# Patient Record
Sex: FEMALE | Race: WHITE | NOT HISPANIC OR LATINO
[De-identification: names, ages, dates, MRNs, and addresses within clinical notes are randomized per-mention and may not be internally consistent; named-entity substitution may affect disease eponyms.]

---

## 2017-04-05 ENCOUNTER — APPOINTMENT (OUTPATIENT)
Dept: CARDIOLOGY | Facility: CLINIC | Age: 82
End: 2017-04-05

## 2017-04-05 VITALS — DIASTOLIC BLOOD PRESSURE: 70 MMHG | HEART RATE: 68 BPM | SYSTOLIC BLOOD PRESSURE: 110 MMHG

## 2017-04-05 VITALS — WEIGHT: 147 LBS | BODY MASS INDEX: 23.07 KG/M2 | HEIGHT: 67 IN

## 2017-05-03 ENCOUNTER — APPOINTMENT (OUTPATIENT)
Dept: CARDIOLOGY | Facility: CLINIC | Age: 82
End: 2017-05-03

## 2017-06-15 ENCOUNTER — OUTPATIENT (OUTPATIENT)
Dept: OUTPATIENT SERVICES | Facility: HOSPITAL | Age: 82
LOS: 1 days | Discharge: HOME | End: 2017-06-15

## 2017-06-28 DIAGNOSIS — E03.9 HYPOTHYROIDISM, UNSPECIFIED: ICD-10-CM

## 2017-06-29 ENCOUNTER — EMERGENCY (EMERGENCY)
Facility: HOSPITAL | Age: 82
LOS: 0 days | Discharge: HOME | End: 2017-06-29
Admitting: INTERNAL MEDICINE

## 2017-06-29 DIAGNOSIS — K21.9 GASTRO-ESOPHAGEAL REFLUX DISEASE WITHOUT ESOPHAGITIS: ICD-10-CM
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
## 2017-06-29 DIAGNOSIS — I25.10 ATHEROSCLEROTIC HEART DISEASE OF NATIVE CORONARY ARTERY WITHOUT ANGINA PECTORIS: ICD-10-CM

## 2017-06-29 DIAGNOSIS — I48.91 UNSPECIFIED ATRIAL FIBRILLATION: ICD-10-CM

## 2017-06-29 DIAGNOSIS — Z79.899 OTHER LONG TERM (CURRENT) DRUG THERAPY: ICD-10-CM

## 2017-06-29 DIAGNOSIS — Z88.2 ALLERGY STATUS TO SULFONAMIDES: ICD-10-CM

## 2017-06-29 DIAGNOSIS — S60.512A ABRASION OF LEFT HAND, INITIAL ENCOUNTER: ICD-10-CM

## 2017-07-10 ENCOUNTER — RX RENEWAL (OUTPATIENT)
Age: 82
End: 2017-07-10

## 2017-07-11 ENCOUNTER — EMERGENCY (EMERGENCY)
Facility: HOSPITAL | Age: 82
LOS: 0 days | Discharge: HOME | End: 2017-07-11
Admitting: INTERNAL MEDICINE

## 2017-07-11 DIAGNOSIS — Z79.899 OTHER LONG TERM (CURRENT) DRUG THERAPY: ICD-10-CM

## 2017-07-11 DIAGNOSIS — S80.12XA CONTUSION OF LEFT LOWER LEG, INITIAL ENCOUNTER: ICD-10-CM
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
## 2017-07-11 DIAGNOSIS — Z88.2 ALLERGY STATUS TO SULFONAMIDES: ICD-10-CM
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
## 2017-07-11 DIAGNOSIS — K21.9 GASTRO-ESOPHAGEAL REFLUX DISEASE WITHOUT ESOPHAGITIS: ICD-10-CM
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
## 2017-07-13 ENCOUNTER — OUTPATIENT (OUTPATIENT)
Dept: OUTPATIENT SERVICES | Facility: HOSPITAL | Age: 82
LOS: 1 days | Discharge: HOME | End: 2017-07-13

## 2017-07-13 DIAGNOSIS — E03.9 HYPOTHYROIDISM, UNSPECIFIED: ICD-10-CM

## 2017-07-13 DIAGNOSIS — I48.92 UNSPECIFIED ATRIAL FLUTTER: ICD-10-CM

## 2017-07-13 DIAGNOSIS — M43.06 SPONDYLOLYSIS, LUMBAR REGION: ICD-10-CM

## 2017-07-13 DIAGNOSIS — J45.909 UNSPECIFIED ASTHMA, UNCOMPLICATED: ICD-10-CM

## 2017-07-13 DIAGNOSIS — N28.1 CYST OF KIDNEY, ACQUIRED: ICD-10-CM

## 2017-07-14 ENCOUNTER — APPOINTMENT (OUTPATIENT)
Dept: CARDIOLOGY | Facility: CLINIC | Age: 82
End: 2017-07-14

## 2017-07-14 VITALS — HEART RATE: 76 BPM | SYSTOLIC BLOOD PRESSURE: 136 MMHG | DIASTOLIC BLOOD PRESSURE: 80 MMHG

## 2017-07-14 VITALS — WEIGHT: 148 LBS | BODY MASS INDEX: 23.23 KG/M2 | HEIGHT: 67 IN

## 2017-07-17 ENCOUNTER — OUTPATIENT (OUTPATIENT)
Dept: OUTPATIENT SERVICES | Facility: HOSPITAL | Age: 82
LOS: 1 days | Discharge: HOME | End: 2017-07-17

## 2017-07-17 DIAGNOSIS — T14.8 OTHER INJURY OF UNSPECIFIED BODY REGION: ICD-10-CM

## 2017-07-18 ENCOUNTER — RX RENEWAL (OUTPATIENT)
Age: 82
End: 2017-07-18

## 2017-07-31 ENCOUNTER — RX RENEWAL (OUTPATIENT)
Age: 82
End: 2017-07-31

## 2017-08-24 ENCOUNTER — APPOINTMENT (OUTPATIENT)
Dept: CARDIOLOGY | Facility: CLINIC | Age: 82
End: 2017-08-24

## 2017-08-24 VITALS — DIASTOLIC BLOOD PRESSURE: 80 MMHG | SYSTOLIC BLOOD PRESSURE: 130 MMHG | HEART RATE: 72 BPM

## 2017-08-24 VITALS — BODY MASS INDEX: 22.6 KG/M2 | HEIGHT: 67 IN | WEIGHT: 144 LBS

## 2017-08-26 ENCOUNTER — MEDICATION RENEWAL (OUTPATIENT)
Age: 82
End: 2017-08-26

## 2017-10-17 ENCOUNTER — EMERGENCY (EMERGENCY)
Facility: HOSPITAL | Age: 82
LOS: 0 days | Discharge: HOME | End: 2017-10-17
Admitting: INTERNAL MEDICINE

## 2017-10-17 DIAGNOSIS — Z79.899 OTHER LONG TERM (CURRENT) DRUG THERAPY: ICD-10-CM

## 2017-10-17 DIAGNOSIS — I25.10 ATHEROSCLEROTIC HEART DISEASE OF NATIVE CORONARY ARTERY WITHOUT ANGINA PECTORIS: ICD-10-CM

## 2017-10-17 DIAGNOSIS — Z88.2 ALLERGY STATUS TO SULFONAMIDES: ICD-10-CM

## 2017-10-17 DIAGNOSIS — M25.552 PAIN IN LEFT HIP: ICD-10-CM

## 2017-10-17 DIAGNOSIS — E03.9 HYPOTHYROIDISM, UNSPECIFIED: ICD-10-CM

## 2017-10-17 DIAGNOSIS — Z88.1 ALLERGY STATUS TO OTHER ANTIBIOTIC AGENTS STATUS: ICD-10-CM

## 2017-10-17 DIAGNOSIS — K21.9 GASTRO-ESOPHAGEAL REFLUX DISEASE WITHOUT ESOPHAGITIS: ICD-10-CM

## 2017-10-17 DIAGNOSIS — I48.91 UNSPECIFIED ATRIAL FIBRILLATION: ICD-10-CM

## 2017-10-24 ENCOUNTER — OUTPATIENT (OUTPATIENT)
Dept: OUTPATIENT SERVICES | Facility: HOSPITAL | Age: 82
LOS: 1 days | Discharge: HOME | End: 2017-10-24

## 2017-10-24 DIAGNOSIS — M25.50 PAIN IN UNSPECIFIED JOINT: ICD-10-CM

## 2017-11-10 ENCOUNTER — OUTPATIENT (OUTPATIENT)
Dept: OUTPATIENT SERVICES | Facility: HOSPITAL | Age: 82
LOS: 1 days | Discharge: HOME | End: 2017-11-10

## 2017-11-10 DIAGNOSIS — Z00.00 ENCOUNTER FOR GENERAL ADULT MEDICAL EXAMINATION WITHOUT ABNORMAL FINDINGS: ICD-10-CM

## 2017-11-10 DIAGNOSIS — R00.2 PALPITATIONS: ICD-10-CM

## 2017-11-10 DIAGNOSIS — I34.0 NONRHEUMATIC MITRAL (VALVE) INSUFFICIENCY: ICD-10-CM

## 2017-11-10 DIAGNOSIS — I31.9 DISEASE OF PERICARDIUM, UNSPECIFIED: ICD-10-CM

## 2017-11-10 DIAGNOSIS — K57.32 DIVERTICULITIS OF LARGE INTESTINE WITHOUT PERFORATION OR ABSCESS WITHOUT BLEEDING: ICD-10-CM

## 2017-11-10 DIAGNOSIS — J45.909 UNSPECIFIED ASTHMA, UNCOMPLICATED: ICD-10-CM

## 2017-11-10 DIAGNOSIS — I48.91 UNSPECIFIED ATRIAL FIBRILLATION: ICD-10-CM

## 2017-11-10 DIAGNOSIS — K21.9 GASTRO-ESOPHAGEAL REFLUX DISEASE WITHOUT ESOPHAGITIS: ICD-10-CM

## 2017-11-10 DIAGNOSIS — M65.80 OTHER SYNOVITIS AND TENOSYNOVITIS, UNSPECIFIED SITE: ICD-10-CM

## 2017-11-10 DIAGNOSIS — N28.1 CYST OF KIDNEY, ACQUIRED: ICD-10-CM

## 2017-11-16 ENCOUNTER — EMERGENCY (EMERGENCY)
Facility: HOSPITAL | Age: 82
LOS: 0 days | Discharge: HOME | End: 2017-11-16
Admitting: INTERNAL MEDICINE

## 2017-11-16 DIAGNOSIS — S81.811A LACERATION WITHOUT FOREIGN BODY, RIGHT LOWER LEG, INITIAL ENCOUNTER: ICD-10-CM

## 2017-11-16 DIAGNOSIS — I25.10 ATHEROSCLEROTIC HEART DISEASE OF NATIVE CORONARY ARTERY WITHOUT ANGINA PECTORIS: ICD-10-CM
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
## 2017-11-16 DIAGNOSIS — Z88.2 ALLERGY STATUS TO SULFONAMIDES: ICD-10-CM
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
## 2017-11-16 DIAGNOSIS — I48.91 UNSPECIFIED ATRIAL FIBRILLATION: ICD-10-CM

## 2017-11-21 DIAGNOSIS — Z79.899 OTHER LONG TERM (CURRENT) DRUG THERAPY: ICD-10-CM

## 2017-11-22 ENCOUNTER — EMERGENCY (EMERGENCY)
Facility: HOSPITAL | Age: 82
LOS: 0 days | Discharge: HOME | End: 2017-11-22

## 2017-11-22 DIAGNOSIS — K21.9 GASTRO-ESOPHAGEAL REFLUX DISEASE WITHOUT ESOPHAGITIS: ICD-10-CM

## 2017-11-22 DIAGNOSIS — Z79.899 OTHER LONG TERM (CURRENT) DRUG THERAPY: ICD-10-CM
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
## 2017-11-22 DIAGNOSIS — I25.10 ATHEROSCLEROTIC HEART DISEASE OF NATIVE CORONARY ARTERY WITHOUT ANGINA PECTORIS: ICD-10-CM

## 2017-11-22 DIAGNOSIS — Z88.2 ALLERGY STATUS TO SULFONAMIDES: ICD-10-CM

## 2017-11-22 DIAGNOSIS — Z79.82 LONG TERM (CURRENT) USE OF ASPIRIN: ICD-10-CM
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
## 2017-11-22 DIAGNOSIS — S80.822A BLISTER (NONTHERMAL), LEFT LOWER LEG, INITIAL ENCOUNTER: ICD-10-CM

## 2017-11-27 ENCOUNTER — OUTPATIENT (OUTPATIENT)
Dept: OUTPATIENT SERVICES | Facility: HOSPITAL | Age: 82
LOS: 1 days | Discharge: HOME | End: 2017-11-27

## 2017-11-27 DIAGNOSIS — R94.6 ABNORMAL RESULTS OF THYROID FUNCTION STUDIES: ICD-10-CM

## 2017-11-30 ENCOUNTER — APPOINTMENT (OUTPATIENT)
Dept: CARDIOLOGY | Facility: CLINIC | Age: 82
End: 2017-11-30

## 2017-11-30 VITALS — SYSTOLIC BLOOD PRESSURE: 110 MMHG | HEART RATE: 84 BPM | DIASTOLIC BLOOD PRESSURE: 70 MMHG

## 2017-11-30 VITALS — BODY MASS INDEX: 22.76 KG/M2 | HEIGHT: 67 IN | WEIGHT: 145 LBS

## 2017-11-30 DIAGNOSIS — M85.80 OTHER SPECIFIED DISORDERS OF BONE DENSITY AND STRUCTURE, UNSPECIFIED SITE: ICD-10-CM

## 2017-11-30 DIAGNOSIS — T14.8XXA OTHER INJURY OF UNSPECIFIED BODY REGION, INITIAL ENCOUNTER: ICD-10-CM

## 2017-12-10 ENCOUNTER — OUTPATIENT (OUTPATIENT)
Dept: OUTPATIENT SERVICES | Facility: HOSPITAL | Age: 82
LOS: 1 days | Discharge: HOME | End: 2017-12-10

## 2017-12-10 DIAGNOSIS — R05 COUGH: ICD-10-CM

## 2017-12-14 ENCOUNTER — APPOINTMENT (OUTPATIENT)
Age: 82
End: 2017-12-14

## 2017-12-14 ENCOUNTER — OUTPATIENT (OUTPATIENT)
Dept: OUTPATIENT SERVICES | Facility: HOSPITAL | Age: 82
LOS: 1 days | Discharge: HOME | End: 2017-12-14
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
## 2017-12-14 DIAGNOSIS — S81.801A UNSPECIFIED OPEN WOUND, RIGHT LOWER LEG, INITIAL ENCOUNTER: ICD-10-CM

## 2018-01-11 ENCOUNTER — APPOINTMENT (OUTPATIENT)
Age: 83
End: 2018-01-11

## 2018-01-11 ENCOUNTER — OUTPATIENT (OUTPATIENT)
Dept: OUTPATIENT SERVICES | Facility: HOSPITAL | Age: 83
LOS: 1 days | Discharge: HOME | End: 2018-01-11

## 2018-01-11 DIAGNOSIS — S81.801A UNSPECIFIED OPEN WOUND, RIGHT LOWER LEG, INITIAL ENCOUNTER: ICD-10-CM
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
## 2018-01-18 PROBLEM — S81.801A OPEN WOUND OF RIGHT LOWER LEG, INITIAL ENCOUNTER: Status: ACTIVE | Noted: 2017-12-29

## 2018-01-19 ENCOUNTER — OUTPATIENT (OUTPATIENT)
Dept: OUTPATIENT SERVICES | Facility: HOSPITAL | Age: 83
LOS: 1 days | Discharge: HOME | End: 2018-01-19

## 2018-01-19 DIAGNOSIS — M54.5 LOW BACK PAIN: ICD-10-CM

## 2018-02-01 ENCOUNTER — OUTPATIENT (OUTPATIENT)
Dept: OUTPATIENT SERVICES | Facility: HOSPITAL | Age: 83
LOS: 1 days | Discharge: HOME | End: 2018-02-01

## 2018-02-01 ENCOUNTER — APPOINTMENT (OUTPATIENT)
Age: 83
End: 2018-02-01
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
## 2018-02-12 DIAGNOSIS — S81.802A UNSPECIFIED OPEN WOUND, LEFT LOWER LEG, INITIAL ENCOUNTER: ICD-10-CM
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
## 2018-02-26 ENCOUNTER — LABORATORY RESULT (OUTPATIENT)
Age: 83
End: 2018-02-26

## 2018-02-26 ENCOUNTER — OUTPATIENT (OUTPATIENT)
Dept: OUTPATIENT SERVICES | Facility: HOSPITAL | Age: 83
LOS: 1 days | Discharge: HOME | End: 2018-02-26

## 2018-02-26 DIAGNOSIS — K21.9 GASTRO-ESOPHAGEAL REFLUX DISEASE WITHOUT ESOPHAGITIS: ICD-10-CM

## 2018-02-26 DIAGNOSIS — N28.1 CYST OF KIDNEY, ACQUIRED: ICD-10-CM

## 2018-02-26 DIAGNOSIS — I34.0 NONRHEUMATIC MITRAL (VALVE) INSUFFICIENCY: ICD-10-CM

## 2018-02-26 DIAGNOSIS — K57.32 DIVERTICULITIS OF LARGE INTESTINE WITHOUT PERFORATION OR ABSCESS WITHOUT BLEEDING: ICD-10-CM

## 2018-02-26 DIAGNOSIS — I48.91 UNSPECIFIED ATRIAL FIBRILLATION: ICD-10-CM

## 2018-02-26 DIAGNOSIS — I31.9 DISEASE OF PERICARDIUM, UNSPECIFIED: ICD-10-CM

## 2018-02-26 DIAGNOSIS — E03.9 HYPOTHYROIDISM, UNSPECIFIED: ICD-10-CM

## 2018-02-26 DIAGNOSIS — M85.80 OTHER SPECIFIED DISORDERS OF BONE DENSITY AND STRUCTURE, UNSPECIFIED SITE: ICD-10-CM

## 2018-02-26 DIAGNOSIS — T14.8XXA OTHER INJURY OF UNSPECIFIED BODY REGION, INITIAL ENCOUNTER: ICD-10-CM

## 2018-02-26 DIAGNOSIS — M43.06 SPONDYLOLYSIS, LUMBAR REGION: ICD-10-CM

## 2018-02-26 DIAGNOSIS — J45.909 UNSPECIFIED ASTHMA, UNCOMPLICATED: ICD-10-CM

## 2018-02-26 DIAGNOSIS — R00.2 PALPITATIONS: ICD-10-CM

## 2018-03-01 ENCOUNTER — OUTPATIENT (OUTPATIENT)
Dept: OUTPATIENT SERVICES | Facility: HOSPITAL | Age: 83
LOS: 1 days | Discharge: HOME | End: 2018-03-01

## 2018-03-01 ENCOUNTER — RX RENEWAL (OUTPATIENT)
Age: 83
End: 2018-03-01

## 2018-03-01 ENCOUNTER — APPOINTMENT (OUTPATIENT)
Age: 83
End: 2018-03-01

## 2018-03-08 DIAGNOSIS — S61.205A UNSPECIFIED OPEN WOUND OF LEFT RING FINGER WITHOUT DAMAGE TO NAIL, INITIAL ENCOUNTER: ICD-10-CM
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
## 2018-03-09 ENCOUNTER — OUTPATIENT (OUTPATIENT)
Dept: OUTPATIENT SERVICES | Facility: HOSPITAL | Age: 83
LOS: 1 days | Discharge: HOME | End: 2018-03-09

## 2018-03-09 ENCOUNTER — APPOINTMENT (OUTPATIENT)
Dept: CARDIOLOGY | Facility: CLINIC | Age: 83
End: 2018-03-09

## 2018-03-09 VITALS — WEIGHT: 145 LBS | BODY MASS INDEX: 22.76 KG/M2 | HEIGHT: 67 IN

## 2018-03-09 VITALS — DIASTOLIC BLOOD PRESSURE: 76 MMHG | SYSTOLIC BLOOD PRESSURE: 124 MMHG | HEART RATE: 98 BPM

## 2018-03-09 DIAGNOSIS — F03.90 UNSPECIFIED DEMENTIA WITHOUT BEHAVIORAL DISTURBANCE: ICD-10-CM

## 2018-03-09 DIAGNOSIS — E03.9 HYPOTHYROIDISM, UNSPECIFIED: ICD-10-CM

## 2018-03-11 LAB
FOLATE SERPL-MCNC: >20 NG/ML
T4 FREE SERPL-MCNC: 1.6 NG/DL
TSH SERPL-ACNC: 2.9 UIU/ML
VIT B12 SERPL-MCNC: 389 PG/ML

## 2018-03-15 ENCOUNTER — OUTPATIENT (OUTPATIENT)
Dept: OUTPATIENT SERVICES | Facility: HOSPITAL | Age: 83
LOS: 1 days | Discharge: HOME | End: 2018-03-15

## 2018-03-15 ENCOUNTER — APPOINTMENT (OUTPATIENT)
Age: 83
End: 2018-03-15

## 2018-03-20 ENCOUNTER — APPOINTMENT (OUTPATIENT)
Dept: PLASTIC SURGERY | Facility: CLINIC | Age: 83
End: 2018-03-20
Payer: MEDICARE

## 2018-03-20 PROCEDURE — 99203 OFFICE O/P NEW LOW 30 MIN: CPT

## 2018-03-23 ENCOUNTER — OUTPATIENT (OUTPATIENT)
Dept: OUTPATIENT SERVICES | Facility: HOSPITAL | Age: 83
LOS: 1 days | Discharge: HOME | End: 2018-03-23

## 2018-03-23 DIAGNOSIS — S61.209A UNSPECIFIED OPEN WOUND OF UNSPECIFIED FINGER WITHOUT DAMAGE TO NAIL, INITIAL ENCOUNTER: ICD-10-CM

## 2018-03-26 ENCOUNTER — EMERGENCY (EMERGENCY)
Facility: HOSPITAL | Age: 83
LOS: 0 days | Discharge: HOME | End: 2018-03-26
Attending: EMERGENCY MEDICINE

## 2018-03-26 VITALS
RESPIRATION RATE: 18 BRPM | HEART RATE: 87 BPM | OXYGEN SATURATION: 98 % | DIASTOLIC BLOOD PRESSURE: 66 MMHG | TEMPERATURE: 97 F | SYSTOLIC BLOOD PRESSURE: 133 MMHG
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
## 2018-03-26 DIAGNOSIS — R05 COUGH: ICD-10-CM

## 2018-03-26 DIAGNOSIS — S61.205A UNSPECIFIED OPEN WOUND OF LEFT RING FINGER WITHOUT DAMAGE TO NAIL, INITIAL ENCOUNTER: ICD-10-CM

## 2018-03-26 DIAGNOSIS — J06.9 ACUTE UPPER RESPIRATORY INFECTION, UNSPECIFIED: ICD-10-CM

## 2018-03-26 DIAGNOSIS — L08.9 LOCAL INFECTION OF THE SKIN AND SUBCUTANEOUS TISSUE, UNSPECIFIED: ICD-10-CM

## 2018-03-26 DIAGNOSIS — Z79.899 OTHER LONG TERM (CURRENT) DRUG THERAPY: ICD-10-CM
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
## 2018-03-26 DIAGNOSIS — Z88.2 ALLERGY STATUS TO SULFONAMIDES: ICD-10-CM

## 2018-03-26 RX ORDER — DEXAMETHASONE 0.5 MG/5ML
12 ELIXIR ORAL ONCE
Qty: 0 | Refills: 0 | Status: COMPLETED | OUTPATIENT
Start: 2018-03-26 | End: 2018-03-26

## 2018-03-26 RX ADMIN — Medication 12 MILLIGRAM(S): at 19:49

## 2018-03-26 NOTE — ED PROVIDER NOTE - NS ED ROS FT
Constitutional: see HPI  Cardiac: No chest pain, SOB or edema.  Respiratory: see HPI  GI: No nausea, vomiting, diarrhea or abdominal pain.  : No dysuria, frequency, urgency or hematuria  MS: no pain to back or extremities, no loss of ROM, no weakness  Neuro: No headache or weakness. No LOC.  Skin: No skin rash.  Except as documented in the HPI, all other systems are negative.

## 2018-03-26 NOTE — ED PROVIDER NOTE - OBJECTIVE STATEMENT
83 yo F with no significant history, here for assessment of cough, congestion x 3 days. Cough with white sputum, no associated fever, chills, chest pain, dizziness, weakness. No vomiting or diarrhea. Best friend whom she lives with (is a nun) has same sx, resolving.

## 2018-03-26 NOTE — ED PROVIDER NOTE - PHYSICAL EXAMINATION
VITAL SIGNS: I have reviewed nursing notes and confirm.  CONSTITUTIONAL: Well-developed; well-nourished; in no acute distress.  SKIN: Skin exam is warm and dry, no acute rash.  HEAD: Normocephalic; atraumatic.  EYES: PERRL, EOM intact; conjunctiva and sclera clear.  ENT: clear rhinorrhea  NECK: Supple; non tender.  CARD: Regular rate and rhythm.  RESP: No wheezes, rales or rhonchi.  ABD: Normal bowel sounds; soft; non-distended; non-tender  EXT: Normal ROM. No clubbing, cyanosis or edema.  NEURO: Alert, oriented. Grossly unremarkable. No focal deficits.  PSYCH: Cooperative, appropriate.

## 2018-03-26 NOTE — ED PROVIDER NOTE - MEDICAL DECISION MAKING DETAILS
Patient here for assessment of cough, congestion -- has normal VS, clear lungs, edematous turbinates and clear rhinorrhea. Will get CXR, give dex and reassess.

## 2018-04-05 ENCOUNTER — OUTPATIENT (OUTPATIENT)
Dept: OUTPATIENT SERVICES | Facility: HOSPITAL | Age: 83
LOS: 1 days | Discharge: HOME | End: 2018-04-05

## 2018-04-05 ENCOUNTER — APPOINTMENT (OUTPATIENT)
Age: 83
End: 2018-04-05

## 2018-04-10 ENCOUNTER — OUTPATIENT (OUTPATIENT)
Dept: OUTPATIENT SERVICES | Facility: HOSPITAL | Age: 83
LOS: 1 days | Discharge: HOME | End: 2018-04-10

## 2018-04-10 DIAGNOSIS — M79.671 PAIN IN RIGHT FOOT: ICD-10-CM

## 2018-04-10 DIAGNOSIS — E03.9 HYPOTHYROIDISM, UNSPECIFIED: ICD-10-CM
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
## 2018-04-11 DIAGNOSIS — S81.801A UNSPECIFIED OPEN WOUND, RIGHT LOWER LEG, INITIAL ENCOUNTER: ICD-10-CM

## 2018-04-11 DIAGNOSIS — S81.802A UNSPECIFIED OPEN WOUND, LEFT LOWER LEG, INITIAL ENCOUNTER: ICD-10-CM
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
## 2018-04-17 ENCOUNTER — MEDICATION RENEWAL (OUTPATIENT)
Age: 83
End: 2018-04-17

## 2018-04-18 DIAGNOSIS — Z02.9 ENCOUNTER FOR ADMINISTRATIVE EXAMINATIONS, UNSPECIFIED: ICD-10-CM

## 2018-04-18 DIAGNOSIS — E03.9 HYPOTHYROIDISM, UNSPECIFIED: ICD-10-CM

## 2018-04-19 ENCOUNTER — APPOINTMENT (OUTPATIENT)
Dept: WOUND CARE | Facility: CLINIC | Age: 83
End: 2018-04-19

## 2018-04-19 ENCOUNTER — OUTPATIENT (OUTPATIENT)
Dept: OUTPATIENT SERVICES | Facility: HOSPITAL | Age: 83
LOS: 1 days | Discharge: HOME | End: 2018-04-19

## 2018-04-24 ENCOUNTER — APPOINTMENT (OUTPATIENT)
Dept: PLASTIC SURGERY | Facility: CLINIC | Age: 83
End: 2018-04-24
Payer: MEDICARE

## 2018-04-24 DIAGNOSIS — S61.209A UNSPECIFIED OPEN WOUND OF UNSPECIFIED FINGER W/OUT DAMAGE TO NAIL, INITIAL ENCOUNTER: ICD-10-CM

## 2018-04-24 PROCEDURE — 99212 OFFICE O/P EST SF 10 MIN: CPT
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
## 2018-04-30 DIAGNOSIS — S81.801A UNSPECIFIED OPEN WOUND, RIGHT LOWER LEG, INITIAL ENCOUNTER: ICD-10-CM
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
## 2018-05-01 ENCOUNTER — OUTPATIENT (OUTPATIENT)
Dept: OUTPATIENT SERVICES | Facility: HOSPITAL | Age: 83
LOS: 1 days | Discharge: HOME | End: 2018-05-01

## 2018-05-01 DIAGNOSIS — E03.9 HYPOTHYROIDISM, UNSPECIFIED: ICD-10-CM

## 2018-05-01 DIAGNOSIS — D64.9 ANEMIA, UNSPECIFIED: ICD-10-CM

## 2018-05-03 ENCOUNTER — OUTPATIENT (OUTPATIENT)
Dept: OUTPATIENT SERVICES | Facility: HOSPITAL | Age: 83
LOS: 1 days | Discharge: HOME | End: 2018-05-03

## 2018-05-03 DIAGNOSIS — M19.90 UNSPECIFIED OSTEOARTHRITIS, UNSPECIFIED SITE: ICD-10-CM

## 2018-05-03 DIAGNOSIS — M85.80 OTHER SPECIFIED DISORDERS OF BONE DENSITY AND STRUCTURE, UNSPECIFIED SITE: ICD-10-CM

## 2018-05-22 ENCOUNTER — APPOINTMENT (OUTPATIENT)
Dept: WOUND CARE | Facility: CLINIC | Age: 83
End: 2018-05-22

## 2018-05-22 ENCOUNTER — OUTPATIENT (OUTPATIENT)
Dept: OUTPATIENT SERVICES | Facility: HOSPITAL | Age: 83
LOS: 1 days | Discharge: HOME | End: 2018-05-22

## 2018-05-29 ENCOUNTER — OUTPATIENT (OUTPATIENT)
Dept: OUTPATIENT SERVICES | Facility: HOSPITAL | Age: 83
LOS: 1 days | Discharge: HOME | End: 2018-05-29

## 2018-05-29 DIAGNOSIS — I48.91 UNSPECIFIED ATRIAL FIBRILLATION: ICD-10-CM

## 2018-05-29 DIAGNOSIS — L98.499 NON-PRESSURE CHRONIC ULCER OF SKIN OF OTHER SITES WITH UNSPECIFIED SEVERITY: ICD-10-CM

## 2018-05-29 DIAGNOSIS — S81.801A UNSPECIFIED OPEN WOUND, RIGHT LOWER LEG, INITIAL ENCOUNTER: ICD-10-CM

## 2018-05-29 DIAGNOSIS — K57.32 DIVERTICULITIS OF LARGE INTESTINE WITHOUT PERFORATION OR ABSCESS WITHOUT BLEEDING: ICD-10-CM

## 2018-05-29 DIAGNOSIS — K21.9 GASTRO-ESOPHAGEAL REFLUX DISEASE WITHOUT ESOPHAGITIS: ICD-10-CM

## 2018-05-29 DIAGNOSIS — E03.9 HYPOTHYROIDISM, UNSPECIFIED: ICD-10-CM

## 2018-05-29 DIAGNOSIS — I31.9 DISEASE OF PERICARDIUM, UNSPECIFIED: ICD-10-CM

## 2018-05-29 DIAGNOSIS — I48.92 UNSPECIFIED ATRIAL FLUTTER: ICD-10-CM

## 2018-05-29 DIAGNOSIS — S81.802D UNSPECIFIED OPEN WOUND, LEFT LOWER LEG, SUBSEQUENT ENCOUNTER: ICD-10-CM

## 2018-05-29 DIAGNOSIS — R00.2 PALPITATIONS: ICD-10-CM

## 2018-05-29 DIAGNOSIS — I34.0 NONRHEUMATIC MITRAL (VALVE) INSUFFICIENCY: ICD-10-CM

## 2018-05-29 DIAGNOSIS — L08.9 LOCAL INFECTION OF THE SKIN AND SUBCUTANEOUS TISSUE, UNSPECIFIED: ICD-10-CM
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
## 2018-05-31 DIAGNOSIS — S81.801A UNSPECIFIED OPEN WOUND, RIGHT LOWER LEG, INITIAL ENCOUNTER: ICD-10-CM
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
## 2018-06-02 LAB
ALBUMIN SERPL ELPH-MCNC: 4.1 G/DL
ALP BLD-CCNC: 71 U/L
ALT SERPL-CCNC: 17 U/L
ANION GAP SERPL CALC-SCNC: 17 MMOL/L
AST SERPL-CCNC: 21 U/L
BASOPHILS # BLD AUTO: 0.04 K/UL
BASOPHILS NFR BLD AUTO: 0.6 %
BILIRUB SERPL-MCNC: 0.4 MG/DL
BUN SERPL-MCNC: 25 MG/DL
CALCIUM SERPL-MCNC: 9.5 MG/DL
CHLORIDE SERPL-SCNC: 106 MMOL/L
CHOLEST SERPL-MCNC: 186 MG/DL
CHOLEST/HDLC SERPL: 2 RATIO
CO2 SERPL-SCNC: 22 MMOL/L
CREAT SERPL-MCNC: 1.1 MG/DL
EOSINOPHIL # BLD AUTO: 0.07 K/UL
EOSINOPHIL NFR BLD AUTO: 1.1 %
GLUCOSE SERPL-MCNC: 113 MG/DL
HCT VFR BLD CALC: 37 %
HDLC SERPL-MCNC: 93 MG/DL
HGB BLD-MCNC: 11.6 G/DL
IMM GRANULOCYTES NFR BLD AUTO: 0.3 %
LDLC SERPL CALC-MCNC: 84 MG/DL
LYMPHOCYTES # BLD AUTO: 1.54 K/UL
LYMPHOCYTES NFR BLD AUTO: 23.5 %
MAN DIFF?: NORMAL
MCHC RBC-ENTMCNC: 29.4 PG
MCHC RBC-ENTMCNC: 31.4 G/DL
MCV RBC AUTO: 93.7 FL
MONOCYTES # BLD AUTO: 0.43 K/UL
MONOCYTES NFR BLD AUTO: 6.6 %
NEUTROPHILS # BLD AUTO: 4.45 K/UL
NEUTROPHILS NFR BLD AUTO: 67.9 %
PLATELET # BLD AUTO: 240 K/UL
POTASSIUM SERPL-SCNC: 4.3 MMOL/L
PROT SERPL-MCNC: 6.5 G/DL
RBC # BLD: 3.95 M/UL
RBC # FLD: 13.5 %
SODIUM SERPL-SCNC: 145 MMOL/L
T4 FREE SERPL-MCNC: 1.6 NG/DL
TRIGL SERPL-MCNC: 66 MG/DL
TSH SERPL-ACNC: 1.05 UIU/ML
WBC # FLD AUTO: 6.55 K/UL

## 2018-06-14 ENCOUNTER — OUTPATIENT (OUTPATIENT)
Dept: OUTPATIENT SERVICES | Facility: HOSPITAL | Age: 83
LOS: 1 days | Discharge: HOME | End: 2018-06-14

## 2018-06-14 ENCOUNTER — APPOINTMENT (OUTPATIENT)
Dept: BURN CARE | Facility: CLINIC | Age: 83
End: 2018-06-14

## 2018-06-14 DIAGNOSIS — S51.801A UNSPECIFIED OPEN WOUND OF RIGHT FOREARM, INITIAL ENCOUNTER: ICD-10-CM

## 2018-06-14 DIAGNOSIS — S41.101A UNSPECIFIED OPEN WOUND OF RIGHT UPPER ARM, INITIAL ENCOUNTER: ICD-10-CM

## 2018-06-15 ENCOUNTER — APPOINTMENT (OUTPATIENT)
Dept: CARDIOLOGY | Facility: CLINIC | Age: 83
End: 2018-06-15

## 2018-06-15 VITALS
WEIGHT: 143 LBS | SYSTOLIC BLOOD PRESSURE: 130 MMHG | HEIGHT: 67 IN | BODY MASS INDEX: 22.44 KG/M2 | HEART RATE: 87 BPM | DIASTOLIC BLOOD PRESSURE: 80 MMHG

## 2018-06-28 ENCOUNTER — OUTPATIENT (OUTPATIENT)
Dept: OUTPATIENT SERVICES | Facility: HOSPITAL | Age: 83
LOS: 1 days | Discharge: HOME | End: 2018-06-28

## 2018-06-28 ENCOUNTER — APPOINTMENT (OUTPATIENT)
Dept: BURN CARE | Facility: CLINIC | Age: 83
End: 2018-06-28

## 2018-06-28 DIAGNOSIS — S81.802A UNSPECIFIED OPEN WOUND, LEFT LOWER LEG, INITIAL ENCOUNTER: ICD-10-CM
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
## 2018-06-28 DIAGNOSIS — S81.801A UNSPECIFIED OPEN WOUND, RIGHT LOWER LEG, INITIAL ENCOUNTER: ICD-10-CM

## 2018-06-28 DIAGNOSIS — S51.801A UNSPECIFIED OPEN WOUND OF RIGHT FOREARM, INITIAL ENCOUNTER: ICD-10-CM

## 2018-07-05 ENCOUNTER — APPOINTMENT (OUTPATIENT)
Dept: CARDIOLOGY | Facility: CLINIC | Age: 83
End: 2018-07-05

## 2018-07-19 ENCOUNTER — OUTPATIENT (OUTPATIENT)
Dept: OUTPATIENT SERVICES | Facility: HOSPITAL | Age: 83
LOS: 1 days | Discharge: HOME | End: 2018-07-19

## 2018-07-19 ENCOUNTER — APPOINTMENT (OUTPATIENT)
Dept: CARDIOLOGY | Facility: CLINIC | Age: 83
End: 2018-07-19

## 2018-07-19 ENCOUNTER — APPOINTMENT (OUTPATIENT)
Dept: WOUND CARE | Facility: CLINIC | Age: 83
End: 2018-07-19
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
## 2018-07-30 DIAGNOSIS — S41.101D UNSPECIFIED OPEN WOUND OF RIGHT UPPER ARM, SUBSEQUENT ENCOUNTER: ICD-10-CM

## 2018-07-30 DIAGNOSIS — S81.801D UNSPECIFIED OPEN WOUND, RIGHT LOWER LEG, SUBSEQUENT ENCOUNTER: ICD-10-CM

## 2018-08-16 ENCOUNTER — MEDICATION RENEWAL (OUTPATIENT)
Age: 83
End: 2018-08-16

## 2018-08-30 ENCOUNTER — OUTPATIENT (OUTPATIENT)
Dept: OUTPATIENT SERVICES | Facility: HOSPITAL | Age: 83
LOS: 1 days | Discharge: HOME | End: 2018-08-30

## 2018-08-30 ENCOUNTER — APPOINTMENT (OUTPATIENT)
Dept: WOUND CARE | Facility: CLINIC | Age: 83
End: 2018-08-30
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
## 2018-08-30 DIAGNOSIS — S41.101A UNSPECIFIED OPEN WOUND OF RIGHT UPPER ARM, INITIAL ENCOUNTER: ICD-10-CM
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
## 2018-08-30 DIAGNOSIS — S81.802A UNSPECIFIED OPEN WOUND, LEFT LOWER LEG, INITIAL ENCOUNTER: ICD-10-CM

## 2018-08-30 DIAGNOSIS — S81.801A UNSPECIFIED OPEN WOUND, RIGHT LOWER LEG, INITIAL ENCOUNTER: ICD-10-CM

## 2018-09-04 ENCOUNTER — OUTPATIENT (OUTPATIENT)
Dept: OUTPATIENT SERVICES | Facility: HOSPITAL | Age: 83
LOS: 1 days | Discharge: HOME | End: 2018-09-04

## 2018-09-04 DIAGNOSIS — F03.90 UNSPECIFIED DEMENTIA WITHOUT BEHAVIORAL DISTURBANCE: ICD-10-CM

## 2018-09-04 DIAGNOSIS — S41.101A UNSPECIFIED OPEN WOUND OF RIGHT UPPER ARM, INITIAL ENCOUNTER: ICD-10-CM

## 2018-09-04 DIAGNOSIS — K57.32 DIVERTICULITIS OF LARGE INTESTINE WITHOUT PERFORATION OR ABSCESS WITHOUT BLEEDING: ICD-10-CM

## 2018-09-04 DIAGNOSIS — L98.499 NON-PRESSURE CHRONIC ULCER OF SKIN OF OTHER SITES WITH UNSPECIFIED SEVERITY: ICD-10-CM

## 2018-09-04 DIAGNOSIS — M19.90 UNSPECIFIED OSTEOARTHRITIS, UNSPECIFIED SITE: ICD-10-CM

## 2018-09-04 DIAGNOSIS — I34.0 NONRHEUMATIC MITRAL (VALVE) INSUFFICIENCY: ICD-10-CM

## 2018-09-04 DIAGNOSIS — J45.909 UNSPECIFIED ASTHMA, UNCOMPLICATED: ICD-10-CM

## 2018-09-04 DIAGNOSIS — S51.801A UNSPECIFIED OPEN WOUND OF RIGHT FOREARM, INITIAL ENCOUNTER: ICD-10-CM

## 2018-09-04 DIAGNOSIS — L08.9 LOCAL INFECTION OF THE SKIN AND SUBCUTANEOUS TISSUE, UNSPECIFIED: ICD-10-CM

## 2018-09-04 DIAGNOSIS — E03.9 HYPOTHYROIDISM, UNSPECIFIED: ICD-10-CM

## 2018-09-04 DIAGNOSIS — K21.9 GASTRO-ESOPHAGEAL REFLUX DISEASE WITHOUT ESOPHAGITIS: ICD-10-CM

## 2018-09-07 ENCOUNTER — APPOINTMENT (OUTPATIENT)
Dept: CARDIOLOGY | Facility: CLINIC | Age: 83
End: 2018-09-07

## 2018-09-10 LAB
ALBUMIN SERPL ELPH-MCNC: 4 G/DL
ALP BLD-CCNC: 73 U/L
ALT SERPL-CCNC: 12 U/L
ANION GAP SERPL CALC-SCNC: 16 MMOL/L
AST SERPL-CCNC: 19 U/L
BASOPHILS # BLD AUTO: 0.06 K/UL
BASOPHILS NFR BLD AUTO: 1.1 %
BILIRUB SERPL-MCNC: 0.4 MG/DL
BUN SERPL-MCNC: 25 MG/DL
CALCIUM SERPL-MCNC: 9.6 MG/DL
CHLORIDE SERPL-SCNC: 100 MMOL/L
CHOLEST SERPL-MCNC: 183 MG/DL
CHOLEST/HDLC SERPL: 2.2 RATIO
CO2 SERPL-SCNC: 23 MMOL/L
CREAT SERPL-MCNC: 1.2 MG/DL
EOSINOPHIL # BLD AUTO: 0.15 K/UL
EOSINOPHIL NFR BLD AUTO: 2.7 %
GLUCOSE SERPL-MCNC: 89 MG/DL
HCT VFR BLD CALC: 37.9 %
HDLC SERPL-MCNC: 83 MG/DL
HGB BLD-MCNC: 12.7 G/DL
LDLC SERPL CALC-MCNC: 95 MG/DL
LYMPHOCYTES # BLD AUTO: 1.78 K/UL
LYMPHOCYTES NFR BLD AUTO: 32.6 %
MAN DIFF?: NORMAL
MCHC RBC-ENTMCNC: 28.5 PG
MCHC RBC-ENTMCNC: 31.9 G/DL
MCV RBC AUTO: 89.2 FL
MONOCYTES # BLD AUTO: 0.35 K/UL
MONOCYTES NFR BLD AUTO: 6.4 %
NEUTROPHILS # BLD AUTO: 3.11 K/UL
NEUTROPHILS NFR BLD AUTO: 57 %
PLATELET # BLD AUTO: 216 K/UL
POTASSIUM SERPL-SCNC: 4.6 MMOL/L
PROT SERPL-MCNC: 6.8 G/DL
RBC # BLD: 4.25 M/UL
RBC # FLD: 13.4 %
SODIUM SERPL-SCNC: 139 MMOL/L
T4 FREE SERPL-MCNC: 0.9 NG/DL
TRIGL SERPL-MCNC: 93 MG/DL
TSH SERPL-ACNC: 6.21 UIU/ML
WBC # FLD AUTO: 5.46 K/UL

## 2018-09-11 ENCOUNTER — APPOINTMENT (OUTPATIENT)
Dept: CARDIOLOGY | Facility: CLINIC | Age: 83
End: 2018-09-11

## 2018-09-11 VITALS — BODY MASS INDEX: 22.29 KG/M2 | HEART RATE: 96 BPM | WEIGHT: 142 LBS | HEIGHT: 67 IN

## 2018-09-11 VITALS — SYSTOLIC BLOOD PRESSURE: 130 MMHG | DIASTOLIC BLOOD PRESSURE: 80 MMHG | HEART RATE: 96 BPM

## 2018-09-14 ENCOUNTER — APPOINTMENT (OUTPATIENT)
Dept: CARDIOLOGY | Facility: CLINIC | Age: 83
End: 2018-09-14

## 2018-09-18 ENCOUNTER — APPOINTMENT (OUTPATIENT)
Dept: BURN CARE | Facility: CLINIC | Age: 83
End: 2018-09-18

## 2018-09-18 ENCOUNTER — OUTPATIENT (OUTPATIENT)
Dept: OUTPATIENT SERVICES | Facility: HOSPITAL | Age: 83
LOS: 1 days | Discharge: HOME | End: 2018-09-18
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
## 2018-09-18 DIAGNOSIS — S81.802A UNSPECIFIED OPEN WOUND, LEFT LOWER LEG, INITIAL ENCOUNTER: ICD-10-CM
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
## 2018-09-20 ENCOUNTER — OUTPATIENT (OUTPATIENT)
Dept: OUTPATIENT SERVICES | Facility: HOSPITAL | Age: 83
LOS: 1 days | Discharge: HOME | End: 2018-09-20

## 2018-09-20 ENCOUNTER — APPOINTMENT (OUTPATIENT)
Dept: WOUND CARE | Facility: CLINIC | Age: 83
End: 2018-09-20
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
## 2018-09-20 DIAGNOSIS — S81.802A UNSPECIFIED OPEN WOUND, LEFT LOWER LEG, INITIAL ENCOUNTER: ICD-10-CM
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
## 2018-09-23 ENCOUNTER — FORM ENCOUNTER (OUTPATIENT)
Age: 83
End: 2018-09-23

## 2018-09-24 ENCOUNTER — OUTPATIENT (OUTPATIENT)
Dept: OUTPATIENT SERVICES | Facility: HOSPITAL | Age: 83
LOS: 1 days | Discharge: HOME | End: 2018-09-24

## 2018-09-24 DIAGNOSIS — I48.92 UNSPECIFIED ATRIAL FLUTTER: ICD-10-CM

## 2018-09-24 DIAGNOSIS — I48.91 UNSPECIFIED ATRIAL FIBRILLATION: ICD-10-CM

## 2018-10-11 ENCOUNTER — APPOINTMENT (OUTPATIENT)
Dept: WOUND CARE | Facility: CLINIC | Age: 83
End: 2018-10-11

## 2018-10-11 ENCOUNTER — OUTPATIENT (OUTPATIENT)
Dept: OUTPATIENT SERVICES | Facility: HOSPITAL | Age: 83
LOS: 1 days | Discharge: HOME | End: 2018-10-11
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
## 2018-10-22 DIAGNOSIS — S81.801A UNSPECIFIED OPEN WOUND, RIGHT LOWER LEG, INITIAL ENCOUNTER: ICD-10-CM

## 2018-10-29 ENCOUNTER — EMERGENCY (EMERGENCY)
Facility: HOSPITAL | Age: 83
LOS: 1 days | Discharge: HOME | End: 2018-10-29
Attending: EMERGENCY MEDICINE | Admitting: EMERGENCY MEDICINE

## 2018-10-29 VITALS — DIASTOLIC BLOOD PRESSURE: 66 MMHG | SYSTOLIC BLOOD PRESSURE: 150 MMHG

## 2018-10-29 VITALS
DIASTOLIC BLOOD PRESSURE: 70 MMHG | RESPIRATION RATE: 20 BRPM | OXYGEN SATURATION: 100 % | SYSTOLIC BLOOD PRESSURE: 161 MMHG | TEMPERATURE: 97 F | HEART RATE: 72 BPM

## 2018-10-29 DIAGNOSIS — Z88.2 ALLERGY STATUS TO SULFONAMIDES: ICD-10-CM

## 2018-10-29 DIAGNOSIS — Z79.01 LONG TERM (CURRENT) USE OF ANTICOAGULANTS: ICD-10-CM
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
## 2018-10-29 DIAGNOSIS — M54.9 DORSALGIA, UNSPECIFIED: ICD-10-CM

## 2018-10-29 DIAGNOSIS — S22.31XA FRACTURE OF ONE RIB, RIGHT SIDE, INITIAL ENCOUNTER FOR CLOSED FRACTURE: ICD-10-CM

## 2018-10-29 DIAGNOSIS — M25.551 PAIN IN RIGHT HIP: ICD-10-CM
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
## 2018-10-29 DIAGNOSIS — R10.9 UNSPECIFIED ABDOMINAL PAIN: ICD-10-CM
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
## 2018-10-29 RX ORDER — OXYCODONE AND ACETAMINOPHEN 5; 325 MG/1; MG/1
1 TABLET ORAL EVERY 4 HOURS
Qty: 0 | Refills: 0 | Status: DISCONTINUED | OUTPATIENT
Start: 2018-10-29 | End: 2018-10-29

## 2018-10-29 RX ADMIN — OXYCODONE AND ACETAMINOPHEN 1 TABLET(S): 5; 325 TABLET ORAL at 12:45

## 2018-10-29 NOTE — ED PROVIDER NOTE - OBJECTIVE STATEMENT
84 y/o female h/o dysrhythmia on eliquis p/w fall. fell on thursday, no presyncope/syncope, unsteady gait, dizziness, loc, vomiting, head trauma. Pt got up on her own. Consistently feeling pain in right flank, hip, ribs. Denies cp, sob, lightheadedness, dizziness, syncope, presyncope, vomiting. Pt is able to ambulate however is feeling pain.

## 2018-10-29 NOTE — ED PROVIDER NOTE - PHYSICAL EXAMINATION
Constitutional: Well developed, well nourished. NAD. Good general hygiene  Head: Atraumatic.  Eyes: PERRLA. EOMI without discomfort.   ENT: No nasal discharge. Mucous membranes moist.  Neck: Supple. Painless ROM.  Cardiovascular: Regular rhythm. Regular rate. Normal S1 and S2. No murmurs. 2+ pulses in all extremities.   Pulmonary: Normal respiratory rate and effort. Lungs clear to auscultation bilaterally. No wheezing, rales, or rhonchi. Bilateral, equal lung expansion.   Abdominal: Soft. Nondistended. Nontender. No rebound or guarding.   MS: Right flank, rib tenderness.   Skin: Mild ecchymosis right flank.   Neuro: AAOx3. No focal neurological deficits.

## 2018-10-29 NOTE — ED PROVIDER NOTE - NS ED ROS FT
Constitutional: No fever, chills, night sweats  Eyes:  No visual changes, eye pain or discharge.  ENMT:  No hearing changes, pain, no sore throat or runny nose, no difficulty swallowing  Cardiac:  No chest pain, SOB or edema. No chest pain with exertion.  Respiratory:  No cough or respiratory distress. No hemoptysis. No history of asthma or RAD.  GI:  No nausea, vomiting, diarrhea or abdominal pain.  :  No dysuria, frequency or burning.  MS:  Rt flank pain, hip pain, rib pain. Pt is able to ambulate.   Neuro:  No headache or weakness.  No LOC.  Skin:  No skin rash.   Endocrine: No history of thyroid disease or diabetes.

## 2018-10-29 NOTE — ED PROVIDER NOTE - ATTENDING CONTRIBUTION TO CARE
83 y F PMH dysrhythmia and pauses s/p loop recorder monitored by Dr. sauer, on Eliquis prophylaxis for stroke per patient, pw pain to right ribs, and flank, and posterior right hip worsening x 5 days since a mechanical fall from a chair as she kelly from the chair. No head injury or LOC. No SOB, cough, dizziness, abd pain, or other complaint.  Exam: NAD, NCAT, HEENT: mmm, EOMI, PERRLA, Neck: supple, nontender, nl ROM, Heart: RRR, no murmur, Lungs: BCTA, no signs of increased WOB, Abd: NTND, no guarding or rebound, no hernia palpated, no CVAT. MSK: Right flank and lower rib tenderness greatest at posterior axillary line ribs 8-11. left posterior iliac crest area tenderness. otherwise chest, back, and ext nontender, nl rom, no deformity, normal gait. Neuro: A&Ox3, normal strength, nl sensation throughout, normal speech.   A/P: eval for fractures, fluid collection suggesting blood. Patient is otherwise well appearing. Taking acetaminophen with some relief, also took naproxen with good relief.

## 2018-10-29 NOTE — ED PROVIDER NOTE - PROGRESS NOTE DETAILS
CT shows right 12th rib nondisplaced fx. Pain control for 3 days, pt encouraged for incentive spirometry, f/u w/ PMD. Pt given strict return precautions including syncope, presyncope, worsening pain, vomiting, chest pain, SOB.

## 2018-10-29 NOTE — ED ADULT NURSE NOTE - OBJECTIVE STATEMENT
Pt presents to ED c/o Back pain s/p twist injury a few days ago, pain more on right side. Pt ambulatory with steady gait no s/s distress observed.

## 2018-11-01 ENCOUNTER — OUTPATIENT (OUTPATIENT)
Dept: OUTPATIENT SERVICES | Facility: HOSPITAL | Age: 83
LOS: 1 days | Discharge: HOME | End: 2018-11-01

## 2018-11-01 ENCOUNTER — APPOINTMENT (OUTPATIENT)
Dept: WOUND CARE | Facility: CLINIC | Age: 83
End: 2018-11-01

## 2018-11-01 DIAGNOSIS — Z02.9 ENCOUNTER FOR ADMINISTRATIVE EXAMINATIONS, UNSPECIFIED: ICD-10-CM

## 2018-11-01 DIAGNOSIS — R94.6 ABNORMAL RESULTS OF THYROID FUNCTION STUDIES: ICD-10-CM
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
## 2018-11-01 DIAGNOSIS — S81.801D UNSPECIFIED OPEN WOUND, RIGHT LOWER LEG, SUBSEQUENT ENCOUNTER: ICD-10-CM

## 2018-11-16 ENCOUNTER — OUTPATIENT (OUTPATIENT)
Dept: OUTPATIENT SERVICES | Facility: HOSPITAL | Age: 83
LOS: 1 days | Discharge: HOME | End: 2018-11-16

## 2018-11-16 DIAGNOSIS — S79.919A UNSPECIFIED INJURY OF UNSPECIFIED HIP, INITIAL ENCOUNTER: ICD-10-CM

## 2018-11-20 ENCOUNTER — LABORATORY RESULT (OUTPATIENT)
Age: 83
End: 2018-11-20

## 2018-11-20 ENCOUNTER — OUTPATIENT (OUTPATIENT)
Dept: OUTPATIENT SERVICES | Facility: HOSPITAL | Age: 83
LOS: 1 days | Discharge: HOME | End: 2018-11-20

## 2018-11-20 DIAGNOSIS — M85.80 OTHER SPECIFIED DISORDERS OF BONE DENSITY AND STRUCTURE, UNSPECIFIED SITE: ICD-10-CM

## 2018-11-20 DIAGNOSIS — T14.8XXA OTHER INJURY OF UNSPECIFIED BODY REGION, INITIAL ENCOUNTER: ICD-10-CM

## 2018-11-20 DIAGNOSIS — I34.0 NONRHEUMATIC MITRAL (VALVE) INSUFFICIENCY: ICD-10-CM

## 2018-11-20 DIAGNOSIS — I48.91 UNSPECIFIED ATRIAL FIBRILLATION: ICD-10-CM

## 2018-11-20 DIAGNOSIS — I31.9 DISEASE OF PERICARDIUM, UNSPECIFIED: ICD-10-CM

## 2018-11-20 DIAGNOSIS — S81.801D UNSPECIFIED OPEN WOUND, RIGHT LOWER LEG, SUBSEQUENT ENCOUNTER: ICD-10-CM

## 2018-11-20 DIAGNOSIS — F03.90 UNSPECIFIED DEMENTIA WITHOUT BEHAVIORAL DISTURBANCE: ICD-10-CM

## 2018-11-20 DIAGNOSIS — E03.9 HYPOTHYROIDISM, UNSPECIFIED: ICD-10-CM

## 2018-11-27 ENCOUNTER — EMERGENCY (EMERGENCY)
Facility: HOSPITAL | Age: 83
LOS: 0 days | Discharge: HOME | End: 2018-11-27
Admitting: PHYSICIAN ASSISTANT

## 2018-11-27 VITALS
OXYGEN SATURATION: 100 % | SYSTOLIC BLOOD PRESSURE: 142 MMHG | RESPIRATION RATE: 18 BRPM | TEMPERATURE: 98 F | HEART RATE: 100 BPM | DIASTOLIC BLOOD PRESSURE: 95 MMHG
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
## 2018-11-27 DIAGNOSIS — E03.9 HYPOTHYROIDISM, UNSPECIFIED: ICD-10-CM

## 2018-11-27 DIAGNOSIS — Z87.442 PERSONAL HISTORY OF URINARY CALCULI: ICD-10-CM

## 2018-11-27 DIAGNOSIS — S22.41XA MULTIPLE FRACTURES OF RIBS, RIGHT SIDE, INITIAL ENCOUNTER FOR CLOSED FRACTURE: ICD-10-CM

## 2018-11-27 DIAGNOSIS — Z79.891 LONG TERM (CURRENT) USE OF OPIATE ANALGESIC: ICD-10-CM
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
## 2018-11-27 DIAGNOSIS — M54.9 DORSALGIA, UNSPECIFIED: ICD-10-CM
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
## 2018-11-27 DIAGNOSIS — Z79.899 OTHER LONG TERM (CURRENT) DRUG THERAPY: ICD-10-CM

## 2018-11-27 DIAGNOSIS — Z88.2 ALLERGY STATUS TO SULFONAMIDES: ICD-10-CM
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
## 2018-11-27 RX ORDER — OXYCODONE AND ACETAMINOPHEN 5; 325 MG/1; MG/1
1 TABLET ORAL ONCE
Qty: 0 | Refills: 0 | Status: DISCONTINUED | OUTPATIENT
Start: 2018-11-27 | End: 2018-11-27

## 2018-11-27 RX ADMIN — OXYCODONE AND ACETAMINOPHEN 1 TABLET(S): 5; 325 TABLET ORAL at 10:54

## 2018-11-27 NOTE — ED PROVIDER NOTE - NSFOLLOWUPINSTRUCTIONS_ED_ALL_ED_FT
Rib Fracture(s)    A rib fracture is a break or crack in one of the bones of the ribs. The ribs are a group of long, curved bones that wrap around your chest and attach to your spine. A broken or cracked rib is often painful, but most do not cause other problems and heal on their own over time. Symptoms include pain when you breath or cough or pain when pressing over the area. Breathing exercises will help keep your lungs inflated and prevent lung collapse or infection.    SEEK IMMEDIATE MEDICAL CARE IF YOU HAVE ANY OF THE FOLLOWING SYMPTOMS: fever, shortness of breath, coughing up blood, abdominal pain, nausea or vomiting, pain not controlled with medications.

## 2018-11-27 NOTE — ED PROVIDER NOTE - CARE PROVIDER_API CALL
Leonel Marte), Cardiovascular Disease; Internal Medicine  [address] [place] [address]  Phone: [phone]  Fax: [phone]

## 2018-11-27 NOTE — ED PROVIDER NOTE - PROGRESS NOTE DETAILS
Pt was seen here on 10/29 s/p fall and had CT chest and abd that revealed a 12th rib fx on right side. Now today noted to have an additional 11th rib fx.

## 2018-11-27 NOTE — ED PROVIDER NOTE - OBJECTIVE STATEMENT
3 days of atraumatic right upper back pain worse on ROM and inspiration. Denies sob, fever, urinary sx. Pt on Eloquis for Afib.

## 2018-11-27 NOTE — ED ADULT TRIAGE NOTE - CHIEF COMPLAINT QUOTE
back pain x 1 day, as per patient it starts in the right upper back and radiates down, pt states "I had this on my elft side and it was muscular"

## 2018-11-27 NOTE — ED PROVIDER NOTE - PHYSICAL EXAMINATION
CONST: Well appearing in NAD  EYES: PERRL, EOMI, Sclera and conjunctiva clear.   ENT: No nasal discharge. TM's clear B/L without drainage. Oropharynx normal appearing, no erythema or exudates. Uvula midline.  NECK: Non-tender, no meningeal signs  CARD: Normal S1 S2; Normal rate and rhythm  RESP: Equal BS B/L, No wheezes, rhonchi or rales. No distress  GI: Soft, non-tender, non-distended. No CVAT or puls masses  MS: Normal ROM in all extremities. No midline spinal tenderness. Point tenderness to right posterior ribs just below scapula. No crepitous.  SKIN: Warm, dry, no acute rashes. Good turgor  NEURO: A&Ox3, No focal deficits. Strength 5/5 with no sensory deficits. Steady gait

## 2018-11-27 NOTE — ED ADULT NURSE NOTE - OBJECTIVE STATEMENT
patient had mri 2 weeks ago for pain in right upper pain and states it was due to muscle, states no trauma,  Pt states urinating is often and "little".  States urine is darker denies dysuria. recent uti 2weeks ago. same sympton but on different side

## 2018-12-05 ENCOUNTER — OUTPATIENT (OUTPATIENT)
Dept: OUTPATIENT SERVICES | Facility: HOSPITAL | Age: 83
LOS: 1 days | Discharge: HOME | End: 2018-12-05

## 2018-12-05 ENCOUNTER — APPOINTMENT (OUTPATIENT)
Dept: CARDIOLOGY | Facility: CLINIC | Age: 83
End: 2018-12-05

## 2018-12-05 VITALS
BODY MASS INDEX: 21.66 KG/M2 | DIASTOLIC BLOOD PRESSURE: 72 MMHG | HEIGHT: 67 IN | HEART RATE: 96 BPM | SYSTOLIC BLOOD PRESSURE: 130 MMHG | WEIGHT: 138 LBS

## 2018-12-05 DIAGNOSIS — I77.6 ARTERITIS, UNSPECIFIED: ICD-10-CM

## 2018-12-05 DIAGNOSIS — M32.10 SYSTEMIC LUPUS ERYTHEMATOSUS, ORGAN OR SYSTEM INVOLVEMENT UNSPECIFIED: ICD-10-CM

## 2018-12-05 PROBLEM — E03.9 HYPOTHYROIDISM, UNSPECIFIED: Chronic | Status: ACTIVE | Noted: 2018-11-27

## 2018-12-05 PROBLEM — N39.0 URINARY TRACT INFECTION, SITE NOT SPECIFIED: Chronic | Status: ACTIVE | Noted: 2018-11-27

## 2018-12-05 NOTE — ASSESSMENT
[FreeTextEntry1] : The patient has not had SOB . SHe had pauses on ILM. This improved after stopping Aricept. . The patient has had rib fractures and is seeing rheumatology  . She has continued back issues as well. She is getting a bone density done. . Free t4 is ok TSH is mildly increased .

## 2018-12-05 NOTE — REVIEW OF SYSTEMS
[Feeling Fatigued] : feeling fatigued [Joint Pain] : joint pain [Joint Swelling] : joint swelling [Negative] : Heme/Lymph

## 2018-12-05 NOTE — PHYSICAL EXAM
[General Appearance - Well Developed] : well developed [Normal Appearance] : normal appearance [Well Groomed] : well groomed [General Appearance - Well Nourished] : well nourished [No Deformities] : no deformities [General Appearance - In No Acute Distress] : no acute distress [Normal Conjunctiva] : the conjunctiva exhibited no abnormalities [Eyelids - No Xanthelasma] : the eyelids demonstrated no xanthelasmas [Normal Oral Mucosa] : normal oral mucosa [No Oral Pallor] : no oral pallor [No Oral Cyanosis] : no oral cyanosis [Respiration, Rhythm And Depth] : normal respiratory rhythm and effort [Exaggerated Use Of Accessory Muscles For Inspiration] : no accessory muscle use [Auscultation Breath Sounds / Voice Sounds] : lungs were clear to auscultation bilaterally [Abdomen Soft] : soft [Abdomen Tenderness] : non-tender [Abdomen Mass (___ Cm)] : no abdominal mass palpated [Abnormal Walk] : normal gait [Nail Clubbing] : no clubbing of the fingernails [Cyanosis, Localized] : no localized cyanosis [Petechial Hemorrhages (___cm)] : no petechial hemorrhages [Skin Color & Pigmentation] : normal skin color and pigmentation [] : no rash [No Venous Stasis] : no venous stasis [No Skin Ulcers] : no skin ulcer [No Xanthoma] : no  xanthoma was observed [Oriented To Time, Place, And Person] : oriented to person, place, and time [Affect] : the affect was normal [Mood] : the mood was normal [No Anxiety] : not feeling anxious [Normal Rate] : normal [Rhythm Regular] : regular [No Murmur] : no murmurs heard [No Pitting Edema] : no pitting edema present [FreeTextEntry1] : multiple areas of ecchymosis. arems and legs.  [Rt] : no varicose veins of the right leg [Lt] : no varicose veins of the left leg

## 2018-12-05 NOTE — HISTORY OF PRESENT ILLNESS
[FreeTextEntry1] : The patient was having pauses on her ILM . Aricept was stopped and this was improved. Synthroid decreased by Dr. Thompson. . . No SOB . No palpitations.

## 2018-12-05 NOTE — REASON FOR VISIT
[Follow-Up - Clinic] : a clinic follow-up of [Atrial Fibrillation] : atrial fibrillation [Hypertension] : hypertension [Mitral Regurgitation] : mitral regurgitation [Palpitations] : palpitations

## 2018-12-06 ENCOUNTER — OUTPATIENT (OUTPATIENT)
Dept: OUTPATIENT SERVICES | Facility: HOSPITAL | Age: 83
LOS: 1 days | Discharge: HOME | End: 2018-12-06

## 2018-12-06 ENCOUNTER — APPOINTMENT (OUTPATIENT)
Dept: WOUND CARE | Facility: CLINIC | Age: 83
End: 2018-12-06
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
## 2018-12-06 DIAGNOSIS — M54.9 DORSALGIA, UNSPECIFIED: ICD-10-CM

## 2018-12-08 ENCOUNTER — MEDICATION RENEWAL (OUTPATIENT)
Age: 83
End: 2018-12-08

## 2018-12-10 DIAGNOSIS — Z78.0 ASYMPTOMATIC MENOPAUSAL STATE: ICD-10-CM

## 2018-12-10 DIAGNOSIS — M89.9 DISORDER OF BONE, UNSPECIFIED: ICD-10-CM

## 2018-12-10 DIAGNOSIS — Z13.820 ENCOUNTER FOR SCREENING FOR OSTEOPOROSIS: ICD-10-CM

## 2018-12-18 DIAGNOSIS — Z02.9 ENCOUNTER FOR ADMINISTRATIVE EXAMINATIONS, UNSPECIFIED: ICD-10-CM

## 2018-12-18 DIAGNOSIS — S41.151A OPEN BITE OF RIGHT UPPER ARM, INITIAL ENCOUNTER: ICD-10-CM

## 2018-12-18 DIAGNOSIS — Z13.820 ENCOUNTER FOR SCREENING FOR OSTEOPOROSIS: ICD-10-CM

## 2018-12-18 DIAGNOSIS — Z78.0 ASYMPTOMATIC MENOPAUSAL STATE: ICD-10-CM
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
## 2018-12-18 DIAGNOSIS — M54.9 DORSALGIA, UNSPECIFIED: ICD-10-CM
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
## 2018-12-18 DIAGNOSIS — M89.9 DISORDER OF BONE, UNSPECIFIED: ICD-10-CM

## 2019-01-10 ENCOUNTER — OUTPATIENT (OUTPATIENT)
Dept: OUTPATIENT SERVICES | Facility: HOSPITAL | Age: 84
LOS: 1 days | Discharge: HOME | End: 2019-01-10

## 2019-01-10 ENCOUNTER — APPOINTMENT (OUTPATIENT)
Dept: WOUND CARE | Facility: CLINIC | Age: 84
End: 2019-01-10

## 2019-01-10 NOTE — PHYSICAL EXAM
[Healing] : healing [Size%: ______] : Size: [unfilled]% [Infected?] : Infected: No [3] : 3 out of 10 [Normal] : normal [Small] : small  [] : no [de-identified] : and Xeroform  [de-identified] : left leg wound healing--> local wound care

## 2019-01-10 NOTE — HISTORY OF PRESENT ILLNESS
[Did you have an operation on your burn/wound injury?] : Did you have an operation on your burn/wound injury? No [Did this injury occur on the job?] : Did this injury occur on the job? No [de-identified] : home  [de-identified] : traumatic wound left leg [de-identified] : wound heling

## 2019-01-15 ENCOUNTER — APPOINTMENT (OUTPATIENT)
Dept: BURN CARE | Facility: CLINIC | Age: 84
End: 2019-01-15

## 2019-01-15 ENCOUNTER — OUTPATIENT (OUTPATIENT)
Dept: OUTPATIENT SERVICES | Facility: HOSPITAL | Age: 84
LOS: 1 days | Discharge: HOME | End: 2019-01-15

## 2019-01-15 DIAGNOSIS — S81.801A UNSPECIFIED OPEN WOUND, RIGHT LOWER LEG, INITIAL ENCOUNTER: ICD-10-CM

## 2019-01-16 PROBLEM — S81.801A OPEN WOUND OF LOWER LEG, RIGHT, INITIAL ENCOUNTER: Status: ACTIVE | Noted: 2019-01-16

## 2019-01-16 NOTE — ASSESSMENT
[FreeTextEntry1] : FT wound right leg - due to trauma\par \par Procedure: sharp excisional debridement of devitalized skin to subcutaneous tissue and old clot performed ; slight bleeding noted; stopped with minimal pressure\par \par Rec: wash with mild soap and water, apply Bacitracin ointment and Band aid daily [Wound Care] : wound care

## 2019-01-16 NOTE — HISTORY OF PRESENT ILLNESS
[de-identified] : 1 week ago  [de-identified] : home  [de-identified] : clawed by dog on right leg  [de-identified] : Pt c/o pain - treating site with Xeroform dressing.

## 2019-01-16 NOTE — PHYSICAL EXAM
[New] : new [Normal] : normal [Small] : small  [Infected?] : Infected: No [] : no [de-identified] : right leg - open FT  wound with skin avulsion, exposed subcutaneous fat ; devitalized skin and old clot ; pink otherwise \par no cellulitis , purulence

## 2019-01-16 NOTE — REASON FOR VISIT
[Other: _____] : [unfilled] [Initial] : initial visit [Were you seen in the Emergency Room?] : not seen in the emergency room [Were you admitted to the burn center at [facility]?] : not admitted to the burn center at [facility] [Friend] : friend

## 2019-01-20 ENCOUNTER — CLINICAL ADVICE (OUTPATIENT)
Age: 84
End: 2019-01-20

## 2019-01-21 ENCOUNTER — EMERGENCY (EMERGENCY)
Facility: HOSPITAL | Age: 84
LOS: 0 days | Discharge: HOME | End: 2019-01-21
Attending: EMERGENCY MEDICINE | Admitting: INTERNAL MEDICINE

## 2019-01-21 VITALS
RESPIRATION RATE: 20 BRPM | SYSTOLIC BLOOD PRESSURE: 146 MMHG | OXYGEN SATURATION: 98 % | HEART RATE: 91 BPM | TEMPERATURE: 97 F | DIASTOLIC BLOOD PRESSURE: 67 MMHG

## 2019-01-21 VITALS
DIASTOLIC BLOOD PRESSURE: 57 MMHG | HEART RATE: 92 BPM | RESPIRATION RATE: 18 BRPM | SYSTOLIC BLOOD PRESSURE: 123 MMHG | TEMPERATURE: 97 F

## 2019-01-21 DIAGNOSIS — Z02.9 ENCOUNTER FOR ADMINISTRATIVE EXAMINATIONS, UNSPECIFIED: ICD-10-CM

## 2019-01-21 RX ORDER — TETANUS TOXOID, REDUCED DIPHTHERIA TOXOID AND ACELLULAR PERTUSSIS VACCINE, ADSORBED 5; 2.5; 8; 8; 2.5 [IU]/.5ML; [IU]/.5ML; UG/.5ML; UG/.5ML; UG/.5ML
0.5 SUSPENSION INTRAMUSCULAR ONCE
Qty: 0 | Refills: 0 | Status: COMPLETED | OUTPATIENT
Start: 2019-01-21 | End: 2019-01-21

## 2019-01-21 RX ADMIN — Medication 1 TABLET(S): at 16:42

## 2019-01-21 RX ADMIN — TETANUS TOXOID, REDUCED DIPHTHERIA TOXOID AND ACELLULAR PERTUSSIS VACCINE, ADSORBED 0.5 MILLILITER(S): 5; 2.5; 8; 8; 2.5 SUSPENSION INTRAMUSCULAR at 16:42

## 2019-01-21 RX ADMIN — Medication 1 TABLET(S): at 16:45

## 2019-01-21 NOTE — ED PROVIDER NOTE - PHYSICAL EXAMINATION
Constitutional: Well developed, well nourished. NAD.  Head: Normocephalic, atraumatic.  Eyes: PERRL.  ENT: Mucous membranes moist.  Cardiovascular: Normal S1, S2. Regular rate and rhythm. No murmurs, rubs, or gallops.  Pulmonary: Normal respiratory rate and effort. Lungs clear to auscultation bilaterally. No wheezing, rales, or rhonchi.  Neuro: AAOx3. No focal neurological deficits.  Skin: + 2x3 cm wound to RLE with mild surrounding erythema. No streaking. No drainage.

## 2019-01-21 NOTE — ED PROVIDER NOTE - NSFOLLOWUPCLINICS_GEN_ALL_ED_FT
[facility]  Burn  [address], [address]  [place] [address]  Phone: [phone]  Fax:   Follow Up Time: 1-3 Days

## 2019-01-21 NOTE — ED PROVIDER NOTE - OBJECTIVE STATEMENT
Pt is an 82 y/o female with hx of afib on eliquis, dementia, presents to ED for wound check. Pt was scratched by her dog 1 week ago to RLE. Pt has abrasion to area with some redness. Since then, pt has been applying abx ointment and nonstick dressing. Pt was seen by Dr. Capellan last week who recommended continuing but wound is not getting better so came in for eval. no drainage, fever, chills, streaking.

## 2019-01-21 NOTE — ED PROVIDER NOTE - PROGRESS NOTE DETAILS
Will give abx and f/up in burn clinic. Strict return precautions given. Will give abx here and 1 pill for tomorrow morning since pt can't get to pharmacy until the afternoon. Pt will f/up in burn clinic. Strict return precautions given.

## 2019-01-21 NOTE — ED PROVIDER NOTE - NSFOLLOWUPINSTRUCTIONS_ED_ALL_ED_FT
Wound Care    Taking care of your wound properly can help to prevent pain and infection. It can also help your wound to heal more quickly.     HOW TO CARE FOR YOUR WOUND   Take or apply over-the-counter and prescription medicines only as told by your health care provider.  If you were prescribed antibiotic medicine, take or apply it as told by your health care provider. Do not stop using the antibiotic even if your condition improves.  Clean the wound each day or as told by your health care provider.  Wash the wound with mild soap and water.  Rinse the wound with water to remove all soap.  Pat the wound dry with a clean towel. Do not rub it.  There are many different ways to close and cover a wound. For example, a wound can be covered with stitches (sutures), skin glue, or adhesive strips. Follow instructions from your health care provider about:  How to take care of your wound.  When and how you should change your bandage (dressing).  When you should remove your dressing.  Removing whatever was used to close your wound.  Check your wound every day for signs of infection. Watch for:  Redness, swelling, or pain.  Fluid, blood, or pus.  Keep the dressing dry until your health care provider says it can be removed. Do not take baths, swim, use a hot tub, or do anything that would put your wound underwater until your health care provider approves.  Raise (elevate) the injured area above the level of your heart while you are sitting or lying down.  Do not scratch or pick at the wound.  Keep all follow-up visits as told by your health care provider. This is important.    SEEK MEDICAL CARE IF:  You received a tetanus shot and you have swelling, severe pain, redness, or bleeding at the injection site.  You have a fever.  Your pain is not controlled with medicine.  You have increased redness, swelling, or pain at the site of your wound.  You have fluid, blood, or pus coming from your wound.  You notice a bad smell coming from your wound or your dressing.    SEEK IMMEDIATE MEDICAL CARE IF:  You have a red streak going away from your wound.

## 2019-01-21 NOTE — ED PROVIDER NOTE - ATTENDING CONTRIBUTION TO CARE
dog scratch 1 week ago had debridment done as outpatient friend states wound is improving , no fevers, but still has redness. on exam 1x1cm with slight redness, no pus. plan is to start augment, update dtap and fu with wound clinic.

## 2019-01-22 ENCOUNTER — OUTPATIENT (OUTPATIENT)
Dept: OUTPATIENT SERVICES | Facility: HOSPITAL | Age: 84
LOS: 1 days | Discharge: HOME | End: 2019-01-22

## 2019-01-22 ENCOUNTER — APPOINTMENT (OUTPATIENT)
Dept: BURN CARE | Facility: CLINIC | Age: 84
End: 2019-01-22

## 2019-01-22 DIAGNOSIS — S81.802A UNSPECIFIED OPEN WOUND, LEFT LOWER LEG, INITIAL ENCOUNTER: ICD-10-CM
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
## 2019-01-22 NOTE — REASON FOR VISIT
[Initial] : initial visit [Were you seen in the Emergency Room?] : not seen in the emergency room [Were you admitted to the burn center at [facility]?] : not admitted to the burn center at [facility] [Friend] : friend [Other: _____] : [unfilled]

## 2019-01-22 NOTE — HISTORY OF PRESENT ILLNESS
[de-identified] : 1 week ago  [de-identified] : home  [de-identified] : clawed by dog on right leg  [de-identified] : Pt continuing Bacitracin ointment, c/opain . Seen in ED yesterday and started on Augmentin po. Pt"s friend states that redness around wound started " a couple of days ago". \par No bleeding.

## 2019-01-22 NOTE — PHYSICAL EXAM
[New] : new [Infected?] : Infected: No [Normal] : normal [Small] : small  [] : no [de-identified] : right leg - open FT  wound with yellow exudate, devitalized skin and ~ 1cm of surrounding mild cellulitis ; no undermining or purulent drainage \par pink granulating base after cleaning \par \par

## 2019-01-22 NOTE — ASSESSMENT
[FreeTextEntry1] : FT wound right leg - due to trauma, now with cellulitis \par \par Proc: area prepped ; ~ 2cc of 1 % lidocaine with epi infiltrated into subcutaneous tissue\par         wound mechanically  debrided of exudate and devitalized skin.Pt joslyn well \par \par Rec: wash with mild soap and water, apply Medihoney and non adherent dressing bid\par Follow up in 7-9 days if improved; in 2 days if worsening \par  [Wound Care] : wound care

## 2019-01-23 DIAGNOSIS — S81.801A UNSPECIFIED OPEN WOUND, RIGHT LOWER LEG, INITIAL ENCOUNTER: ICD-10-CM
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
## 2019-01-24 ENCOUNTER — OUTPATIENT (OUTPATIENT)
Dept: OUTPATIENT SERVICES | Facility: HOSPITAL | Age: 84
LOS: 1 days | Discharge: HOME | End: 2019-01-24

## 2019-01-24 ENCOUNTER — EMERGENCY (EMERGENCY)
Facility: HOSPITAL | Age: 84
LOS: 0 days | Discharge: HOME | End: 2019-01-24
Admitting: PLASTIC SURGERY

## 2019-01-24 ENCOUNTER — INPATIENT (INPATIENT)
Facility: HOSPITAL | Age: 84
LOS: 0 days | Discharge: HOME | End: 2019-01-25
Attending: PLASTIC SURGERY | Admitting: PLASTIC SURGERY

## 2019-01-24 ENCOUNTER — APPOINTMENT (OUTPATIENT)
Dept: BURN CARE | Facility: CLINIC | Age: 84
End: 2019-01-24

## 2019-01-24 VITALS
TEMPERATURE: 97 F | SYSTOLIC BLOOD PRESSURE: 184 MMHG | OXYGEN SATURATION: 96 % | HEART RATE: 99 BPM | RESPIRATION RATE: 18 BRPM | DIASTOLIC BLOOD PRESSURE: 74 MMHG

## 2019-01-24 DIAGNOSIS — Z90.49 ACQUIRED ABSENCE OF OTHER SPECIFIED PARTS OF DIGESTIVE TRACT: Chronic | ICD-10-CM

## 2019-01-24 DIAGNOSIS — Z95.818 PRESENCE OF OTHER CARDIAC IMPLANTS AND GRAFTS: Chronic | ICD-10-CM

## 2019-01-24 DIAGNOSIS — L03.115 CELLULITIS OF RIGHT LOWER LIMB: ICD-10-CM

## 2019-01-24 DIAGNOSIS — T14.90XA INJURY, UNSPECIFIED, INITIAL ENCOUNTER: ICD-10-CM

## 2019-01-24 LAB
ALBUMIN SERPL ELPH-MCNC: 4.2 G/DL — SIGNIFICANT CHANGE UP (ref 3.5–5.2)
ALP SERPL-CCNC: 112 U/L — SIGNIFICANT CHANGE UP (ref 30–115)
ALT FLD-CCNC: 15 U/L — SIGNIFICANT CHANGE UP (ref 0–41)
ANION GAP SERPL CALC-SCNC: 18 MMOL/L — HIGH (ref 7–14)
APPEARANCE UR: CLEAR — SIGNIFICANT CHANGE UP
APTT BLD: 42.3 SEC — HIGH (ref 27–39.2)
AST SERPL-CCNC: 21 U/L — SIGNIFICANT CHANGE UP (ref 0–41)
BASOPHILS # BLD AUTO: 0.02 K/UL — SIGNIFICANT CHANGE UP (ref 0–0.2)
BASOPHILS NFR BLD AUTO: 0.3 % — SIGNIFICANT CHANGE UP (ref 0–1)
BILIRUB DIRECT SERPL-MCNC: <0.2 MG/DL — SIGNIFICANT CHANGE UP (ref 0–0.2)
BILIRUB INDIRECT FLD-MCNC: >0.1 MG/DL — LOW (ref 0.2–1.2)
BILIRUB SERPL-MCNC: 0.3 MG/DL — SIGNIFICANT CHANGE UP (ref 0.2–1.2)
BILIRUB UR-MCNC: NEGATIVE — SIGNIFICANT CHANGE UP
BLD GP AB SCN SERPL QL: SIGNIFICANT CHANGE UP
BUN SERPL-MCNC: 23 MG/DL — HIGH (ref 10–20)
CALCIUM SERPL-MCNC: 9.3 MG/DL — SIGNIFICANT CHANGE UP (ref 8.5–10.1)
CHLORIDE SERPL-SCNC: 100 MMOL/L — SIGNIFICANT CHANGE UP (ref 98–110)
CO2 SERPL-SCNC: 23 MMOL/L — SIGNIFICANT CHANGE UP (ref 17–32)
COLOR SPEC: YELLOW — SIGNIFICANT CHANGE UP
CREAT SERPL-MCNC: 1.1 MG/DL — SIGNIFICANT CHANGE UP (ref 0.7–1.5)
DIFF PNL FLD: NEGATIVE — SIGNIFICANT CHANGE UP
EOSINOPHIL # BLD AUTO: 0.17 K/UL — SIGNIFICANT CHANGE UP (ref 0–0.7)
EOSINOPHIL NFR BLD AUTO: 2.8 % — SIGNIFICANT CHANGE UP (ref 0–8)
GLUCOSE SERPL-MCNC: 87 MG/DL — SIGNIFICANT CHANGE UP (ref 70–99)
GLUCOSE UR QL: NEGATIVE MG/DL — SIGNIFICANT CHANGE UP
HCT VFR BLD CALC: 36.4 % — LOW (ref 37–47)
HGB BLD-MCNC: 11.9 G/DL — LOW (ref 12–16)
IMM GRANULOCYTES NFR BLD AUTO: 0.8 % — HIGH (ref 0.1–0.3)
INR BLD: 1.17 RATIO — SIGNIFICANT CHANGE UP (ref 0.65–1.3)
KETONES UR-MCNC: NEGATIVE — SIGNIFICANT CHANGE UP
LEUKOCYTE ESTERASE UR-ACNC: NEGATIVE — SIGNIFICANT CHANGE UP
LYMPHOCYTES # BLD AUTO: 1.6 K/UL — SIGNIFICANT CHANGE UP (ref 1.2–3.4)
LYMPHOCYTES # BLD AUTO: 26.1 % — SIGNIFICANT CHANGE UP (ref 20.5–51.1)
MAGNESIUM SERPL-MCNC: 1.8 MG/DL — SIGNIFICANT CHANGE UP (ref 1.8–2.4)
MCHC RBC-ENTMCNC: 29.1 PG — SIGNIFICANT CHANGE UP (ref 27–31)
MCHC RBC-ENTMCNC: 32.7 G/DL — SIGNIFICANT CHANGE UP (ref 32–37)
MCV RBC AUTO: 89 FL — SIGNIFICANT CHANGE UP (ref 81–99)
MONOCYTES # BLD AUTO: 0.44 K/UL — SIGNIFICANT CHANGE UP (ref 0.1–0.6)
MONOCYTES NFR BLD AUTO: 7.2 % — SIGNIFICANT CHANGE UP (ref 1.7–9.3)
NEUTROPHILS # BLD AUTO: 3.85 K/UL — SIGNIFICANT CHANGE UP (ref 1.4–6.5)
NEUTROPHILS NFR BLD AUTO: 62.8 % — SIGNIFICANT CHANGE UP (ref 42.2–75.2)
NITRITE UR-MCNC: NEGATIVE — SIGNIFICANT CHANGE UP
NRBC # BLD: 0 /100 WBCS — SIGNIFICANT CHANGE UP (ref 0–0)
PH UR: 6.5 — SIGNIFICANT CHANGE UP (ref 5–8)
PHOSPHATE SERPL-MCNC: 3.6 MG/DL — SIGNIFICANT CHANGE UP (ref 2.1–4.9)
PLATELET # BLD AUTO: 276 K/UL — SIGNIFICANT CHANGE UP (ref 130–400)
POTASSIUM SERPL-MCNC: 4.3 MMOL/L — SIGNIFICANT CHANGE UP (ref 3.5–5)
POTASSIUM SERPL-SCNC: 4.3 MMOL/L — SIGNIFICANT CHANGE UP (ref 3.5–5)
PROT SERPL-MCNC: 6.9 G/DL — SIGNIFICANT CHANGE UP (ref 6–8)
PROT UR-MCNC: NEGATIVE MG/DL — SIGNIFICANT CHANGE UP
PROTHROM AB SERPL-ACNC: 13.4 SEC — HIGH (ref 9.95–12.87)
RBC # BLD: 4.09 M/UL — LOW (ref 4.2–5.4)
RBC # FLD: 13.3 % — SIGNIFICANT CHANGE UP (ref 11.5–14.5)
SODIUM SERPL-SCNC: 141 MMOL/L — SIGNIFICANT CHANGE UP (ref 135–146)
SP GR SPEC: 1.01 — SIGNIFICANT CHANGE UP (ref 1.01–1.03)
TYPE + AB SCN PNL BLD: SIGNIFICANT CHANGE UP
UROBILINOGEN FLD QL: 0.2 MG/DL — SIGNIFICANT CHANGE UP (ref 0.2–0.2)
WBC # BLD: 6.13 K/UL — SIGNIFICANT CHANGE UP (ref 4.8–10.8)
WBC # FLD AUTO: 6.13 K/UL — SIGNIFICANT CHANGE UP (ref 4.8–10.8)

## 2019-01-24 RX ORDER — SODIUM CHLORIDE 9 MG/ML
1000 INJECTION, SOLUTION INTRAVENOUS
Qty: 0 | Refills: 0 | Status: DISCONTINUED | OUTPATIENT
Start: 2019-01-24 | End: 2019-01-25

## 2019-01-24 RX ORDER — HEPARIN SODIUM 5000 [USP'U]/ML
5000 INJECTION INTRAVENOUS; SUBCUTANEOUS EVERY 8 HOURS
Qty: 0 | Refills: 0 | Status: DISCONTINUED | OUTPATIENT
Start: 2019-01-24 | End: 2019-01-25

## 2019-01-24 RX ORDER — MAGNESIUM SULFATE 500 MG/ML
1 VIAL (ML) INJECTION ONCE
Qty: 0 | Refills: 0 | Status: COMPLETED | OUTPATIENT
Start: 2019-01-24 | End: 2019-01-24

## 2019-01-24 RX ORDER — PANTOPRAZOLE SODIUM 20 MG/1
40 TABLET, DELAYED RELEASE ORAL DAILY
Qty: 0 | Refills: 0 | Status: DISCONTINUED | OUTPATIENT
Start: 2019-01-24 | End: 2019-01-25

## 2019-01-24 RX ORDER — LEVOTHYROXINE SODIUM 125 MCG
50 TABLET ORAL DAILY
Qty: 0 | Refills: 0 | Status: DISCONTINUED | OUTPATIENT
Start: 2019-01-24 | End: 2019-01-24

## 2019-01-24 RX ORDER — OXYCODONE AND ACETAMINOPHEN 5; 325 MG/1; MG/1
1 TABLET ORAL EVERY 4 HOURS
Qty: 0 | Refills: 0 | Status: DISCONTINUED | OUTPATIENT
Start: 2019-01-24 | End: 2019-01-25

## 2019-01-24 RX ORDER — MEMANTINE HYDROCHLORIDE 10 MG/1
5 TABLET ORAL ONCE
Qty: 0 | Refills: 0 | Status: COMPLETED | OUTPATIENT
Start: 2019-01-24 | End: 2019-01-24

## 2019-01-24 RX ORDER — CIPROFLOXACIN LACTATE 400MG/40ML
400 VIAL (ML) INTRAVENOUS ONCE
Qty: 0 | Refills: 0 | Status: COMPLETED | OUTPATIENT
Start: 2019-01-24 | End: 2019-01-24

## 2019-01-24 RX ORDER — DOCUSATE SODIUM 100 MG
100 CAPSULE ORAL EVERY 8 HOURS
Qty: 0 | Refills: 0 | Status: DISCONTINUED | OUTPATIENT
Start: 2019-01-24 | End: 2019-01-25

## 2019-01-24 RX ORDER — SENNA PLUS 8.6 MG/1
2 TABLET ORAL DAILY
Qty: 0 | Refills: 0 | Status: DISCONTINUED | OUTPATIENT
Start: 2019-01-24 | End: 2019-01-25

## 2019-01-24 RX ORDER — LEVOTHYROXINE SODIUM 125 MCG
50 TABLET ORAL DAILY
Qty: 0 | Refills: 0 | Status: DISCONTINUED | OUTPATIENT
Start: 2019-01-24 | End: 2019-01-25

## 2019-01-24 RX ORDER — AMPICILLIN SODIUM AND SULBACTAM SODIUM 250; 125 MG/ML; MG/ML
3 INJECTION, POWDER, FOR SUSPENSION INTRAMUSCULAR; INTRAVENOUS EVERY 6 HOURS
Qty: 0 | Refills: 0 | Status: DISCONTINUED | OUTPATIENT
Start: 2019-01-24 | End: 2019-01-25

## 2019-01-24 RX ORDER — MORPHINE SULFATE 50 MG/1
2 CAPSULE, EXTENDED RELEASE ORAL EVERY 12 HOURS
Qty: 0 | Refills: 0 | Status: DISCONTINUED | OUTPATIENT
Start: 2019-01-24 | End: 2019-01-25

## 2019-01-24 RX ORDER — ACETAMINOPHEN 500 MG
650 TABLET ORAL EVERY 4 HOURS
Qty: 0 | Refills: 0 | Status: DISCONTINUED | OUTPATIENT
Start: 2019-01-24 | End: 2019-01-25

## 2019-01-24 RX ADMIN — SODIUM CHLORIDE 100 MILLILITER(S): 9 INJECTION, SOLUTION INTRAVENOUS at 16:05

## 2019-01-24 RX ADMIN — HEPARIN SODIUM 5000 UNIT(S): 5000 INJECTION INTRAVENOUS; SUBCUTANEOUS at 21:00

## 2019-01-24 RX ADMIN — AMPICILLIN SODIUM AND SULBACTAM SODIUM 200 GRAM(S): 250; 125 INJECTION, POWDER, FOR SUSPENSION INTRAMUSCULAR; INTRAVENOUS at 20:46

## 2019-01-24 RX ADMIN — MEMANTINE HYDROCHLORIDE 5 MILLIGRAM(S): 10 TABLET ORAL at 20:46

## 2019-01-24 RX ADMIN — Medication 200 MILLIGRAM(S): at 16:05

## 2019-01-24 RX ADMIN — Medication 100 GRAM(S): at 18:07

## 2019-01-24 RX ADMIN — AMPICILLIN SODIUM AND SULBACTAM SODIUM 200 GRAM(S): 250; 125 INJECTION, POWDER, FOR SUSPENSION INTRAMUSCULAR; INTRAVENOUS at 14:31

## 2019-01-24 NOTE — HISTORY OF PRESENT ILLNESS
[de-identified] : 1 week ago  [de-identified] : home  [de-identified] : clawed by dog on right leg  [de-identified] : increased pain right lower leg

## 2019-01-24 NOTE — ED PROVIDER NOTE - MEDICAL DECISION MAKING DETAILS
Pt with continued symptoms despite outpatient treatment with antibiotics.  Pt needed to be admitted for continued treatment.

## 2019-01-24 NOTE — H&P ADULT - HISTORY OF PRESENT ILLNESS
83 year old female admitted for surgical debridement of non-healing wound of RLE Patient was bit by a dog in December of 2018. Since then  her wound has not healed despite multiple regimens of local wound care and antibiotics

## 2019-01-24 NOTE — ED PROVIDER NOTE - OBJECTIVE STATEMENT
82 yo female presents for right shin wound x 3 weeks. Patient was seen in Burn clinic by Dr. Pride and sent in for admission for surgical debridement of wound. Patient had injury 3 weeks ago and wound is non healing and now is erythematous and indurated around wound. Patient denies fevers, chest pain, SOB, abdominal pain, nausea, vomiting, diarrhea, dizziness, weakness, changes in PO intake, changes in urine output, changes in mental status.

## 2019-01-24 NOTE — CONSULT NOTE ADULT - ASSESSMENT
1.Pre op debridement of leg wound. The patient has more than 4 METS exercise tolerance , known normal LV systolic function. She had a recent nuclear stress test which was negative for ischemia. The patient is an intermediate risk undergoing a moinor risk procedure. No further cardiac work up is needed   2. PAF , in NSR . On Eliquis   3. History of Sinus Bradycardia , improved after stopping Eliquis   4. 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   Plan:  May hold Eliquis if needed for procedure  Stable from the cardiac stand point for surgery .

## 2019-01-24 NOTE — ED ADULT TRIAGE NOTE - CHIEF COMPLAINT QUOTE
wound on right leg from her dog happened 3 weeks ago, and is sent from the burn clinic to be admitted under dr. Pride

## 2019-01-24 NOTE — ED PROVIDER NOTE - ATTENDING CONTRIBUTION TO CARE
Pt sent in for worsening cellulitis. Pt sent in for worsening cellulitis despite outpt antibiotics for admission and further treatment by Dr. Pride.  +erythema to LE.  Orders placed and pt adm for further eval and tx.

## 2019-01-24 NOTE — H&P ADULT - ASSESSMENT
83 year old female  with non-healing right lower extremity ulcer  Plan: Surgical debridement  IV antibiotics  Local wound care  Pain control

## 2019-01-24 NOTE — CONSULT NOTE ADULT - SUBJECTIVE AND OBJECTIVE BOX
Patient is a 83y old  Female who presents with a chief complaint of non-healing wound RLE (2019 16:13)      HPI:  83 year old female admitted for surgical debridement of non-healing wound of RLE Patient was bit by a dog in 2018. Since then  her wound has not healed despite multiple regimens of local wound care and antibiotics (2019 16:13). The patient has a history of PAF and periods of bradycardia on ILM placed by Dr. Thompson. The patient is on Eliquis and her bradycardia has improved after stopping Aricept       PAST MEDICAL & SURGICAL HISTORY:  Idiopathic pericarditis, resolved.  PAF  Sinus Bradycardia.    Mitral valve insufficiency,   Reflux gastritis  Diverticulitis: resolved  Cellulitis of finger, unspecified laterality: resolved  Mild intermittent asthma, unspecified whether complicated: no longer present  Anxiety  Atrial fibrillation, unspecified type: no longer in atrial fifrillation  UTI (urinary tract infection)  Hypothyroid  Status post placement of implantable loop recorder  History of appendectomy      PREVIOUS DIAGNOSTIC TESTING:      ECHO  FINDINGS:    STRESS  FINDINGS:    CATHETERIZATION  FINDINGS:    MEDICATIONS  (STANDING):  ampicillin/sulbactam  IVPB 3 Gram(s) IV Intermittent every 6 hours  docusate sodium 100 milliGRAM(s) Oral every 8 hours  heparin  Injectable 5000 Unit(s) SubCutaneous every 8 hours  lactated ringers. 1000 milliLiter(s) (100 mL/Hr) IV Continuous <Continuous>  levothyroxine 50 MICROGram(s) Oral daily  memantine 5 milliGRAM(s) Oral once  pantoprazole    Tablet 40 milliGRAM(s) Oral daily  senna 2 Tablet(s) Oral daily    MEDICATIONS  (PRN):  acetaminophen   Tablet .. 650 milliGRAM(s) Oral every 4 hours PRN Temp greater or equal to 38.5C (101.3F), Mild Pain (1 - 3)  morphine  - Injectable 2 milliGRAM(s) IV Push every 12 hours PRN Moderate Pain (4 - 6)  oxyCODONE    5 mG/acetaminophen 325 mG 1 Tablet(s) Oral every 4 hours PRN Mild Pain (1 - 3)      FAMILY HISTORY:  No pertinent family history in first degree relatives      SOCIAL HISTORY:  CIGARETTES:    ALCOHOL:    Allergies    sulfa drugs (Unknown)    Intolerances        REVIEW OF SYSTEMS:  CONSTITUTIONAL: No fever, weight loss, or fatigue  EYES: No eye pain, visual disturbances, or discharge  ENMT:  No difficulty hearing, tinnitus, vertigo; No sinus or throat pain  NECK: No pain or stiffness  BREASTS: No pain, masses, or nipple discharge  RESPIRATORY: No cough, wheezing, chills or hemoptysis; No shortness of breath  CARDIOVASCULAR: No chest pain, palpitations, dizziness, or leg swelling  GASTROINTESTINAL: No abdominal or epigastric pain. No nausea, vomiting, or hematemesis; No diarrhea or constipation. No melena or hematochezia.  GENITOURINARY: No dysuria, frequency, hematuria, or incontinence  NEUROLOGICAL: No headaches, memory loss, loss of strength, numbness, or tremors  SKIN: No itching, burning, rashes, or lesions   LYMPH NODES: No enlarged glands  ENDOCRINE: No heat or cold intolerance; No hair loss  MUSCULOSKELETAL: No joint pain or swelling; No muscle, back, or extremity pain  PSYCHIATRIC: No depression, anxiety, mood swings, or difficulty sleeping  HEME/LYMPH: No easy bruising, or bleeding gums  ALLERY AND IMMUNOLOGIC: No hives or eczema          Vital Signs Last 24 Hrs  T(C): 36.5 (2019 16:08), Max: 36.5 (2019 16:08)  T(F): 97.7 (2019 16:08), Max: 97.7 (2019 16:08)  HR: 93 (2019 16:08) (93 - 99)  BP: 141/62 (2019 16:08) (141/62 - 184/74)  BP(mean): --  RR: 20 (2019 16:08) (18 - 20)  SpO2: 99% (2019 16:08) (96% - 99%)        PHYSICAL EXAM:  GENERAL: NAD, well-groomed, well-developed  HEAD:  Atraumatic, Normocephalic  EYES: EOMI, PERRLA, conjunctiva and sclera clear  ENMT: No tonsillar erythema, exudates, or enlargement; Moist mucous membranes, Good dentition, No lesions  NECK: Supple, No JVD, Normal thyroid  NERVOUS SYSTEM:  Alert & Oriented X3, Good concentration; Motor Strength 5/5 B/L upper and lower extremities; DTRs 2+ intact and symmetric  CHEST/LUNG: Clear to percussion bilaterally; No rales, rhonchi, wheezing, or rubs  HEART: Regular rate and rhythm; No murmurs, rubs, or gallops  ABDOMEN: Soft, Nontender, Nondistended; Bowel sounds present  EXTREMITIES:  2+ Peripheral Pulses, No clubbing, cyanosis, or edema  LYMPH: No lymphadenopathy noted  SKIN: No rashes or lesions    INTERPRETATION OF TELEMETRY:    ECG: NSR PAC .     I&O's Detail      LABS:                        11.9   6.13  )-----------( 276      ( 2019 14:10 )             36.4         141  |  100  |  23<H>  ----------------------------<  87  4.3   |  23  |  1.1    Ca    9.3      2019 14:10  Phos  3.6       Mg     1.8         TPro  6.9  /  Alb  4.2  /  TBili  0.3  /  DBili  <0.2  /  AST  21  /  ALT  15  /  AlkPhos  112          PT/INR - ( 2019 14:10 )   PT: 13.40 sec;   INR: 1.17 ratio         PTT - ( 2019 14:10 )  PTT:42.3 sec  Urinalysis Basic - ( 2019 17:30 )    Color: Yellow / Appearance: Clear / S.010 / pH: x  Gluc: x / Ketone: Negative  / Bili: Negative / Urobili: 0.2 mg/dL   Blood: x / Protein: Negative mg/dL / Nitrite: Negative   Leuk Esterase: Negative / RBC: x / WBC x   Sq Epi: x / Non Sq Epi: x / Bacteria: x      I&O's Summary  Stress Test: 16 Adenosine Thallium No ischemia.   18 Adenosine Thallium No ischemia   Echo:  Normal LV systolic functon mild TR RVSP was 43 mmhg and trace MR  Normal LV systolic funciton mild AI mild MR mild TR RVSP was 38 mmhg  18 Normal LV systolic function mild AI Trace MR Mild TR RVSP is normal       RADIOLOGY & ADDITIONAL STUDIES: Patient is a 83y old  Female who presents with a chief complaint of non-healing wound RLE (2019 16:13)      HPI:  83 year old female admitted for surgical debridement of non-healing wound of RLE Patient was bit by a dog in 2018. Since then  her wound has not healed despite multiple regimens of local wound care and antibiotics (2019 16:13). The patient has a history of PAF and periods of bradycardia on ILM placed by Dr. Thompson. The patient is on Eliquis and her bradycardia has improved after stopping Aricept       PAST MEDICAL & SURGICAL HISTORY:  Idiopathic pericarditis, resolved.  PAF  Sinus Bradycardia.    Mitral valve insufficiency,   Reflux gastritis  Diverticulitis: resolved  Cellulitis of finger, unspecified laterality: resolved  Mild intermittent asthma, unspecified whether complicated: no longer present  Anxiety  Atrial fibrillation, unspecified type: no longer in atrial fifrillation  UTI (urinary tract infection)  Hypothyroid  Status post placement of implantable loop recorder  History of appendectomy      PREVIOUS DIAGNOSTIC TESTING:      ECHO  FINDINGS:    STRESS  FINDINGS:    CATHETERIZATION  FINDINGS:    MEDICATIONS  (STANDING):  ampicillin/sulbactam  IVPB 3 Gram(s) IV Intermittent every 6 hours  docusate sodium 100 milliGRAM(s) Oral every 8 hours  heparin  Injectable 5000 Unit(s) SubCutaneous every 8 hours  lactated ringers. 1000 milliLiter(s) (100 mL/Hr) IV Continuous <Continuous>  levothyroxine 50 MICROGram(s) Oral daily  memantine 5 milliGRAM(s) Oral once  pantoprazole    Tablet 40 milliGRAM(s) Oral daily  senna 2 Tablet(s) Oral daily    MEDICATIONS  (PRN):  acetaminophen   Tablet .. 650 milliGRAM(s) Oral every 4 hours PRN Temp greater or equal to 38.5C (101.3F), Mild Pain (1 - 3)  morphine  - Injectable 2 milliGRAM(s) IV Push every 12 hours PRN Moderate Pain (4 - 6)  oxyCODONE    5 mG/acetaminophen 325 mG 1 Tablet(s) Oral every 4 hours PRN Mild Pain (1 - 3)      FAMILY HISTORY:  No pertinent family history in first degree relatives      SOCIAL HISTORY:  CIGARETTES:    ALCOHOL:    Allergies    sulfa drugs (Unknown)    Intolerances        REVIEW OF SYSTEMS:  CONSTITUTIONAL: No fever, weight loss, or fatigue  EYES: No eye pain, visual disturbances, or discharge  ENMT:  No difficulty hearing, tinnitus, vertigo; No sinus or throat pain  NECK: No pain or stiffness  BREASTS: No pain, masses, or nipple discharge  RESPIRATORY: No cough, wheezing, chills or hemoptysis; No shortness of breath  CARDIOVASCULAR: No chest pain, palpitations, dizziness, or leg swelling  GASTROINTESTINAL: No abdominal or epigastric pain. No nausea, vomiting, or hematemesis; No diarrhea or constipation. No melena or hematochezia.  GENITOURINARY: No dysuria, frequency, hematuria, or incontinence  NEUROLOGICAL: No headaches, memory loss, loss of strength, numbness, or tremors  SKIN: No itching, burning, rashes, or lesions   LYMPH NODES: No enlarged glands  ENDOCRINE: No heat or cold intolerance; No hair loss  MUSCULOSKELETAL: No joint pain or swelling; No muscle, back, or extremity pain  PSYCHIATRIC: No depression, anxiety, mood swings, or difficulty sleeping  HEME/LYMPH: No easy bruising, or bleeding gums  ALLERY AND IMMUNOLOGIC: No hives or eczema          Vital Signs Last 24 Hrs  T(C): 36.5 (2019 16:08), Max: 36.5 (2019 16:08)  T(F): 97.7 (2019 16:08), Max: 97.7 (2019 16:08)  HR: 93 (2019 16:08) (93 - 99)  BP: 141/62 (2019 16:08) (141/62 - 184/74)  BP(mean): --  RR: 20 (2019 16:08) (18 - 20)  SpO2: 99% (2019 16:08) (96% - 99%)        PHYSICAL EXAM:  GENERAL: NAD, well-groomed, well-developed  HEAD:  Atraumatic, Normocephalic  NECK: Supple,   NERVOUS SYSTEM:  Alert & Oriented X3,   CHEST/LUNG: Clear   HEART: Regular rate and rhythm;  ABDOMEN: Soft, Nontender,   EXTREMITIES:  no edema     INTERPRETATION OF TELEMETRY:    ECG: NSR PAC .     I&O's Detail      LABS:                        11.9   6.13  )-----------( 276      ( 2019 14:10 )             36.4     -    141  |  100  |  23<H>  ----------------------------<  87  4.3   |  23  |  1.1    Ca    9.3      2019 14:10  Phos  3.6       Mg     1.8         TPro  6.9  /  Alb  4.2  /  TBili  0.3  /  DBili  <0.2  /  AST  21  /  ALT  15  /  AlkPhos  112          PT/INR - ( 2019 14:10 )   PT: 13.40 sec;   INR: 1.17 ratio         PTT - ( 2019 14:10 )  PTT:42.3 sec  Urinalysis Basic - ( 2019 17:30 )    Color: Yellow / Appearance: Clear / S.010 / pH: x  Gluc: x / Ketone: Negative  / Bili: Negative / Urobili: 0.2 mg/dL   Blood: x / Protein: Negative mg/dL / Nitrite: Negative   Leuk Esterase: Negative / RBC: x / WBC x   Sq Epi: x / Non Sq Epi: x / Bacteria: x      I&O's Summary  Stress Test: 16 Adenosine Thallium No ischemia.   18 Adenosine Thallium No ischemia   Echo:  Normal LV systolic functon mild TR RVSP was 43 mmhg and trace MR  Normal LV systolic funciton mild AI mild MR mild TR RVSP was 38 mmhg  18 Normal LV systolic function mild AI Trace MR Mild TR RVSP is normal       RADIOLOGY & ADDITIONAL STUDIES:

## 2019-01-24 NOTE — ED PROVIDER NOTE - NS ED ROS FT
Constitutional: No fevers/chills.    Head: No injury, headache.    Eyes:  No visual changes, eye pain or discharge. No injury.    ENMT:  No hearing changes, pain, discharge or infections. No neck pain or stiffness. No loss ROM.    Cardiac:  No chest pain, SOB or edema. No chest pain with exertion.    Respiratory:  No cough or respiratory distress.     GI:  No nausea, vomiting, diarrhea or abdominal pain. No anorexia. No change in PO intake.    :  No frequency, urgency or burning. No change in urine output.    MS:  (+) leg pain, (-) leg swelling, myalgia, muscle weakness, joint pain or back pain. No loss ROM.    Neuro:  No dizziness or weakness.  No LOC.    Skin:  No skin rash.

## 2019-01-24 NOTE — H&P ADULT - NSHPLABSRESULTS_GEN_ALL_CORE
ICU Vital Signs Last 24 Hrs  T(C): 36.5 (24 Jan 2019 16:08), Max: 36.5 (24 Jan 2019 16:08)  T(F): 97.7 (24 Jan 2019 16:08), Max: 97.7 (24 Jan 2019 16:08)  HR: 93 (24 Jan 2019 16:08) (93 - 99)  BP: 141/62 (24 Jan 2019 16:08) (141/62 - 184/74)  BP(mean): --  ABP: --  ABP(mean): --  RR: 20 (24 Jan 2019 16:08) (18 - 20)  SpO2: 99% (24 Jan 2019 16:08) (96% - 99%)      CAPILLARY BLOOD GLUCOSE                      01-24    141  |  100  |  23<H>  ----------------------------<  87  4.3   |  23  |  1.1    Ca    9.3      24 Jan 2019 14:10  Phos  3.6     01-24  Mg     1.8     01-24    TPro  6.9  /  Alb  4.2  /  TBili  0.3  /  DBili  <0.2  /  AST  21  /  ALT  15  /  AlkPhos  112  01-24            LIVER FUNCTIONS - ( 24 Jan 2019 14:10 )  Alb: 4.2 g/dL / Pro: 6.9 g/dL / ALK PHOS: 112 U/L / ALT: 15 U/L / AST: 21 U/L / GGT: x

## 2019-01-24 NOTE — REASON FOR VISIT
[Initial] : initial visit [Were you seen in the Emergency Room?] : not seen in the emergency room [Were you admitted to the burn center at [facility]?] : not admitted to the burn center at [facility] [Friend] : friend [Other: _____] : [unfilled]

## 2019-01-24 NOTE — PHYSICAL EXAM
[Healing] : healing [Size%: ______] : Size: [unfilled]% [Infected?] : Infected: No [6] : 6 out of 10 [Abnormal] : abnormal [Medium] : medium [] : no [de-identified] : right lower leg with infection and necrotic tissue--> will debride and admit for iv abx\par

## 2019-01-24 NOTE — PROGRESS NOTE ADULT - SUBJECTIVE AND OBJECTIVE BOX
stable    Vital Signs Last 24 Hrs  T(C): 36.5 (24 Jan 2019 16:08), Max: 36.5 (24 Jan 2019 16:08)  T(F): 97.7 (24 Jan 2019 16:08), Max: 97.7 (24 Jan 2019 16:08)  HR: 93 (24 Jan 2019 16:08) (93 - 99)  BP: 141/62 (24 Jan 2019 16:08) (141/62 - 184/74)  BP(mean): --  RR: 20 (24 Jan 2019 16:08) (18 - 20)  SpO2: 99% (24 Jan 2019 16:08) (96% - 99%)    CVP:  T(C): 36.5 (01-24-19 @ 16:08), Max: 36.5 (01-24-19 @ 16:08)  HR: 93 (01-24-19 @ 16:08) (93 - 99)  BP: 141/62 (01-24-19 @ 16:08) (141/62 - 184/74)  RR: 20 (01-24-19 @ 16:08) (18 - 20)  SpO2: 99% (01-24-19 @ 16:08) (96% - 99%)  CVP(mm Hg): --    U.O.:  I&O's Detail                                    11.9   6.13  )-----------( 276      ( 24 Jan 2019 14:10 )             36.4     01-24    141  |  100  |  23<H>  ----------------------------<  87  4.3   |  23  |  1.1    Ca    9.3      24 Jan 2019 14:10  Phos  3.6     01-24  Mg     1.8     01-24    TPro  6.9  /  Alb  4.2  /  TBili  0.3  /  DBili  <0.2  /  AST  21  /  ALT  15  /  AlkPhos  112  01-24      Large Dressing Change--> right lower leg wound with infection

## 2019-01-24 NOTE — H&P ADULT - NSHPPHYSICALEXAM_GEN_ALL_CORE
PE: Alert and oriented times 3 .NAD  Cor: S1S2  Lungs: equalBS bilaterally  Extremities: right shin ulcer 2 by 4 cm with erythema, tender to palpation with yellow eschar

## 2019-01-24 NOTE — H&P ADULT - PMH
Anxiety    Atrial fibrillation, unspecified type  no longer in atrial fifrillation  Cellulitis of finger, unspecified laterality  resolved  Diverticulitis  resolved  Hypothyroid    Idiopathic pericarditis, unspecified chronicity    Mild intermittent asthma, unspecified whether complicated  no longer present  Mitral valve insufficiency, unspecified etiology    Reflux gastritis    UTI (urinary tract infection)

## 2019-01-24 NOTE — ED PROVIDER NOTE - PHYSICAL EXAMINATION
GEN: Well appearing, in no apparent distress.    HEAD:  Normocephalic, atraumatic.    EYES:  Clear conjunctivae without injection, drainage or discharge.    ENMT:  Nasal mucosa moist; mouth moist without ulcerations or lesions; throat moist without erythema, exudate, ulcerations or lesions.    NECK:  Supple, no masses. Normal ROM.    CARDIAC:  RRR, normal S1 and S2, no murmurs, rubs or gallops.    RESP:  Respiratory rate and effort appear normal; lungs are clear to auscultation bilaterally; no rhonchi, rales or wheezes.    ABDOMEN:  Soft, non-tender, non-distended, no masses. Normal BS throughout.    MUSCULOSKELETAL: (+) 3 cm laceration to right shin with surrounding erythema, induration without pus drainage. No leg swelling, no calf tenderness. Patient able to ambulate without difficulty.  NEURO:  AAO x 3. Motor 5/5.     SKIN:  Normal skin color for age and race, well-perfused; warm and dry.

## 2019-01-25 ENCOUNTER — RESULT REVIEW (OUTPATIENT)
Age: 84
End: 2019-01-25

## 2019-01-25 ENCOUNTER — TRANSCRIPTION ENCOUNTER (OUTPATIENT)
Age: 84
End: 2019-01-25

## 2019-01-25 VITALS — WEIGHT: 140.21 LBS

## 2019-01-25 DIAGNOSIS — Z02.9 ENCOUNTER FOR ADMINISTRATIVE EXAMINATIONS, UNSPECIFIED: ICD-10-CM

## 2019-01-25 RX ORDER — PANTOPRAZOLE SODIUM 20 MG/1
40 TABLET, DELAYED RELEASE ORAL DAILY
Qty: 0 | Refills: 0 | Status: DISCONTINUED | OUTPATIENT
Start: 2019-01-25 | End: 2019-01-25

## 2019-01-25 RX ORDER — DOCUSATE SODIUM 100 MG
100 CAPSULE ORAL EVERY 8 HOURS
Qty: 0 | Refills: 0 | Status: DISCONTINUED | OUTPATIENT
Start: 2019-01-25 | End: 2019-01-25

## 2019-01-25 RX ORDER — OXYCODONE AND ACETAMINOPHEN 5; 325 MG/1; MG/1
1 TABLET ORAL EVERY 4 HOURS
Qty: 0 | Refills: 0 | Status: DISCONTINUED | OUTPATIENT
Start: 2019-01-25 | End: 2019-01-25

## 2019-01-25 RX ORDER — SENNA PLUS 8.6 MG/1
2 TABLET ORAL DAILY
Qty: 0 | Refills: 0 | Status: DISCONTINUED | OUTPATIENT
Start: 2019-01-25 | End: 2019-01-25

## 2019-01-25 RX ORDER — ONDANSETRON 8 MG/1
4 TABLET, FILM COATED ORAL ONCE
Qty: 0 | Refills: 0 | Status: DISCONTINUED | OUTPATIENT
Start: 2019-01-25 | End: 2019-01-25

## 2019-01-25 RX ORDER — OXYCODONE AND ACETAMINOPHEN 5; 325 MG/1; MG/1
2 TABLET ORAL ONCE
Qty: 0 | Refills: 0 | Status: DISCONTINUED | OUTPATIENT
Start: 2019-01-25 | End: 2019-01-25

## 2019-01-25 RX ORDER — ACETAMINOPHEN 500 MG
650 TABLET ORAL EVERY 4 HOURS
Qty: 0 | Refills: 0 | Status: DISCONTINUED | OUTPATIENT
Start: 2019-01-25 | End: 2019-01-25

## 2019-01-25 RX ORDER — AMPICILLIN SODIUM AND SULBACTAM SODIUM 250; 125 MG/ML; MG/ML
3 INJECTION, POWDER, FOR SUSPENSION INTRAMUSCULAR; INTRAVENOUS EVERY 6 HOURS
Qty: 0 | Refills: 0 | Status: DISCONTINUED | OUTPATIENT
Start: 2019-01-25 | End: 2019-01-25

## 2019-01-25 RX ORDER — LEVOTHYROXINE SODIUM 125 MCG
50 TABLET ORAL DAILY
Qty: 0 | Refills: 0 | Status: DISCONTINUED | OUTPATIENT
Start: 2019-01-25 | End: 2019-01-25

## 2019-01-25 RX ORDER — SENNA PLUS 8.6 MG/1
2 TABLET ORAL
Qty: 14 | Refills: 0
Start: 2019-01-25 | End: 2019-01-31

## 2019-01-25 RX ORDER — LEVOTHYROXINE SODIUM 125 MCG
1 TABLET ORAL
Qty: 0 | Refills: 0 | COMMUNITY
Start: 2019-01-25

## 2019-01-25 RX ORDER — SODIUM CHLORIDE 9 MG/ML
1000 INJECTION, SOLUTION INTRAVENOUS
Qty: 0 | Refills: 0 | Status: DISCONTINUED | OUTPATIENT
Start: 2019-01-25 | End: 2019-01-25

## 2019-01-25 RX ORDER — HEPARIN SODIUM 5000 [USP'U]/ML
5000 INJECTION INTRAVENOUS; SUBCUTANEOUS EVERY 8 HOURS
Qty: 0 | Refills: 0 | Status: DISCONTINUED | OUTPATIENT
Start: 2019-01-25 | End: 2019-01-25

## 2019-01-25 RX ORDER — HYDROMORPHONE HYDROCHLORIDE 2 MG/ML
0.5 INJECTION INTRAMUSCULAR; INTRAVENOUS; SUBCUTANEOUS
Qty: 0 | Refills: 0 | Status: DISCONTINUED | OUTPATIENT
Start: 2019-01-25 | End: 2019-01-25

## 2019-01-25 RX ORDER — LEVOTHYROXINE SODIUM 125 MCG
1 TABLET ORAL
Qty: 0 | Refills: 0 | DISCHARGE
Start: 2019-01-25

## 2019-01-25 RX ORDER — MORPHINE SULFATE 50 MG/1
2 CAPSULE, EXTENDED RELEASE ORAL EVERY 12 HOURS
Qty: 0 | Refills: 0 | Status: DISCONTINUED | OUTPATIENT
Start: 2019-01-25 | End: 2019-01-25

## 2019-01-25 RX ADMIN — SODIUM CHLORIDE 100 MILLILITER(S): 9 INJECTION, SOLUTION INTRAVENOUS at 08:09

## 2019-01-25 RX ADMIN — SODIUM CHLORIDE 100 MILLILITER(S): 9 INJECTION, SOLUTION INTRAVENOUS at 00:11

## 2019-01-25 RX ADMIN — HEPARIN SODIUM 5000 UNIT(S): 5000 INJECTION INTRAVENOUS; SUBCUTANEOUS at 05:16

## 2019-01-25 RX ADMIN — AMPICILLIN SODIUM AND SULBACTAM SODIUM 200 GRAM(S): 250; 125 INJECTION, POWDER, FOR SUSPENSION INTRAMUSCULAR; INTRAVENOUS at 00:11

## 2019-01-25 RX ADMIN — AMPICILLIN SODIUM AND SULBACTAM SODIUM 200 GRAM(S): 250; 125 INJECTION, POWDER, FOR SUSPENSION INTRAMUSCULAR; INTRAVENOUS at 05:49

## 2019-01-25 RX ADMIN — Medication 50 MICROGRAM(S): at 05:16

## 2019-01-25 NOTE — OCCUPATIONAL THERAPY INITIAL EVALUATION ADULT - GENERAL OBSERVATIONS, REHAB EVAL
Chart reviewed. Pt seen 9:00-9:30am, Pt encountered in bed, NAD, friend at bedside. Pt in agreement for OT

## 2019-01-25 NOTE — DISCHARGE NOTE ADULT - CARE PLAN
Principal Discharge DX:	Wound cellulitis  Goal:	Wound healing, resolution of cellulitis  Assessment and plan of treatment:	Continue wound care twice daily with Xeroform and Kerlix. Wash wound with soapy water and wash cloth. Complete full course of Augmentin. Follow-up in Burn Clinic within 1 week.  Secondary Diagnosis:	Hypothyroid  Assessment and plan of treatment:	Continue home meds  Secondary Diagnosis:	Mild intermittent asthma, unspecified whether complicated  Assessment and plan of treatment:	Continue home meds  Secondary Diagnosis:	Atrial fibrillation, unspecified type  Assessment and plan of treatment:	Continue home meds  Secondary Diagnosis:	Anxiety  Assessment and plan of treatment:	Continue home meds  Secondary Diagnosis:	Reflux gastritis  Assessment and plan of treatment:	Continue home meds  Secondary Diagnosis:	Diverticulitis  Assessment and plan of treatment:	Continue home meds

## 2019-01-25 NOTE — DISCHARGE NOTE ADULT - PLAN OF CARE
Wound healing, resolution of cellulitis Continue wound care twice daily with Xeroform and Kerlix. Wash wound with soapy water and wash cloth. Complete full course of Augmentin. Follow-up in Burn Clinic within 1 week. Continue home meds

## 2019-01-25 NOTE — DISCHARGE NOTE ADULT - HOSPITAL COURSE
Carolyn García is an 83 year old female with PMH significant for afib, asthma, anxiety, GERD, dementia, hypothyroidism and diverticulitis who is sent to the ED by Dr. Pride for an infected wound to the right leg. Patient reportedly was scratched by a dog in December and wound has failed to healed. Patient has been treated in Burn Clinic with local wound care and PO antibiotics. She is admitted and taken to OR on 1/25 for debridement of wound. Wound is dressed in Xeroform and Kerlix post-operatively and patient is discharged home with VNS to assist with wound care. She will complete course of Augmentin (x7days) and follow-up in Burn Clinic within 1 week. Carolyn García is an 83 year old female with PMH significant for afib, asthma, anxiety, GERD, dementia, hypothyroidism and diverticulitis who is sent to the ED by Dr. Pride for an infected wound to the right leg. Patient reportedly was scratched by a dog in December and wound has failed to healed. Patient has been treated in Burn Clinic with local wound care and PO antibiotics. She is admitted and taken to OR on 1/25 for debridement of wound. Wound is dressed in Xeroform and Kerlix post-operatively and patient is stable for discharge following appropriate recovery from anesthesia. She will complete course of Augmentin (x7days) and follow-up in Burn Clinic within 1 week.

## 2019-01-25 NOTE — DISCHARGE NOTE ADULT - CARE PROVIDER_API CALL
Beto Pride), Plastic Surgery  [address] [place] [address]  Phone: [phone]  Fax: [phone]    Nataliya Capellan), Plastic Surgery  [address] [place] [address]  Phone: [phone]  Fax: [phone]

## 2019-01-25 NOTE — DISCHARGE NOTE ADULT - CARE PROVIDERS DIRECT ADDRESSES
[email or web address]

## 2019-01-25 NOTE — BRIEF OPERATIVE NOTE - PROCEDURE
<<-----Click on this checkbox to enter Procedure Debridement  01/25/2019  debridement right lower leg [id number]  Active  [id number]

## 2019-01-25 NOTE — DISCHARGE NOTE ADULT - PATIENT PORTAL LINK FT
You can access the [facility] [facility] Patient Portal, offered by [facility], by registering with the following website: [email or web address]

## 2019-01-25 NOTE — DISCHARGE NOTE ADULT - MEDICATION SUMMARY - MEDICATIONS TO TAKE
I will START or STAY ON the medications listed below when I get home from the hospital:    oxycodone-acetaminophen 5 mg-325 mg oral tablet  -- 1 tab(s) by mouth every 4 hours, As needed, Moderate pain (4-7) MDD:MDD: 6 tabs  -- Indication: For Pain    Eliquis 2.5 mg oral tablet  -- 1 tab(s) by mouth 2 times a day  -- Indication: For H/o a fib    diazePAM 5 mg oral tablet  -- 1 tab(s) by mouth 3 times a day, As Needed  -- Indication: For Anxiety    Ventolin HFA 90 mcg/inh inhalation aerosol  -- 2 puff(s) inhaled 4-6 times a day, As Needed  -- Indication: For Asthma    senna oral tablet  -- 2 tab(s) by mouth once a day as needed for constipation   -- Indication: For Constipation     memantine 10 mg oral tablet  -- 1 tab(s) by mouth 2 times a day  -- Indication: For Dementia     Restasis 0.05% ophthalmic emulsion  -- 1 drop(s) to each affected eye every 12 hours  -- Indication: For Dry Eye     Augmentin 875 mg-125 mg oral tablet  -- 1 tab(s) by mouth every 12 hours   -- Finish all this medication unless otherwise directed by prescriber.  Take with food or milk.    -- Indication: For Wound cellulitis    NexIUM 40 mg oral delayed release capsule  -- 1 cap(s) by mouth once a day  -- Indication: For Reflux gastritis    levothyroxine 25 mcg (0.025 mg) oral tablet  -- 1 tab(s) by mouth once a day  -- Indication: For Hypothyroid

## 2019-01-25 NOTE — CHART NOTE - NSCHARTNOTEFT_GEN_A_CORE
PACU ANESTHESIA ADMISSION NOTE      Procedure:   Post op diagnosis:      ____  Intubated  TV:______       Rate: ______      FiO2: ______    ____  Patent Airway    ____  Full return of protective reflexes    _x___  Full recovery from anesthesia / back to baseline     Vitals:   T:  98.1         R:  10                BP: 136/60                 Sat:  96                 P: 87      Mental Status:  x____ Awake   ___x__ Alert   _____ Drowsy   _____ Sedated    Nausea/Vomiting:  __x__ NO  ______Yes,   See Post - Op Orders          Pain Scale (0-10):  ___0__    Treatment: ____ None    ___x_ See Post - Op/PCA Orders    Post - Operative Fluids:   ____ Oral   __x__ See Post - Op Orders    Plan: Discharge:   ____Home       x_____Floor     _____Critical Care    _____  Other:_________________    Comments:

## 2019-01-25 NOTE — DISCHARGE NOTE ADULT - SECONDARY DIAGNOSIS.
Hypothyroid Mild intermittent asthma, unspecified whether complicated Atrial fibrillation, unspecified type Anxiety Reflux gastritis Diverticulitis

## 2019-01-25 NOTE — DISCHARGE NOTE ADULT - MEDICATION SUMMARY - MEDICATIONS TO STOP TAKING
I will STOP taking the medications listed below when I get home from the hospital:    acetaminophen-codeine 300 mg-30 mg oral tablet  -- 1 tab(s) by mouth every 12 hours MDD:MDD: 2 tabs  -- Caution federal law prohibits the transfer of this drug to any person other  than the person for whom it was prescribed.  May cause drowsiness.  Alcohol may intensify this effect.  Use care when operating dangerous machinery.  This product contains acetaminophen.  Do not use  with any other product containing acetaminophen to prevent possible liver damage.  Using more of this medication than prescribed may cause serious breathing problems.

## 2019-01-28 PROBLEM — F41.9 ANXIETY DISORDER, UNSPECIFIED: Chronic | Status: ACTIVE | Noted: 2019-01-24

## 2019-01-28 PROBLEM — I48.91 UNSPECIFIED ATRIAL FIBRILLATION: Chronic | Status: ACTIVE | Noted: 2019-01-24

## 2019-01-28 PROBLEM — I30.0 ACUTE NONSPECIFIC IDIOPATHIC PERICARDITIS: Chronic | Status: ACTIVE | Noted: 2019-01-24

## 2019-01-28 PROBLEM — J45.20 MILD INTERMITTENT ASTHMA, UNCOMPLICATED: Chronic | Status: ACTIVE | Noted: 2019-01-24

## 2019-01-28 PROBLEM — K29.60 OTHER GASTRITIS WITHOUT BLEEDING: Chronic | Status: ACTIVE | Noted: 2019-01-24

## 2019-01-28 PROBLEM — L03.019 CELLULITIS OF UNSPECIFIED FINGER: Chronic | Status: ACTIVE | Noted: 2019-01-24

## 2019-01-28 PROBLEM — K57.92 DIVERTICULITIS OF INTESTINE, PART UNSPECIFIED, WITHOUT PERFORATION OR ABSCESS WITHOUT BLEEDING: Chronic | Status: ACTIVE | Noted: 2019-01-24

## 2019-01-28 PROBLEM — I34.0 NONRHEUMATIC MITRAL (VALVE) INSUFFICIENCY: Chronic | Status: ACTIVE | Noted: 2019-01-24

## 2019-01-31 ENCOUNTER — APPOINTMENT (OUTPATIENT)
Dept: BURN CARE | Facility: CLINIC | Age: 84
End: 2019-01-31

## 2019-01-31 ENCOUNTER — OUTPATIENT (OUTPATIENT)
Dept: OUTPATIENT SERVICES | Facility: HOSPITAL | Age: 84
LOS: 1 days | Discharge: HOME | End: 2019-01-31

## 2019-01-31 DIAGNOSIS — Z90.49 ACQUIRED ABSENCE OF OTHER SPECIFIED PARTS OF DIGESTIVE TRACT: Chronic | ICD-10-CM

## 2019-01-31 DIAGNOSIS — S81.801A UNSPECIFIED OPEN WOUND, RIGHT LOWER LEG, INITIAL ENCOUNTER: ICD-10-CM

## 2019-01-31 DIAGNOSIS — Z95.818 PRESENCE OF OTHER CARDIAC IMPLANTS AND GRAFTS: Chronic | ICD-10-CM

## 2019-01-31 NOTE — ASSESSMENT
[FreeTextEntry1] : FT traumatic wound right leg S/P debridement \par \par Proc: wound mechanically debrided of exudate ( with forceps)  to pink  base. Pt joslyn well \par \par \par Rec: discontinue antibacterial soap \par         NS -wet to dry dressing to site bid - pt and caretaker instructed. Questions addressed\par  [Wound Care] : wound care

## 2019-01-31 NOTE — HISTORY OF PRESENT ILLNESS
[de-identified] : 1 week ago  [de-identified] : home  [de-identified] : clawed by dog on right leg  [de-identified] : Pt returns earlier than scheduled due to concern about redness around wound site. Continuing wound care with Xeroform gauze following debridement ~ 10 days ago.\par Continuing Augmentin po and washing with Dial soap

## 2019-01-31 NOTE — PHYSICAL EXAM
[Healing] : healing [Size%: ______] : Size: [unfilled]% [Infected?] : Infected: No [6] : 6 out of 10 [Normal] : normal [Small] : small  [] : no [de-identified] : right lower leg - open wound with yellow exudate; mild surrounding erythema\par \par pink  healthy base after debridement \par

## 2019-01-31 NOTE — REASON FOR VISIT
[Were you seen in the Emergency Room?] : not seen in the emergency room [Were you admitted to the burn center at [facility]?] : not admitted to the burn center at [facility] [Friend] : friend [Other: _____] : [unfilled]

## 2019-02-05 DIAGNOSIS — S82.851A DISPLACED TRIMALLEOLAR FRACTURE OF RIGHT LOWER LEG, INITIAL ENCOUNTER FOR CLOSED FRACTURE: ICD-10-CM
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
## 2019-02-05 DIAGNOSIS — L03.115 CELLULITIS OF RIGHT LOWER LIMB: ICD-10-CM

## 2019-02-05 DIAGNOSIS — E03.9 HYPOTHYROIDISM, UNSPECIFIED: ICD-10-CM

## 2019-02-05 DIAGNOSIS — F41.9 ANXIETY DISORDER, UNSPECIFIED: ICD-10-CM

## 2019-02-05 DIAGNOSIS — S81.851A OPEN BITE, RIGHT LOWER LEG, INITIAL ENCOUNTER: ICD-10-CM
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
## 2019-02-05 DIAGNOSIS — K57.92 DIVERTICULITIS OF INTESTINE, PART UNSPECIFIED, WITHOUT PERFORATION OR ABSCESS WITHOUT BLEEDING: ICD-10-CM

## 2019-02-05 DIAGNOSIS — J45.20 MILD INTERMITTENT ASTHMA, UNCOMPLICATED: ICD-10-CM

## 2019-02-05 DIAGNOSIS — I48.0 PAROXYSMAL ATRIAL FIBRILLATION: ICD-10-CM

## 2019-02-05 LAB — SURGICAL PATHOLOGY STUDY: SIGNIFICANT CHANGE UP

## 2019-02-07 ENCOUNTER — OUTPATIENT (OUTPATIENT)
Dept: OUTPATIENT SERVICES | Facility: HOSPITAL | Age: 84
LOS: 1 days | Discharge: HOME | End: 2019-02-07

## 2019-02-07 ENCOUNTER — APPOINTMENT (OUTPATIENT)
Dept: BURN CARE | Facility: CLINIC | Age: 84
End: 2019-02-07

## 2019-02-07 DIAGNOSIS — Z95.818 PRESENCE OF OTHER CARDIAC IMPLANTS AND GRAFTS: Chronic | ICD-10-CM

## 2019-02-07 DIAGNOSIS — Z90.49 ACQUIRED ABSENCE OF OTHER SPECIFIED PARTS OF DIGESTIVE TRACT: Chronic | ICD-10-CM

## 2019-02-07 DIAGNOSIS — R07.81 PLEURODYNIA: ICD-10-CM

## 2019-02-07 DIAGNOSIS — M54.6 PAIN IN THORACIC SPINE: ICD-10-CM

## 2019-02-11 ENCOUNTER — OUTPATIENT (OUTPATIENT)
Dept: OUTPATIENT SERVICES | Facility: HOSPITAL | Age: 84
LOS: 1 days | Discharge: HOME | End: 2019-02-11
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
## 2019-02-11 DIAGNOSIS — M54.6 PAIN IN THORACIC SPINE: ICD-10-CM

## 2019-02-11 DIAGNOSIS — Z95.818 PRESENCE OF OTHER CARDIAC IMPLANTS AND GRAFTS: Chronic | ICD-10-CM

## 2019-02-11 DIAGNOSIS — Z90.49 ACQUIRED ABSENCE OF OTHER SPECIFIED PARTS OF DIGESTIVE TRACT: Chronic | ICD-10-CM
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
## 2019-02-11 DIAGNOSIS — S81.801A UNSPECIFIED OPEN WOUND, RIGHT LOWER LEG, INITIAL ENCOUNTER: ICD-10-CM

## 2019-02-11 NOTE — PHYSICAL EXAM
[Healing] : healing [Size%: ______] : Size: [unfilled]% [Infected?] : Infected: No [3] : 3 out of 10 [Abnormal] : abnormal [Medium] : medium [] : no [de-identified] : right lower leg --> healing--> cont local care\par

## 2019-02-11 NOTE — REASON FOR VISIT
[Revisit] : revisit [Were you seen in the Emergency Room?] : not seen in the emergency room [Were you admitted to the burn center at [facility]?] : not admitted to the burn center at [facility] [Friend] : friend [Other: _____] : [unfilled]

## 2019-02-11 NOTE — HISTORY OF PRESENT ILLNESS
[de-identified] : 1 week ago  [de-identified] : home  [de-identified] : clawed by dog on right leg  [de-identified] : right lower leg wound healing

## 2019-02-14 ENCOUNTER — APPOINTMENT (OUTPATIENT)
Dept: BURN CARE | Facility: CLINIC | Age: 84
End: 2019-02-14

## 2019-02-14 ENCOUNTER — OUTPATIENT (OUTPATIENT)
Dept: OUTPATIENT SERVICES | Facility: HOSPITAL | Age: 84
LOS: 1 days | Discharge: HOME | End: 2019-02-14

## 2019-02-14 DIAGNOSIS — Z90.49 ACQUIRED ABSENCE OF OTHER SPECIFIED PARTS OF DIGESTIVE TRACT: Chronic | ICD-10-CM

## 2019-02-14 DIAGNOSIS — Z95.818 PRESENCE OF OTHER CARDIAC IMPLANTS AND GRAFTS: Chronic | ICD-10-CM

## 2019-02-14 NOTE — HISTORY OF PRESENT ILLNESS
[de-identified] : 1 week ago  [de-identified] : home  [de-identified] : clawed by dog on right leg  [de-identified] : right lower leg wound healing

## 2019-02-14 NOTE — REASON FOR VISIT
[Revisit] : revisit [Were you seen in the Emergency Room?] : not seen in the emergency room [Were you admitted to the burn center at [facility]?] : not admitted to the burn center at [facility] [Friend] : friend [Other: _____] : [unfilled]

## 2019-02-14 NOTE — PHYSICAL EXAM
[Healing] : healing [Size%: ______] : Size: [unfilled]% [Infected?] : Infected: No [5] : 5 out of 10 [Abnormal] : abnormal [Medium] : medium [] : no [de-identified] : percocet [de-identified] : right lower leg --> healing--> cont local care\par

## 2019-02-19 DIAGNOSIS — S81.801A UNSPECIFIED OPEN WOUND, RIGHT LOWER LEG, INITIAL ENCOUNTER: ICD-10-CM
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
## 2019-02-20 DIAGNOSIS — Z02.9 ENCOUNTER FOR ADMINISTRATIVE EXAMINATIONS, UNSPECIFIED: ICD-10-CM

## 2019-02-20 DIAGNOSIS — R07.81 PLEURODYNIA: ICD-10-CM

## 2019-02-20 DIAGNOSIS — M54.6 PAIN IN THORACIC SPINE: ICD-10-CM

## 2019-02-21 ENCOUNTER — APPOINTMENT (OUTPATIENT)
Dept: BURN CARE | Facility: CLINIC | Age: 84
End: 2019-02-21

## 2019-02-21 ENCOUNTER — OUTPATIENT (OUTPATIENT)
Dept: OUTPATIENT SERVICES | Facility: HOSPITAL | Age: 84
LOS: 1 days | Discharge: HOME | End: 2019-02-21

## 2019-02-21 DIAGNOSIS — Z90.49 ACQUIRED ABSENCE OF OTHER SPECIFIED PARTS OF DIGESTIVE TRACT: Chronic | ICD-10-CM

## 2019-02-21 DIAGNOSIS — Z95.818 PRESENCE OF OTHER CARDIAC IMPLANTS AND GRAFTS: Chronic | ICD-10-CM

## 2019-02-21 NOTE — REASON FOR VISIT
[Revisit] : revisit [Were you seen in the Emergency Room?] : not seen in the emergency room [Were you admitted to the burn center at [facility]?] : not admitted to the burn center at [facility] [Friend] : friend [Other: _____] : [unfilled]

## 2019-02-21 NOTE — PHYSICAL EXAM
[Healing] : healing [Size%: ______] : Size: [unfilled]% [Infected?] : Infected: No [5] : 5 out of 10 [Abnormal] : abnormal [Medium] : medium [] : no [de-identified] : percocet [de-identified] : right lower leg --> healing--> cont local care\par

## 2019-02-21 NOTE — HISTORY OF PRESENT ILLNESS
[de-identified] : 1 week ago  [de-identified] : home  [de-identified] : clawed by dog on right leg  [de-identified] : right lower leg wound healing

## 2019-02-26 ENCOUNTER — LABORATORY RESULT (OUTPATIENT)
Age: 84
End: 2019-02-26

## 2019-02-26 ENCOUNTER — OUTPATIENT (OUTPATIENT)
Dept: OUTPATIENT SERVICES | Facility: HOSPITAL | Age: 84
LOS: 1 days | Discharge: HOME | End: 2019-02-26

## 2019-02-26 DIAGNOSIS — L08.9 LOCAL INFECTION OF THE SKIN AND SUBCUTANEOUS TISSUE, UNSPECIFIED: ICD-10-CM

## 2019-02-26 DIAGNOSIS — I48.91 UNSPECIFIED ATRIAL FIBRILLATION: ICD-10-CM

## 2019-02-26 DIAGNOSIS — R00.2 PALPITATIONS: ICD-10-CM

## 2019-02-26 DIAGNOSIS — M43.06 SPONDYLOLYSIS, LUMBAR REGION: ICD-10-CM

## 2019-02-26 DIAGNOSIS — M85.80 OTHER SPECIFIED DISORDERS OF BONE DENSITY AND STRUCTURE, UNSPECIFIED SITE: ICD-10-CM

## 2019-02-26 DIAGNOSIS — Z95.818 PRESENCE OF OTHER CARDIAC IMPLANTS AND GRAFTS: Chronic | ICD-10-CM

## 2019-02-26 DIAGNOSIS — E03.9 HYPOTHYROIDISM, UNSPECIFIED: ICD-10-CM

## 2019-02-26 DIAGNOSIS — Z00.00 ENCOUNTER FOR GENERAL ADULT MEDICAL EXAMINATION WITHOUT ABNORMAL FINDINGS: ICD-10-CM

## 2019-02-26 DIAGNOSIS — F03.90 UNSPECIFIED DEMENTIA WITHOUT BEHAVIORAL DISTURBANCE: ICD-10-CM

## 2019-02-26 DIAGNOSIS — I31.9 DISEASE OF PERICARDIUM, UNSPECIFIED: ICD-10-CM

## 2019-02-26 DIAGNOSIS — Z90.49 ACQUIRED ABSENCE OF OTHER SPECIFIED PARTS OF DIGESTIVE TRACT: Chronic | ICD-10-CM

## 2019-02-26 DIAGNOSIS — I34.0 NONRHEUMATIC MITRAL (VALVE) INSUFFICIENCY: ICD-10-CM

## 2019-02-26 DIAGNOSIS — T14.8XXA OTHER INJURY OF UNSPECIFIED BODY REGION, INITIAL ENCOUNTER: ICD-10-CM

## 2019-02-28 ENCOUNTER — APPOINTMENT (OUTPATIENT)
Dept: BURN CARE | Facility: CLINIC | Age: 84
End: 2019-02-28

## 2019-02-28 ENCOUNTER — OUTPATIENT (OUTPATIENT)
Dept: OUTPATIENT SERVICES | Facility: HOSPITAL | Age: 84
LOS: 1 days | Discharge: HOME | End: 2019-02-28

## 2019-02-28 DIAGNOSIS — Z90.49 ACQUIRED ABSENCE OF OTHER SPECIFIED PARTS OF DIGESTIVE TRACT: Chronic | ICD-10-CM
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
## 2019-02-28 DIAGNOSIS — S81.801A UNSPECIFIED OPEN WOUND, RIGHT LOWER LEG, INITIAL ENCOUNTER: ICD-10-CM

## 2019-02-28 DIAGNOSIS — Z95.818 PRESENCE OF OTHER CARDIAC IMPLANTS AND GRAFTS: Chronic | ICD-10-CM
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
## 2019-02-28 NOTE — REASON FOR VISIT
[Revisit] : revisit [Friend] : friend [Other: _____] : [unfilled] [Were you seen in the Emergency Room?] : not seen in the emergency room [Were you admitted to the burn center at [facility]?] : not admitted to the burn center at [facility]

## 2019-02-28 NOTE — PHYSICAL EXAM
[Healing] : healing [Size%: ______] : Size: [unfilled]% [3] : 3 out of 10 [Abnormal] : abnormal [Medium] : medium [Infected?] : Infected: No [] : no [de-identified] : percocet [de-identified] : right lower leg --> healing--> cont local care\par

## 2019-02-28 NOTE — HISTORY OF PRESENT ILLNESS
[de-identified] : 1 week ago  [de-identified] : home  [de-identified] : clawed by dog on right leg  [de-identified] : right lower leg wound healing

## 2019-03-03 LAB
ALBUMIN SERPL ELPH-MCNC: 4.2 G/DL
ALP BLD-CCNC: 95 U/L
ALT SERPL-CCNC: 19 U/L
ANION GAP SERPL CALC-SCNC: 14 MMOL/L
AST SERPL-CCNC: 26 U/L
BILIRUB SERPL-MCNC: 0.4 MG/DL
BUN SERPL-MCNC: 26 MG/DL
CALCIUM SERPL-MCNC: 9.1 MG/DL
CHLORIDE SERPL-SCNC: 103 MMOL/L
CHOLEST SERPL-MCNC: 173 MG/DL
CHOLEST/HDLC SERPL: 2 RATIO
CO2 SERPL-SCNC: 24 MMOL/L
CREAT SERPL-MCNC: 1 MG/DL
GLUCOSE SERPL-MCNC: 78 MG/DL
HDLC SERPL-MCNC: 85 MG/DL
LDLC SERPL CALC-MCNC: 89 MG/DL
POTASSIUM SERPL-SCNC: 4.4 MMOL/L
PROT SERPL-MCNC: 6.6 G/DL
SODIUM SERPL-SCNC: 141 MMOL/L
T4 FREE SERPL-MCNC: 1 NG/DL
TRIGL SERPL-MCNC: 105 MG/DL
TSH SERPL-ACNC: 4.46 UIU/ML

## 2019-03-07 ENCOUNTER — OUTPATIENT (OUTPATIENT)
Dept: OUTPATIENT SERVICES | Facility: HOSPITAL | Age: 84
LOS: 1 days | Discharge: HOME | End: 2019-03-07

## 2019-03-07 ENCOUNTER — APPOINTMENT (OUTPATIENT)
Dept: BURN CARE | Facility: CLINIC | Age: 84
End: 2019-03-07

## 2019-03-07 DIAGNOSIS — Z95.818 PRESENCE OF OTHER CARDIAC IMPLANTS AND GRAFTS: Chronic | ICD-10-CM

## 2019-03-07 DIAGNOSIS — Z90.49 ACQUIRED ABSENCE OF OTHER SPECIFIED PARTS OF DIGESTIVE TRACT: Chronic | ICD-10-CM

## 2019-03-07 NOTE — HISTORY OF PRESENT ILLNESS
[de-identified] : 1 week ago  [de-identified] : home  [de-identified] : clawed by dog on right leg  [de-identified] : right lower leg wound healing

## 2019-03-07 NOTE — PHYSICAL EXAM
[Healing] : healing [Size%: ______] : Size: [unfilled]% [Infected?] : Infected: No [5] : 5 out of 10 [Abnormal] : abnormal [Medium] : medium [] : no [de-identified] : percocet [de-identified] : right lower leg --> healing--> cont local care\par

## 2019-03-07 NOTE — REASON FOR VISIT
[Revisit] : revisit [Were you seen in the Emergency Room?] : not seen in the emergency room [Were you admitted to the burn center at [facility]?] : not admitted to the burn center at [facility] [Friend] : friend [Other: _____] : [unfilled]
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
## 2019-03-08 DIAGNOSIS — S81.801A UNSPECIFIED OPEN WOUND, RIGHT LOWER LEG, INITIAL ENCOUNTER: ICD-10-CM
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
## 2019-03-13 ENCOUNTER — APPOINTMENT (OUTPATIENT)
Dept: CARDIOLOGY | Facility: CLINIC | Age: 84
End: 2019-03-13

## 2019-03-13 VITALS — DIASTOLIC BLOOD PRESSURE: 62 MMHG | SYSTOLIC BLOOD PRESSURE: 110 MMHG

## 2019-03-13 VITALS — HEART RATE: 85 BPM | SYSTOLIC BLOOD PRESSURE: 120 MMHG | DIASTOLIC BLOOD PRESSURE: 70 MMHG

## 2019-03-13 VITALS — BODY MASS INDEX: 22.76 KG/M2 | HEIGHT: 67 IN | WEIGHT: 145 LBS

## 2019-03-13 VITALS — BODY MASS INDEX: 22.24 KG/M2 | WEIGHT: 142 LBS

## 2019-03-13 NOTE — HISTORY OF PRESENT ILLNESS
[FreeTextEntry1] : The patient was seen by Dr. Thompson, ILM reportedly showed no arrhythmias. . No chest pain .  No SOB .

## 2019-03-13 NOTE — ASSESSMENT
[FreeTextEntry1] : The patient has been feeling well. ILM was ok. . No chest pain . No SOB No lightheadedness. Right leg ulcers are improved. The patient has been getting treated for a right leg ulcer originating from a dog scratch.

## 2019-03-14 ENCOUNTER — APPOINTMENT (OUTPATIENT)
Dept: WOUND CARE | Facility: CLINIC | Age: 84
End: 2019-03-14

## 2019-03-14 ENCOUNTER — OUTPATIENT (OUTPATIENT)
Dept: OUTPATIENT SERVICES | Facility: HOSPITAL | Age: 84
LOS: 1 days | Discharge: HOME | End: 2019-03-14

## 2019-03-14 DIAGNOSIS — Z90.49 ACQUIRED ABSENCE OF OTHER SPECIFIED PARTS OF DIGESTIVE TRACT: Chronic | ICD-10-CM

## 2019-03-14 DIAGNOSIS — S81.801A UNSPECIFIED OPEN WOUND, RIGHT LOWER LEG, INITIAL ENCOUNTER: ICD-10-CM

## 2019-03-14 DIAGNOSIS — Z95.818 PRESENCE OF OTHER CARDIAC IMPLANTS AND GRAFTS: Chronic | ICD-10-CM
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
## 2019-03-14 NOTE — PHYSICAL EXAM
[Healing] : healing [Size%: ______] : Size: [unfilled]% [Infected?] : Infected: No [5] : 5 out of 10 [Abnormal] : abnormal [Medium] : medium [] : no [de-identified] : percocet [de-identified] : right lower leg --> healing--> cont local care\par

## 2019-03-14 NOTE — REASON FOR VISIT
[Revisit] : revisit [Were you seen in the Emergency Room?] : not seen in the emergency room [Were you admitted to the burn center at [facility]?] : not admitted to the burn center at [facility] [Friend] : friend [Other: _____] : [unfilled]

## 2019-03-14 NOTE — HISTORY OF PRESENT ILLNESS
[de-identified] : 1 week ago  [de-identified] : home  [de-identified] : clawed by dog on right leg  [de-identified] : right lower leg wound healing

## 2019-03-19 ENCOUNTER — EMERGENCY (EMERGENCY)
Facility: HOSPITAL | Age: 84
LOS: 0 days | Discharge: HOME | End: 2019-03-19
Attending: EMERGENCY MEDICINE | Admitting: EMERGENCY MEDICINE

## 2019-03-19 VITALS
SYSTOLIC BLOOD PRESSURE: 164 MMHG | RESPIRATION RATE: 18 BRPM | OXYGEN SATURATION: 99 % | HEART RATE: 85 BPM | TEMPERATURE: 97 F | DIASTOLIC BLOOD PRESSURE: 70 MMHG

## 2019-03-19 VITALS — RESPIRATION RATE: 18 BRPM | OXYGEN SATURATION: 98 %

## 2019-03-19 DIAGNOSIS — R07.89 OTHER CHEST PAIN: ICD-10-CM

## 2019-03-19 DIAGNOSIS — Z79.2 LONG TERM (CURRENT) USE OF ANTIBIOTICS: ICD-10-CM

## 2019-03-19 DIAGNOSIS — Z79.899 OTHER LONG TERM (CURRENT) DRUG THERAPY: ICD-10-CM

## 2019-03-19 DIAGNOSIS — I48.91 UNSPECIFIED ATRIAL FIBRILLATION: ICD-10-CM

## 2019-03-19 DIAGNOSIS — R00.2 PALPITATIONS: ICD-10-CM

## 2019-03-19 DIAGNOSIS — Z79.1 LONG TERM (CURRENT) USE OF NON-STEROIDAL ANTI-INFLAMMATORIES (NSAID): ICD-10-CM

## 2019-03-19 DIAGNOSIS — E03.9 HYPOTHYROIDISM, UNSPECIFIED: ICD-10-CM

## 2019-03-19 DIAGNOSIS — Z90.49 ACQUIRED ABSENCE OF OTHER SPECIFIED PARTS OF DIGESTIVE TRACT: ICD-10-CM

## 2019-03-19 DIAGNOSIS — R42 DIZZINESS AND GIDDINESS: ICD-10-CM

## 2019-03-19 DIAGNOSIS — Z79.01 LONG TERM (CURRENT) USE OF ANTICOAGULANTS: ICD-10-CM

## 2019-03-19 DIAGNOSIS — Z90.49 ACQUIRED ABSENCE OF OTHER SPECIFIED PARTS OF DIGESTIVE TRACT: Chronic | ICD-10-CM

## 2019-03-19 DIAGNOSIS — J45.909 UNSPECIFIED ASTHMA, UNCOMPLICATED: ICD-10-CM

## 2019-03-19 DIAGNOSIS — Z95.818 PRESENCE OF OTHER CARDIAC IMPLANTS AND GRAFTS: Chronic | ICD-10-CM

## 2019-03-19 DIAGNOSIS — Z79.891 LONG TERM (CURRENT) USE OF OPIATE ANALGESIC: ICD-10-CM

## 2019-03-19 LAB
ALBUMIN SERPL ELPH-MCNC: 4.1 G/DL — SIGNIFICANT CHANGE UP (ref 3.5–5.2)
ALP SERPL-CCNC: 91 U/L — SIGNIFICANT CHANGE UP (ref 30–115)
ALT FLD-CCNC: 11 U/L — SIGNIFICANT CHANGE UP (ref 0–41)
ANION GAP SERPL CALC-SCNC: 12 MMOL/L — SIGNIFICANT CHANGE UP (ref 7–14)
APTT BLD: 41.6 SEC — HIGH (ref 27–39.2)
AST SERPL-CCNC: 20 U/L — SIGNIFICANT CHANGE UP (ref 0–41)
BASOPHILS # BLD AUTO: 0.02 K/UL — SIGNIFICANT CHANGE UP (ref 0–0.2)
BASOPHILS NFR BLD AUTO: 0.3 % — SIGNIFICANT CHANGE UP (ref 0–1)
BILIRUB SERPL-MCNC: 0.2 MG/DL — SIGNIFICANT CHANGE UP (ref 0.2–1.2)
BUN SERPL-MCNC: 26 MG/DL — HIGH (ref 10–20)
CALCIUM SERPL-MCNC: 9.4 MG/DL — SIGNIFICANT CHANGE UP (ref 8.5–10.1)
CHLORIDE SERPL-SCNC: 102 MMOL/L — SIGNIFICANT CHANGE UP (ref 98–110)
CO2 SERPL-SCNC: 25 MMOL/L — SIGNIFICANT CHANGE UP (ref 17–32)
CREAT SERPL-MCNC: 1.2 MG/DL — SIGNIFICANT CHANGE UP (ref 0.7–1.5)
EOSINOPHIL # BLD AUTO: 0.13 K/UL — SIGNIFICANT CHANGE UP (ref 0–0.7)
EOSINOPHIL NFR BLD AUTO: 2.2 % — SIGNIFICANT CHANGE UP (ref 0–8)
GLUCOSE SERPL-MCNC: 93 MG/DL — SIGNIFICANT CHANGE UP (ref 70–99)
HCT VFR BLD CALC: 34.2 % — LOW (ref 37–47)
HGB BLD-MCNC: 10.9 G/DL — LOW (ref 12–16)
IMM GRANULOCYTES NFR BLD AUTO: 0.3 % — SIGNIFICANT CHANGE UP (ref 0.1–0.3)
INR BLD: 1.18 RATIO — SIGNIFICANT CHANGE UP (ref 0.65–1.3)
LYMPHOCYTES # BLD AUTO: 1.87 K/UL — SIGNIFICANT CHANGE UP (ref 1.2–3.4)
LYMPHOCYTES # BLD AUTO: 32.1 % — SIGNIFICANT CHANGE UP (ref 20.5–51.1)
MAGNESIUM SERPL-MCNC: 1.8 MG/DL — SIGNIFICANT CHANGE UP (ref 1.8–2.4)
MCHC RBC-ENTMCNC: 28.4 PG — SIGNIFICANT CHANGE UP (ref 27–31)
MCHC RBC-ENTMCNC: 31.9 G/DL — LOW (ref 32–37)
MCV RBC AUTO: 89.1 FL — SIGNIFICANT CHANGE UP (ref 81–99)
MONOCYTES # BLD AUTO: 0.42 K/UL — SIGNIFICANT CHANGE UP (ref 0.1–0.6)
MONOCYTES NFR BLD AUTO: 7.2 % — SIGNIFICANT CHANGE UP (ref 1.7–9.3)
NEUTROPHILS # BLD AUTO: 3.36 K/UL — SIGNIFICANT CHANGE UP (ref 1.4–6.5)
NEUTROPHILS NFR BLD AUTO: 57.9 % — SIGNIFICANT CHANGE UP (ref 42.2–75.2)
NRBC # BLD: 0 /100 WBCS — SIGNIFICANT CHANGE UP (ref 0–0)
PLATELET # BLD AUTO: 187 K/UL — SIGNIFICANT CHANGE UP (ref 130–400)
POTASSIUM SERPL-MCNC: 4.5 MMOL/L — SIGNIFICANT CHANGE UP (ref 3.5–5)
POTASSIUM SERPL-SCNC: 4.5 MMOL/L — SIGNIFICANT CHANGE UP (ref 3.5–5)
PROT SERPL-MCNC: 6.7 G/DL — SIGNIFICANT CHANGE UP (ref 6–8)
PROTHROM AB SERPL-ACNC: 13.5 SEC — HIGH (ref 9.95–12.87)
RBC # BLD: 3.84 M/UL — LOW (ref 4.2–5.4)
RBC # FLD: 12.9 % — SIGNIFICANT CHANGE UP (ref 11.5–14.5)
SODIUM SERPL-SCNC: 139 MMOL/L — SIGNIFICANT CHANGE UP (ref 135–146)
TROPONIN T SERPL-MCNC: <0.01 NG/ML — SIGNIFICANT CHANGE UP
TROPONIN T SERPL-MCNC: <0.01 NG/ML — SIGNIFICANT CHANGE UP
WBC # BLD: 5.82 K/UL — SIGNIFICANT CHANGE UP (ref 4.8–10.8)
WBC # FLD AUTO: 5.82 K/UL — SIGNIFICANT CHANGE UP (ref 4.8–10.8)

## 2019-03-19 NOTE — ED PROVIDER NOTE - PHYSICAL EXAMINATION
VITAL SIGNS: I have reviewed nursing notes and confirm.  CONSTITUTIONAL: pleasant elderly woman in no acute distress.  SKIN: Skin exam is warm and dry, <2 sec cap refill  HEAD: Normocephalic; atraumatic.  EYES: PERRL, EOM intact; normal conjunctiva.  ENT: MMM; airway clear.   NECK: Supple; non tender.  CARD: RRR, 2+ dp pulses  RESP: No wheezes, rales or rhonchi, speaking in full sentences  ABD: soft non tender.   EXT: Normal ROM. No edema.  NEURO: Alert, oriented. Grossly unremarkable. No focal deficits.  PSYCH: Cooperative, appropriate.

## 2019-03-19 NOTE — ED PROVIDER NOTE - PROGRESS NOTE DETAILS
pt is on Eliquis. unable to give ASA Dr. Vaughan spoke with Dr. bains. states we can rpt troponin and ekg and pt can be d/c with outpt follow up pt feeling much better. repeat trop and ekg unremarkable. pt states she will follow up with Dr. bains. pt gave verbal understanding and agrees with plan

## 2019-03-19 NOTE — ED ADULT NURSE NOTE - OBJECTIVE STATEMENT
Pt presents to the ED with c/o feeling weak and diaphoretic after taking a percocet this morning. Pt also states she started getting heart palpitation. Pt denies cp, sob, vomiting.

## 2019-03-19 NOTE — ED PROVIDER NOTE - OBJECTIVE STATEMENT
84 y/o F, h/o afib-on elliquis, diverticulitis s/p resection and reversal presents with palpitations onset 9am today. pt states she has been taking percocet for her chronic back pain at night, but forgot to take it last night and decided to take percocet this morning instead. pt states she is prescribed valium to take prn palpitations and took one today when she started to feel her heart racing to the L lateral aspect of her neck and felt lightheaded. currently pt c/o L sided chest pressure. denies headache, dizziness, numbness, tingling, weakness, slurred speech, changes in gait. 84 y/o F, h/o afib-on raad, has a loop recorder, presents with palpitations onset 9am today. pt states she has been taking percocet for her chronic back pain at night, but forgot to take it last night and decided to take percocet this morning instead. pt states she is prescribed valium to take prn palpitations and took one today when she started to feel her heart racing to the L lateral aspect of her neck and felt lightheaded. currently pt c/o L sided chest pressure. pt last saw her cardiologist dr. bains and her EP doctor-dr. israel 2 weeks ago and had a normal work up. pt also had a normal stress test less than 6 months ago. denies headache, dizziness, numbness, tingling, weakness, slurred speech, changes in gait.

## 2019-03-19 NOTE — ED PROVIDER NOTE - CLINICAL SUMMARY MEDICAL DECISION MAKING FREE TEXT BOX
Pt felt much better after IVF, rest. Labs and EKG x 2 unremarkable. Pt ambulatory in the ED without discomfort. Spoke with Dr Marte, knows pt very well. Would not cath based on this presentation, will follow her closely in the office. Pt is comfortable with this plan, return precautions discussed with her and friend accompanying her.

## 2019-03-19 NOTE — ED PROVIDER NOTE - NS ED ROS FT
Review of Systems:  CONSTITUTIONAL: no fever  EYES: no photophobia, no blurred vision  ENT: no ear pain, no nasal discharge  RESPIRATORY: no shortness of breath, no cough  CARDIAC: +chest pain, +palpitations  GI: no abd pain, no nausea, no vomiting, no diarrhea, no constipation,   : no dysuria; no hematuria,   MUSCULOSKELETAL: no joint paint  NEUROLOGIC: no headache,   PSYCH: no anxiety, non suicidal, non homicidal, no hallucination, no depression

## 2019-03-19 NOTE — ED PROVIDER NOTE - ATTENDING CONTRIBUTION TO CARE
82yo woman h/o afib on eliquis, loop recorder in place, presents with palpitations and weakness s/p taking percocet this am for back pain. Pt has h/o chronic back pain, for which she was recently prescribed the percocet; previously she has only taken it before bed. She forgot last night, and took it this morning instead, and did not eat much breakfast before taking it. She went to work, and there began feeling dizzy and weak, with some palpitations and chest discomfort. She then took valium (as recommended by Dr Marte), and felt worse, some came to the ED. She had recent checkup with Dr Thompson and recent stress test with Dr Marte (within the last 6 months) and no concerning findings. EKG no ischemic changes, VS, exam as noted. Will check labs, observe, speak with Dr Marte, reassess.

## 2019-03-19 NOTE — ED ADULT NURSE REASSESSMENT NOTE - CONDITION
Patient states she is no longer having chest pain, palpatations or shortness of breath. Continuous cardiac monitoring in progress. Heart rate regular./improved

## 2019-03-19 NOTE — ED PROVIDER NOTE - CARE PROVIDER_API CALL
Leonel Marte)  Cardiovascular Disease; Internal Medicine  [address] [place] [address]  Phone: [phone]  Fax: [phone]  Follow Up Time:

## 2019-03-19 NOTE — ED ADULT NURSE NOTE - NSIMPLEMENTINTERV_GEN_ALL_ED
Implemented All Universal Safety Interventions:  [place] to call system. Call bell, personal items and telephone within reach. Instruct patient to call for assistance. Room bathroom lighting operational. Non-slip footwear when patient is off stretcher. Physically safe environment: no spills, clutter or unnecessary equipment. Stretcher in lowest position, wheels locked, appropriate side rails in place.

## 2019-03-21 ENCOUNTER — OUTPATIENT (OUTPATIENT)
Dept: OUTPATIENT SERVICES | Facility: HOSPITAL | Age: 84
LOS: 1 days | Discharge: HOME | End: 2019-03-21

## 2019-03-21 DIAGNOSIS — S81.801A UNSPECIFIED OPEN WOUND, RIGHT LOWER LEG, INITIAL ENCOUNTER: ICD-10-CM

## 2019-03-21 DIAGNOSIS — Z90.49 ACQUIRED ABSENCE OF OTHER SPECIFIED PARTS OF DIGESTIVE TRACT: Chronic | ICD-10-CM
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
## 2019-03-21 DIAGNOSIS — R94.6 ABNORMAL RESULTS OF THYROID FUNCTION STUDIES: ICD-10-CM

## 2019-03-21 DIAGNOSIS — Z95.818 PRESENCE OF OTHER CARDIAC IMPLANTS AND GRAFTS: Chronic | ICD-10-CM
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
## 2019-03-21 DIAGNOSIS — E03.9 HYPOTHYROIDISM, UNSPECIFIED: ICD-10-CM
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
## 2019-03-25 DIAGNOSIS — Z02.9 ENCOUNTER FOR ADMINISTRATIVE EXAMINATIONS, UNSPECIFIED: ICD-10-CM

## 2019-03-25 DIAGNOSIS — E03.9 HYPOTHYROIDISM, UNSPECIFIED: ICD-10-CM

## 2019-03-28 ENCOUNTER — OUTPATIENT (OUTPATIENT)
Dept: OUTPATIENT SERVICES | Facility: HOSPITAL | Age: 84
LOS: 1 days | Discharge: HOME | End: 2019-03-28

## 2019-03-28 ENCOUNTER — APPOINTMENT (OUTPATIENT)
Dept: WOUND CARE | Facility: CLINIC | Age: 84
End: 2019-03-28

## 2019-03-28 DIAGNOSIS — Z90.49 ACQUIRED ABSENCE OF OTHER SPECIFIED PARTS OF DIGESTIVE TRACT: Chronic | ICD-10-CM

## 2019-03-28 DIAGNOSIS — Z95.818 PRESENCE OF OTHER CARDIAC IMPLANTS AND GRAFTS: Chronic | ICD-10-CM

## 2019-03-28 NOTE — REASON FOR VISIT
[Revisit] : revisit [Were you seen in the Emergency Room?] : not seen in the emergency room [Were you admitted to the burn center at [facility]?] : not admitted to the burn center at [facility] [Friend] : friend [Other: _____] : [unfilled]

## 2019-03-28 NOTE — HISTORY OF PRESENT ILLNESS
[de-identified] : 1 week ago  [de-identified] : home  [de-identified] : clawed by dog on right leg  [de-identified] : right lower leg wound healing

## 2019-03-28 NOTE — PHYSICAL EXAM
[Healing] : healing [Size%: ______] : Size: [unfilled]% [Infected?] : Infected: No [3] : 3 out of 10 [Abnormal] : abnormal [Medium] : medium [] : no [de-identified] : percocet [de-identified] : right lower leg --> healing--> cont local care\par

## 2019-04-01 ENCOUNTER — OUTPATIENT (OUTPATIENT)
Dept: OUTPATIENT SERVICES | Facility: HOSPITAL | Age: 84
LOS: 1 days | Discharge: HOME | End: 2019-04-01

## 2019-04-01 ENCOUNTER — OUTPATIENT (OUTPATIENT)
Dept: OUTPATIENT SERVICES | Facility: HOSPITAL | Age: 84
LOS: 1 days | Discharge: HOME | End: 2019-04-01
Payer: MEDICARE

## 2019-04-01 DIAGNOSIS — M79.641 PAIN IN RIGHT HAND: ICD-10-CM

## 2019-04-01 DIAGNOSIS — Z95.818 PRESENCE OF OTHER CARDIAC IMPLANTS AND GRAFTS: Chronic | ICD-10-CM

## 2019-04-01 DIAGNOSIS — M25.561 PAIN IN RIGHT KNEE: ICD-10-CM

## 2019-04-01 DIAGNOSIS — E78.2 MIXED HYPERLIPIDEMIA: ICD-10-CM

## 2019-04-01 DIAGNOSIS — M32.10 SYSTEMIC LUPUS ERYTHEMATOSUS, ORGAN OR SYSTEM INVOLVEMENT UNSPECIFIED: ICD-10-CM

## 2019-04-01 DIAGNOSIS — M10.9 GOUT, UNSPECIFIED: ICD-10-CM

## 2019-04-01 DIAGNOSIS — M79.642 PAIN IN LEFT HAND: ICD-10-CM

## 2019-04-01 DIAGNOSIS — D60.8 OTHER ACQUIRED PURE RED CELL APLASIAS: ICD-10-CM

## 2019-04-01 DIAGNOSIS — Z90.49 ACQUIRED ABSENCE OF OTHER SPECIFIED PARTS OF DIGESTIVE TRACT: Chronic | ICD-10-CM

## 2019-04-01 DIAGNOSIS — M32.9 SYSTEMIC LUPUS ERYTHEMATOSUS, UNSPECIFIED: ICD-10-CM

## 2019-04-01 DIAGNOSIS — M25.562 PAIN IN LEFT KNEE: ICD-10-CM

## 2019-04-01 PROCEDURE — 73130 X-RAY EXAM OF HAND: CPT | Mod: 26,50

## 2019-04-01 PROCEDURE — 73110 X-RAY EXAM OF WRIST: CPT | Mod: 26,50

## 2019-04-01 PROCEDURE — 73562 X-RAY EXAM OF KNEE 3: CPT | Mod: 26,50

## 2019-04-03 DIAGNOSIS — Z02.9 ENCOUNTER FOR ADMINISTRATIVE EXAMINATIONS, UNSPECIFIED: ICD-10-CM

## 2019-04-03 DIAGNOSIS — D60.8 OTHER ACQUIRED PURE RED CELL APLASIAS: ICD-10-CM

## 2019-04-03 DIAGNOSIS — M32.10 SYSTEMIC LUPUS ERYTHEMATOSUS, ORGAN OR SYSTEM INVOLVEMENT UNSPECIFIED: ICD-10-CM

## 2019-04-03 DIAGNOSIS — E78.2 MIXED HYPERLIPIDEMIA: ICD-10-CM

## 2019-04-03 DIAGNOSIS — M10.9 GOUT, UNSPECIFIED: ICD-10-CM

## 2019-04-09 ENCOUNTER — OUTPATIENT (OUTPATIENT)
Dept: OUTPATIENT SERVICES | Facility: HOSPITAL | Age: 84
LOS: 1 days | Discharge: HOME | End: 2019-04-09

## 2019-04-09 DIAGNOSIS — Z90.49 ACQUIRED ABSENCE OF OTHER SPECIFIED PARTS OF DIGESTIVE TRACT: Chronic | ICD-10-CM

## 2019-04-09 DIAGNOSIS — Z95.818 PRESENCE OF OTHER CARDIAC IMPLANTS AND GRAFTS: Chronic | ICD-10-CM

## 2019-04-09 DIAGNOSIS — N39.0 URINARY TRACT INFECTION, SITE NOT SPECIFIED: ICD-10-CM
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
## 2019-04-10 DIAGNOSIS — S81.801A UNSPECIFIED OPEN WOUND, RIGHT LOWER LEG, INITIAL ENCOUNTER: ICD-10-CM

## 2019-04-11 ENCOUNTER — MEDICATION RENEWAL (OUTPATIENT)
Age: 84
End: 2019-04-11

## 2019-04-11 ENCOUNTER — OUTPATIENT (OUTPATIENT)
Dept: OUTPATIENT SERVICES | Facility: HOSPITAL | Age: 84
LOS: 1 days | Discharge: HOME | End: 2019-04-11

## 2019-04-11 ENCOUNTER — APPOINTMENT (OUTPATIENT)
Dept: WOUND CARE | Facility: CLINIC | Age: 84
End: 2019-04-11

## 2019-04-11 DIAGNOSIS — Z90.49 ACQUIRED ABSENCE OF OTHER SPECIFIED PARTS OF DIGESTIVE TRACT: Chronic | ICD-10-CM

## 2019-04-11 DIAGNOSIS — Z95.818 PRESENCE OF OTHER CARDIAC IMPLANTS AND GRAFTS: Chronic | ICD-10-CM

## 2019-04-11 NOTE — REASON FOR VISIT
[Were you seen in the Emergency Room?] : not seen in the emergency room [Revisit] : revisit [Were you admitted to the burn center at [facility]?] : not admitted to the burn center at [facility] [Friend] : friend [Other: _____] : [unfilled]

## 2019-04-11 NOTE — HISTORY OF PRESENT ILLNESS
[de-identified] : home  [de-identified] : right lower leg wound healed [de-identified] : clawed by dog on right leg

## 2019-04-11 NOTE — PHYSICAL EXAM
[Closed] : closed [Infected?] : Infected: No [Size%: ______] : Size: [unfilled]% [3] : 3 out of 10 [Normal] : normal [] : no [Medium] : medium [de-identified] : right lower leg --> healed\par  [de-identified] : percocet

## 2019-04-23 DIAGNOSIS — S81.801A UNSPECIFIED OPEN WOUND, RIGHT LOWER LEG, INITIAL ENCOUNTER: ICD-10-CM
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
## 2019-05-02 ENCOUNTER — APPOINTMENT (OUTPATIENT)
Dept: BURN CARE | Facility: CLINIC | Age: 84
End: 2019-05-02

## 2019-05-02 ENCOUNTER — OUTPATIENT (OUTPATIENT)
Dept: OUTPATIENT SERVICES | Facility: HOSPITAL | Age: 84
LOS: 1 days | Discharge: HOME | End: 2019-05-02
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
## 2019-05-02 DIAGNOSIS — Z90.49 ACQUIRED ABSENCE OF OTHER SPECIFIED PARTS OF DIGESTIVE TRACT: Chronic | ICD-10-CM

## 2019-05-02 DIAGNOSIS — S81.801A UNSPECIFIED OPEN WOUND, RIGHT LOWER LEG, INITIAL ENCOUNTER: ICD-10-CM
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
## 2019-05-02 DIAGNOSIS — Z95.818 PRESENCE OF OTHER CARDIAC IMPLANTS AND GRAFTS: Chronic | ICD-10-CM
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
## 2019-05-02 NOTE — HISTORY OF PRESENT ILLNESS
[de-identified] : clawed by dog on right leg  [de-identified] : home  [de-identified] : right lower leg wound healed

## 2019-05-02 NOTE — REASON FOR VISIT
[Were you seen in the Emergency Room?] : not seen in the emergency room [Revisit] : revisit [Other: _____] : [unfilled] [Friend] : friend [Were you admitted to the burn center at [facility]?] : not admitted to the burn center at [facility]

## 2019-05-02 NOTE — PHYSICAL EXAM
[Closed] : closed [Size%: ______] : Size: [unfilled]% [3] : 3 out of 10 [Infected?] : Infected: No [Normal] : normal [Medium] : medium [] : no [de-identified] : percocet [de-identified] : right lower leg --> healed\par

## 2019-05-07 ENCOUNTER — OUTPATIENT (OUTPATIENT)
Dept: OUTPATIENT SERVICES | Facility: HOSPITAL | Age: 84
LOS: 1 days | Discharge: HOME | End: 2019-05-07

## 2019-05-07 DIAGNOSIS — I48.91 UNSPECIFIED ATRIAL FIBRILLATION: ICD-10-CM

## 2019-05-07 DIAGNOSIS — L03.90 CELLULITIS, UNSPECIFIED: ICD-10-CM

## 2019-05-07 DIAGNOSIS — R00.2 PALPITATIONS: ICD-10-CM

## 2019-05-07 DIAGNOSIS — Z90.49 ACQUIRED ABSENCE OF OTHER SPECIFIED PARTS OF DIGESTIVE TRACT: Chronic | ICD-10-CM

## 2019-05-07 DIAGNOSIS — I31.9 DISEASE OF PERICARDIUM, UNSPECIFIED: ICD-10-CM

## 2019-05-07 DIAGNOSIS — Z95.818 PRESENCE OF OTHER CARDIAC IMPLANTS AND GRAFTS: Chronic | ICD-10-CM

## 2019-05-07 DIAGNOSIS — L98.499 NON-PRESSURE CHRONIC ULCER OF SKIN OF OTHER SITES WITH UNSPECIFIED SEVERITY: ICD-10-CM

## 2019-05-07 DIAGNOSIS — I34.0 NONRHEUMATIC MITRAL (VALVE) INSUFFICIENCY: ICD-10-CM

## 2019-05-11 ENCOUNTER — EMERGENCY (EMERGENCY)
Facility: HOSPITAL | Age: 84
LOS: 0 days | Discharge: HOME | End: 2019-05-11
Attending: EMERGENCY MEDICINE | Admitting: EMERGENCY MEDICINE
Payer: MEDICARE

## 2019-05-11 VITALS
RESPIRATION RATE: 17 BRPM | SYSTOLIC BLOOD PRESSURE: 154 MMHG | OXYGEN SATURATION: 99 % | TEMPERATURE: 97 F | HEART RATE: 74 BPM | DIASTOLIC BLOOD PRESSURE: 68 MMHG

## 2019-05-11 VITALS
DIASTOLIC BLOOD PRESSURE: 68 MMHG | RESPIRATION RATE: 16 BRPM | HEART RATE: 90 BPM | TEMPERATURE: 98 F | SYSTOLIC BLOOD PRESSURE: 148 MMHG | OXYGEN SATURATION: 99 %

## 2019-05-11 DIAGNOSIS — Z79.891 LONG TERM (CURRENT) USE OF OPIATE ANALGESIC: ICD-10-CM

## 2019-05-11 DIAGNOSIS — Z79.51 LONG TERM (CURRENT) USE OF INHALED STEROIDS: ICD-10-CM

## 2019-05-11 DIAGNOSIS — E03.9 HYPOTHYROIDISM, UNSPECIFIED: ICD-10-CM

## 2019-05-11 DIAGNOSIS — F41.9 ANXIETY DISORDER, UNSPECIFIED: ICD-10-CM

## 2019-05-11 DIAGNOSIS — Z90.49 ACQUIRED ABSENCE OF OTHER SPECIFIED PARTS OF DIGESTIVE TRACT: Chronic | ICD-10-CM

## 2019-05-11 DIAGNOSIS — R10.9 UNSPECIFIED ABDOMINAL PAIN: ICD-10-CM

## 2019-05-11 DIAGNOSIS — Z79.84 LONG TERM (CURRENT) USE OF ORAL HYPOGLYCEMIC DRUGS: ICD-10-CM

## 2019-05-11 DIAGNOSIS — Z79.01 LONG TERM (CURRENT) USE OF ANTICOAGULANTS: ICD-10-CM

## 2019-05-11 DIAGNOSIS — Z79.899 OTHER LONG TERM (CURRENT) DRUG THERAPY: ICD-10-CM

## 2019-05-11 DIAGNOSIS — Z79.2 LONG TERM (CURRENT) USE OF ANTIBIOTICS: ICD-10-CM

## 2019-05-11 DIAGNOSIS — Z79.811 LONG TERM (CURRENT) USE OF AROMATASE INHIBITORS: ICD-10-CM

## 2019-05-11 DIAGNOSIS — Z95.818 PRESENCE OF OTHER CARDIAC IMPLANTS AND GRAFTS: Chronic | ICD-10-CM

## 2019-05-11 DIAGNOSIS — R35.0 FREQUENCY OF MICTURITION: ICD-10-CM

## 2019-05-11 DIAGNOSIS — J45.20 MILD INTERMITTENT ASTHMA, UNCOMPLICATED: ICD-10-CM

## 2019-05-11 DIAGNOSIS — Z79.818 LONG TERM (CURRENT) USE OF OTHER AGENTS AFFECTING ESTROGEN RECEPTORS AND ESTROGEN LEVELS: ICD-10-CM

## 2019-05-11 LAB
ALBUMIN SERPL ELPH-MCNC: 4.6 G/DL — SIGNIFICANT CHANGE UP (ref 3.5–5.2)
ALP SERPL-CCNC: 82 U/L — SIGNIFICANT CHANGE UP (ref 30–115)
ALT FLD-CCNC: 16 U/L — SIGNIFICANT CHANGE UP (ref 0–41)
ANION GAP SERPL CALC-SCNC: 14 MMOL/L — SIGNIFICANT CHANGE UP (ref 7–14)
APPEARANCE UR: CLEAR — SIGNIFICANT CHANGE UP
AST SERPL-CCNC: 32 U/L — SIGNIFICANT CHANGE UP (ref 0–41)
BASOPHILS # BLD AUTO: 0.04 K/UL — SIGNIFICANT CHANGE UP (ref 0–0.2)
BASOPHILS NFR BLD AUTO: 0.5 % — SIGNIFICANT CHANGE UP (ref 0–1)
BILIRUB SERPL-MCNC: 0.5 MG/DL — SIGNIFICANT CHANGE UP (ref 0.2–1.2)
BILIRUB UR-MCNC: NEGATIVE — SIGNIFICANT CHANGE UP
BUN SERPL-MCNC: 26 MG/DL — HIGH (ref 10–20)
CALCIUM SERPL-MCNC: 10.1 MG/DL — SIGNIFICANT CHANGE UP (ref 8.5–10.1)
CHLORIDE SERPL-SCNC: 102 MMOL/L — SIGNIFICANT CHANGE UP (ref 98–110)
CO2 SERPL-SCNC: 25 MMOL/L — SIGNIFICANT CHANGE UP (ref 17–32)
COLOR SPEC: YELLOW — SIGNIFICANT CHANGE UP
CREAT SERPL-MCNC: 1.2 MG/DL — SIGNIFICANT CHANGE UP (ref 0.7–1.5)
DIFF PNL FLD: NEGATIVE — SIGNIFICANT CHANGE UP
EOSINOPHIL # BLD AUTO: 0.14 K/UL — SIGNIFICANT CHANGE UP (ref 0–0.7)
EOSINOPHIL NFR BLD AUTO: 1.6 % — SIGNIFICANT CHANGE UP (ref 0–8)
EPI CELLS # UR: ABNORMAL /HPF
GLUCOSE SERPL-MCNC: 90 MG/DL — SIGNIFICANT CHANGE UP (ref 70–99)
GLUCOSE UR QL: NEGATIVE MG/DL — SIGNIFICANT CHANGE UP
HCT VFR BLD CALC: 41 % — SIGNIFICANT CHANGE UP (ref 37–47)
HGB BLD-MCNC: 13 G/DL — SIGNIFICANT CHANGE UP (ref 12–16)
IMM GRANULOCYTES NFR BLD AUTO: 0.8 % — HIGH (ref 0.1–0.3)
KETONES UR-MCNC: NEGATIVE — SIGNIFICANT CHANGE UP
LEUKOCYTE ESTERASE UR-ACNC: ABNORMAL
LYMPHOCYTES # BLD AUTO: 1.72 K/UL — SIGNIFICANT CHANGE UP (ref 1.2–3.4)
LYMPHOCYTES # BLD AUTO: 20 % — LOW (ref 20.5–51.1)
MCHC RBC-ENTMCNC: 28.3 PG — SIGNIFICANT CHANGE UP (ref 27–31)
MCHC RBC-ENTMCNC: 31.7 G/DL — LOW (ref 32–37)
MCV RBC AUTO: 89.3 FL — SIGNIFICANT CHANGE UP (ref 81–99)
MONOCYTES # BLD AUTO: 0.44 K/UL — SIGNIFICANT CHANGE UP (ref 0.1–0.6)
MONOCYTES NFR BLD AUTO: 5.1 % — SIGNIFICANT CHANGE UP (ref 1.7–9.3)
NEUTROPHILS # BLD AUTO: 6.2 K/UL — SIGNIFICANT CHANGE UP (ref 1.4–6.5)
NEUTROPHILS NFR BLD AUTO: 72 % — SIGNIFICANT CHANGE UP (ref 42.2–75.2)
NITRITE UR-MCNC: NEGATIVE — SIGNIFICANT CHANGE UP
NRBC # BLD: 0 /100 WBCS — SIGNIFICANT CHANGE UP (ref 0–0)
PH UR: 6 — SIGNIFICANT CHANGE UP (ref 5–8)
PLATELET # BLD AUTO: 214 K/UL — SIGNIFICANT CHANGE UP (ref 130–400)
POTASSIUM SERPL-MCNC: 5.4 MMOL/L — HIGH (ref 3.5–5)
POTASSIUM SERPL-SCNC: 5.4 MMOL/L — HIGH (ref 3.5–5)
PROT SERPL-MCNC: 7.6 G/DL — SIGNIFICANT CHANGE UP (ref 6–8)
PROT UR-MCNC: NEGATIVE MG/DL — SIGNIFICANT CHANGE UP
RBC # BLD: 4.59 M/UL — SIGNIFICANT CHANGE UP (ref 4.2–5.4)
RBC # FLD: 13.6 % — SIGNIFICANT CHANGE UP (ref 11.5–14.5)
SODIUM SERPL-SCNC: 141 MMOL/L — SIGNIFICANT CHANGE UP (ref 135–146)
SP GR SPEC: 1.01 — SIGNIFICANT CHANGE UP (ref 1.01–1.03)
UROBILINOGEN FLD QL: 0.2 MG/DL — SIGNIFICANT CHANGE UP (ref 0.2–0.2)
WBC # BLD: 8.61 K/UL — SIGNIFICANT CHANGE UP (ref 4.8–10.8)
WBC # FLD AUTO: 8.61 K/UL — SIGNIFICANT CHANGE UP (ref 4.8–10.8)
WBC UR QL: SIGNIFICANT CHANGE UP /HPF

## 2019-05-11 PROCEDURE — 74177 CT ABD & PELVIS W/CONTRAST: CPT | Mod: 26

## 2019-05-11 PROCEDURE — 99284 EMERGENCY DEPT VISIT MOD MDM: CPT

## 2019-05-11 RX ORDER — CEFUROXIME AXETIL 250 MG
1 TABLET ORAL
Qty: 20 | Refills: 0
Start: 2019-05-11 | End: 2019-05-20

## 2019-05-11 RX ORDER — ACETAMINOPHEN 500 MG
975 TABLET ORAL ONCE
Refills: 0 | Status: COMPLETED | OUTPATIENT
Start: 2019-05-11 | End: 2019-05-11

## 2019-05-11 RX ORDER — CEFTRIAXONE 500 MG/1
1 INJECTION, POWDER, FOR SOLUTION INTRAMUSCULAR; INTRAVENOUS ONCE
Refills: 0 | Status: DISCONTINUED | OUTPATIENT
Start: 2019-05-11 | End: 2019-05-11

## 2019-05-11 RX ORDER — SODIUM CHLORIDE 9 MG/ML
1000 INJECTION INTRAMUSCULAR; INTRAVENOUS; SUBCUTANEOUS ONCE
Refills: 0 | Status: COMPLETED | OUTPATIENT
Start: 2019-05-11 | End: 2019-05-11

## 2019-05-11 RX ADMIN — Medication 975 MILLIGRAM(S): at 11:00

## 2019-05-11 RX ADMIN — SODIUM CHLORIDE 2000 MILLILITER(S): 9 INJECTION INTRAMUSCULAR; INTRAVENOUS; SUBCUTANEOUS at 11:00

## 2019-05-11 NOTE — ED PROVIDER NOTE - OBJECTIVE STATEMENT
84 y/o F PMHx AFib on Eliquis with frequent UTIs presenting with x3 days of R flank pain. Pain described as persistent, sore, non radiating. No alleviating or exacerbating factors. Pain associated with urinary frequency. No fevers or chills. No n/v.

## 2019-05-11 NOTE — ED PROVIDER NOTE - NSFOLLOWUPINSTRUCTIONS_ED_ALL_ED_FT
Take Ceftin (Cefuroxime) 1 tab by mouth twice a day for 7 days.   Follow up with your PMD within 48-72 hours.  Increase fluids. Take Tylenol 650mg 1 tab every 4-6 hours as needed for pain. Worsening pain, new fever, chills, nausea, vomiting return to ER     Urinary Tract Infection    A urinary tract infection (UTI) is an infection of any part of the urinary tract, which includes the kidneys, ureters, bladder, and urethra. Risk factors include ignoring your need to urinate, wiping back to front if female, being an uncircumcised male, and having diabetes or a weak immune system. Symptoms include frequent urination, pain or burning with urination, foul smelling urine, cloudy urine, pain in the lower abdomen, blood in the urine, and fever. If you were prescribed an antibiotic medicine, take it as told by your health care provider. Do not stop taking the antibiotic even if you start to feel better.    SEEK IMMEDIATE MEDICAL CARE IF YOU HAVE ANY OF THE FOLLOWING SYMPTOMS: severe back or abdominal pain, fever, inability to keep fluids or medicine down, dizziness/lightheadedness, or a change in mental status.

## 2019-05-11 NOTE — ED ADULT NURSE NOTE - NSIMPLEMENTINTERV_GEN_ALL_ED
Implemented All Universal Safety Interventions:  [place] to call system. Call bell, personal items and telephone within reach. Instruct patient to call for assistance. Room bathroom lighting operational. Non-slip footwear when patient is off stretcher. Physically safe environment: no spills, clutter or unnecessary equipment. Stretcher in lowest position, wheels locked, appropriate side rails in place.

## 2019-05-11 NOTE — ED PROVIDER NOTE - NS ED ROS FT
Constitutional: See HPI. No fever/chills.  Eyes: No visual changes, eye pain or discharge.  ENT: No hearing changes, pain, discharge or infections.  Neck: No neck pain or stiffness.  Cardiac: No chest pain, SOB or edema. No chest pain with exertion.  Respiratory: No cough or respiratory distress. No hemoptysis.   GI: (+) R flank pain. No nausea, vomiting, or diarrhea.  : (+) urinary frequency dysuria  MS: No myalgia, muscle weakness, joint pain or back pain.  Neuro: No headache or weakness. No LOC.  Skin: No rash.  Except as documented in the HPI, all other systems are negative.

## 2019-05-11 NOTE — ED PROVIDER NOTE - CARE PLAN
Principal Discharge DX:	Urinary frequency  Assessment and plan of treatment:	Plan: Pyelo. Will do UA, Uculture, and give ABX. Pt hemodynamically stable, well appearing, tolerating PO, afebrile. Likely suitable for out-pt management with close f/u.

## 2019-05-11 NOTE — ED PROVIDER NOTE - PLAN OF CARE
Plan: Pyelo. Will do UA, Uculture, and give ABX. Pt hemodynamically stable, well appearing, tolerating PO, afebrile. Likely suitable for out-pt management with close f/u.

## 2019-05-11 NOTE — ED PROVIDER NOTE - CLINICAL SUMMARY MEDICAL DECISION MAKING FREE TEXT BOX
Plan: Pyelo. Will do UA, Uculture, and give ABX. Pt hemodynamically stable, well appearing, tolerating PO, afebrile. Likely suitable for out-pt management with close f/u. CT negative. UA clear. Discussed rba of abx for empiric treatment given symptoms and patient agrees with plan. Feels and appears well. No complaints at present. Eager to leave. Stable for discharge. Patient was given strict return and follow up precautions. The patient has been informed of all concerning signs and symptoms to return to Emergency Department, the necessity to follow up with PMD/Clinic/follow up provided within 2-3 days was explained, and the patient reports understanding of above with capacity and insight.

## 2019-05-11 NOTE — ED PROVIDER NOTE - PHYSICAL EXAMINATION
VITAL SIGNS: noted  CONSTITUTIONAL: Well-developed; well-nourished; in no acute distress  HEAD: Normocephalic; atraumatic  EYES: conjunctiva and sclera clear  ENT: No nasal discharge; airway clear. MMM  NECK: Supple; non tender. No anterior cervical lymphadenopathy noted  CARD: Regular rate and rhythm  RESP: CTAB/L, no wheezes, rales or rhonchi  ABD: (+) suprapubic tenderness without rebound or guarding, (+) R CVAT.   EXT: Normal ROM. No calf tenderness or edema. Distal pulses intact  NEURO: Alert, oriented. Grossly unremarkable. No focal deficits  SKIN: Skin exam is warm and dry, no acute rash  MS: No midline spinal tenderness

## 2019-05-12 LAB
ALBUMIN SERPL ELPH-MCNC: 4.1 G/DL
ALP BLD-CCNC: 81 U/L
ALT SERPL-CCNC: 16 U/L
ANION GAP SERPL CALC-SCNC: 18 MMOL/L
AST SERPL-CCNC: 17 U/L
BASOPHILS # BLD AUTO: 0.04 K/UL
BASOPHILS NFR BLD AUTO: 0.4 %
BILIRUB SERPL-MCNC: 0.2 MG/DL
BUN SERPL-MCNC: 25 MG/DL
CALCIUM SERPL-MCNC: 8.7 MG/DL
CHLORIDE SERPL-SCNC: 104 MMOL/L
CHOLEST SERPL-MCNC: 242 MG/DL
CHOLEST/HDLC SERPL: 2.3 RATIO
CO2 SERPL-SCNC: 20 MMOL/L
CREAT SERPL-MCNC: 1.3 MG/DL
CULTURE RESULTS: SIGNIFICANT CHANGE UP
EOSINOPHIL # BLD AUTO: 0.14 K/UL
EOSINOPHIL NFR BLD AUTO: 1.6 %
GLUCOSE SERPL-MCNC: 91 MG/DL
HCT VFR BLD CALC: 39.1 %
HDLC SERPL-MCNC: 105 MG/DL
HGB BLD-MCNC: 12.2 G/DL
IMM GRANULOCYTES NFR BLD AUTO: 1 %
LDLC SERPL CALC-MCNC: 117 MG/DL
LYMPHOCYTES # BLD AUTO: 2.76 K/UL
LYMPHOCYTES NFR BLD AUTO: 30.8 %
MAN DIFF?: NORMAL
MCHC RBC-ENTMCNC: 28.5 PG
MCHC RBC-ENTMCNC: 31.2 G/DL
MCV RBC AUTO: 91.4 FL
MONOCYTES # BLD AUTO: 0.48 K/UL
MONOCYTES NFR BLD AUTO: 5.4 %
NEUTROPHILS # BLD AUTO: 5.45 K/UL
NEUTROPHILS NFR BLD AUTO: 60.8 %
PLATELET # BLD AUTO: 235 K/UL
POTASSIUM SERPL-SCNC: 4.2 MMOL/L
PROT SERPL-MCNC: 6.7 G/DL
RBC # BLD: 4.28 M/UL
RBC # FLD: 14.1 %
SODIUM SERPL-SCNC: 142 MMOL/L
SPECIMEN SOURCE: SIGNIFICANT CHANGE UP
TRIGL SERPL-MCNC: 186 MG/DL
WBC # FLD AUTO: 8.96 K/UL

## 2019-05-31 NOTE — ED PROVIDER NOTE - CADM POA PRESS ULCER
INFORMATION YOUR PROVIDER WANTS YOU TO KNOW    Thank you for choosing Dr. Aiden Rivas at [facility]. It was a pleasure to care for you today!    Clinic Policy:  Cancellation and No Show: If you must cancel a visit, please give minimally 24 hours notice so we can accommodate other patients that have been waiting to be seen. Do not rely on a reminder call for your appointment as it is a courtesy and not guaranteed. Please write it on your calendar. You are responsible to be at all scheduled appointments.   You can cancel your appointment by calling our office at [phone] during our normal business hours which are as follows:  Monday-Thursday 8:00 am to 4:30 pm  Friday 8:00 am to 2:00 pm     Messages:  Messages left for Dr. Aiden Rivas will be returned within 24-48 business hours.     Medication Requests:  We need up to 7 business days notice for refills to be completed. Please contact your preferred pharmacy for all non-narcotic refills. The pharmacy will contact the office for those refills. It is imperative that you plan ahead as we are unable to guarantee your refill by a certain date if this is not followed. If you miss appointments, you may not get a refill. When calling for refills or other concerns, please take into consideration that we are closed for holidays.    No medication changes are made over the phone, if you feel that a medication change is needed, please make an appointment.    Test results:  Any tests ordered by Dr. Rivas will be reviewed at your next appointment.    Forms (FMLA/ Disability):  FMLA/disability forms will need an appointment to be completed. Please complete as much as possible on any forms prior to bringing them to your appointment. This includes adding a telephone number where you can be reached during normal business hours. Please note that if you are requesting disability paperwork, you will have to complete a Functional Capacity  Evaluation. This involves an evaluation that lasts approximately 4 hours and you will be responsible to call your insurance company to verify it is a covered expense.    To improve your clinical care, we practice treatments that often include Urine Drug Testing, a tool that helps to best monitor your care.    Reports are always confidential.    Urine Drug Testing (UDT) can be a valuable tool in clinical practices that include prescription of opioid analgesics for treatment of chronic pain. The overriding principle is that this testing is used to help the patient, and to enhance positive outcomes. The issue of testing is complex and the purpose of this guidance document is to assist the practitioner in formulating the rationale for testing, as well as to provide a framework for using test results in a positive way for the patient.    For Urine Drug Testing billing questions, please contact:      [phone]     8 a.m-8 p.m Eastern Time     Monday-Friday    For more information, visit [email or web address]    For Pain Management Clinic Billing Questions Please call [phone]    We know how important your pain management is and together we can help you live your best life.    A key factor to living with a spine or back pain condition is staying healthy. Overall wellness is a combination of a balanced diet, appropriate exercise and physical activity, restful sleep and positive lifestyle choices.    PLEASE VISIT [email or web address] AS DISCUSSED WITH DR. MORTON TO LEARN MORE ABOUT YOUR CONDITION(S), TREATMENTS, ARTICLES, VIDEOS (INCLUDING VIDEOS OF ALL PROCEDURES), IMAGE LIBRARY, FORUMS, & BLOGS.     FOR ANY INQUIRIES THAT YOU WOULD LIKE TO LOOK UP, PLEASE BE SURE TO USE REPUTABLE RESOURCES.        Your opinion matters!  Our desire is to increase your overall state of wellness and improve your quality of life with individualized patient care and innovative interventional modalities.    In the next few weeks,  you may receive a [employer] survey regarding your clinic visit with us today. Your responses on this survey help us to maintain and improve the care we provide to you! We look forward to hearing from you!  A key factor to living with a spine or back pain condition is staying healthy. Overall wellness is a combination of a balanced diet, appropriate exercise and physical activity, restful sleep and positive lifestyle choices.        Please Note that our office will be CLOSED on June 13th and 14th, July 4th and 5th. Please note that the office will also close at noon on July 3rd.  Please plan accordingly.            No

## 2019-06-05 ENCOUNTER — APPOINTMENT (OUTPATIENT)
Dept: CARDIOLOGY | Facility: CLINIC | Age: 84
End: 2019-06-05
Payer: MEDICARE

## 2019-06-05 VITALS
WEIGHT: 148 LBS | SYSTOLIC BLOOD PRESSURE: 122 MMHG | DIASTOLIC BLOOD PRESSURE: 68 MMHG | HEART RATE: 96 BPM | HEIGHT: 67 IN | BODY MASS INDEX: 23.23 KG/M2

## 2019-06-05 DIAGNOSIS — M19.90 UNSPECIFIED OSTEOARTHRITIS, UNSPECIFIED SITE: ICD-10-CM

## 2019-06-05 PROCEDURE — 99214 OFFICE O/P EST MOD 30 MIN: CPT

## 2019-06-05 PROCEDURE — 93000 ELECTROCARDIOGRAM COMPLETE: CPT

## 2019-06-05 RX ORDER — OXYCODONE AND ACETAMINOPHEN 5; 325 MG/1; MG/1
5-325 TABLET ORAL
Qty: 20 | Refills: 0 | Status: DISCONTINUED | COMMUNITY
Start: 2019-02-14 | End: 2019-06-05

## 2019-06-05 NOTE — REVIEW OF SYSTEMS
[Joint Pain] : joint pain [Feeling Fatigued] : feeling fatigued [Joint Swelling] : joint swelling [Negative] : Heme/Lymph

## 2019-06-05 NOTE — PHYSICAL EXAM
[General Appearance - Well Developed] : well developed [Well Groomed] : well groomed [Normal Appearance] : normal appearance [No Deformities] : no deformities [General Appearance - Well Nourished] : well nourished [General Appearance - In No Acute Distress] : no acute distress [Normal Conjunctiva] : the conjunctiva exhibited no abnormalities [Eyelids - No Xanthelasma] : the eyelids demonstrated no xanthelasmas [No Oral Pallor] : no oral pallor [Normal Oral Mucosa] : normal oral mucosa [No Oral Cyanosis] : no oral cyanosis [Auscultation Breath Sounds / Voice Sounds] : lungs were clear to auscultation bilaterally [Exaggerated Use Of Accessory Muscles For Inspiration] : no accessory muscle use [Respiration, Rhythm And Depth] : normal respiratory rhythm and effort [Abdomen Tenderness] : non-tender [Abdomen Soft] : soft [Abnormal Walk] : normal gait [Abdomen Mass (___ Cm)] : no abdominal mass palpated [Petechial Hemorrhages (___cm)] : no petechial hemorrhages [Nail Clubbing] : no clubbing of the fingernails [Cyanosis, Localized] : no localized cyanosis [Skin Color & Pigmentation] : normal skin color and pigmentation [No Venous Stasis] : no venous stasis [No Skin Ulcers] : no skin ulcer [] : no rash [No Xanthoma] : no  xanthoma was observed [FreeTextEntry1] : multiple areas of ecchymosis. arems and legs.  [Oriented To Time, Place, And Person] : oriented to person, place, and time [Affect] : the affect was normal [Mood] : the mood was normal [No Anxiety] : not feeling anxious [Normal Rate] : normal [No Murmur] : no murmurs heard [Rhythm Regular] : regular [Rt] : no varicose veins of the right leg [Lt] : no varicose veins of the left leg [No Pitting Edema] : no pitting edema present

## 2019-06-05 NOTE — ASSESSMENT
[FreeTextEntry1] : The patient has been feeling well. No palpitations. She feels better on low dose prednisone. she has PAF and is on Eliquis. She is taking Namenda for her mild dementia.

## 2019-06-05 NOTE — HISTORY OF PRESENT ILLNESS
[FreeTextEntry1] : The patient has been put on prednisone  by rheumatology . SHe is now going for pain management . The patient has had no palpitations.  She has an ILM and is followed by Dr. Thompson .

## 2019-06-05 NOTE — REASON FOR VISIT
[Atrial Fibrillation] : atrial fibrillation [Follow-Up - Clinic] : a clinic follow-up of [Hypertension] : hypertension [Mitral Regurgitation] : mitral regurgitation [Palpitations] : palpitations

## 2019-06-11 ENCOUNTER — OUTPATIENT (OUTPATIENT)
Dept: OUTPATIENT SERVICES | Facility: HOSPITAL | Age: 84
LOS: 1 days | Discharge: HOME | End: 2019-06-11

## 2019-06-11 DIAGNOSIS — Z90.49 ACQUIRED ABSENCE OF OTHER SPECIFIED PARTS OF DIGESTIVE TRACT: Chronic | ICD-10-CM

## 2019-06-11 DIAGNOSIS — E78.2 MIXED HYPERLIPIDEMIA: ICD-10-CM

## 2019-06-11 DIAGNOSIS — M06.4 INFLAMMATORY POLYARTHROPATHY: ICD-10-CM

## 2019-06-11 DIAGNOSIS — D60.8 OTHER ACQUIRED PURE RED CELL APLASIAS: ICD-10-CM

## 2019-06-11 DIAGNOSIS — Z95.818 PRESENCE OF OTHER CARDIAC IMPLANTS AND GRAFTS: Chronic | ICD-10-CM

## 2019-06-19 ENCOUNTER — OUTPATIENT (OUTPATIENT)
Dept: OUTPATIENT SERVICES | Facility: HOSPITAL | Age: 84
LOS: 1 days | Discharge: HOME | End: 2019-06-19
Payer: MEDICARE

## 2019-06-19 DIAGNOSIS — Z90.49 ACQUIRED ABSENCE OF OTHER SPECIFIED PARTS OF DIGESTIVE TRACT: Chronic | ICD-10-CM

## 2019-06-19 DIAGNOSIS — Z95.818 PRESENCE OF OTHER CARDIAC IMPLANTS AND GRAFTS: Chronic | ICD-10-CM

## 2019-06-19 DIAGNOSIS — M54.5 LOW BACK PAIN: ICD-10-CM

## 2019-06-19 PROCEDURE — 72110 X-RAY EXAM L-2 SPINE 4/>VWS: CPT | Mod: 26

## 2019-07-03 ENCOUNTER — LABORATORY RESULT (OUTPATIENT)
Age: 84
End: 2019-07-03

## 2019-07-03 ENCOUNTER — OUTPATIENT (OUTPATIENT)
Dept: OUTPATIENT SERVICES | Facility: HOSPITAL | Age: 84
LOS: 1 days | Discharge: HOME | End: 2019-07-03

## 2019-07-03 ENCOUNTER — APPOINTMENT (OUTPATIENT)
Dept: CARDIOLOGY | Facility: CLINIC | Age: 84
End: 2019-07-03
Payer: MEDICARE

## 2019-07-03 DIAGNOSIS — Z90.49 ACQUIRED ABSENCE OF OTHER SPECIFIED PARTS OF DIGESTIVE TRACT: Chronic | ICD-10-CM

## 2019-07-03 DIAGNOSIS — Z95.818 PRESENCE OF OTHER CARDIAC IMPLANTS AND GRAFTS: Chronic | ICD-10-CM

## 2019-07-03 DIAGNOSIS — R94.6 ABNORMAL RESULTS OF THYROID FUNCTION STUDIES: ICD-10-CM

## 2019-07-03 PROCEDURE — 99214 OFFICE O/P EST MOD 30 MIN: CPT | Mod: 25

## 2019-07-03 PROCEDURE — 93285 PRGRMG DEV EVAL SCRMS IP: CPT | Mod: 59

## 2019-07-22 ENCOUNTER — OUTPATIENT (OUTPATIENT)
Dept: OUTPATIENT SERVICES | Facility: HOSPITAL | Age: 84
LOS: 1 days | Discharge: HOME | End: 2019-07-22

## 2019-07-22 DIAGNOSIS — Z90.49 ACQUIRED ABSENCE OF OTHER SPECIFIED PARTS OF DIGESTIVE TRACT: Chronic | ICD-10-CM

## 2019-07-22 DIAGNOSIS — D60.8 OTHER ACQUIRED PURE RED CELL APLASIAS: ICD-10-CM

## 2019-07-22 DIAGNOSIS — E78.2 MIXED HYPERLIPIDEMIA: ICD-10-CM

## 2019-07-22 DIAGNOSIS — Z95.818 PRESENCE OF OTHER CARDIAC IMPLANTS AND GRAFTS: Chronic | ICD-10-CM

## 2019-07-22 DIAGNOSIS — M06.4 INFLAMMATORY POLYARTHROPATHY: ICD-10-CM

## 2019-07-25 ENCOUNTER — OUTPATIENT (OUTPATIENT)
Dept: OUTPATIENT SERVICES | Facility: HOSPITAL | Age: 84
LOS: 1 days | Discharge: HOME | End: 2019-07-25
Payer: MEDICARE

## 2019-07-25 DIAGNOSIS — M54.5 LOW BACK PAIN: ICD-10-CM

## 2019-07-25 DIAGNOSIS — Z90.49 ACQUIRED ABSENCE OF OTHER SPECIFIED PARTS OF DIGESTIVE TRACT: Chronic | ICD-10-CM

## 2019-07-25 DIAGNOSIS — Z95.818 PRESENCE OF OTHER CARDIAC IMPLANTS AND GRAFTS: Chronic | ICD-10-CM

## 2019-07-25 PROCEDURE — 72148 MRI LUMBAR SPINE W/O DYE: CPT | Mod: 26

## 2019-08-03 ENCOUNTER — EMERGENCY (EMERGENCY)
Facility: HOSPITAL | Age: 84
LOS: 0 days | Discharge: HOME | End: 2019-08-03
Attending: EMERGENCY MEDICINE | Admitting: EMERGENCY MEDICINE
Payer: MEDICARE

## 2019-08-03 VITALS
TEMPERATURE: 96 F | SYSTOLIC BLOOD PRESSURE: 125 MMHG | DIASTOLIC BLOOD PRESSURE: 58 MMHG | OXYGEN SATURATION: 99 % | HEART RATE: 88 BPM | RESPIRATION RATE: 19 BRPM

## 2019-08-03 VITALS
RESPIRATION RATE: 18 BRPM | TEMPERATURE: 97 F | DIASTOLIC BLOOD PRESSURE: 83 MMHG | HEART RATE: 85 BPM | SYSTOLIC BLOOD PRESSURE: 150 MMHG | OXYGEN SATURATION: 97 % | WEIGHT: 145.95 LBS | HEIGHT: 66 IN

## 2019-08-03 DIAGNOSIS — Z95.818 PRESENCE OF OTHER CARDIAC IMPLANTS AND GRAFTS: Chronic | ICD-10-CM

## 2019-08-03 DIAGNOSIS — R00.2 PALPITATIONS: ICD-10-CM

## 2019-08-03 DIAGNOSIS — Z90.49 ACQUIRED ABSENCE OF OTHER SPECIFIED PARTS OF DIGESTIVE TRACT: Chronic | ICD-10-CM

## 2019-08-03 DIAGNOSIS — R10.32 LEFT LOWER QUADRANT PAIN: ICD-10-CM

## 2019-08-03 DIAGNOSIS — R11.0 NAUSEA: ICD-10-CM

## 2019-08-03 DIAGNOSIS — Z88.2 ALLERGY STATUS TO SULFONAMIDES: ICD-10-CM

## 2019-08-03 LAB
ALBUMIN SERPL ELPH-MCNC: 3.9 G/DL — SIGNIFICANT CHANGE UP (ref 3.5–5.2)
ALP SERPL-CCNC: 60 U/L — SIGNIFICANT CHANGE UP (ref 30–115)
ALT FLD-CCNC: 20 U/L — SIGNIFICANT CHANGE UP (ref 0–41)
ANION GAP SERPL CALC-SCNC: 12 MMOL/L — SIGNIFICANT CHANGE UP (ref 7–14)
APPEARANCE UR: CLEAR — SIGNIFICANT CHANGE UP
AST SERPL-CCNC: 26 U/L — SIGNIFICANT CHANGE UP (ref 0–41)
BASOPHILS # BLD AUTO: 0.03 K/UL — SIGNIFICANT CHANGE UP (ref 0–0.2)
BASOPHILS NFR BLD AUTO: 0.6 % — SIGNIFICANT CHANGE UP (ref 0–1)
BILIRUB SERPL-MCNC: 0.4 MG/DL — SIGNIFICANT CHANGE UP (ref 0.2–1.2)
BILIRUB UR-MCNC: NEGATIVE — SIGNIFICANT CHANGE UP
BUN SERPL-MCNC: 19 MG/DL — SIGNIFICANT CHANGE UP (ref 10–20)
CALCIUM SERPL-MCNC: 9.6 MG/DL — SIGNIFICANT CHANGE UP (ref 8.5–10.1)
CHLORIDE SERPL-SCNC: 106 MMOL/L — SIGNIFICANT CHANGE UP (ref 98–110)
CO2 SERPL-SCNC: 24 MMOL/L — SIGNIFICANT CHANGE UP (ref 17–32)
COLOR SPEC: YELLOW — SIGNIFICANT CHANGE UP
CREAT SERPL-MCNC: 1.2 MG/DL — SIGNIFICANT CHANGE UP (ref 0.7–1.5)
DIFF PNL FLD: NEGATIVE — SIGNIFICANT CHANGE UP
EOSINOPHIL # BLD AUTO: 0.1 K/UL — SIGNIFICANT CHANGE UP (ref 0–0.7)
EOSINOPHIL NFR BLD AUTO: 1.9 % — SIGNIFICANT CHANGE UP (ref 0–8)
GLUCOSE SERPL-MCNC: 85 MG/DL — SIGNIFICANT CHANGE UP (ref 70–99)
GLUCOSE UR QL: NEGATIVE — SIGNIFICANT CHANGE UP
HCT VFR BLD CALC: 35.8 % — LOW (ref 37–47)
HGB BLD-MCNC: 11.4 G/DL — LOW (ref 12–16)
IMM GRANULOCYTES NFR BLD AUTO: 0.2 % — SIGNIFICANT CHANGE UP (ref 0.1–0.3)
KETONES UR-MCNC: NEGATIVE — SIGNIFICANT CHANGE UP
LACTATE SERPL-SCNC: 2.2 MMOL/L — SIGNIFICANT CHANGE UP (ref 0.5–2.2)
LEUKOCYTE ESTERASE UR-ACNC: NEGATIVE — SIGNIFICANT CHANGE UP
LIDOCAIN IGE QN: 31 U/L — SIGNIFICANT CHANGE UP (ref 7–60)
LYMPHOCYTES # BLD AUTO: 1.71 K/UL — SIGNIFICANT CHANGE UP (ref 1.2–3.4)
LYMPHOCYTES # BLD AUTO: 32 % — SIGNIFICANT CHANGE UP (ref 20.5–51.1)
MCHC RBC-ENTMCNC: 29.9 PG — SIGNIFICANT CHANGE UP (ref 27–31)
MCHC RBC-ENTMCNC: 31.8 G/DL — LOW (ref 32–37)
MCV RBC AUTO: 94 FL — SIGNIFICANT CHANGE UP (ref 81–99)
MONOCYTES # BLD AUTO: 0.46 K/UL — SIGNIFICANT CHANGE UP (ref 0.1–0.6)
MONOCYTES NFR BLD AUTO: 8.6 % — SIGNIFICANT CHANGE UP (ref 1.7–9.3)
NEUTROPHILS # BLD AUTO: 3.04 K/UL — SIGNIFICANT CHANGE UP (ref 1.4–6.5)
NEUTROPHILS NFR BLD AUTO: 56.7 % — SIGNIFICANT CHANGE UP (ref 42.2–75.2)
NITRITE UR-MCNC: NEGATIVE — SIGNIFICANT CHANGE UP
NRBC # BLD: 0 /100 WBCS — SIGNIFICANT CHANGE UP (ref 0–0)
PH UR: 6 — SIGNIFICANT CHANGE UP (ref 5–8)
PLATELET # BLD AUTO: 199 K/UL — SIGNIFICANT CHANGE UP (ref 130–400)
POTASSIUM SERPL-MCNC: 4.9 MMOL/L — SIGNIFICANT CHANGE UP (ref 3.5–5)
POTASSIUM SERPL-SCNC: 4.9 MMOL/L — SIGNIFICANT CHANGE UP (ref 3.5–5)
PROT SERPL-MCNC: 6.8 G/DL — SIGNIFICANT CHANGE UP (ref 6–8)
PROT UR-MCNC: SIGNIFICANT CHANGE UP
RBC # BLD: 3.81 M/UL — LOW (ref 4.2–5.4)
RBC # FLD: 13.5 % — SIGNIFICANT CHANGE UP (ref 11.5–14.5)
SODIUM SERPL-SCNC: 142 MMOL/L — SIGNIFICANT CHANGE UP (ref 135–146)
SP GR SPEC: 1.02 — SIGNIFICANT CHANGE UP (ref 1.01–1.02)
TROPONIN T SERPL-MCNC: 0.01 NG/ML — SIGNIFICANT CHANGE UP
UROBILINOGEN FLD QL: SIGNIFICANT CHANGE UP
WBC # BLD: 5.35 K/UL — SIGNIFICANT CHANGE UP (ref 4.8–10.8)
WBC # FLD AUTO: 5.35 K/UL — SIGNIFICANT CHANGE UP (ref 4.8–10.8)

## 2019-08-03 PROCEDURE — 71045 X-RAY EXAM CHEST 1 VIEW: CPT | Mod: 26

## 2019-08-03 PROCEDURE — 74177 CT ABD & PELVIS W/CONTRAST: CPT | Mod: 26

## 2019-08-03 PROCEDURE — 99285 EMERGENCY DEPT VISIT HI MDM: CPT | Mod: GC

## 2019-08-03 PROCEDURE — 93291 INTERROG DEV EVAL SCRMS IP: CPT | Mod: 26

## 2019-08-03 RX ORDER — SODIUM CHLORIDE 9 MG/ML
1000 INJECTION, SOLUTION INTRAVENOUS ONCE
Refills: 0 | Status: COMPLETED | OUTPATIENT
Start: 2019-08-03 | End: 2019-08-03

## 2019-08-03 RX ORDER — MORPHINE SULFATE 50 MG/1
4 CAPSULE, EXTENDED RELEASE ORAL ONCE
Refills: 0 | Status: DISCONTINUED | OUTPATIENT
Start: 2019-08-03 | End: 2019-08-03

## 2019-08-03 RX ORDER — ONDANSETRON 8 MG/1
4 TABLET, FILM COATED ORAL ONCE
Refills: 0 | Status: COMPLETED | OUTPATIENT
Start: 2019-08-03 | End: 2019-08-03

## 2019-08-03 RX ADMIN — SODIUM CHLORIDE 1000 MILLILITER(S): 9 INJECTION, SOLUTION INTRAVENOUS at 15:35

## 2019-08-03 RX ADMIN — ONDANSETRON 4 MILLIGRAM(S): 8 TABLET, FILM COATED ORAL at 14:13

## 2019-08-03 NOTE — ED PROVIDER NOTE - CARE PROVIDER_API CALL
Leonel Marte (MD)  Cardiovascular Disease; Internal Medicine  [address] [place] [address]  Phone: [phone]  Fax: [phone]  Follow Up Time: 1-3 Days

## 2019-08-03 NOTE — ED PROVIDER NOTE - PHYSICAL EXAMINATION
CONSTITUTIONAL: Well-developed; well-nourished; in no acute distress.   SKIN: warm, dry  HEAD: Normocephalic; atraumatic.  EYES: PERRL, EOMI, no conjunctival erythema  ENT: No nasal discharge; airway clear.  NECK: Supple; non tender.  CARD: S1, S2 normal; no murmurs, gallops, or rubs. Regular rate and rhythm.   RESP: No wheezes, rales or rhonchi.  ABD: soft tenderness to palpation of LLQ.  EXT: Normal ROM.  No clubbing, cyanosis or edema.   LYMPH: No acute cervical adenopathy.  NEURO: Alert, oriented, grossly unremarkable  PSYCH: Cooperative, appropriate.

## 2019-08-03 NOTE — ED PROVIDER NOTE - CLINICAL SUMMARY MEDICAL DECISION MAKING FREE TEXT BOX
I personally evaluated the patient. I reviewed the Resident’s or Physician Assistant’s note (as assigned above), and agree with the findings and plan except as documented in my note. patient signed out to me by dr. gonzalez to follow cardiology eval. patient evaluated by fellow and device interrogated, patient cleared by cardiology, discussed case with Dr. bains, agrees with patient discharge, patient will follow as an OP. repeat neuro and cardiac exam unremarklable. patient would like to be dscharged. I have fully discussed the medical management and delivery of care with the patient. I have discussed any available labs, imaging and treatment options with the patient. Patient confirms understanding and has been given detailed return precautions. Patient instructed to return to the ED should symptoms persist or worsen. Patient has demonstrated capacity and has verbalized understanding. Patient is well appearing upon discharge.

## 2019-08-03 NOTE — ED PROVIDER NOTE - PROGRESS NOTE DETAILS
Discussed with Cardio fellow. Will come evaluate the pt and loop recorder. Discussed with cardiac fellow. Will be available in about an hour for evaluation of loop recorder. Discussed with cardiac fellow. Loop recorder interrogated. No recent arrythmia in the last week. Last arrhythmia was SVT in June lasting < 1 min.

## 2019-08-03 NOTE — ED PROVIDER NOTE - OBJECTIVE STATEMENT
84y F w/ PMH of Afib not on AC, Hypothyroidism, and GERD presents with near syncope with palpitations for the past 3 days. States occurred initially after spent working on hot day. Felt lightheaded and started having palpitations and nausea without vomiting. Resolved after sitting down and drinking water. No LOC. Occurred the next day after eating a meal. Happened the following day as well after being outside on a hot day. Resolved after being inside, sitting down, and drinking water. Called her cardiologist, Dr. Marte, and was referred to ED for further evaluation. Has loop recorder currently in place. Denies fever, chills, CP, SOB, abd pain, or numbness/tingling.

## 2019-08-03 NOTE — ED ADULT NURSE NOTE - NSIMPLEMENTINTERV_GEN_ALL_ED
Implemented All Universal Safety Interventions:  Ray to call system. Call bell, personal items and telephone within reach. Instruct patient to call for assistance. Room bathroom lighting operational. Non-slip footwear when patient is off stretcher. Physically safe environment: no spills, clutter or unnecessary equipment. Stretcher in lowest position, wheels locked, appropriate side rails in place.

## 2019-08-03 NOTE — ED PROVIDER NOTE - ATTENDING CONTRIBUTION TO CARE
I personally evaluated the patient. I reviewed the Resident’s or Physician Assistant’s note (as assigned above), and agree with the findings and plan except as documented in my note.    Pt is an 85 y/o female with hx of afib not on AC, hypothyroidism, with loop recorder in place (Jay), presents to ED for three episodes of palpitations and near syncope, three days in a row. Sx's were mild, intermittent, relieved with hydration and rest. No chest pain, SOB, abd pain. fever. No sx's are this time.      Constitutional: Well developed, well nourished. NAD.  Head: Normocephalic, atraumatic.  Eyes: PERRL. EOMI.  ENT: No nasal discharge. Mucous membranes slightly dry.  Neck: Supple. Painless ROM.  Cardiovascular: Normal S1, S2. Regular rate and rhythm. No murmurs, rubs, or gallops.  Pulmonary: Normal respiratory rate and effort. Lungs clear to auscultation bilaterally. No wheezing, rales, or rhonchi.  Abdominal: Soft. Nondistended. Nontender. No rebound, guarding, rigidity.  Extremities. Pelvis stable. No lower extremity edema, symmetric calves.  Skin: No rashes, cyanosis.  Neuro: AAOx3. No focal neurological deficits.  Psych: Normal mood. Normal affect.

## 2019-09-02 ENCOUNTER — MEDICATION RENEWAL (OUTPATIENT)
Age: 84
End: 2019-09-02

## 2019-09-10 ENCOUNTER — OUTPATIENT (OUTPATIENT)
Dept: OUTPATIENT SERVICES | Facility: HOSPITAL | Age: 84
LOS: 1 days | Discharge: HOME | End: 2019-09-10

## 2019-09-10 DIAGNOSIS — Z90.49 ACQUIRED ABSENCE OF OTHER SPECIFIED PARTS OF DIGESTIVE TRACT: Chronic | ICD-10-CM

## 2019-09-10 DIAGNOSIS — K57.32 DIVERTICULITIS OF LARGE INTESTINE WITHOUT PERFORATION OR ABSCESS WITHOUT BLEEDING: ICD-10-CM

## 2019-09-10 DIAGNOSIS — R00.2 PALPITATIONS: ICD-10-CM

## 2019-09-10 DIAGNOSIS — F41.9 ANXIETY DISORDER, UNSPECIFIED: ICD-10-CM

## 2019-09-10 DIAGNOSIS — I48.91 UNSPECIFIED ATRIAL FIBRILLATION: ICD-10-CM

## 2019-09-10 DIAGNOSIS — Z95.818 PRESENCE OF OTHER CARDIAC IMPLANTS AND GRAFTS: Chronic | ICD-10-CM

## 2019-09-10 DIAGNOSIS — L03.90 CELLULITIS, UNSPECIFIED: ICD-10-CM

## 2019-09-15 LAB
ALBUMIN SERPL ELPH-MCNC: 4.3 G/DL
ALP BLD-CCNC: 68 U/L
ALT SERPL-CCNC: 21 U/L
ANION GAP SERPL CALC-SCNC: 17 MMOL/L
AST SERPL-CCNC: 26 U/L
BASOPHILS # BLD AUTO: 0.03 K/UL
BASOPHILS NFR BLD AUTO: 0.5 %
BILIRUB SERPL-MCNC: 0.4 MG/DL
BUN SERPL-MCNC: 26 MG/DL
CALCIUM SERPL-MCNC: 9.5 MG/DL
CHLORIDE SERPL-SCNC: 104 MMOL/L
CHOLEST SERPL-MCNC: 175 MG/DL
CHOLEST/HDLC SERPL: 2.2 RATIO
CO2 SERPL-SCNC: 20 MMOL/L
CREAT SERPL-MCNC: 1.2 MG/DL
EOSINOPHIL # BLD AUTO: 0.16 K/UL
EOSINOPHIL NFR BLD AUTO: 2.7 %
GLUCOSE SERPL-MCNC: 156 MG/DL
HCT VFR BLD CALC: 37 %
HDLC SERPL-MCNC: 80 MG/DL
HGB BLD-MCNC: 11.7 G/DL
IMM GRANULOCYTES NFR BLD AUTO: 0.2 %
LDLC SERPL CALC-MCNC: 83 MG/DL
LYMPHOCYTES # BLD AUTO: 1.96 K/UL
LYMPHOCYTES NFR BLD AUTO: 32.5 %
MAN DIFF?: NORMAL
MCHC RBC-ENTMCNC: 29.5 PG
MCHC RBC-ENTMCNC: 31.6 G/DL
MCV RBC AUTO: 93.4 FL
MONOCYTES # BLD AUTO: 0.38 K/UL
MONOCYTES NFR BLD AUTO: 6.3 %
NEUTROPHILS # BLD AUTO: 3.49 K/UL
NEUTROPHILS NFR BLD AUTO: 57.8 %
PLATELET # BLD AUTO: 218 K/UL
POTASSIUM SERPL-SCNC: 4 MMOL/L
PROT SERPL-MCNC: 6.7 G/DL
RBC # BLD: 3.96 M/UL
RBC # FLD: 13.2 %
SODIUM SERPL-SCNC: 141 MMOL/L
T4 FREE SERPL-MCNC: 0.9 NG/DL
TRIGL SERPL-MCNC: 128 MG/DL
TSH SERPL-ACNC: 3.86 UIU/ML
WBC # FLD AUTO: 6.03 K/UL

## 2019-09-20 ENCOUNTER — APPOINTMENT (OUTPATIENT)
Dept: CARDIOLOGY | Facility: CLINIC | Age: 84
End: 2019-09-20
Payer: MEDICARE

## 2019-09-20 VITALS
HEART RATE: 80 BPM | HEIGHT: 67 IN | BODY MASS INDEX: 23.07 KG/M2 | DIASTOLIC BLOOD PRESSURE: 70 MMHG | SYSTOLIC BLOOD PRESSURE: 116 MMHG | WEIGHT: 147 LBS

## 2019-09-20 DIAGNOSIS — N28.1 CYST OF KIDNEY, ACQUIRED: ICD-10-CM

## 2019-09-20 PROCEDURE — 93000 ELECTROCARDIOGRAM COMPLETE: CPT

## 2019-09-20 PROCEDURE — 99214 OFFICE O/P EST MOD 30 MIN: CPT

## 2019-09-20 NOTE — REASON FOR VISIT
[Follow-Up - Clinic] : a clinic follow-up of [Hypertension] : hypertension [Atrial Fibrillation] : atrial fibrillation [Mitral Regurgitation] : mitral regurgitation [Palpitations] : palpitations

## 2019-09-20 NOTE — PHYSICAL EXAM
[Well Groomed] : well groomed [General Appearance - Well Developed] : well developed [Normal Appearance] : normal appearance [General Appearance - Well Nourished] : well nourished [No Deformities] : no deformities [Normal Conjunctiva] : the conjunctiva exhibited no abnormalities [General Appearance - In No Acute Distress] : no acute distress [Eyelids - No Xanthelasma] : the eyelids demonstrated no xanthelasmas [Normal Oral Mucosa] : normal oral mucosa [No Oral Pallor] : no oral pallor [No Oral Cyanosis] : no oral cyanosis [Exaggerated Use Of Accessory Muscles For Inspiration] : no accessory muscle use [Respiration, Rhythm And Depth] : normal respiratory rhythm and effort [Abdomen Soft] : soft [Auscultation Breath Sounds / Voice Sounds] : lungs were clear to auscultation bilaterally [Abdomen Tenderness] : non-tender [Abdomen Mass (___ Cm)] : no abdominal mass palpated [Abnormal Walk] : normal gait [Nail Clubbing] : no clubbing of the fingernails [Cyanosis, Localized] : no localized cyanosis [Petechial Hemorrhages (___cm)] : no petechial hemorrhages [Skin Color & Pigmentation] : normal skin color and pigmentation [] : no rash [No Venous Stasis] : no venous stasis [No Skin Ulcers] : no skin ulcer [No Xanthoma] : no  xanthoma was observed [Oriented To Time, Place, And Person] : oriented to person, place, and time [Mood] : the mood was normal [Affect] : the affect was normal [Normal Rate] : normal [No Anxiety] : not feeling anxious [No Murmur] : no murmurs heard [Rhythm Regular] : regular [No Pitting Edema] : no pitting edema present [FreeTextEntry1] : chronic back pain [Rt] : no varicose veins of the right leg [Lt] : no varicose veins of the left leg

## 2019-09-20 NOTE — HISTORY OF PRESENT ILLNESS
[FreeTextEntry1] : The patient still has low back pain . She is going for evaluation for spinal cord stimulator.

## 2019-09-25 ENCOUNTER — OUTPATIENT (OUTPATIENT)
Dept: OUTPATIENT SERVICES | Facility: HOSPITAL | Age: 84
LOS: 1 days | Discharge: HOME | End: 2019-09-25
Payer: MEDICARE

## 2019-09-25 DIAGNOSIS — Z90.49 ACQUIRED ABSENCE OF OTHER SPECIFIED PARTS OF DIGESTIVE TRACT: Chronic | ICD-10-CM

## 2019-09-25 DIAGNOSIS — Z95.818 PRESENCE OF OTHER CARDIAC IMPLANTS AND GRAFTS: Chronic | ICD-10-CM

## 2019-09-25 PROCEDURE — 92134 CPTRZ OPH DX IMG PST SGM RTA: CPT | Mod: 26

## 2019-09-25 PROCEDURE — 92012 INTRM OPH EXAM EST PATIENT: CPT

## 2019-10-03 ENCOUNTER — OUTPATIENT (OUTPATIENT)
Dept: OUTPATIENT SERVICES | Facility: HOSPITAL | Age: 84
LOS: 1 days | Discharge: HOME | End: 2019-10-03
Payer: MEDICARE

## 2019-10-03 DIAGNOSIS — Z90.49 ACQUIRED ABSENCE OF OTHER SPECIFIED PARTS OF DIGESTIVE TRACT: Chronic | ICD-10-CM

## 2019-10-03 DIAGNOSIS — Z95.818 PRESENCE OF OTHER CARDIAC IMPLANTS AND GRAFTS: Chronic | ICD-10-CM

## 2019-10-03 PROCEDURE — 99212 OFFICE O/P EST SF 10 MIN: CPT

## 2019-10-09 ENCOUNTER — OUTPATIENT (OUTPATIENT)
Dept: OUTPATIENT SERVICES | Facility: HOSPITAL | Age: 84
LOS: 1 days | Discharge: HOME | End: 2019-10-09

## 2019-10-09 ENCOUNTER — APPOINTMENT (OUTPATIENT)
Dept: CARDIOLOGY | Facility: CLINIC | Age: 84
End: 2019-10-09
Payer: MEDICARE

## 2019-10-09 DIAGNOSIS — Z90.49 ACQUIRED ABSENCE OF OTHER SPECIFIED PARTS OF DIGESTIVE TRACT: Chronic | ICD-10-CM

## 2019-10-09 DIAGNOSIS — H17.12 CENTRAL CORNEAL OPACITY, LEFT EYE: ICD-10-CM

## 2019-10-09 DIAGNOSIS — H35.3131 NONEXUDATIVE AGE-RELATED MACULAR DEGENERATION, BILATERAL, EARLY DRY STAGE: ICD-10-CM

## 2019-10-09 DIAGNOSIS — E03.9 HYPOTHYROIDISM, UNSPECIFIED: ICD-10-CM

## 2019-10-09 DIAGNOSIS — H00.015 HORDEOLUM EXTERNUM LEFT LOWER EYELID: ICD-10-CM

## 2019-10-09 DIAGNOSIS — H16.142 PUNCTATE KERATITIS, LEFT EYE: ICD-10-CM

## 2019-10-09 DIAGNOSIS — Z95.818 PRESENCE OF OTHER CARDIAC IMPLANTS AND GRAFTS: Chronic | ICD-10-CM

## 2019-10-09 PROCEDURE — 93285 PRGRMG DEV EVAL SCRMS IP: CPT | Mod: 59

## 2019-10-09 PROCEDURE — 99214 OFFICE O/P EST MOD 30 MIN: CPT | Mod: 25

## 2019-10-14 NOTE — ED ADULT NURSE NOTE - PAIN RATING/NUMBER SCALE (0-10): REST
"       SUBJECTIVE       Kavon Helm is a 5 year old male with a PMH significant for   Patient Active Problem List   Diagnosis     Seasonal allergic rhinitis    who presents for evaluation of cough, diarrhea and vomiting.    Urinary symptoms, diarrhea  Patient coming in today due to symptoms that initially started about 10 days ago.  Dad reports that initially started off with some cough then had mild diarrhea which improved and patient was feeling fine for about 4 days and then symptoms recurred again.  Both of patient's siblings have been ill with similar symptoms.  Of note, patient is originally from [place] however recently moved here from [place]. Dad denies any fevers or chills.  Intake and urine output have been completely normal.  Patient has been able to tolerate orals without any issues.  Dad reports that patient initially had diarrhea about 4 times daily but this is not been improving.  Father denies any abdominal pain.  Father reports that patient has had intermittent cough with weather change but denies any prior history of asthma.  After severe bout of cough, patient has had some emesis, nonbloody nonbilious.  Father states that patient has history of seasonal allergies.  Patient speaks Malaysian, so an  was used.    Medications and problem list have been reviewed and updated.         REVIEW OF SYSTEMS     General: No fevers, chills  Head: No headache, dizziness  Neck: No swallowing problems   Resp: No cough. No congestion  GI: No constipation or nausea.  Positive for diarrhea and posttussive vomiting.  Skin: No rash        OBJECTIVE     Vitals:    10/14/19 1324 10/14/19 1326   BP: (!) 87/62 94/59   BP Location: Left arm    Patient Position: Sitting    Cuff Size: Child    Pulse: 96    Resp: 16    Temp: 98.8  F (37.1  C)    TempSrc: Oral    SpO2: 95%    Weight: 24.7 kg (54 lb 6.4 oz)    Height: 1.195 m (3' 11.05\")      Body mass index is 17.28 kg/m .  Gen: Young Malaysian male, in no apparent " distress.  HEENT: No Scleral icterus or conjunctival injection,   Neck: supple without lymphadenopathy  CV:  RRR  - no murmurs, age appropriate rate  Pulm:  CTAB, no wheezes/rales/rhonchi, good air entry   Abdomen: soft, nontender, no masses, no rebound, BS intact throughout  Skin: No rashes or lesions noted.  Cap refills less than 3 seconds.    No results found for this or any previous visit (from the past 24 hour(s)).    ASSESSMENT AND PLAN     Kavon was seen today for vomiting, imm/inj and medication reconciliation.    Diagnoses and all orders for this visit:    Viral gastroenteritis  Patient with intermittent 10-day history of loose bowel movements, mucousy and watery times, cough and posttussive emesis without abdominal pain, fevers coming in today for evaluation.  Symptoms are most likely viral in the setting of recent sick contacts however I did offer that we should complete stool testing to check for any bacterial causes.  Unfortunately family was in a hurry and declined this at this time.  They instead wanted to return to clinic if symptoms persisted.  Return precautions are discussed.  - Increased amount of fluid intake  - Continue to watch out for worsening symptoms or symptoms of dehydration.    Seasonal allergic rhinitis, unspecified trigger  -     cetirizine (ZYRTEC) 5 MG/5ML solution; Take 5 mLs (5 mg) by mouth daily    Options for treatment and/or follow-up care were reviewed with the patient who was engaged and actively involved in the decision making process and verbalized understanding of the options discussed and was satisfied with the final plan. Risks and benefits of plan were carefully reviewed.     I, Jerry Rivera, have discussed patient findings with attending physician Erik Colón MD who was agreeable with plan.     Jerry Rivera MD, PGY3             6

## 2019-10-15 ENCOUNTER — EMERGENCY (EMERGENCY)
Facility: HOSPITAL | Age: 84
LOS: 0 days | Discharge: HOME | End: 2019-10-15
Attending: EMERGENCY MEDICINE | Admitting: EMERGENCY MEDICINE
Payer: MEDICARE

## 2019-10-15 VITALS
SYSTOLIC BLOOD PRESSURE: 164 MMHG | RESPIRATION RATE: 17 BRPM | DIASTOLIC BLOOD PRESSURE: 72 MMHG | TEMPERATURE: 97 F | OXYGEN SATURATION: 100 % | HEART RATE: 87 BPM

## 2019-10-15 DIAGNOSIS — Z00.00 ENCOUNTER FOR GENERAL ADULT MEDICAL EXAMINATION WITHOUT ABNORMAL FINDINGS: ICD-10-CM

## 2019-10-15 DIAGNOSIS — Z95.818 PRESENCE OF OTHER CARDIAC IMPLANTS AND GRAFTS: Chronic | ICD-10-CM

## 2019-10-15 DIAGNOSIS — M54.6 PAIN IN THORACIC SPINE: ICD-10-CM

## 2019-10-15 DIAGNOSIS — Z90.49 ACQUIRED ABSENCE OF OTHER SPECIFIED PARTS OF DIGESTIVE TRACT: Chronic | ICD-10-CM

## 2019-10-15 DIAGNOSIS — Z88.2 ALLERGY STATUS TO SULFONAMIDES: ICD-10-CM

## 2019-10-15 DIAGNOSIS — G89.29 OTHER CHRONIC PAIN: ICD-10-CM

## 2019-10-15 LAB
ALBUMIN SERPL ELPH-MCNC: 4.3 G/DL — SIGNIFICANT CHANGE UP (ref 3.5–5.2)
ALP SERPL-CCNC: 82 U/L — SIGNIFICANT CHANGE UP (ref 30–115)
ALT FLD-CCNC: 20 U/L — SIGNIFICANT CHANGE UP (ref 0–41)
ANION GAP SERPL CALC-SCNC: 13 MMOL/L — SIGNIFICANT CHANGE UP (ref 7–14)
APPEARANCE UR: CLEAR — SIGNIFICANT CHANGE UP
AST SERPL-CCNC: 27 U/L — SIGNIFICANT CHANGE UP (ref 0–41)
BASOPHILS # BLD AUTO: 0.04 K/UL — SIGNIFICANT CHANGE UP (ref 0–0.2)
BASOPHILS NFR BLD AUTO: 0.6 % — SIGNIFICANT CHANGE UP (ref 0–1)
BILIRUB SERPL-MCNC: 0.3 MG/DL — SIGNIFICANT CHANGE UP (ref 0.2–1.2)
BILIRUB UR-MCNC: NEGATIVE — SIGNIFICANT CHANGE UP
BUN SERPL-MCNC: 22 MG/DL — HIGH (ref 10–20)
CALCIUM SERPL-MCNC: 9.5 MG/DL — SIGNIFICANT CHANGE UP (ref 8.5–10.1)
CHLORIDE SERPL-SCNC: 106 MMOL/L — SIGNIFICANT CHANGE UP (ref 98–110)
CO2 SERPL-SCNC: 23 MMOL/L — SIGNIFICANT CHANGE UP (ref 17–32)
COLOR SPEC: SIGNIFICANT CHANGE UP
CREAT SERPL-MCNC: 1.1 MG/DL — SIGNIFICANT CHANGE UP (ref 0.7–1.5)
DIFF PNL FLD: NEGATIVE — SIGNIFICANT CHANGE UP
EOSINOPHIL # BLD AUTO: 0.12 K/UL — SIGNIFICANT CHANGE UP (ref 0–0.7)
EOSINOPHIL NFR BLD AUTO: 1.7 % — SIGNIFICANT CHANGE UP (ref 0–8)
GLUCOSE SERPL-MCNC: 114 MG/DL — HIGH (ref 70–99)
GLUCOSE UR QL: NEGATIVE — SIGNIFICANT CHANGE UP
HCT VFR BLD CALC: 35.3 % — LOW (ref 37–47)
HGB BLD-MCNC: 11.4 G/DL — LOW (ref 12–16)
IMM GRANULOCYTES NFR BLD AUTO: 0.1 % — SIGNIFICANT CHANGE UP (ref 0.1–0.3)
KETONES UR-MCNC: NEGATIVE — SIGNIFICANT CHANGE UP
LEUKOCYTE ESTERASE UR-ACNC: NEGATIVE — SIGNIFICANT CHANGE UP
LYMPHOCYTES # BLD AUTO: 2.54 K/UL — SIGNIFICANT CHANGE UP (ref 1.2–3.4)
LYMPHOCYTES # BLD AUTO: 36.8 % — SIGNIFICANT CHANGE UP (ref 20.5–51.1)
MCHC RBC-ENTMCNC: 30.1 PG — SIGNIFICANT CHANGE UP (ref 27–31)
MCHC RBC-ENTMCNC: 32.3 G/DL — SIGNIFICANT CHANGE UP (ref 32–37)
MCV RBC AUTO: 93.1 FL — SIGNIFICANT CHANGE UP (ref 81–99)
MONOCYTES # BLD AUTO: 0.5 K/UL — SIGNIFICANT CHANGE UP (ref 0.1–0.6)
MONOCYTES NFR BLD AUTO: 7.2 % — SIGNIFICANT CHANGE UP (ref 1.7–9.3)
NEUTROPHILS # BLD AUTO: 3.7 K/UL — SIGNIFICANT CHANGE UP (ref 1.4–6.5)
NEUTROPHILS NFR BLD AUTO: 53.6 % — SIGNIFICANT CHANGE UP (ref 42.2–75.2)
NITRITE UR-MCNC: NEGATIVE — SIGNIFICANT CHANGE UP
NRBC # BLD: 0 /100 WBCS — SIGNIFICANT CHANGE UP (ref 0–0)
PH UR: 6 — SIGNIFICANT CHANGE UP (ref 5–8)
PLATELET # BLD AUTO: 214 K/UL — SIGNIFICANT CHANGE UP (ref 130–400)
POTASSIUM SERPL-MCNC: 4.8 MMOL/L — SIGNIFICANT CHANGE UP (ref 3.5–5)
POTASSIUM SERPL-SCNC: 4.8 MMOL/L — SIGNIFICANT CHANGE UP (ref 3.5–5)
PROT SERPL-MCNC: 7 G/DL — SIGNIFICANT CHANGE UP (ref 6–8)
PROT UR-MCNC: NEGATIVE — SIGNIFICANT CHANGE UP
RBC # BLD: 3.79 M/UL — LOW (ref 4.2–5.4)
RBC # FLD: 12.9 % — SIGNIFICANT CHANGE UP (ref 11.5–14.5)
SODIUM SERPL-SCNC: 142 MMOL/L — SIGNIFICANT CHANGE UP (ref 135–146)
SP GR SPEC: 1.02 — SIGNIFICANT CHANGE UP (ref 1.01–1.02)
UROBILINOGEN FLD QL: SIGNIFICANT CHANGE UP
WBC # BLD: 6.91 K/UL — SIGNIFICANT CHANGE UP (ref 4.8–10.8)
WBC # FLD AUTO: 6.91 K/UL — SIGNIFICANT CHANGE UP (ref 4.8–10.8)

## 2019-10-15 PROCEDURE — 74177 CT ABD & PELVIS W/CONTRAST: CPT | Mod: 26

## 2019-10-15 PROCEDURE — 99284 EMERGENCY DEPT VISIT MOD MDM: CPT

## 2019-10-15 RX ORDER — ACETAMINOPHEN 500 MG
975 TABLET ORAL ONCE
Refills: 0 | Status: COMPLETED | OUTPATIENT
Start: 2019-10-15 | End: 2019-10-15

## 2019-10-15 RX ADMIN — Medication 975 MILLIGRAM(S): at 17:07

## 2019-10-15 NOTE — ED PROVIDER NOTE - PATIENT PORTAL LINK FT
You can access the FollowMyHealth Patient Portal offered by [facility] by registering at the following website: [email or web address]. By joining [facility]’s FollowMyHealth portal, you will also be able to view your health information using other applications (apps) compatible with our system.

## 2019-10-15 NOTE — ED PROVIDER NOTE - OBJECTIVE STATEMENT
84y F pmh afib on eliquis, hypothyroid, diverticulitis, asthma, chronic back pain sent from Dr. Denson for evaluation of L sided back pain. Pt states she has had mild/moderate dull aching L upper back pain x1mo, aggravated with movement, relived at rest. She has had similar pain on her R side associated with arthritis followed by Dr. Matta. Denies fever, ha, cp, sob, weakness, numbness, change in vision, dysuria, hematuria

## 2019-10-15 NOTE — ED PROVIDER NOTE - ATTENDING CONTRIBUTION TO CARE
85yo woman h/o afib (on eliquis), chronic back pain was sent from Dr Denson's office for CT due to persistent L flank/back pain x 1 month. Had normal urine in the office and normal sono. Denies urinary sx, nausea, vomiting. Pt feels sx are due to her arthritis, she follows with Dr Matta and is scheduled for neurostim on the R next week. On exam she is well appearing and cooperative, lungs CTA, CVS1S2 abd soft, NT. No peripheral edema. No CVA tenderness, however there is some point tenderness in the paraspinal lower thoracic region, worse with movement. Neuro intact. Doubt stone, but will check labs, UA, CT, likely d/c to f/u with pain management.

## 2019-10-15 NOTE — ED PROVIDER NOTE - NSFOLLOWUPINSTRUCTIONS_ED_ALL_ED_FT
Follow up with Dr. Blackburn, Dr. Matta in 1-2 days.    Back Pain    Back pain is very common in adults. The cause of back pain is rarely dangerous and the pain often gets better over time. The cause of your back pain may not be known and may include strain of muscles or ligaments, degeneration of the spinal disks, or arthritis. Occasionally the pain may radiate down your leg(s). Over-the-counter medicines to reduce pain and inflammation are often the most helpful. Stretching and remaining active frequently helps the healing process.     SEEK IMMEDIATE MEDICAL CARE IF YOU HAVE ANY OF THE FOLLOWING SYMPTOMS: bowel or bladder control problems, unusual weakness or numbness in your arms or legs, nausea or vomiting, abdominal pain, fever, dizziness/lightheadedness.

## 2019-10-15 NOTE — ED PROVIDER NOTE - NS ED MD EM SELECTION
Additional Anesthesia Volume In Cc (Will Not Render If 0): 0 Render Path Notes In Note?: No Lab: [facility] [place] 2[place]0 [facility] Anesthesia Type: 1% lidocaine with 1:100,000 epinephrine and a 1:10 solution of 8.4% sodium bicarbonate Curettage Text: The wound bed was treated with curettage after the biopsy was performed. Destruction After The Procedure: Yes Billing Type: [employer] Depth Of Biopsy: dermis Anesthesia Volume In Cc (Will Not Render If 0): 0.5 Lab Facility: [facility] Cryotherapy Text: The wound bed was treated with cryotherapy after the biopsy was performed. Biopsy Type: H and E Post-Care Instructions: I reviewed with the patient in detail post-care instructions. Patient is to keep the biopsy site dry overnight, and then apply bacitracin twice daily until healed. Patient may apply hydrogen peroxide soaks to remove any crusting. Wound Care: Petrolatum Type Of Destruction Used: Electrodesiccation Dressing: bandage Path Notes (To The Dermatopathologist): Please check margins Detail Level: Detailed 46697 Comprehensive Electrodesiccation Text: The wound bed was treated with electrodesiccation after the biopsy was performed. Consent: The provider's intent is to obtain a tissue sample solely for diagnostic purposes. Written consent to obtain tissue sample was obtained and risks were reviewed including but not limited to scarring, infection, bleeding, scabbing, incomplete removal, nerve damage and allergy to anesthesia. Notification Instructions: Patient will be notified of biopsy results. However, patient instructed to call the office if not contacted within 2 weeks. Hemostasis: Aluminum Chloride Biopsy Method: Personna blade Silver Nitrate Text: The wound bed was treated with silver nitrate after the biopsy was performed. Electrodesiccation And Curettage Text: The wound bed was treated with electrodesiccation and curettage after the biopsy was performed.

## 2019-10-15 NOTE — ED PROVIDER NOTE - PHYSICAL EXAMINATION
CONST: NAD  EYES: PERRL, EOMI, Sclera and conjunctiva clear.   ENT: No nasal discharge. Oropharynx normal appearing, no erythema or exudates. No abscess or swelling. Uvula midline.   NECK: Non-tender, no meningeal signs. normal ROM. supple   CARD: S1 S2; No jvd  RESP: Equal BS B/L, No wheezes, rhonchi or rales. No distress  GI: Soft, non-tender, non-distended. no cva tenderness. normal BS  MS: Reproducible L superior back tenderness. Normal ROM in all extremities. pulses 2 +. no calf tenderness or swelling  SKIN: Warm, dry, no acute rashes. Good turgor  NEURO: A&Ox3, No focal deficits. Strength 5/5 with no sensory deficits. Steady gait.

## 2019-10-15 NOTE — ED PROVIDER NOTE - CLINICAL SUMMARY MEDICAL DECISION MAKING FREE TEXT BOX
Labs ok, UA negative for blood. CT no stone and no suggestion of other intraabdominal pathology. Sx likely MSK in origin. Pt to f/u with Dr Matta and return to the ED for worsening sx.

## 2019-10-15 NOTE — ED PROVIDER NOTE - NS ED ROS FT
Constitutional: (-) fever  Eyes/ENT: (-) blurry vision, (-) epistaxis  Cardiovascular: (-) chest pain, (-) syncope  Respiratory: (-) cough, (-) shortness of breath  Gastrointestinal: (-) vomiting, (-) diarrhea  Musculoskeletal: (+) back pain, (-) joint pain, (-) neck pain  Integumentary: (-) rash, (-) edema  Neurological: (-) headache, (-) altered mental status  Psychiatric: (-) hallucinations  Allergic/Immunologic: (-) pruritus

## 2019-10-17 ENCOUNTER — RX RENEWAL (OUTPATIENT)
Age: 84
End: 2019-10-17

## 2019-10-22 ENCOUNTER — TRANSCRIPTION ENCOUNTER (OUTPATIENT)
Age: 84
End: 2019-10-22

## 2019-11-04 ENCOUNTER — OUTPATIENT (OUTPATIENT)
Dept: OUTPATIENT SERVICES | Facility: HOSPITAL | Age: 84
LOS: 1 days | Discharge: HOME | End: 2019-11-04
Payer: MEDICARE

## 2019-11-04 DIAGNOSIS — Z95.818 PRESENCE OF OTHER CARDIAC IMPLANTS AND GRAFTS: Chronic | ICD-10-CM

## 2019-11-04 DIAGNOSIS — Z90.49 ACQUIRED ABSENCE OF OTHER SPECIFIED PARTS OF DIGESTIVE TRACT: Chronic | ICD-10-CM

## 2019-11-04 DIAGNOSIS — M96.1 POSTLAMINECTOMY SYNDROME, NOT ELSEWHERE CLASSIFIED: ICD-10-CM

## 2019-11-04 PROCEDURE — 72110 X-RAY EXAM L-2 SPINE 4/>VWS: CPT | Mod: 26

## 2020-01-08 ENCOUNTER — APPOINTMENT (OUTPATIENT)
Dept: CARDIOLOGY | Facility: CLINIC | Age: 85
End: 2020-01-08
Payer: MEDICARE

## 2020-01-08 LAB
ALBUMIN SERPL ELPH-MCNC: 4.3 G/DL
ALP BLD-CCNC: 74 U/L
ALT SERPL-CCNC: 11 U/L
ANION GAP SERPL CALC-SCNC: 19 MMOL/L
AST SERPL-CCNC: 17 U/L
BASOPHILS # BLD AUTO: 0.04 K/UL
BASOPHILS NFR BLD AUTO: 0.6 %
BILIRUB SERPL-MCNC: 0.5 MG/DL
BUN SERPL-MCNC: 20 MG/DL
CALCIUM SERPL-MCNC: 9.6 MG/DL
CHLORIDE SERPL-SCNC: 106 MMOL/L
CHOLEST SERPL-MCNC: 187 MG/DL
CHOLEST/HDLC SERPL: 2.1 RATIO
CO2 SERPL-SCNC: 19 MMOL/L
CREAT SERPL-MCNC: 1.4 MG/DL
EOSINOPHIL # BLD AUTO: 0.16 K/UL
EOSINOPHIL NFR BLD AUTO: 2.4 %
ESTIMATED AVERAGE GLUCOSE: 100 MG/DL
GLUCOSE SERPL-MCNC: 149 MG/DL
HBA1C MFR BLD HPLC: 5.1 %
HCT VFR BLD CALC: 38.8 %
HDLC SERPL-MCNC: 89 MG/DL
HGB BLD-MCNC: 12.3 G/DL
IMM GRANULOCYTES NFR BLD AUTO: 0.2 %
LDLC SERPL CALC-MCNC: 83 MG/DL
LYMPHOCYTES # BLD AUTO: 1.81 K/UL
LYMPHOCYTES NFR BLD AUTO: 27.7 %
MAN DIFF?: NORMAL
MCHC RBC-ENTMCNC: 29.6 PG
MCHC RBC-ENTMCNC: 31.7 G/DL
MCV RBC AUTO: 93.3 FL
MONOCYTES # BLD AUTO: 0.27 K/UL
MONOCYTES NFR BLD AUTO: 4.1 %
NEUTROPHILS # BLD AUTO: 4.25 K/UL
NEUTROPHILS NFR BLD AUTO: 65 %
PLATELET # BLD AUTO: 190 K/UL
POTASSIUM SERPL-SCNC: 4.2 MMOL/L
PROT SERPL-MCNC: 6.9 G/DL
RBC # BLD: 4.16 M/UL
RBC # FLD: 12.6 %
SODIUM SERPL-SCNC: 144 MMOL/L
TRIGL SERPL-MCNC: 114 MG/DL
WBC # FLD AUTO: 6.54 K/UL

## 2020-01-08 PROCEDURE — 93285 PRGRMG DEV EVAL SCRMS IP: CPT | Mod: 59

## 2020-01-08 PROCEDURE — 99214 OFFICE O/P EST MOD 30 MIN: CPT | Mod: 25

## 2020-01-27 ENCOUNTER — APPOINTMENT (OUTPATIENT)
Dept: CARDIOLOGY | Facility: CLINIC | Age: 85
End: 2020-01-27
Payer: MEDICARE

## 2020-01-27 VITALS — HEIGHT: 67 IN | HEART RATE: 88 BPM | WEIGHT: 143 LBS | BODY MASS INDEX: 22.44 KG/M2

## 2020-01-27 VITALS — DIASTOLIC BLOOD PRESSURE: 70 MMHG | HEART RATE: 88 BPM | SYSTOLIC BLOOD PRESSURE: 120 MMHG

## 2020-01-27 DIAGNOSIS — S81.801D UNSPECIFIED OPEN WOUND, RIGHT LOWER LEG, SUBSEQUENT ENCOUNTER: ICD-10-CM

## 2020-01-27 DIAGNOSIS — K21.9 GASTRO-ESOPHAGEAL REFLUX DISEASE W/OUT ESOPHAGITIS: ICD-10-CM

## 2020-01-27 PROCEDURE — 93000 ELECTROCARDIOGRAM COMPLETE: CPT

## 2020-01-27 PROCEDURE — 99214 OFFICE O/P EST MOD 30 MIN: CPT

## 2020-01-27 NOTE — ASSESSMENT
[FreeTextEntry1] : The patient is feeling better with spinal cord stimulator. The patient has less pain . Blod sugar is still high but she again ate a donut prior to taking her blood. No AF on ILM . Tolerating Eliquis  No anemia .

## 2020-01-27 NOTE — HISTORY OF PRESENT ILLNESS
[FreeTextEntry1] : The patient has a spinal cord stimulator and there is some improvement . No chest pain or SOB . No palpitations .

## 2020-01-27 NOTE — PHYSICAL EXAM
[General Appearance - Well Developed] : well developed [Normal Appearance] : normal appearance [Well Groomed] : well groomed [General Appearance - Well Nourished] : well nourished [No Deformities] : no deformities [General Appearance - In No Acute Distress] : no acute distress [Normal Conjunctiva] : the conjunctiva exhibited no abnormalities [Eyelids - No Xanthelasma] : the eyelids demonstrated no xanthelasmas [Normal Oral Mucosa] : normal oral mucosa [No Oral Pallor] : no oral pallor [No Oral Cyanosis] : no oral cyanosis [Respiration, Rhythm And Depth] : normal respiratory rhythm and effort [Exaggerated Use Of Accessory Muscles For Inspiration] : no accessory muscle use [Auscultation Breath Sounds / Voice Sounds] : lungs were clear to auscultation bilaterally [Abdomen Soft] : soft [Abdomen Tenderness] : non-tender [Abdomen Mass (___ Cm)] : no abdominal mass palpated [Abnormal Walk] : normal gait [Nail Clubbing] : no clubbing of the fingernails [Cyanosis, Localized] : no localized cyanosis [Petechial Hemorrhages (___cm)] : no petechial hemorrhages [Skin Color & Pigmentation] : normal skin color and pigmentation [] : no rash [No Venous Stasis] : no venous stasis [No Skin Ulcers] : no skin ulcer [No Xanthoma] : no  xanthoma was observed [FreeTextEntry1] : multiple areas of ecchymosis. arems and legs.  [Oriented To Time, Place, And Person] : oriented to person, place, and time [Affect] : the affect was normal [Mood] : the mood was normal [No Anxiety] : not feeling anxious [Normal Rate] : normal [Rhythm Regular] : regular [No Murmur] : no murmurs heard [No Pitting Edema] : no pitting edema present [Rt] : no varicose veins of the right leg [Lt] : no varicose veins of the left leg

## 2020-05-08 ENCOUNTER — APPOINTMENT (OUTPATIENT)
Dept: CARDIOLOGY | Facility: CLINIC | Age: 85
End: 2020-05-08

## 2020-05-19 ENCOUNTER — APPOINTMENT (OUTPATIENT)
Dept: CARDIOLOGY | Facility: CLINIC | Age: 85
End: 2020-05-19
Payer: MEDICARE

## 2020-05-19 PROCEDURE — 99213 OFFICE O/P EST LOW 20 MIN: CPT | Mod: 95

## 2020-05-20 ENCOUNTER — APPOINTMENT (OUTPATIENT)
Dept: CARDIOLOGY | Facility: CLINIC | Age: 85
End: 2020-05-20

## 2020-06-23 ENCOUNTER — RESULT REVIEW (OUTPATIENT)
Age: 85
End: 2020-06-23

## 2020-06-23 ENCOUNTER — OUTPATIENT (OUTPATIENT)
Dept: OUTPATIENT SERVICES | Facility: HOSPITAL | Age: 85
LOS: 1 days | Discharge: HOME | End: 2020-06-23
Payer: MEDICARE

## 2020-06-23 DIAGNOSIS — Z95.818 PRESENCE OF OTHER CARDIAC IMPLANTS AND GRAFTS: Chronic | ICD-10-CM

## 2020-06-23 DIAGNOSIS — R52 PAIN, UNSPECIFIED: ICD-10-CM

## 2020-06-23 DIAGNOSIS — Z90.49 ACQUIRED ABSENCE OF OTHER SPECIFIED PARTS OF DIGESTIVE TRACT: Chronic | ICD-10-CM

## 2020-06-23 PROCEDURE — 73522 X-RAY EXAM HIPS BI 3-4 VIEWS: CPT | Mod: 26

## 2020-06-23 PROCEDURE — 72202 X-RAY EXAM SI JOINTS 3/> VWS: CPT | Mod: 26

## 2020-06-23 PROCEDURE — 72110 X-RAY EXAM L-2 SPINE 4/>VWS: CPT | Mod: 26

## 2020-06-29 ENCOUNTER — OUTPATIENT (OUTPATIENT)
Dept: OUTPATIENT SERVICES | Facility: HOSPITAL | Age: 85
LOS: 1 days | Discharge: HOME | End: 2020-06-29
Payer: MEDICARE

## 2020-06-29 ENCOUNTER — RESULT REVIEW (OUTPATIENT)
Age: 85
End: 2020-06-29

## 2020-06-29 DIAGNOSIS — M54.5 LOW BACK PAIN: ICD-10-CM

## 2020-06-29 DIAGNOSIS — Z95.818 PRESENCE OF OTHER CARDIAC IMPLANTS AND GRAFTS: Chronic | ICD-10-CM

## 2020-06-29 DIAGNOSIS — Z90.49 ACQUIRED ABSENCE OF OTHER SPECIFIED PARTS OF DIGESTIVE TRACT: Chronic | ICD-10-CM

## 2020-06-29 PROCEDURE — 72192 CT PELVIS W/O DYE: CPT | Mod: 26

## 2020-07-12 ENCOUNTER — RX RENEWAL (OUTPATIENT)
Age: 85
End: 2020-07-12

## 2020-07-24 LAB
ALBUMIN SERPL ELPH-MCNC: 4.4 G/DL
ALP BLD-CCNC: 172 U/L
ALT SERPL-CCNC: 11 U/L
ANION GAP SERPL CALC-SCNC: 16 MMOL/L
AST SERPL-CCNC: 18 U/L
BASOPHILS # BLD AUTO: 0.02 K/UL
BASOPHILS NFR BLD AUTO: 0.3 %
BILIRUB SERPL-MCNC: 0.4 MG/DL
BUN SERPL-MCNC: 15 MG/DL
CALCIUM SERPL-MCNC: 9.5 MG/DL
CHLORIDE SERPL-SCNC: 96 MMOL/L
CHOLEST SERPL-MCNC: 183 MG/DL
CHOLEST/HDLC SERPL: 2.1 RATIO
CO2 SERPL-SCNC: 21 MMOL/L
CREAT SERPL-MCNC: 1 MG/DL
EOSINOPHIL # BLD AUTO: 0.05 K/UL
EOSINOPHIL NFR BLD AUTO: 0.7 %
ESTIMATED AVERAGE GLUCOSE: 114 MG/DL
GLUCOSE SERPL-MCNC: 95 MG/DL
HBA1C MFR BLD HPLC: 5.6 %
HCT VFR BLD CALC: 38.8 %
HDLC SERPL-MCNC: 87 MG/DL
HGB BLD-MCNC: 12.1 G/DL
IMM GRANULOCYTES NFR BLD AUTO: 0.4 %
LDLC SERPL CALC-MCNC: 73 MG/DL
LYMPHOCYTES # BLD AUTO: 1.15 K/UL
LYMPHOCYTES NFR BLD AUTO: 15 %
MAN DIFF?: NORMAL
MCHC RBC-ENTMCNC: 28.5 PG
MCHC RBC-ENTMCNC: 31.2 G/DL
MCV RBC AUTO: 91.5 FL
MONOCYTES # BLD AUTO: 0.51 K/UL
MONOCYTES NFR BLD AUTO: 6.6 %
NEUTROPHILS # BLD AUTO: 5.91 K/UL
NEUTROPHILS NFR BLD AUTO: 77 %
PLATELET # BLD AUTO: 293 K/UL
POTASSIUM SERPL-SCNC: 4.8 MMOL/L
PROT SERPL-MCNC: 7.1 G/DL
RBC # BLD: 4.24 M/UL
RBC # FLD: 13 %
SODIUM SERPL-SCNC: 133 MMOL/L
TRIGL SERPL-MCNC: 154 MG/DL
WBC # FLD AUTO: 7.67 K/UL

## 2020-07-29 ENCOUNTER — APPOINTMENT (OUTPATIENT)
Dept: CARDIOLOGY | Facility: CLINIC | Age: 85
End: 2020-07-29
Payer: MEDICARE

## 2020-07-29 ENCOUNTER — RESULT CHARGE (OUTPATIENT)
Age: 85
End: 2020-07-29

## 2020-07-29 VITALS — SYSTOLIC BLOOD PRESSURE: 120 MMHG | DIASTOLIC BLOOD PRESSURE: 70 MMHG

## 2020-07-29 VITALS — HEIGHT: 67 IN | TEMPERATURE: 97.2 F | HEART RATE: 91 BPM

## 2020-07-29 PROCEDURE — 99214 OFFICE O/P EST MOD 30 MIN: CPT

## 2020-07-29 PROCEDURE — 93000 ELECTROCARDIOGRAM COMPLETE: CPT

## 2020-07-29 NOTE — PHYSICAL EXAM
[General Appearance - Well Developed] : well developed [Normal Appearance] : normal appearance [Well Groomed] : well groomed [General Appearance - Well Nourished] : well nourished [No Deformities] : no deformities [General Appearance - In No Acute Distress] : no acute distress [Normal Conjunctiva] : the conjunctiva exhibited no abnormalities [Eyelids - No Xanthelasma] : the eyelids demonstrated no xanthelasmas [Normal Oral Mucosa] : normal oral mucosa [No Oral Pallor] : no oral pallor [No Oral Cyanosis] : no oral cyanosis [Exaggerated Use Of Accessory Muscles For Inspiration] : no accessory muscle use [Respiration, Rhythm And Depth] : normal respiratory rhythm and effort [Auscultation Breath Sounds / Voice Sounds] : lungs were clear to auscultation bilaterally [Abdomen Soft] : soft [Abdomen Tenderness] : non-tender [Abnormal Walk] : normal gait [Abdomen Mass (___ Cm)] : no abdominal mass palpated [Nail Clubbing] : no clubbing of the fingernails [Cyanosis, Localized] : no localized cyanosis [Petechial Hemorrhages (___cm)] : no petechial hemorrhages [Skin Color & Pigmentation] : normal skin color and pigmentation [No Venous Stasis] : no venous stasis [] : no rash [No Skin Ulcers] : no skin ulcer [No Xanthoma] : no  xanthoma was observed [Oriented To Time, Place, And Person] : oriented to person, place, and time [Mood] : the mood was normal [Affect] : the affect was normal [No Anxiety] : not feeling anxious [Normal Rate] : normal [Rhythm Regular] : regular [No Murmur] : no murmurs heard [No Pitting Edema] : no pitting edema present [FreeTextEntry1] : multiple areas of ecchymosis. arems and legs.  [Rt] : no varicose veins of the right leg [Lt] : no varicose veins of the left leg

## 2020-07-29 NOTE — HISTORY OF PRESENT ILLNESS
[FreeTextEntry1] : The patient is going for another stimulator . The patient continues to have severe lower back pain . No CP or SOB . No report of AF .

## 2020-07-29 NOTE — REASON FOR VISIT
[Follow-Up - Clinic] : a clinic follow-up of [Atrial Fibrillation] : atrial fibrillation [Hypertension] : hypertension [Palpitations] : palpitations [Mitral Regurgitation] : mitral regurgitation

## 2020-07-29 NOTE — ASSESSMENT
[FreeTextEntry1] : The patient is going for another spinal stimulator . she has had a previous stimulator without issues . She is on eliquis . If there is an unacceptable bleeding risk may stop Eliquis for 3 days before the procedure . She may have sedation

## 2020-08-18 ENCOUNTER — APPOINTMENT (OUTPATIENT)
Dept: NEUROLOGY | Facility: CLINIC | Age: 85
End: 2020-08-18
Payer: MEDICARE

## 2020-08-18 VITALS
HEART RATE: 87 BPM | OXYGEN SATURATION: 99 % | WEIGHT: 140 LBS | HEIGHT: 66 IN | DIASTOLIC BLOOD PRESSURE: 70 MMHG | TEMPERATURE: 95.4 F | BODY MASS INDEX: 22.5 KG/M2 | SYSTOLIC BLOOD PRESSURE: 110 MMHG

## 2020-08-18 PROCEDURE — 99204 OFFICE O/P NEW MOD 45 MIN: CPT

## 2020-08-18 RX ORDER — PANTOPRAZOLE 40 MG/1
40 TABLET, DELAYED RELEASE ORAL
Qty: 90 | Refills: 3 | Status: DISCONTINUED | COMMUNITY
Start: 2020-08-03 | End: 2020-08-18

## 2020-08-19 NOTE — ASSESSMENT
[FreeTextEntry1] : Patient is a 85 year old woman with a history of A-Fib, loop recorder, spine stimulator, hypothyroidism, GERD who presents for a initial evaluation for memory difficulty. \par \par \par Plan:\par 1. Continue Namenda 10 mg BID\par 2. Fall precautions \par 3. proper fitting shoes, good posture, physically active\par 4. MRI Head without contrast\par 5. Caroid Ultrasound \par 6. B/W Folate, B12, RPR, Vitamin D, TSH, Free T4, T4, T3\par 7. Follow up in 3 months\par

## 2020-08-19 NOTE — PHYSICAL EXAM
[FreeTextEntry1] : Neurologic Examination:\par NAD. AOx3.  Intact memory.  Speech fluent, nondysarthric.  CN 2 – 12 normal.  \par Strength 5/5 b/l UE/LE.  NL tone, bulk. No abnl movements.  DTRs 2+ throughout.  Plantar response downgoing b/l.  (-) Hoffmans, clonus.  Sensory intact LT/PP, pain, temp, proprioception and vibration.  NL FTN/HKS.  No dysdiadokinesia.  stooped posture  Gait narrow based/NL tandem.  \par \par MOCA 22/30\par Difficulty with visuospatial/ executive functioning, language fluency, delayed recall \par \par Patient remembers her phone number and friends phone numbers\par

## 2020-08-19 NOTE — HISTORY OF PRESENT ILLNESS
[FreeTextEntry1] : Patient is a 85 year old woman with a history of A-Fib, loop recorder, spine stimulator, hypothyroidism, GERD who presents for a initial evaluation for memory difficulty.  Patient is accompanied to this visit by her friend, Sister Yesenia.  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  She has not volunteered in a while due to COVID-19 but is looking forward to going back to her volunteering position when it is safer.  She plays the piano, cooks and can use a Iphone without difficulty.  She was driving up until a few weeks ago but stopped because of back pain.  (She will be having a a procedure for spinal stimulator next week with Dr. Matta.)    Patient eats well and sleeps well (7-8 hours at night.)  \par She gets help with paying bills.      \par \par \par \par

## 2020-08-19 NOTE — HISTORY OF PRESENT ILLNESS
[FreeTextEntry1] : Patient is a 85 year old woman with a history of A-Fib, loop recorder, spine stimulator, hypothyroidism, GERD who presents for a initial evaluation for memory difficulty.  Patient is accompanied to this visit by her friend, Sister Yesenia.  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  She plays the piano, cooks and can use a Iphone without difficulty.  She was driving up until a few weeks ago but stopped because of back pain.  (She will be having a a procedure for spinal stimulator next week with Dr. Matta.)    Patient eats well and sleeps well (7-8 hours at night.)  \par She gets help with paying bills.      \par \par \par \par

## 2020-09-02 ENCOUNTER — APPOINTMENT (OUTPATIENT)
Dept: CARDIOLOGY | Facility: CLINIC | Age: 85
End: 2020-09-02

## 2020-09-02 ENCOUNTER — APPOINTMENT (OUTPATIENT)
Dept: CARDIOLOGY | Facility: CLINIC | Age: 85
End: 2020-09-02
Payer: MEDICARE

## 2020-09-02 VITALS — SYSTOLIC BLOOD PRESSURE: 118 MMHG | DIASTOLIC BLOOD PRESSURE: 69 MMHG

## 2020-09-02 VITALS
BODY MASS INDEX: 22.18 KG/M2 | HEIGHT: 66 IN | SYSTOLIC BLOOD PRESSURE: 130 MMHG | DIASTOLIC BLOOD PRESSURE: 80 MMHG | HEART RATE: 87 BPM | WEIGHT: 138 LBS | TEMPERATURE: 98.8 F

## 2020-09-02 DIAGNOSIS — Z00.00 ENCOUNTER FOR GENERAL ADULT MEDICAL EXAMINATION W/OUT ABNORMAL FINDINGS: ICD-10-CM

## 2020-09-02 PROCEDURE — 99213 OFFICE O/P EST LOW 20 MIN: CPT

## 2020-09-02 PROCEDURE — 93000 ELECTROCARDIOGRAM COMPLETE: CPT

## 2020-09-02 NOTE — PHYSICAL EXAM
[Normal Appearance] : normal appearance [General Appearance - Well Developed] : well developed [General Appearance - Well Nourished] : well nourished [Heart Sounds] : normal S1 and S2 [Heart Rate And Rhythm] : heart rate and rhythm were normal [] : no respiratory distress [Respiration, Rhythm And Depth] : normal respiratory rhythm and effort [Auscultation Breath Sounds / Voice Sounds] : lungs were clear to auscultation bilaterally [Dry] : dry [Clean] : clean [Well-Healed] : well-healed [Bowel Sounds] : normal bowel sounds [Nail Clubbing] : no clubbing of the fingernails

## 2020-09-02 NOTE — ASSESSMENT
[FreeTextEntry1] : ECG (9/2/2020) SINUS ARRHYTHMIA AT 87 BPM. \par \par \par LOOP RECORDER: 7 ATRIAL TACH EPISODES, LONGEST 9  SECONDS . SHE IS ASYMPTOMATIC. \par \par ON ELIQUIS  2.5 BID FOR STROKE PREVENTION. DENIES S/S BLEEDING. \par \par \par PLAN \par -CONTINUE CURRENT MEDICATIONS \par -RETURN FOR FOLLOW UP IN 2 MONTHS

## 2020-09-02 NOTE — PROCEDURE
[See Scanned Paceart Report] : See scanned paceart report [See Device Printout] : See device printout [Longevity: ___ months] : The estimated remaining battery life is [unfilled] months [de-identified] : [id number] [de-identified] : MEDTRONIC [de-identified] : LOOP LINQ [de-identified] : 1-6-17 [de-identified] : 7 ATRIAL TACH episodes, longest 9 seconds\par \par BATTERY REACHED EOS

## 2020-09-02 NOTE — ADDENDUM
[FreeTextEntry1] : I was present with the NP/PA during the history and exam of the patient. I discussed patient’s management in details. I reviewed the NP’s note and agree with the documented findings and plan of care. Please do not hesitate to contact me if you have any further questions at [phone]. \par \par \par

## 2020-09-02 NOTE — HISTORY OF PRESENT ILLNESS
[de-identified] : PERICARDIAL EFFUSION DEC 2012 COMPLICATED WITH AF\par FEELS GOOD SINCE DISCHARGE\par NO CHEST PAIN,\par NO DYSPNEA\par \par occasional mid chest back pain not affected by breathing or exercise\par \par C/O OCCASIONAL FLUTTERING IN THE CHEST LASTING 2-20 MIN ABOUT TWICE/WEEK\par NO CHEST PAIN OR DYSPNEA\par HAD PNUMONIA 2 WEEKS AGO\par \par C/O INSOMNIA\par EOISODES OF WEAKNESS BUT NO PALPITATION 7/9/14\par \par SLEEPS BETTER SINCE STARTED ON CELEXIA 9/29/14\par \par NO C/O TODAY 3/23/15\par \par FEELS GOOD ,NO CARDIAC C/O 10/12/15\par \par LOST TO FOLLOW FOR 1 YEAR\par C/O WEAKNESS IN THE UPPER LEFT CHEST RADIATING TO THE SIDE OF NECK ASSOCITEED WITH PALPITATION ,FLUTTERING NOT ASSOCIATED WITH PAIN ,DYSPNEA OR LIGHT HEADEDNESS OF 2 MONTH DURATION 10/31/16\par \par \par \par NO CARDIAC C/O TODAY NO PALPITATION BUT PATIENT HAS CHEST COLD AND MUSCLES ARE SORE 1/16/17\par \par NO CHEST PAIN BUT A LITTLE DIZZY FROM LOOP RECORDER MIXED WITH WATER PILL 3/1/17\par \par NO CARDIAC C/O 5/22/17\par \par NO CARDIAC C/O 7/17/17\par NO CARDIAC C/O 8/28/17\par NO CARDIAC C/O 11/27/17\par NO CARDIAC C/O 2/26/18\par \par NO CARDIAC C/O 6/18/18\par \par NO CARDIAC C/O 10/1/18\par \par NO CARDIAC C/O 7/3/19\par \par NO CARDIAC C/O 10/09/19\par \par NO CARDIAC C/O TODAY 01/08/2020\par \par TELEHEALTH NO CARDIAC TODAY C/O 05/19/2020 \par \par NO CARDIAC C/O 9/2/2020 . HAD NERVE STIMULATER IMPLANTED FOR LOW BACK PAIN ON 8/27/2020

## 2020-09-16 NOTE — REVIEW OF SYSTEMS
-- DO NOT REPLY / DO NOT REPLY ALL --  -- Message is from the [facility]--    COVID-19 Universal Screening: Negative    Caller is requesting an appointment - at a sooner time than what was available.      Caller declined scheduling with a trusted partner or sister site               Patient is willing to be seen by: Trusted Partner    Reason for Visit: Patient would like to get a Physical thrusday or Friday to her flying out on Saturday.     Is the patient currently scheduled? Yes.  Appointment date:  10/26/2020    Preferred time to be seen: Anytime.    Caller Information       Type Contact Phone    09/16/2020 05:10 PM CDT Phone (Incoming) Luz Marina Manrique (Self) [phone] (H)          Alternative phone number: none    Turnaround time given to caller:   \"This message will be sent to [state Provider's name]. The clinical team will fulfill your request as soon as they review your message when the office opens tomorrow.\"   [Patient Intake Form Reviewed] : Patient intake form was reviewed

## 2020-09-22 ENCOUNTER — RECORD ABSTRACTING (OUTPATIENT)
Age: 85
End: 2020-09-22

## 2020-09-22 DIAGNOSIS — I67.89 OTHER CEREBROVASCULAR DISEASE: ICD-10-CM

## 2020-10-28 ENCOUNTER — APPOINTMENT (OUTPATIENT)
Dept: CARDIOLOGY | Facility: CLINIC | Age: 85
End: 2020-10-28
Payer: MEDICARE

## 2020-10-28 VITALS — SYSTOLIC BLOOD PRESSURE: 115 MMHG | DIASTOLIC BLOOD PRESSURE: 69 MMHG

## 2020-10-28 VITALS
HEIGHT: 66 IN | WEIGHT: 142 LBS | SYSTOLIC BLOOD PRESSURE: 130 MMHG | DIASTOLIC BLOOD PRESSURE: 80 MMHG | BODY MASS INDEX: 22.82 KG/M2 | HEART RATE: 79 BPM

## 2020-10-28 PROCEDURE — 93000 ELECTROCARDIOGRAM COMPLETE: CPT

## 2020-10-28 PROCEDURE — 99214 OFFICE O/P EST MOD 30 MIN: CPT

## 2020-10-28 RX ORDER — ESOMEPRAZOLE MAGNESIUM 40 MG/1
40 CAPSULE, DELAYED RELEASE ORAL DAILY
Refills: 0 | Status: DISCONTINUED | COMMUNITY
End: 2020-10-28

## 2020-10-28 NOTE — PHYSICAL EXAM
[General Appearance - Well Developed] : well developed [Normal Appearance] : normal appearance [General Appearance - Well Nourished] : well nourished [Heart Rate And Rhythm] : heart rate and rhythm were normal [Heart Sounds] : normal S1 and S2 [] : no respiratory distress [Respiration, Rhythm And Depth] : normal respiratory rhythm and effort [Auscultation Breath Sounds / Voice Sounds] : lungs were clear to auscultation bilaterally [Clean] : clean [Dry] : dry [Well-Healed] : well-healed [Bowel Sounds] : normal bowel sounds [Nail Clubbing] : no clubbing of the fingernails

## 2020-10-28 NOTE — HISTORY OF PRESENT ILLNESS
[de-identified] : PERICARDIAL EFFUSION DEC 2012 COMPLICATED WITH AF\par HAD NERVE STIMULATER IMPLANTED FOR LOW BACK PAIN ON 8/27/2020 \par \par RETURNING FOR FOLLOW UP , REPORTS OCCASIONAL FLUTTERING IN THE CHEST LASTING SECONDS . \par NO CHEST PAIN OR DYSPNEA. NO DIZZINESS, NEAR SYNCOPE OR FALLS . \par \par \par

## 2020-10-28 NOTE — PROCEDURE
[No] : not [de-identified] : [employer] [de-identified] : LOOP [de-identified] : LOOP RECORDER BATTERY DEPLETION

## 2020-10-28 NOTE — ADDENDUM
[FreeTextEntry1] : I was present with the NP/PA during the history and exam of the patient. I discussed patient’s management in details. I reviewed the NP’s note and agree with the documented findings and plan of care. Please do not hesitate to contact me if you have any further questions at [phone]. \par \par \par

## 2020-10-28 NOTE — ASSESSMENT
[FreeTextEntry1] : ECG(10/28/2020): NORMAL SINUS RHYTHM AT 79 BPM. \par ECG (9/2/2020) SINUS ARRHYTHMIA AT 87 BPM. \par \par \par LOOP RECORDER: BATTERY DEPLETED.  PT DECIDED NOT TO REMOVE LOOP RECORDER AT THIS TIME. \par \par ON ELIQUIS  2.5 BID FOR STROKE PREVENTION. DENIES S/S BLEEDING. \par REPORTS C/W MEDS \par \par \par A/P   \par 1. PAF \par -NO SIGNIFICANT SYMPTOMS\par -CONTINUE CURRENT MEDICATIONS \par -F/U WITH CARDIO/DR. ARCE AS SCHEDULED \par -RETURN FOR FOLLOW UP IN 4 MONTHS

## 2020-11-02 LAB
ALBUMIN SERPL ELPH-MCNC: 4.6 G/DL
ALP BLD-CCNC: 113 U/L
ALT SERPL-CCNC: 14 U/L
ANION GAP SERPL CALC-SCNC: 19 MMOL/L
AST SERPL-CCNC: 21 U/L
BASOPHILS # BLD AUTO: 0.03 K/UL
BASOPHILS NFR BLD AUTO: 0.4 %
BILIRUB SERPL-MCNC: 0.4 MG/DL
BUN SERPL-MCNC: 30 MG/DL
CALCIUM SERPL-MCNC: 10.1 MG/DL
CHLORIDE SERPL-SCNC: 102 MMOL/L
CHOLEST SERPL-MCNC: 195 MG/DL
CO2 SERPL-SCNC: 20 MMOL/L
CREAT SERPL-MCNC: 1.3 MG/DL
EOSINOPHIL # BLD AUTO: 0.08 K/UL
EOSINOPHIL NFR BLD AUTO: 1.2 %
GLUCOSE SERPL-MCNC: 97 MG/DL
HCT VFR BLD CALC: 38.7 %
HDLC SERPL-MCNC: 96 MG/DL
HGB BLD-MCNC: 12.1 G/DL
IMM GRANULOCYTES NFR BLD AUTO: 0.3 %
LDLC SERPL CALC-MCNC: 87 MG/DL
LYMPHOCYTES # BLD AUTO: 1.88 K/UL
LYMPHOCYTES NFR BLD AUTO: 27.4 %
MAN DIFF?: NORMAL
MCHC RBC-ENTMCNC: 29.3 PG
MCHC RBC-ENTMCNC: 31.3 G/DL
MCV RBC AUTO: 93.7 FL
MONOCYTES # BLD AUTO: 0.44 K/UL
MONOCYTES NFR BLD AUTO: 6.4 %
NEUTROPHILS # BLD AUTO: 4.41 K/UL
NEUTROPHILS NFR BLD AUTO: 64.3 %
NONHDLC SERPL-MCNC: 99 MG/DL
PLATELET # BLD AUTO: 215 K/UL
POTASSIUM SERPL-SCNC: 4.7 MMOL/L
PROT SERPL-MCNC: 7.3 G/DL
RBC # BLD: 4.13 M/UL
RBC # FLD: 13 %
SODIUM SERPL-SCNC: 141 MMOL/L
TRIGL SERPL-MCNC: 118 MG/DL
WBC # FLD AUTO: 6.86 K/UL

## 2020-11-03 ENCOUNTER — APPOINTMENT (OUTPATIENT)
Dept: NEUROLOGY | Facility: CLINIC | Age: 85
End: 2020-11-03
Payer: MEDICARE

## 2020-11-03 VITALS
WEIGHT: 143 LBS | BODY MASS INDEX: 22.98 KG/M2 | HEIGHT: 66 IN | HEART RATE: 64 BPM | TEMPERATURE: 96.9 F | OXYGEN SATURATION: 97 % | DIASTOLIC BLOOD PRESSURE: 77 MMHG | SYSTOLIC BLOOD PRESSURE: 119 MMHG

## 2020-11-03 PROCEDURE — 99213 OFFICE O/P EST LOW 20 MIN: CPT

## 2020-11-10 NOTE — PHYSICAL EXAM
[FreeTextEntry1] : A/A/Ox3\par well groomed and dressed\par pleasant . follows 3 step commands.2/3 recal.\par good attention\par CN- normal\par scoliotic posture\par stable gait\par

## 2020-11-10 NOTE — ASSESSMENT
[FreeTextEntry1] : MCI\par spine disease\par A-fib\par spinal stimulator for pain\par \par Plan\par MRI of the brain if cleared\par Blood test

## 2020-11-10 NOTE — HISTORY OF PRESENT ILLNESS
[FreeTextEntry1] : Carolyn is here for the F/U \par She is at her baseline in regard to her memory.\par THe MRI and the blood test was not done. She continues to be functional , taking care of the majority of her personal issues and needs.plays Piano every day . Her moods and behavior  did not change.\par She is mainly bothered and to some extend complains of her back pain.\par Every morning waking up is reminding her of pain .\par No falls. No change in her bladder and bowel function\par Regularly she takes 3 Tylenol/day.\par No change in her vision\par

## 2020-11-16 ENCOUNTER — OUTPATIENT (OUTPATIENT)
Dept: OUTPATIENT SERVICES | Facility: HOSPITAL | Age: 85
LOS: 1 days | Discharge: HOME | End: 2020-11-16
Payer: MEDICARE

## 2020-11-16 DIAGNOSIS — Z90.49 ACQUIRED ABSENCE OF OTHER SPECIFIED PARTS OF DIGESTIVE TRACT: Chronic | ICD-10-CM

## 2020-11-16 DIAGNOSIS — Z95.818 PRESENCE OF OTHER CARDIAC IMPLANTS AND GRAFTS: Chronic | ICD-10-CM

## 2020-11-16 DIAGNOSIS — F03.90 UNSPECIFIED DEMENTIA WITHOUT BEHAVIORAL DISTURBANCE: ICD-10-CM

## 2020-11-16 PROCEDURE — 70551 MRI BRAIN STEM W/O DYE: CPT | Mod: 26

## 2020-11-18 ENCOUNTER — APPOINTMENT (OUTPATIENT)
Dept: CARDIOLOGY | Facility: CLINIC | Age: 85
End: 2020-11-18
Payer: MEDICARE

## 2020-11-18 VITALS — TEMPERATURE: 96.9 F | WEIGHT: 140 LBS | HEART RATE: 80 BPM | HEIGHT: 66 IN | BODY MASS INDEX: 22.5 KG/M2

## 2020-11-18 DIAGNOSIS — I31.9 DISEASE OF PERICARDIUM, UNSPECIFIED: ICD-10-CM

## 2020-11-18 DIAGNOSIS — I47.1 SUPRAVENTRICULAR TACHYCARDIA: ICD-10-CM

## 2020-11-18 PROCEDURE — 99214 OFFICE O/P EST MOD 30 MIN: CPT

## 2020-11-18 PROCEDURE — 93000 ELECTROCARDIOGRAM COMPLETE: CPT

## 2020-11-18 NOTE — ASSESSMENT
[FreeTextEntry1] : The patient has still been having issues with her walking . She has dementia and this is followed by DR. Harris . The patient has not had chest pain or clinical AF . She is on Eliquis .

## 2020-11-18 NOTE — HISTORY OF PRESENT ILLNESS
[FreeTextEntry1] : The patient has had continued back pain . Has a stimulator . Seeing Dr. Hatch for dementia. She has an ILM but it is not functioning . She has not had clinical AF . The patient has small vessel disease on her MRI but it is more prominent in the right temporal and parietal regions of her brain .

## 2020-12-18 ENCOUNTER — RESULT REVIEW (OUTPATIENT)
Age: 85
End: 2020-12-18

## 2020-12-18 ENCOUNTER — OUTPATIENT (OUTPATIENT)
Dept: OUTPATIENT SERVICES | Facility: HOSPITAL | Age: 85
LOS: 1 days | Discharge: HOME | End: 2020-12-18
Payer: MEDICARE

## 2020-12-18 DIAGNOSIS — Z95.818 PRESENCE OF OTHER CARDIAC IMPLANTS AND GRAFTS: Chronic | ICD-10-CM

## 2020-12-18 DIAGNOSIS — Z90.49 ACQUIRED ABSENCE OF OTHER SPECIFIED PARTS OF DIGESTIVE TRACT: Chronic | ICD-10-CM

## 2020-12-18 DIAGNOSIS — M19.012 PRIMARY OSTEOARTHRITIS, LEFT SHOULDER: ICD-10-CM

## 2020-12-18 PROCEDURE — 73030 X-RAY EXAM OF SHOULDER: CPT | Mod: 26,LT

## 2021-02-09 ENCOUNTER — APPOINTMENT (OUTPATIENT)
Dept: CARDIOLOGY | Facility: CLINIC | Age: 86
End: 2021-02-09
Payer: MEDICARE

## 2021-02-09 VITALS
WEIGHT: 140 LBS | SYSTOLIC BLOOD PRESSURE: 130 MMHG | HEIGHT: 66 IN | TEMPERATURE: 96.6 F | BODY MASS INDEX: 22.5 KG/M2 | DIASTOLIC BLOOD PRESSURE: 90 MMHG | HEART RATE: 79 BPM

## 2021-02-09 DIAGNOSIS — I48.92 UNSPECIFIED ATRIAL FIBRILLATION: ICD-10-CM

## 2021-02-09 DIAGNOSIS — I48.91 UNSPECIFIED ATRIAL FIBRILLATION: ICD-10-CM

## 2021-02-09 PROCEDURE — 99215 OFFICE O/P EST HI 40 MIN: CPT

## 2021-02-09 PROCEDURE — 93000 ELECTROCARDIOGRAM COMPLETE: CPT

## 2021-02-09 NOTE — PROCEDURE
[No] : not [de-identified] : [employer] [de-identified] : LNQ11 [de-identified] : [id number] [de-identified] : 1/6/2017 [de-identified] : LOOP battery depleted-\par [facility]link events showed 9 pause episodes longest 5 seconds on 1/10/2020. \par AF episode on 9/28/2018 lasting 1 hour and 56 seconds.\par

## 2021-02-09 NOTE — REASON FOR VISIT
[New Patient Device Check] : is here today for a new patient device check visit for [Arrhythmias (seen on stored data)] : arrhythmias seen on stored data [Other ___] : [unfilled] [Friend] : friend

## 2021-02-09 NOTE — HISTORY OF PRESENT ILLNESS
[Palpitations] : no palpitations [SOB] : no dyspnea [Syncope] : no syncope [Dizziness] : no dizziness [Chest Pain] : no chest pain or discomfort [Shoulder Pain] : no shoulder pain [Pain at Site] : no pain at device site [Erythema at Site] : no erythema at device site [Swelling at Site] : no swelling at device site [de-identified] : PERICARDIAL EFFUSION DEC 2012 COMPLICATED WITH AF\par NERVE STIMULATER IMPLANTED FOR LOW BACK PAIN ON 8/27/2020\par Pauses in Dec 2019 to Jan 2020\par AF episode on 9/28/2018 lasting 1 hour and 56 seconds (No AF between 9/2018 and 9/2020)\par \par ---------------------------------------------\par Referring Physician: Dr. Leonel Marte\par ---------------------------------------------\par \par CARDIAC TESTING:\par ECG (2/9/2021): sinus rhythm at 79 bpm, short SC, no significant ST/T abnormalities, QTc 409\par \par

## 2021-02-09 NOTE — PHYSICAL EXAM
[General Appearance - Well Developed] : well developed [Normal Appearance] : normal appearance [Well Groomed] : well groomed [General Appearance - Well Nourished] : well nourished [No Deformities] : no deformities [General Appearance - In No Acute Distress] : no acute distress [Heart Rate And Rhythm] : heart rate and rhythm were normal [Heart Sounds] : normal S1 and S2 [Murmurs] : no murmurs present [Respiration, Rhythm And Depth] : normal respiratory rhythm and effort [Exaggerated Use Of Accessory Muscles For Inspiration] : no accessory muscle use [Auscultation Breath Sounds / Voice Sounds] : lungs were clear to auscultation bilaterally [Clean] : clean [Dry] : dry [Well-Healed] : well-healed [Normal Conjunctiva] : the conjunctiva exhibited no abnormalities [Eyelids - No Xanthelasma] : the eyelids demonstrated no xanthelasmas [Normal Oral Mucosa] : normal oral mucosa [No Oral Pallor] : no oral pallor [No Oral Cyanosis] : no oral cyanosis [Abnormal Walk] : normal gait [Gait - Sufficient For Exercise Testing] : the gait was sufficient for exercise testing [Normal Jugular Venous A Waves Present] : normal jugular venous A waves present [Normal Jugular Venous V Waves Present] : normal jugular venous V waves present [No Jugular Venous Bajwa A Waves] : no jugular venous bajwa A waves [Abdomen Soft] : soft [Abdomen Tenderness] : non-tender [Abdomen Mass (___ Cm)] : no abdominal mass palpated [Nail Clubbing] : no clubbing of the fingernails [Cyanosis, Localized] : no localized cyanosis [Petechial Hemorrhages (___cm)] : no petechial hemorrhages [] : no ischemic changes [FreeTextEntry1] : Left parasternal

## 2021-02-09 NOTE — DISCUSSION/SUMMARY
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 \par \par Her CHADSVASC is 4 (female, age, hypertension). She is on Eliquis 2.5 mg.\par \par I recommend to continue same mediations, including Eliquis.\par \par I recommend ILR explant and reimplant of new ILR for long term monitoring of AF burden, and for monitoring of sinus pauses.  I discussed an ILR implantation with the patient in great detail. I discussed benefits such as monitoring the heart rate and rhythm for a prolonged period of time and assist in establishing a diagnosis for future management and treatment. In addition, ILR implantation procedure as well as risks such as infection, bleeding and sensitivity to cardiac monitor material was discussed. Ample time was provided for questions/answers. Patient has expressed that he would like to move forward with an ILR explant and re-implant. He was notified that our office will contact him to confirm scheduling date . I discussed remote monitoring and follow up device interrogations as well.\par \par Procedure will be scheduled on 3/11/2021;\par \par I discussed with patient plan of care in great details. I answered all her questions to her satisfaction. Patient was pleased with the visit.\par \par Patient will follow with me in 2 months’ time. Please do not hesitate to contact me at [phone] if you have any further questions regarding this patient care.\par \par \par

## 2021-02-17 ENCOUNTER — APPOINTMENT (OUTPATIENT)
Dept: NEUROLOGY | Facility: CLINIC | Age: 86
End: 2021-02-17
Payer: MEDICARE

## 2021-02-17 VITALS
OXYGEN SATURATION: 99 % | DIASTOLIC BLOOD PRESSURE: 70 MMHG | BODY MASS INDEX: 24.11 KG/M2 | HEART RATE: 52 BPM | WEIGHT: 150 LBS | SYSTOLIC BLOOD PRESSURE: 129 MMHG | HEIGHT: 66 IN | TEMPERATURE: 97.5 F

## 2021-02-17 PROCEDURE — 99214 OFFICE O/P EST MOD 30 MIN: CPT

## 2021-02-19 NOTE — HISTORY OF PRESENT ILLNESS
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 according to her and also her  she is not doing any mistakes. Her appetite is good.\par Sleeps well.\par Continues to practice and play the piano every day. remember the notes well.\par She is slightly bored. staying in the house too much because of the Covid and also the weather.\par She continues to have aches and pain of the spine\par Did not fall\par overall her mood is good\par she might start to do some volunteering in Dr Marte's office\par She calls family members\par \par \par \par

## 2021-02-19 NOTE — ASSESSMENT
[FreeTextEntry1] : A-fib+ fluuer\par MCI  ( stable exam)\par Scoliosis\par Anxiety D/O\par \par \par plan\par continue the current meds\par F/U 4-6 months

## 2021-02-19 NOTE — PHYSICAL EXAM
[FreeTextEntry1] : A/A/O person, place day\par good, appropriate interaction\par well dressed\par clean\par good eye contact\par good attention\par follows 4 step command ,crosses the midline\par occasionally has difficulty with retrieval\par 2/3 recall\par no drift\par no dysmetria\par stable scoliotic gait\par

## 2021-02-22 LAB
ALBUMIN SERPL ELPH-MCNC: 4.7 G/DL
ALP BLD-CCNC: 93 U/L
ALT SERPL-CCNC: 12 U/L
ANION GAP SERPL CALC-SCNC: 13 MMOL/L
AST SERPL-CCNC: 19 U/L
BASOPHILS # BLD AUTO: 0.05 K/UL
BASOPHILS NFR BLD AUTO: 0.7 %
BILIRUB SERPL-MCNC: 0.5 MG/DL
BUN SERPL-MCNC: 32 MG/DL
CALCIUM SERPL-MCNC: 9.9 MG/DL
CHLORIDE SERPL-SCNC: 105 MMOL/L
CHOLEST SERPL-MCNC: 195 MG/DL
CO2 SERPL-SCNC: 21 MMOL/L
CREAT SERPL-MCNC: 1.3 MG/DL
EOSINOPHIL # BLD AUTO: 0.1 K/UL
EOSINOPHIL NFR BLD AUTO: 1.4 %
GLUCOSE SERPL-MCNC: 95 MG/DL
HCT VFR BLD CALC: 39.7 %
HDLC SERPL-MCNC: 99 MG/DL
HGB BLD-MCNC: 12.9 G/DL
IMM GRANULOCYTES NFR BLD AUTO: 0.3 %
LDLC SERPL CALC-MCNC: 80 MG/DL
LYMPHOCYTES # BLD AUTO: 1.77 K/UL
LYMPHOCYTES NFR BLD AUTO: 24 %
MAN DIFF?: NORMAL
MCHC RBC-ENTMCNC: 30.6 PG
MCHC RBC-ENTMCNC: 32.5 G/DL
MCV RBC AUTO: 94.3 FL
MONOCYTES # BLD AUTO: 0.39 K/UL
MONOCYTES NFR BLD AUTO: 5.3 %
NEUTROPHILS # BLD AUTO: 5.03 K/UL
NEUTROPHILS NFR BLD AUTO: 68.3 %
NONHDLC SERPL-MCNC: 96 MG/DL
PLATELET # BLD AUTO: 230 K/UL
POTASSIUM SERPL-SCNC: 4.2 MMOL/L
PROT SERPL-MCNC: 7.3 G/DL
RBC # BLD: 4.21 M/UL
RBC # FLD: 12.6 %
SODIUM SERPL-SCNC: 139 MMOL/L
TRIGL SERPL-MCNC: 95 MG/DL
WBC # FLD AUTO: 7.36 K/UL

## 2021-02-24 ENCOUNTER — APPOINTMENT (OUTPATIENT)
Dept: CARDIOLOGY | Facility: CLINIC | Age: 86
End: 2021-02-24

## 2021-02-25 ENCOUNTER — NON-APPOINTMENT (OUTPATIENT)
Age: 86
End: 2021-02-25

## 2021-02-25 LAB
T4 FREE SERPL-MCNC: 1 NG/DL
TSH SERPL-ACNC: 3.53 UIU/ML

## 2021-03-01 ENCOUNTER — APPOINTMENT (OUTPATIENT)
Dept: CARDIOLOGY | Facility: CLINIC | Age: 86
End: 2021-03-01
Payer: MEDICARE

## 2021-03-01 VITALS
BODY MASS INDEX: 22.82 KG/M2 | WEIGHT: 142 LBS | HEIGHT: 66 IN | TEMPERATURE: 97.1 F | DIASTOLIC BLOOD PRESSURE: 78 MMHG | HEART RATE: 85 BPM | SYSTOLIC BLOOD PRESSURE: 120 MMHG

## 2021-03-01 PROCEDURE — 99214 OFFICE O/P EST MOD 30 MIN: CPT

## 2021-03-01 PROCEDURE — 93000 ELECTROCARDIOGRAM COMPLETE: CPT

## 2021-03-01 RX ORDER — AMIODARONE HYDROCHLORIDE 100 MG/1
TABLET ORAL
Refills: 0 | Status: DISCONTINUED | COMMUNITY
End: 2021-03-01

## 2021-03-01 RX ORDER — IBUPROFEN 600 MG/1
600 TABLET, FILM COATED ORAL
Refills: 0 | Status: DISCONTINUED | COMMUNITY
End: 2021-03-01

## 2021-03-01 RX ORDER — ALBUTEROL SULFATE 90 UG/1
108 (90 BASE) AEROSOL, METERED RESPIRATORY (INHALATION)
Refills: 0 | Status: DISCONTINUED | COMMUNITY
End: 2021-03-01

## 2021-03-01 RX ORDER — ESOMEPRAZOLE MAGNESIUM 40 MG/1
40 CAPSULE, DELAYED RELEASE ORAL
Refills: 0 | Status: DISCONTINUED | COMMUNITY
End: 2021-03-01

## 2021-03-01 RX ORDER — PREDNISONE 20 MG/1
20 TABLET ORAL
Refills: 0 | Status: DISCONTINUED | COMMUNITY
End: 2021-03-01

## 2021-03-01 NOTE — HISTORY OF PRESENT ILLNESS
[FreeTextEntry1] : The patient is being seen by neurology and EP . Dr. Cheema is going to hhave her ILM reimplanted. May need PPM . She remains on Namenda .

## 2021-03-01 NOTE — ASSESSMENT
[FreeTextEntry1] : The patient has been doing well. She had a family loss . Seeing Neurology for dementia and seeing EP for puses and PAF . Going to have ILM reimplanted

## 2021-03-02 ENCOUNTER — APPOINTMENT (OUTPATIENT)
Dept: PULMONOLOGY | Facility: CLINIC | Age: 86
End: 2021-03-02
Payer: MEDICARE

## 2021-03-02 VITALS
OXYGEN SATURATION: 98 % | RESPIRATION RATE: 14 BRPM | HEART RATE: 76 BPM | SYSTOLIC BLOOD PRESSURE: 116 MMHG | DIASTOLIC BLOOD PRESSURE: 70 MMHG | BODY MASS INDEX: 22.5 KG/M2 | HEIGHT: 66 IN | WEIGHT: 140 LBS

## 2021-03-02 DIAGNOSIS — Z23 ENCOUNTER FOR IMMUNIZATION: ICD-10-CM

## 2021-03-02 PROCEDURE — 86580 TB INTRADERMAL TEST: CPT

## 2021-03-08 ENCOUNTER — LABORATORY RESULT (OUTPATIENT)
Age: 86
End: 2021-03-08

## 2021-03-08 ENCOUNTER — OUTPATIENT (OUTPATIENT)
Dept: OUTPATIENT SERVICES | Facility: HOSPITAL | Age: 86
LOS: 1 days | Discharge: HOME | End: 2021-03-08

## 2021-03-08 DIAGNOSIS — Z95.818 PRESENCE OF OTHER CARDIAC IMPLANTS AND GRAFTS: Chronic | ICD-10-CM

## 2021-03-08 DIAGNOSIS — Z90.49 ACQUIRED ABSENCE OF OTHER SPECIFIED PARTS OF DIGESTIVE TRACT: Chronic | ICD-10-CM

## 2021-03-08 DIAGNOSIS — Z11.59 ENCOUNTER FOR SCREENING FOR OTHER VIRAL DISEASES: ICD-10-CM

## 2021-03-11 ENCOUNTER — OUTPATIENT (OUTPATIENT)
Dept: OUTPATIENT SERVICES | Facility: HOSPITAL | Age: 86
LOS: 1 days | Discharge: HOME | End: 2021-03-11
Payer: MEDICARE

## 2021-03-11 DIAGNOSIS — R07.9 CHEST PAIN, UNSPECIFIED: ICD-10-CM

## 2021-03-11 DIAGNOSIS — Z95.818 PRESENCE OF OTHER CARDIAC IMPLANTS AND GRAFTS: Chronic | ICD-10-CM

## 2021-03-11 DIAGNOSIS — Z90.49 ACQUIRED ABSENCE OF OTHER SPECIFIED PARTS OF DIGESTIVE TRACT: Chronic | ICD-10-CM

## 2021-03-11 PROCEDURE — 33285 INSJ SUBQ CAR RHYTHM MNTR: CPT | Mod: 58

## 2021-03-11 PROCEDURE — 33286 RMVL SUBQ CAR RHYTHM MNTR: CPT | Mod: 58

## 2021-03-11 RX ORDER — CEPHALEXIN 500 MG
500 CAPSULE ORAL ONCE
Refills: 0 | Status: COMPLETED | OUTPATIENT
Start: 2021-03-11 | End: 2021-03-11

## 2021-03-11 RX ADMIN — Medication 500 MILLIGRAM(S): at 08:40

## 2021-03-11 NOTE — H&P ADULT - ASSESSMENT
Cardiologist: Dr. Marte    This is a 84 yo female with PMH idiopathic pericarditis, GERD, diverticulitis, anxiety, AF (eliquis), hypothyroidism, ILR placed 2017 (Pauses in Dec 2019 to Jan 2020 AF episode on 9/28/2018 lasting 1 hour and 56 seconds, No AF between 9/2018 and 9/2020) who presents today for Loop explant with reimplantation of ILR for long term monitoring of AF burden, and for monitoring of sinus pauses.     Plan:  - Keflex 500 mg Po x1 for preop ppx  - Home following procedure  - May remove bandaid tomorrow  - May shower in 48 hours  - FU in office for wound check on 4/27/2021 10 AM, [address]

## 2021-03-11 NOTE — CHART NOTE - NSCHARTNOTEFT_GEN_A_CORE
Electrophysiology Brief Post-Operative Note    I have personally seen and examined the patient.  I agree with the history and physical which I have reviewed and noted any changes below.     PRE-OP DIAGNOSIS:  - syncope      POST-OP DIAGNOSIS:  - syncope      PROCEDURE: explant and re-Implant of Implantable Loop Recorder     Physician: LIS Cheema MD  Assistant: None    ANESTHESIA TYPE:  [  ]General Anesthesia  [  ] Sedation  [X] Local    CONDITION  [  ] Critical  [  ] Serious  [  ]Fair  [X]Good    SPECIMENS REMOVED (IF APPLICABLE): None    IMPLANTS (IF APPLICABLE)  Implantable Loop Recorder    FINDINGS (see below)  -Explant of ILR  -Successful implantation of ILR (Implantable Loop Recorder)  -No immediate complications  -ESTIMATED BLOOD LOSS: 1 mL    PLAN OF CARE  -Outpatient follow-up with EP in 4-6 weeks          [facility] ([address])          [place] [address] [phone]

## 2021-03-11 NOTE — H&P ADULT - NSHPPHYSICALEXAM_GEN_ALL_CORE
General: Elderly female, NAD  Pulm: CTA b/l, no rales, wheezing, rhonchi  Cardiac: S1S2, RRR  GI: soft, NT/ND  PV: warm and well perfused, No cyanosis, clubbing or edema  Neuro: AO x3, [employer], speech clear.

## 2021-03-11 NOTE — H&P ADULT - NSICDXPASTSURGICALHX_GEN_ALL_CORE_FT
PAST SURGICAL HISTORY:  History of appendectomy     Status post placement of implantable loop recorder

## 2021-03-11 NOTE — H&P ADULT - NSICDXPASTMEDICALHX_GEN_ALL_CORE_FT
PAST MEDICAL HISTORY:  Anxiety     Atrial fibrillation, unspecified type no longer in atrial fifrillation    Cellulitis of finger, unspecified laterality resolved    Diverticulitis resolved    Hypothyroid     Idiopathic pericarditis, unspecified chronicity     Mild intermittent asthma, unspecified whether complicated no longer present    Mitral valve insufficiency, unspecified etiology     Reflux gastritis     UTI (urinary tract infection)

## 2021-03-11 NOTE — H&P ADULT - HISTORY OF PRESENT ILLNESS
This is a 86 yo female with PMH idiopathic pericarditis, GERD, diverticulitis, anxiety, AF. hypothyroidism ILR placed 2017 (Pauses in Dec 2019 to Jan 2020 AF episode on 9/28/2018 lasting 1 hour and 56 seconds, No AF between 9/2018 and 9/2020) who presents today for Loop explant with reimplantation of ILR to evaluate long term monitoring of AF burden, and for monitoring of sinus pauses. Patient denies fever, chils, SOB, cough, chest pain, palpitations, n/v/d/c, dysuria, hematuria.

## 2021-03-17 DIAGNOSIS — E03.9 HYPOTHYROIDISM, UNSPECIFIED: ICD-10-CM

## 2021-03-17 DIAGNOSIS — I48.0 PAROXYSMAL ATRIAL FIBRILLATION: ICD-10-CM

## 2021-03-17 DIAGNOSIS — I30.0 ACUTE NONSPECIFIC IDIOPATHIC PERICARDITIS: ICD-10-CM

## 2021-03-17 DIAGNOSIS — I34.0 NONRHEUMATIC MITRAL (VALVE) INSUFFICIENCY: ICD-10-CM

## 2021-03-17 DIAGNOSIS — F41.9 ANXIETY DISORDER, UNSPECIFIED: ICD-10-CM

## 2021-03-17 DIAGNOSIS — Z79.01 LONG TERM (CURRENT) USE OF ANTICOAGULANTS: ICD-10-CM

## 2021-03-17 DIAGNOSIS — I10 ESSENTIAL (PRIMARY) HYPERTENSION: ICD-10-CM

## 2021-03-17 DIAGNOSIS — K21.9 GASTRO-ESOPHAGEAL REFLUX DISEASE WITHOUT ESOPHAGITIS: ICD-10-CM

## 2021-04-26 ENCOUNTER — RX RENEWAL (OUTPATIENT)
Age: 86
End: 2021-04-26

## 2021-04-27 ENCOUNTER — APPOINTMENT (OUTPATIENT)
Dept: CARDIOLOGY | Facility: CLINIC | Age: 86
End: 2021-04-27
Payer: MEDICARE

## 2021-04-27 VITALS
OXYGEN SATURATION: 98 % | DIASTOLIC BLOOD PRESSURE: 80 MMHG | WEIGHT: 140 LBS | SYSTOLIC BLOOD PRESSURE: 132 MMHG | HEART RATE: 87 BPM | BODY MASS INDEX: 22.5 KG/M2 | HEIGHT: 66 IN

## 2021-04-27 PROCEDURE — 99213 OFFICE O/P EST LOW 20 MIN: CPT | Mod: 25

## 2021-05-20 ENCOUNTER — NON-APPOINTMENT (OUTPATIENT)
Age: 86
End: 2021-05-20

## 2021-05-20 ENCOUNTER — APPOINTMENT (OUTPATIENT)
Dept: CARDIOLOGY | Facility: CLINIC | Age: 86
End: 2021-05-20
Payer: MEDICARE

## 2021-05-20 PROCEDURE — G2066: CPT

## 2021-05-20 PROCEDURE — 93298 REM INTERROG DEV EVAL SCRMS: CPT

## 2021-05-23 NOTE — PROCEDURE
[No] : not [NSR] : normal sinus rhythm [See Scanned Paceart Report] : See scanned paceart report [See Device Printout] : See device printout [Normal] : The battery status is normal. [Sensing Amplitude ___mv] : sensing amplitude was [unfilled] mv [de-identified] : [employer] [de-identified] : LNQ11 [de-identified] : [id number] [de-identified] : 3/11/21 [de-identified] : "normal" [de-identified] : 1 tachy episode for 5 seconds\par implanted for syncope

## 2021-05-23 NOTE — PHYSICAL EXAM
[General Appearance - Well Developed] : well developed [Normal Appearance] : normal appearance [Well Groomed] : well groomed [General Appearance - Well Nourished] : well nourished [No Deformities] : no deformities [General Appearance - In No Acute Distress] : no acute distress [Heart Rate And Rhythm] : heart rate and rhythm were normal [Heart Sounds] : normal S1 and S2 [Murmurs] : no murmurs present [Respiration, Rhythm And Depth] : normal respiratory rhythm and effort [Exaggerated Use Of Accessory Muscles For Inspiration] : no accessory muscle use [Auscultation Breath Sounds / Voice Sounds] : lungs were clear to auscultation bilaterally [Clean] : clean [Dry] : dry [Well-Healed] : well-healed [FreeTextEntry1] : Left parasternal [Abdomen Soft] : soft [Abdomen Tenderness] : non-tender [Abdomen Mass (___ Cm)] : no abdominal mass palpated [Nail Clubbing] : no clubbing of the fingernails [Cyanosis, Localized] : no localized cyanosis [Petechial Hemorrhages (___cm)] : no petechial hemorrhages [] : no ischemic changes [Normal Conjunctiva] : the conjunctiva exhibited no abnormalities [Normal Oral Mucosa] : normal oral mucosa [Eyelids - No Xanthelasma] : the eyelids demonstrated no xanthelasmas [No Oral Pallor] : no oral pallor [No Oral Cyanosis] : no oral cyanosis [Abnormal Walk] : normal gait [Gait - Sufficient For Exercise Testing] : the gait was sufficient for exercise testing [Normal Jugular Venous A Waves Present] : normal jugular venous A waves present [Normal Jugular Venous V Waves Present] : normal jugular venous V waves present [No Jugular Venous Bajwa A Waves] : no jugular venous bajwa A waves

## 2021-05-23 NOTE — REASON FOR VISIT
[Follow-up Device Check] : is here today for a follow-up device check visit for [Arrhythmias (seen on stored data)] : arrhythmias seen on stored data [Other ___] : [unfilled] [Friend] : friend

## 2021-05-23 NOTE — HISTORY OF PRESENT ILLNESS
[Palpitations] : no palpitations [SOB] : no dyspnea [Syncope] : no syncope [Dizziness] : no dizziness [Chest Pain] : no chest pain or discomfort [Shoulder Pain] : no shoulder pain [Pain at Site] : no pain at device site [Erythema at Site] : no erythema at device site [Swelling at Site] : no swelling at device site [de-identified] : PERICARDIAL EFFUSION DEC 2012 COMPLICATED WITH AF\par NERVE STIMULATOR IMPLANTED FOR LOW BACK PAIN ON 8/27/2020\par Pauses in Dec 2019 to Jan 2020\par AF episode on 9/28/2018 lasting 1 hour and 56 seconds (No AF between 9/2018 and 9/2020)\par \par ---------------------------------------------\par Referring Physician: Dr. Leonel Marte\par ---------------------------------------------\par \par CARDIAC TESTING:\par ECG (4/27/2021): sinus rhythm at 87 bpm, short NM, no significant ST/T abnormalities\par ECG (2/9/2021): sinus rhythm at 79 bpm, short NM, no significant ST/T abnormalities, QTc 409\par \par

## 2021-05-23 NOTE — DISCUSSION/SUMMARY
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 \par \par Her CHADSVASC is 4 (female, age, hypertension). She is on Eliquis 2.5 mg.\par \par I recommend to continue same mediations, including Eliquis.\par \par She underwent ILR explant and reimplant of new ILR for long term monitoring of AF burden, and for monitoring of sinus pauses on 3/10/2021.\par \par I recommend to continue same medications;\par \par I interrogated and reprogrammed her device as described in procedure. Her wound is healed properly, with no signs of inflammation, infection or bleeding. I discussed with patient plan of care in great details. 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 Patient was pleased with the visit.  \par \par Patient will follow with me in 6 months’ time. Please do not hesitate to contact me at [phone] if you have any further questions regarding this patient care.\par \par \par

## 2021-06-07 LAB
ALBUMIN SERPL ELPH-MCNC: 4.5 G/DL
ALP BLD-CCNC: 93 U/L
ALT SERPL-CCNC: 11 U/L
ANION GAP SERPL CALC-SCNC: 15 MMOL/L
AST SERPL-CCNC: 16 U/L
BASOPHILS # BLD AUTO: 0.04 K/UL
BASOPHILS NFR BLD AUTO: 0.7 %
BILIRUB SERPL-MCNC: 0.4 MG/DL
BUN SERPL-MCNC: 34 MG/DL
CALCIUM SERPL-MCNC: 9.8 MG/DL
CHLORIDE SERPL-SCNC: 102 MMOL/L
CHOLEST SERPL-MCNC: 186 MG/DL
CO2 SERPL-SCNC: 24 MMOL/L
CREAT SERPL-MCNC: 1.3 MG/DL
EOSINOPHIL # BLD AUTO: 0.15 K/UL
EOSINOPHIL NFR BLD AUTO: 2.7 %
GLUCOSE SERPL-MCNC: 89 MG/DL
HCT VFR BLD CALC: 40 %
HDLC SERPL-MCNC: 107 MG/DL
HGB BLD-MCNC: 12.7 G/DL
IMM GRANULOCYTES NFR BLD AUTO: 0.4 %
LDLC SERPL CALC-MCNC: 72 MG/DL
LYMPHOCYTES # BLD AUTO: 1.78 K/UL
LYMPHOCYTES NFR BLD AUTO: 32.1 %
MAN DIFF?: NORMAL
MCHC RBC-ENTMCNC: 30 PG
MCHC RBC-ENTMCNC: 31.8 G/DL
MCV RBC AUTO: 94.6 FL
MONOCYTES # BLD AUTO: 0.35 K/UL
MONOCYTES NFR BLD AUTO: 6.3 %
NEUTROPHILS # BLD AUTO: 3.2 K/UL
NEUTROPHILS NFR BLD AUTO: 57.8 %
NONHDLC SERPL-MCNC: 79 MG/DL
PLATELET # BLD AUTO: 231 K/UL
POTASSIUM SERPL-SCNC: 4.3 MMOL/L
PROT SERPL-MCNC: 7.4 G/DL
RBC # BLD: 4.23 M/UL
RBC # FLD: 12.5 %
SODIUM SERPL-SCNC: 141 MMOL/L
T4 FREE SERPL-MCNC: 1.2 NG/DL
TRIGL SERPL-MCNC: 73 MG/DL
TSH SERPL-ACNC: 3.38 UIU/ML
WBC # FLD AUTO: 5.54 K/UL

## 2021-06-08 ENCOUNTER — APPOINTMENT (OUTPATIENT)
Dept: CARDIOLOGY | Facility: CLINIC | Age: 86
End: 2021-06-08
Payer: MEDICARE

## 2021-06-08 ENCOUNTER — RESULT CHARGE (OUTPATIENT)
Age: 86
End: 2021-06-08

## 2021-06-08 VITALS
HEART RATE: 93 BPM | TEMPERATURE: 97 F | SYSTOLIC BLOOD PRESSURE: 102 MMHG | WEIGHT: 142 LBS | DIASTOLIC BLOOD PRESSURE: 68 MMHG | HEIGHT: 66 IN | BODY MASS INDEX: 22.82 KG/M2

## 2021-06-08 VITALS — SYSTOLIC BLOOD PRESSURE: 110 MMHG | DIASTOLIC BLOOD PRESSURE: 80 MMHG

## 2021-06-08 PROCEDURE — 99214 OFFICE O/P EST MOD 30 MIN: CPT

## 2021-06-08 PROCEDURE — 93000 ELECTROCARDIOGRAM COMPLETE: CPT

## 2021-06-08 NOTE — ASSESSMENT
[FreeTextEntry1] : The patient has been feeling well. Dementia seems to have improved as the isolation from the pandemic is lifting . No chest pain and SOB .

## 2021-06-08 NOTE — CARDIOLOGY SUMMARY
[___] : [unfilled] [de-identified] : 6-8-2021 NSR Short DC interval  I, Ayo Rodriguez DO, performed the initial face to face bedside interview with this patient regarding history of present illness and determined that the patient should be seen in the main ED.  The history, was documented by the scribe in my presence and I attest to the accuracy of the documentation.

## 2021-06-08 NOTE — REASON FOR VISIT
[Symptom and Test Evaluation] : symptom and test evaluation [Follow-Up - Clinic] : a clinic follow-up of [Atrial Fibrillation] : atrial fibrillation [Hypertension] : hypertension [Mitral Regurgitation] : mitral regurgitation [Palpitations] : palpitations

## 2021-06-24 ENCOUNTER — NON-APPOINTMENT (OUTPATIENT)
Age: 86
End: 2021-06-24

## 2021-06-24 ENCOUNTER — APPOINTMENT (OUTPATIENT)
Dept: CARDIOLOGY | Facility: CLINIC | Age: 86
End: 2021-06-24
Payer: MEDICARE

## 2021-06-24 ENCOUNTER — PREPPED CHART (OUTPATIENT)
Dept: URBAN - METROPOLITAN AREA CLINIC 21 | Facility: CLINIC | Age: 86
End: 2021-06-24

## 2021-06-24 PROBLEM — H16.232 NEUROTROPHIC KERATOCONJUNCTIVITIS: Noted: 2021-06-24

## 2021-06-24 PROBLEM — H04.123 DRY EYE SYNDROME: Noted: 2021-06-24

## 2021-06-24 PROBLEM — H17.812 CORNEAL SCARRING: Noted: 2021-06-24

## 2021-06-24 PROBLEM — H16.142 SUPERFICIAL PUNCTATE KERATOPATHY: Noted: 2021-06-24

## 2021-06-24 PROBLEM — H35.363 MACULAR DRUSEN: Noted: 2021-01-21

## 2021-06-24 PROBLEM — H00.15 CHALAZION: Noted: 2021-05-27

## 2021-06-24 PROCEDURE — 93298 REM INTERROG DEV EVAL SCRMS: CPT

## 2021-06-24 PROCEDURE — G2066: CPT

## 2021-07-06 ENCOUNTER — APPOINTMENT (OUTPATIENT)
Dept: NEUROLOGY | Facility: CLINIC | Age: 86
End: 2021-07-06
Payer: MEDICARE

## 2021-07-06 VITALS
OXYGEN SATURATION: 97 % | WEIGHT: 142 LBS | HEIGHT: 66 IN | BODY MASS INDEX: 22.82 KG/M2 | DIASTOLIC BLOOD PRESSURE: 70 MMHG | HEART RATE: 74 BPM | TEMPERATURE: 97.6 F | SYSTOLIC BLOOD PRESSURE: 130 MMHG

## 2021-07-06 DIAGNOSIS — M53.9 DORSOPATHY, UNSPECIFIED: ICD-10-CM

## 2021-07-06 PROCEDURE — 99213 OFFICE O/P EST LOW 20 MIN: CPT

## 2021-07-07 PROBLEM — M53.9 SPINAL DISEASE: Status: ACTIVE | Noted: 2021-02-19

## 2021-07-07 NOTE — HISTORY OF PRESENT ILLNESS
[FreeTextEntry1] : Carolyn is here for the F/U\par According to her friend and also herself , she is doing better.They believe now that she is going out more often and socializing  things are getting better.\par She sleeps well.\par Main issue continues to be making new memory\par practices piano every day and is getting better \par she says at times feels lonely specially thinking g about her brother whom she lost recently.\par She does not describe herself depressed ?\par Her spinal aches are there. good news is that she is using the pool more often\par her appetite is good. No change in her weight\par has er regular F/U with her cardiologist\par planning to do some more volunteer work\par

## 2021-07-07 NOTE — PHYSICAL EXAM
[FreeTextEntry1] : A/A/Ox place and person and day\par makes good and meaningful conversation\par well groomed\par follows 4 step commands\par scoliotic posture\par no drift\par + mild fixation\par stable gait

## 2021-07-07 NOTE — ASSESSMENT
[FreeTextEntry1] : 1- MCI/  mild dementia\par \par 2- spine disease\par 3- 3 mood disorder\par 4- Afib\par \par plan\par continue at the same level\par Hydrotherapy

## 2021-07-29 ENCOUNTER — NON-APPOINTMENT (OUTPATIENT)
Age: 86
End: 2021-07-29

## 2021-07-29 ENCOUNTER — APPOINTMENT (OUTPATIENT)
Dept: CARDIOLOGY | Facility: CLINIC | Age: 86
End: 2021-07-29
Payer: MEDICARE

## 2021-07-29 PROCEDURE — G2066: CPT

## 2021-07-29 PROCEDURE — 93298 REM INTERROG DEV EVAL SCRMS: CPT

## 2021-08-24 ENCOUNTER — APPOINTMENT (OUTPATIENT)
Dept: CARDIOLOGY | Facility: CLINIC | Age: 86
End: 2021-08-24
Payer: MEDICARE

## 2021-08-24 VITALS
WEIGHT: 145 LBS | BODY MASS INDEX: 23.3 KG/M2 | DIASTOLIC BLOOD PRESSURE: 90 MMHG | HEIGHT: 66 IN | SYSTOLIC BLOOD PRESSURE: 140 MMHG | RESPIRATION RATE: 16 BRPM | TEMPERATURE: 98.3 F | OXYGEN SATURATION: 98 % | HEART RATE: 84 BPM

## 2021-08-24 VITALS
HEIGHT: 66 IN | DIASTOLIC BLOOD PRESSURE: 80 MMHG | BODY MASS INDEX: 22.5 KG/M2 | HEART RATE: 77 BPM | OXYGEN SATURATION: 97 % | WEIGHT: 140 LBS | SYSTOLIC BLOOD PRESSURE: 130 MMHG | RESPIRATION RATE: 16 BRPM | TEMPERATURE: 98.2 F

## 2021-08-24 PROCEDURE — 99214 OFFICE O/P EST MOD 30 MIN: CPT

## 2021-08-24 PROCEDURE — 93000 ELECTROCARDIOGRAM COMPLETE: CPT

## 2021-08-24 NOTE — PHYSICAL EXAM
[General Appearance - Well Developed] : well developed [Normal Appearance] : normal appearance [Well Groomed] : well groomed [General Appearance - Well Nourished] : well nourished [No Deformities] : no deformities [General Appearance - In No Acute Distress] : no acute distress [Heart Rate And Rhythm] : heart rate and rhythm were normal [Heart Sounds] : normal S1 and S2 [Murmurs] : no murmurs present [Respiration, Rhythm And Depth] : normal respiratory rhythm and effort [Exaggerated Use Of Accessory Muscles For Inspiration] : no accessory muscle use [Auscultation Breath Sounds / Voice Sounds] : lungs were clear to auscultation bilaterally [Clean] : clean [Dry] : dry [Well-Healed] : well-healed [Abdomen Soft] : soft [Abdomen Tenderness] : non-tender [Abdomen Mass (___ Cm)] : no abdominal mass palpated [Nail Clubbing] : no clubbing of the fingernails [Cyanosis, Localized] : no localized cyanosis [Petechial Hemorrhages (___cm)] : no petechial hemorrhages [] : no ischemic changes [Normal Conjunctiva] : the conjunctiva exhibited no abnormalities [Eyelids - No Xanthelasma] : the eyelids demonstrated no xanthelasmas [Normal Oral Mucosa] : normal oral mucosa [No Oral Pallor] : no oral pallor [No Oral Cyanosis] : no oral cyanosis [Abnormal Walk] : normal gait [Gait - Sufficient For Exercise Testing] : the gait was sufficient for exercise testing [Normal Jugular Venous A Waves Present] : normal jugular venous A waves present [Normal Jugular Venous V Waves Present] : normal jugular venous V waves present [No Jugular Venous Bajwa A Waves] : no jugular venous bajwa A waves [FreeTextEntry1] : Left parasternal

## 2021-08-24 NOTE — HISTORY OF PRESENT ILLNESS
[Palpitations] : no palpitations [SOB] : no dyspnea [Syncope] : no syncope [Dizziness] : no dizziness [Chest Pain] : no chest pain or discomfort [Shoulder Pain] : no shoulder pain [Pain at Site] : no pain at device site [Erythema at Site] : no erythema at device site [Swelling at Site] : no swelling at device site [de-identified] : PERICARDIAL EFFUSION DEC 2012 COMPLICATED WITH AF\par NERVE STIMULATOR IMPLANTED FOR LOW BACK PAIN ON 8/27/2020\par Pauses in Dec 2019 to Jan 2020\par AF episode on 9/28/2018 lasting 1 hour and 56 seconds (No AF between 9/2018 and 9/2020)\par 8/13/2021 to 8/19/2021: 14 "AF" episodes, burden 0.2%, totalling 7 hours- multiple episodes of AF and Sinus with bursts of Pulmonary veins firing;\par \par -------------------------------------------------\par Referring Physician: Dr. Leonel Marte\par -------------------------------------------------\par \par \par CARDIAC TESTING:\par ECG (8/24/2021): sinus rhythm at 83 bpm, short , no significant ST/T abnormalities\par ECG (4/27/2021): sinus rhythm at 87 bpm, short ND, no significant ST/T abnormalities\par ECG (2/9/2021): sinus rhythm at 79 bpm, short ND, no significant ST/T abnormalities, QTc 409\par \par

## 2021-08-24 NOTE — PROCEDURE
[No] : not [NSR] : normal sinus rhythm [See Scanned Paceart Report] : See scanned paceart report [See Device Printout] : See device printout [Normal] : The battery status is normal. [Sensing Amplitude ___mv] : sensing amplitude was [unfilled] mv [de-identified] : [employer] [de-identified] : [id number] [de-identified] : [id number] [de-identified] : 3/11/21 [de-identified] : "normal" [de-identified] : Multiple tachy episodes, V rate longest lasting 6 minutes\par 14 "AF" episodes, burden 0.2%, totalling 7 hours-

## 2021-08-24 NOTE — DISCUSSION/SUMMARY
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 \par \par Her CHADSVASC is 4 (female, age, hypertension). She is on Eliquis 2.5 mg.\par \par I recommend to continue same mediations, including Eliquis.\par \par I recommend to start Amiodarone 200 mg daily to suppress AF/PV firing. No Amio load. No betablockers due to prior pauses; I will reassess in 3 months; TFTs and LFTs on next visit.\par \par She underwent ILR explant and reimplant of new ILR for long term monitoring of AF burden, and for monitoring of sinus pauses on 3/10/2021.\par \par I recommend to continue same medications;\par \par I interrogated and reprogrammed her device as described in procedure. Her wound is healed properly, with no signs of inflammation, infection or bleeding. I discussed with patient plan of care in great details. 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 Patient was pleased with the visit.  \par \par Patient will follow with me in 6 months’ time. Please do not hesitate to contact me at [phone] if you have any further questions regarding this patient care.\par \par \par

## 2021-08-25 ENCOUNTER — FOLLOW UP (OUTPATIENT)
Dept: URBAN - METROPOLITAN AREA CLINIC 21 | Facility: CLINIC | Age: 86
End: 2021-08-25

## 2021-08-25 DIAGNOSIS — H04.123: ICD-10-CM

## 2021-08-25 DIAGNOSIS — H17.812: ICD-10-CM

## 2021-08-25 DIAGNOSIS — H16.232: ICD-10-CM

## 2021-08-25 PROCEDURE — 92012 INTRM OPH EXAM EST PATIENT: CPT

## 2021-08-25 ASSESSMENT — TONOMETRY
OS_IOP_MMHG: 12
OD_IOP_MMHG: 11

## 2021-08-25 ASSESSMENT — VISUAL ACUITY
OD_CC: 20/25-3
OS_CC: 20/40-3

## 2021-08-29 ENCOUNTER — RX RENEWAL (OUTPATIENT)
Age: 86
End: 2021-08-29

## 2021-09-12 LAB
ALBUMIN SERPL ELPH-MCNC: 4.6 G/DL
ALP BLD-CCNC: 100 U/L
ALT SERPL-CCNC: 11 U/L
ANION GAP SERPL CALC-SCNC: 14 MMOL/L
AST SERPL-CCNC: 21 U/L
BASOPHILS # BLD AUTO: 0.05 K/UL
BASOPHILS NFR BLD AUTO: 0.9 %
BILIRUB SERPL-MCNC: 0.5 MG/DL
BUN SERPL-MCNC: 26 MG/DL
CALCIUM SERPL-MCNC: 9.5 MG/DL
CHLORIDE SERPL-SCNC: 104 MMOL/L
CHOLEST SERPL-MCNC: 178 MG/DL
CO2 SERPL-SCNC: 22 MMOL/L
CREAT SERPL-MCNC: 1.4 MG/DL
EOSINOPHIL # BLD AUTO: 0.2 K/UL
EOSINOPHIL NFR BLD AUTO: 3.8 %
GLUCOSE SERPL-MCNC: 118 MG/DL
HCT VFR BLD CALC: 39.2 %
HDLC SERPL-MCNC: 92 MG/DL
HGB BLD-MCNC: 12.4 G/DL
IMM GRANULOCYTES NFR BLD AUTO: 0.2 %
LDLC SERPL CALC-MCNC: 70 MG/DL
LYMPHOCYTES # BLD AUTO: 1.54 K/UL
LYMPHOCYTES NFR BLD AUTO: 29.1 %
MAN DIFF?: NORMAL
MCHC RBC-ENTMCNC: 29.4 PG
MCHC RBC-ENTMCNC: 31.6 G/DL
MCV RBC AUTO: 92.9 FL
MONOCYTES # BLD AUTO: 0.31 K/UL
MONOCYTES NFR BLD AUTO: 5.9 %
NEUTROPHILS # BLD AUTO: 3.18 K/UL
NEUTROPHILS NFR BLD AUTO: 60.1 %
NONHDLC SERPL-MCNC: 86 MG/DL
PLATELET # BLD AUTO: 234 K/UL
POTASSIUM SERPL-SCNC: 4.7 MMOL/L
PROT SERPL-MCNC: 7.4 G/DL
RBC # BLD: 4.22 M/UL
RBC # FLD: 13.2 %
SODIUM SERPL-SCNC: 140 MMOL/L
T4 FREE SERPL-MCNC: 1.2 NG/DL
TRIGL SERPL-MCNC: 77 MG/DL
TSH SERPL-ACNC: 6.62 UIU/ML
WBC # FLD AUTO: 5.29 K/UL

## 2021-09-22 ENCOUNTER — APPOINTMENT (OUTPATIENT)
Dept: CARDIOLOGY | Facility: CLINIC | Age: 86
End: 2021-09-22
Payer: MEDICARE

## 2021-09-22 VITALS
HEART RATE: 83 BPM | TEMPERATURE: 97.3 F | HEIGHT: 66 IN | DIASTOLIC BLOOD PRESSURE: 68 MMHG | WEIGHT: 146 LBS | BODY MASS INDEX: 23.46 KG/M2 | SYSTOLIC BLOOD PRESSURE: 124 MMHG

## 2021-09-22 DIAGNOSIS — M43.06 SPONDYLOLYSIS, LUMBAR REGION: ICD-10-CM

## 2021-09-22 DIAGNOSIS — F41.9 ANXIETY DISORDER, UNSPECIFIED: ICD-10-CM

## 2021-09-22 PROCEDURE — 99214 OFFICE O/P EST MOD 30 MIN: CPT

## 2021-09-22 PROCEDURE — 93000 ELECTROCARDIOGRAM COMPLETE: CPT

## 2021-09-22 NOTE — HISTORY OF PRESENT ILLNESS
[FreeTextEntry1] : The patient has had episodes of PAF on her ILM . Started on Amiodarone . No chest pain or SOB . Still with back issues . Going to Dr. Matta .

## 2021-09-22 NOTE — ASSESSMENT
[FreeTextEntry1] : The patient has had more episodes of AF . She was started on Amio by his EP . The patient has tolerated this ,. HR  was 83 today and she has an ILM . I have discussed need for monitoring while on Amio. She needs to following up with pulmonary and is to see opthalmology .

## 2021-10-04 ENCOUNTER — TRANSCRIPTION ENCOUNTER (OUTPATIENT)
Age: 86
End: 2021-10-04

## 2021-10-07 ENCOUNTER — NON-APPOINTMENT (OUTPATIENT)
Age: 86
End: 2021-10-07

## 2021-10-07 ENCOUNTER — APPOINTMENT (OUTPATIENT)
Dept: CARDIOLOGY | Facility: CLINIC | Age: 86
End: 2021-10-07
Payer: MEDICARE

## 2021-10-07 PROCEDURE — 93298 REM INTERROG DEV EVAL SCRMS: CPT

## 2021-10-07 PROCEDURE — G2066: CPT

## 2021-11-04 ENCOUNTER — INPATIENT (INPATIENT)
Facility: HOSPITAL | Age: 86
LOS: 0 days | Discharge: HOME | End: 2021-11-05
Attending: INTERNAL MEDICINE | Admitting: INTERNAL MEDICINE
Payer: MEDICARE

## 2021-11-04 ENCOUNTER — APPOINTMENT (OUTPATIENT)
Dept: CARDIOLOGY | Facility: CLINIC | Age: 86
End: 2021-11-04
Payer: MEDICARE

## 2021-11-04 ENCOUNTER — EMERGENCY (EMERGENCY)
Facility: HOSPITAL | Age: 86
LOS: 0 days | Discharge: HOME | End: 2021-11-04
Admitting: EMERGENCY MEDICINE

## 2021-11-04 ENCOUNTER — NON-APPOINTMENT (OUTPATIENT)
Age: 86
End: 2021-11-04

## 2021-11-04 VITALS
TEMPERATURE: 97 F | SYSTOLIC BLOOD PRESSURE: 153 MMHG | OXYGEN SATURATION: 99 % | HEIGHT: 66 IN | RESPIRATION RATE: 18 BRPM | HEART RATE: 73 BPM | DIASTOLIC BLOOD PRESSURE: 74 MMHG | WEIGHT: 139.99 LBS

## 2021-11-04 VITALS — DIASTOLIC BLOOD PRESSURE: 80 MMHG | SYSTOLIC BLOOD PRESSURE: 140 MMHG

## 2021-11-04 DIAGNOSIS — Z90.49 ACQUIRED ABSENCE OF OTHER SPECIFIED PARTS OF DIGESTIVE TRACT: Chronic | ICD-10-CM

## 2021-11-04 DIAGNOSIS — Z88.2 ALLERGY STATUS TO SULFONAMIDES: ICD-10-CM

## 2021-11-04 DIAGNOSIS — I48.91 UNSPECIFIED ATRIAL FIBRILLATION: ICD-10-CM

## 2021-11-04 DIAGNOSIS — E03.9 HYPOTHYROIDISM, UNSPECIFIED: ICD-10-CM

## 2021-11-04 DIAGNOSIS — Z95.818 PRESENCE OF OTHER CARDIAC IMPLANTS AND GRAFTS: Chronic | ICD-10-CM

## 2021-11-04 DIAGNOSIS — M54.41 LUMBAGO WITH SCIATICA, RIGHT SIDE: ICD-10-CM

## 2021-11-04 DIAGNOSIS — R51.9 HEADACHE, UNSPECIFIED: ICD-10-CM

## 2021-11-04 DIAGNOSIS — Z90.49 ACQUIRED ABSENCE OF OTHER SPECIFIED PARTS OF DIGESTIVE TRACT: ICD-10-CM

## 2021-11-04 DIAGNOSIS — M54.59 OTHER LOW BACK PAIN: ICD-10-CM

## 2021-11-04 DIAGNOSIS — Z79.01 LONG TERM (CURRENT) USE OF ANTICOAGULANTS: ICD-10-CM

## 2021-11-04 DIAGNOSIS — R26.2 DIFFICULTY IN WALKING, NOT ELSEWHERE CLASSIFIED: ICD-10-CM

## 2021-11-04 DIAGNOSIS — Z20.822 CONTACT WITH AND (SUSPECTED) EXPOSURE TO COVID-19: ICD-10-CM

## 2021-11-04 DIAGNOSIS — K21.9 GASTRO-ESOPHAGEAL REFLUX DISEASE WITHOUT ESOPHAGITIS: ICD-10-CM

## 2021-11-04 DIAGNOSIS — J45.909 UNSPECIFIED ASTHMA, UNCOMPLICATED: ICD-10-CM

## 2021-11-04 LAB
ALBUMIN SERPL ELPH-MCNC: 4.6 G/DL — SIGNIFICANT CHANGE UP (ref 3.5–5.2)
ALP SERPL-CCNC: 99 U/L — SIGNIFICANT CHANGE UP (ref 30–115)
ALT FLD-CCNC: 12 U/L — SIGNIFICANT CHANGE UP (ref 0–41)
ANION GAP SERPL CALC-SCNC: 13 MMOL/L — SIGNIFICANT CHANGE UP (ref 7–14)
APPEARANCE UR: CLEAR — SIGNIFICANT CHANGE UP
AST SERPL-CCNC: 19 U/L — SIGNIFICANT CHANGE UP (ref 0–41)
BASOPHILS # BLD AUTO: 0.03 K/UL — SIGNIFICANT CHANGE UP (ref 0–0.2)
BASOPHILS NFR BLD AUTO: 0.5 % — SIGNIFICANT CHANGE UP (ref 0–1)
BILIRUB SERPL-MCNC: 0.3 MG/DL — SIGNIFICANT CHANGE UP (ref 0.2–1.2)
BILIRUB UR-MCNC: NEGATIVE — SIGNIFICANT CHANGE UP
BUN SERPL-MCNC: 25 MG/DL — HIGH (ref 10–20)
CALCIUM SERPL-MCNC: 9.8 MG/DL — SIGNIFICANT CHANGE UP (ref 8.5–10.1)
CHLORIDE SERPL-SCNC: 102 MMOL/L — SIGNIFICANT CHANGE UP (ref 98–110)
CO2 SERPL-SCNC: 24 MMOL/L — SIGNIFICANT CHANGE UP (ref 17–32)
COLOR SPEC: SIGNIFICANT CHANGE UP
CREAT SERPL-MCNC: 1.4 MG/DL — SIGNIFICANT CHANGE UP (ref 0.7–1.5)
DIFF PNL FLD: NEGATIVE — SIGNIFICANT CHANGE UP
EOSINOPHIL # BLD AUTO: 0.07 K/UL — SIGNIFICANT CHANGE UP (ref 0–0.7)
EOSINOPHIL NFR BLD AUTO: 1.1 % — SIGNIFICANT CHANGE UP (ref 0–8)
GLUCOSE SERPL-MCNC: 121 MG/DL — HIGH (ref 70–99)
GLUCOSE UR QL: NEGATIVE — SIGNIFICANT CHANGE UP
HCT VFR BLD CALC: 38.9 % — SIGNIFICANT CHANGE UP (ref 37–47)
HGB BLD-MCNC: 12.5 G/DL — SIGNIFICANT CHANGE UP (ref 12–16)
IMM GRANULOCYTES NFR BLD AUTO: 0.5 % — HIGH (ref 0.1–0.3)
KETONES UR-MCNC: NEGATIVE — SIGNIFICANT CHANGE UP
LEUKOCYTE ESTERASE UR-ACNC: NEGATIVE — SIGNIFICANT CHANGE UP
LYMPHOCYTES # BLD AUTO: 1.24 K/UL — SIGNIFICANT CHANGE UP (ref 1.2–3.4)
LYMPHOCYTES # BLD AUTO: 18.8 % — LOW (ref 20.5–51.1)
MCHC RBC-ENTMCNC: 29.9 PG — SIGNIFICANT CHANGE UP (ref 27–31)
MCHC RBC-ENTMCNC: 32.1 G/DL — SIGNIFICANT CHANGE UP (ref 32–37)
MCV RBC AUTO: 93.1 FL — SIGNIFICANT CHANGE UP (ref 81–99)
MONOCYTES # BLD AUTO: 0.47 K/UL — SIGNIFICANT CHANGE UP (ref 0.1–0.6)
MONOCYTES NFR BLD AUTO: 7.1 % — SIGNIFICANT CHANGE UP (ref 1.7–9.3)
NEUTROPHILS # BLD AUTO: 4.75 K/UL — SIGNIFICANT CHANGE UP (ref 1.4–6.5)
NEUTROPHILS NFR BLD AUTO: 72 % — SIGNIFICANT CHANGE UP (ref 42.2–75.2)
NITRITE UR-MCNC: NEGATIVE — SIGNIFICANT CHANGE UP
NRBC # BLD: 0 /100 WBCS — SIGNIFICANT CHANGE UP (ref 0–0)
PH UR: 6 — SIGNIFICANT CHANGE UP (ref 5–8)
PLATELET # BLD AUTO: 227 K/UL — SIGNIFICANT CHANGE UP (ref 130–400)
POTASSIUM SERPL-MCNC: 4.7 MMOL/L — SIGNIFICANT CHANGE UP (ref 3.5–5)
POTASSIUM SERPL-SCNC: 4.7 MMOL/L — SIGNIFICANT CHANGE UP (ref 3.5–5)
PROT SERPL-MCNC: 7.3 G/DL — SIGNIFICANT CHANGE UP (ref 6–8)
PROT UR-MCNC: SIGNIFICANT CHANGE UP
RBC # BLD: 4.18 M/UL — LOW (ref 4.2–5.4)
RBC # FLD: 13.2 % — SIGNIFICANT CHANGE UP (ref 11.5–14.5)
SARS-COV-2 RNA SPEC QL NAA+PROBE: SIGNIFICANT CHANGE UP
SODIUM SERPL-SCNC: 139 MMOL/L — SIGNIFICANT CHANGE UP (ref 135–146)
SP GR SPEC: 1.02 — SIGNIFICANT CHANGE UP (ref 1.01–1.03)
UROBILINOGEN FLD QL: SIGNIFICANT CHANGE UP
WBC # BLD: 6.59 K/UL — SIGNIFICANT CHANGE UP (ref 4.8–10.8)
WBC # FLD AUTO: 6.59 K/UL — SIGNIFICANT CHANGE UP (ref 4.8–10.8)

## 2021-11-04 PROCEDURE — 99223 1ST HOSP IP/OBS HIGH 75: CPT | Mod: AI

## 2021-11-04 PROCEDURE — 99285 EMERGENCY DEPT VISIT HI MDM: CPT

## 2021-11-04 PROCEDURE — 99213 OFFICE O/P EST LOW 20 MIN: CPT

## 2021-11-04 PROCEDURE — 71045 X-RAY EXAM CHEST 1 VIEW: CPT | Mod: 26

## 2021-11-04 PROCEDURE — 93306 TTE W/DOPPLER COMPLETE: CPT

## 2021-11-04 PROCEDURE — 73521 X-RAY EXAM HIPS BI 2 VIEWS: CPT | Mod: 26

## 2021-11-04 PROCEDURE — 93000 ELECTROCARDIOGRAM COMPLETE: CPT

## 2021-11-04 PROCEDURE — 72110 X-RAY EXAM L-2 SPINE 4/>VWS: CPT | Mod: 26

## 2021-11-04 PROCEDURE — 93010 ELECTROCARDIOGRAM REPORT: CPT

## 2021-11-04 PROCEDURE — 70450 CT HEAD/BRAIN W/O DYE: CPT | Mod: 26,MA

## 2021-11-04 RX ORDER — ACETAMINOPHEN 500 MG
650 TABLET ORAL EVERY 6 HOURS
Refills: 0 | Status: DISCONTINUED | OUTPATIENT
Start: 2021-11-04 | End: 2021-11-05

## 2021-11-04 RX ORDER — LEVOTHYROXINE SODIUM 125 MCG
25 TABLET ORAL DAILY
Refills: 0 | Status: DISCONTINUED | OUTPATIENT
Start: 2021-11-04 | End: 2021-11-05

## 2021-11-04 RX ORDER — CHLORHEXIDINE GLUCONATE 213 G/1000ML
1 SOLUTION TOPICAL
Refills: 0 | Status: DISCONTINUED | OUTPATIENT
Start: 2021-11-04 | End: 2021-11-05

## 2021-11-04 RX ORDER — AMIODARONE HYDROCHLORIDE 400 MG/1
200 TABLET ORAL DAILY
Refills: 0 | Status: DISCONTINUED | OUTPATIENT
Start: 2021-11-04 | End: 2021-11-05

## 2021-11-04 RX ORDER — ONDANSETRON 8 MG/1
4 TABLET, FILM COATED ORAL EVERY 8 HOURS
Refills: 0 | Status: DISCONTINUED | OUTPATIENT
Start: 2021-11-04 | End: 2021-11-05

## 2021-11-04 RX ORDER — APIXABAN 2.5 MG/1
2.5 TABLET, FILM COATED ORAL
Refills: 0 | Status: DISCONTINUED | OUTPATIENT
Start: 2021-11-04 | End: 2021-11-05

## 2021-11-04 RX ORDER — ESOMEPRAZOLE MAGNESIUM 40 MG/1
1 CAPSULE, DELAYED RELEASE ORAL
Qty: 0 | Refills: 0 | DISCHARGE

## 2021-11-04 RX ORDER — METHOCARBAMOL 500 MG/1
750 TABLET, FILM COATED ORAL
Refills: 0 | Status: DISCONTINUED | OUTPATIENT
Start: 2021-11-04 | End: 2021-11-05

## 2021-11-04 RX ORDER — MEMANTINE HYDROCHLORIDE 10 MG/1
10 TABLET ORAL
Refills: 0 | Status: DISCONTINUED | OUTPATIENT
Start: 2021-11-04 | End: 2021-11-05

## 2021-11-04 RX ORDER — DIAZEPAM 5 MG
5 TABLET ORAL THREE TIMES A DAY
Refills: 0 | Status: DISCONTINUED | OUTPATIENT
Start: 2021-11-04 | End: 2021-11-04

## 2021-11-04 RX ORDER — OXYCODONE AND ACETAMINOPHEN 5; 325 MG/1; MG/1
1 TABLET ORAL ONCE
Refills: 0 | Status: DISCONTINUED | OUTPATIENT
Start: 2021-11-04 | End: 2021-11-04

## 2021-11-04 RX ORDER — LANOLIN ALCOHOL/MO/W.PET/CERES
3 CREAM (GRAM) TOPICAL AT BEDTIME
Refills: 0 | Status: DISCONTINUED | OUTPATIENT
Start: 2021-11-04 | End: 2021-11-05

## 2021-11-04 RX ADMIN — OXYCODONE AND ACETAMINOPHEN 1 TABLET(S): 5; 325 TABLET ORAL at 12:28

## 2021-11-04 RX ADMIN — APIXABAN 2.5 MILLIGRAM(S): 2.5 TABLET, FILM COATED ORAL at 17:07

## 2021-11-04 RX ADMIN — MEMANTINE HYDROCHLORIDE 10 MILLIGRAM(S): 10 TABLET ORAL at 17:07

## 2021-11-04 NOTE — H&P ADULT - HISTORY OF PRESENT ILLNESS
Patient is a 86 year old female with PMHx of idiopathic pericarditis, GERD, Atrial fibrillation on AC with recent loop recorder implantation , and anxiety presents to the hospital with complaint of acute onset lumbar back pain. Patient has history of polio as a child and notes chronic back discomfort since childhood. She had lumbar spine laminectomy and fusion at [facility] for [facility] > 15 years ago, unknown vertebrae. She denies ever having any trauma or injury to the back. The patient had implantation of spinal stimulators for pain management. Pain usually controlled by Tylenol PRN but notes acute onset worsening of lumbar pain this afternoon when she was at her cardiologist office. Patient notes lying in an uncomfortable position while having echocardiogram and following the completion of the test patient had severe lumbar back pain (9/10) and was not able to get up from the exam table. Denies LE weakness, paresthesia, urinary or bowel incontinence, chest pain, shortness of breath, abdominal pain, nausea, vomiting, diarrhea, and fever   In the ED patient was found to be hemodynamically stable. Labs overall unrevealing. Patient had CT head due to complaint of headache, scan was negative for acute pathology, showing microvascular changes.  She was given percocet x1 with improvement of symptoms. Admitted to medicine for further management.  Patient is a 86 year old female with PMHx of idiopathic pericarditis, GERD, Atrial fibrillation on AC with recent loop recorder implantation , and anxiety presents to the hospital with complaint of acute onset lumbar back pain. Patient has history of polio as a child and notes chronic back discomfort since childhood. She had lumbar spine laminectomy and fusion at [facility] for [facility] > 15 years ago, unknown vertebrae. She denies ever having any trauma or injury to the back. The patient had implantation of spinal stimulators for pain management. Pain usually controlled by Tylenol PRN but notes acute onset worsening of lumbar pain this afternoon when she was at her cardiologist office. Patient notes lying in an uncomfortable position while having echocardiogram and following the completion of the test patient had severe lumbar back pain (9/10, sharp, non radiating) and was not able to get up from the exam table. Denies LE weakness, paresthesia, urinary or bowel incontinence, chest pain, shortness of breath, abdominal pain, nausea, vomiting, diarrhea, and fever   In the ED patient was found to be hemodynamically stable. Labs overall unrevealing. Patient had CT head due to complaint of headache, scan was negative for acute pathology, showing microvascular changes.  She was given percocet x1 with improvement of symptoms. Admitted to medicine for further management.

## 2021-11-04 NOTE — H&P ADULT - ASSESSMENT
Patient is a 86 year old female with PMHx of idiopathic pericarditis, GERD, Atrial fibrillation on AC with recent loop recorder implantation , and anxiety presents to the hospital with complaint of back pain.   In the ED patient was found to be hemodynamically stable. Labs overall unrevealing. CT head negative for acute pathology, show microvascular changes. Patient to be admitted to medicine for further management.     Back Pain:  -     Hypothyroid  - c/w home dose levothyroxine   - check TSH level    Atrial fibrillation, unknown subtype   - c/w apixiban 2.5 mg BID   - c/w amiodarone 200 mg qd  - Heart rate controlled, no evidence of RVR  - patient had recent loop recorder implanted     Anxiety-   - home medication: valium TID     Asthma, mild intermittent   - no evidence of exacerbation  - nebs PRN     #Misc  - DVT Prophylaxis: on apixaban for a-fib  - GI Prophylaxis: n/a  - Diet: DASH/TLC  - Activity: IAT  - IV Fluids: n/a  - Code Status: Full code   - Dispo: admit to medicine   - Med Rec: complete- medication list provided by patient/family,  Patient is a 86 year old female with PMHx of idiopathic pericarditis, GERD, Atrial fibrillation on AC with recent loop recorder implantation , and anxiety presents to the hospital with complaint of acute onset lumbar back pain. Patient has history of polio as a child and notes chronic back discomfort since childhood. She had lumbar spine laminectomy and fusion at [facility] for [facility] > 15 years ago, unknown vertebrae. She denies ever having any trauma or injury to the back. The patient had implantation of spinal stimulators for pain management. Pain usually controlled by Tylenol PRN but notes acute onset worsening of lumbar pain this afternoon when she was at her cardiologist office. Patient notes lying in an uncomfortable position while having echocardiogram and following the completion of the test patient had severe lumbar back pain (9/10) and was not able to get up from the exam table. Denies LE weakness, paresthesia, urinary or bowel incontinence, chest pain, shortness of breath, abdominal pain, nausea, vomiting, diarrhea, and fever   In the ED patient was found to be hemodynamically stable. Labs overall unrevealing. Patient had CT head due to complaint of headache, scan was negative for acute pathology, showing microvascular changes.  She was given percocet x1 with improvement of symptoms. Admitted to medicine for further management.     Back Pain, chronic:   -  Patient has hx of polio as a child with chronic back pain worsening today, history of lumbar laminectomy and fusion, unknown vertebrae at [facility] for [facility] >15 years ago and has implanted neurostimulator, [facility] with Dr. Matta   - denies recent trauma or strenuous activity  - follow up Pain Management consult for recommendations  - c/w pain control with Tylenol & methocarbamol increase as needed  - follow up PT consult   - follow up XR lumbosacral spine    Hypothyroid  - c/w home dose levothyroxine     Atrial fibrillation, paroxsymal    - c/w apixiban 2.5 mg BID   - c/w amiodarone 200 mg qd  - Heart rate controlled, no evidence of RVR    Anxiety  - home medication: valium TID  - patient states she rarely takes valium (1x last month), avoid excessive benzo use as patient is on muscle relaxants, pain medication, and fall precautions due to back pain     Asthma, mild intermittent   - no evidence of exacerbation  - nebs PRN     #Misc  - DVT Prophylaxis: on apixaban for a-fib  - GI Prophylaxis: n/a  - Diet: DASH/TLC  - Activity: IAT  - IV Fluids: n/a  - Code Status: Full code   - Dispo: admit to medicine   - Med Rec: complete- medication list provided by patient/family,  Patient is a 86 year old female with PMHx of idiopathic pericarditis, GERD, Atrial fibrillation on AC with recent loop recorder implantation , and anxiety presents to the hospital with complaint of acute onset lumbar back pain.     Back Pain, chronic:   -  Patient has hx of polio as a child with chronic back pain worsening today, history of lumbar laminectomy and fusion, unknown vertebrae at [facility] for [facility] >15 years ago and has implanted neurostimulator, follows [facility] with Dr. Matta   - denies recent trauma or strenuous activity  - follow up Pain Management consult for recommendations  - c/w pain control with Tylenol & methocarbamol increase as needed  - follow up PT consult   - follow up XR lumbosacral spine    Hypothyroid  - c/w home dose levothyroxine     Atrial fibrillation, paroxsymal    - c/w apixiban 2.5 mg BID   - c/w amiodarone 200 mg qd  - Heart rate controlled, no evidence of RVR    Anxiety  - home medication: valium TID  - patient states she rarely takes valium (1x last month), avoid excessive benzo use as patient is on muscle relaxants, pain medication, and fall precautions due to back pain     Asthma, mild intermittent   - no evidence of exacerbation  - nebs PRN     #Misc  - DVT Prophylaxis: on apixaban for a-fib  - GI Prophylaxis: n/a  - Diet: DASH/TLC  - Activity: IAT  - IV Fluids: n/a  - Code Status: Full code   - Dispo: admit to medicine   - Med Rec: complete- medication list provided by patient/family,

## 2021-11-04 NOTE — ED ADULT NURSE NOTE - NSIMPLEMENTINTERV_GEN_ALL_ED
Implemented All Fall Risk Interventions:  [place] to call system. Call bell, personal items and telephone within reach. Instruct patient to call for assistance. Room bathroom lighting operational. Non-slip footwear when patient is off stretcher. Physically safe environment: no spills, clutter or unnecessary equipment. Stretcher in lowest position, wheels locked, appropriate side rails in place. Provide visual cue, wrist band, yellow gown, etc. Monitor gait and stability. Monitor for mental status changes and reorient to person, place, and time. Review medications for side effects contributing to fall risk. Reinforce activity limits and safety measures with patient and family.

## 2021-11-04 NOTE — HISTORY OF PRESENT ILLNESS
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 The patient has had no chest pan or SOB  She had increased nasuea as well as vision changes

## 2021-11-04 NOTE — H&P ADULT - NSHPREVIEWOFSYSTEMS_GEN_ALL_CORE
CONSTITUTIONAL: No weakness, fevers or chills; No headaches  EYES: No visual changes, eye pain, or discharge  ENT: No vertigo; No ear pain or change in hearing; No sore throat or difficulty swallowing  NECK: No pain or stiffness  RESPIRATORY: No cough, wheezing, or hemoptysis; No shortness of breath  CARDIOVASCULAR: No chest pain or palpitations  GASTROINTESTINAL: No abdominal or epigastric pain; No nausea, vomiting, or hematemesis; No diarrhea or constipation; No melena or hematochezia  GENITOURINARY: No dysuria, frequency or hematuria  MUSCULOSKELETAL: No joint pain, no muscle pain, no weakness  NEUROLOGICAL: No numbness or weakness  SKIN: No itching or rashes CONSTITUTIONAL: No weakness, fevers or chills; No headaches  EYES: No visual changes, eye pain, or discharge  ENT: No vertigo; No ear pain or change in hearing; No sore throat or difficulty swallowing  NECK: No pain or stiffness  RESPIRATORY: No cough, wheezing, or hemoptysis; No shortness of breath  CARDIOVASCULAR: No chest pain or palpitations  GASTROINTESTINAL: No abdominal or epigastric pain; No nausea, vomiting, or hematemesis; No diarrhea or constipation; No melena or hematochezia  GENITOURINARY: No dysuria, frequency or hematuria  MUSCULOSKELETAL: back pain   NEUROLOGICAL: No numbness or weakness  SKIN: No itching or rashes

## 2021-11-04 NOTE — ED PROVIDER NOTE - CARE PLAN
1 Principal Discharge DX:	Low back pain with right-sided sciatica  Secondary Diagnosis:	Unable to walk

## 2021-11-04 NOTE — ED PROVIDER NOTE - CROS ED RESP ALL NEG
Telephone Encounter by Janay Caruso at 01/06/17 05:59 AM     Author:  Janay Caruso Service:  (none) Author Type:  Admin Staff     Filed:  01/06/17 06:01 AM Encounter Date:  1/6/2017 Status:  Signed     :  Janay Caruso (Admin Staff)            Call [employer] and obtain auth for DOS 1/24/17   [phone] Patient id no [id number] Document when called and auth no[DB1.1M]      Revision History        User Key Date/Time User Provider Type Action    > DB1.1 01/06/17 06:01 AM Janay Caruso Admin Staff Sign    M - Manual             negative...

## 2021-11-04 NOTE — PHYSICAL EXAM
[de-identified] : grssly nonfocal  [General Appearance - Well Developed] : well developed [Normal Appearance] : normal appearance [Well Groomed] : well groomed [General Appearance - Well Nourished] : well nourished [No Deformities] : no deformities [General Appearance - In No Acute Distress] : no acute distress [Normal Conjunctiva] : the conjunctiva exhibited no abnormalities [Eyelids - No Xanthelasma] : the eyelids demonstrated no xanthelasmas [Normal Oral Mucosa] : normal oral mucosa [No Oral Pallor] : no oral pallor [No Oral Cyanosis] : no oral cyanosis [Respiration, Rhythm And Depth] : normal respiratory rhythm and effort [Exaggerated Use Of Accessory Muscles For Inspiration] : no accessory muscle use [Auscultation Breath Sounds / Voice Sounds] : lungs were clear to auscultation bilaterally [Abdomen Soft] : soft [Abdomen Tenderness] : non-tender [Abdomen Mass (___ Cm)] : no abdominal mass palpated [Abnormal Walk] : normal gait [Nail Clubbing] : no clubbing of the fingernails [Cyanosis, Localized] : no localized cyanosis [Petechial Hemorrhages (___cm)] : no petechial hemorrhages [Skin Color & Pigmentation] : normal skin color and pigmentation [] : no rash [No Venous Stasis] : no venous stasis [No Skin Ulcers] : no skin ulcer [No Xanthoma] : no  xanthoma was observed [FreeTextEntry1] : multiple areas of ecchymosis. arems and legs.  [Oriented To Time, Place, And Person] : oriented to person, place, and time [Affect] : the affect was normal [Mood] : the mood was normal [No Anxiety] : not feeling anxious [Normal Rate] : normal [Rhythm Regular] : regular [No Murmur] : no murmurs heard [No Pitting Edema] : no pitting edema present [Rt] : no varicose veins of the right leg [Lt] : no varicose veins of the left leg

## 2021-11-04 NOTE — ED PROVIDER NOTE - MUSCULOSKELETAL, MLM
Spine appears normal, range of motion is not limited, no midline tenderness.  (+) Right lower lumbar paraspinal tenderness and (+) tenderness Right sciatic notch, (+) straight leg raise Right

## 2021-11-04 NOTE — H&P ADULT - ATTENDING COMMENTS
***HX and physical limited due to pt being a poor historian. Supplemental information obtained from house staff, EMR, and "Sister MANJU" (Fellow Omaira, [phone]) over the phone    85 YO F with a PMH of idiopathic pericarditis, GERD, CKD3, chronic Afib (Apixaban) s/p loop recorder, anxiety, and CBP who was sent to the hospital by Dr. Maciel for eval of difficulty ambulating due to back pain s/p out-pt echocardiogram this AM. Described as located in lumbar region, non-radiating, worse with movement, and intermittent. No trauma/falls. Denies any saddle anesthesia or bowel/bladder incontinence. No fevers/chills, rashes, ABD pain, CP, or SOB. ROS is negative except as above.    In the ED, CTH was negative for acute process. Pt started on pain meds in the ED.     FMHx: Reviewed, not relevant    Physical exam shows pt in NAD. VSS, afebrile, not hypoxic on RA. A&Ox2 (Name/Place). Neuro exam without deficits, motor/sensory intact, no dysarthria, no facial asymmetry. Muscle strength/sensation intact. CTA B/L with no W/C/R. RRR, no M/G/R. ABD is soft and non-tender, normoactive BSs. LEs without swelling. No rashes. Labs and radiology as above.    Acute on chronic back pain likely musculoskeletal after procedure, non-traumatic; doubt cord compression. NSAIDs. Non-benzo muscle relaxers. Lidocaine patch. PRN pain meds. PT consult. Fall precautions.  -Pt was laying down having an echo performed when her back started to hurt    HX of idiopathic pericarditis, GERD, CKD3, chronic Afib (Apixaban) s/p loop recorder, and anxiety. Restart home meds, except as stated above. DVT PPX. Inform PCP of pt's admission to hospital. My note supersedes the residents note.     Spoke with family: "Sister MANJU" (Fellow Omaira, [phone]) over the phone ***HX and physical limited due to pt being a poor historian. Supplemental information obtained from house staff, EMR, and "Sister MANJU" (Fellow Omaira, [phone]) over the phone    85 YO F with a PMH of idiopathic pericarditis, GERD, CKD3, chronic Afib (Apixaban) s/p loop recorder, anxiety, and CBP who was sent to the hospital by Dr. Maciel for eval of difficulty ambulating due to back pain s/p out-pt echocardiogram this AM. Described as located in lumbar region, non-radiating, worse with movement, and intermittent. No trauma/falls. Denies any saddle anesthesia or bowel/bladder incontinence. No fevers/chills, rashes, ABD pain, CP, or SOB. ROS is negative except as above.    In the ED, CTH was negative for acute process. Pt started on pain meds in the ED.     FMHx: Reviewed, not relevant    Physical exam shows pt in NAD. VSS, afebrile, not hypoxic on RA. A&Ox2 (Name/Place). Neuro exam without deficits, motor/sensory intact, no dysarthria, no facial asymmetry. Muscle strength/sensation intact. CTA B/L with no W/C/R. RRR, no M/G/R. ABD is soft and non-tender, normoactive BSs. LEs without swelling. No rashes. Labs and radiology as above.    Difficulty ambulating due to acute on chronic lumbar back pain likely musculoskeletal after procedure, non-traumatic; doubt cord compression. NSAIDs. Non-benzo muscle relaxers. Lidocaine patch. PRN pain meds. PT consult. Fall precautions.  -Pt was laying down having an echo performed when her back started to hurt    HX of idiopathic pericarditis, GERD, CKD3, chronic Afib (Apixaban) s/p loop recorder, and anxiety. Restart home meds, except as stated above. DVT PPX. Inform PCP of pt's admission to hospital. My note supersedes the residents note.     Spoke with family: "Sister MANJU" (Fellow Nun, [phone]) over the phone

## 2021-11-04 NOTE — ED PROVIDER NOTE - NEUROLOGICAL, MLM
Alert and oriented, no focal deficits, no motor or sensory deficits.  Pt unable to ambulate due to pain.

## 2021-11-04 NOTE — ED PROVIDER NOTE - OBJECTIVE STATEMENT
86 y.o. female with a PMH of lumbar DDD, A. Fib (on Eliquis), hypothyroidism, asthma, and GERD presented to the ER c/o Right low back pain with inability to ambulate since this AM.  Pt getting 2D echo by Dr. Marte lying on her side when pain started.  Denies fever, chills, abdominal pain, flank pain, chest pain, extremity weakness/paresthesias, bladder/bowel incontinence, saddle numbness, dysuria, hematuria.  Per hx given by Dr. Marte pt was complaining of HA and fatigue in office.  Pt denies stating, "I'm fine."

## 2021-11-04 NOTE — ED PROVIDER NOTE - ATTENDING CONTRIBUTION TO CARE
85 y/o female h/o asthma, afib (No AC), back pain, appendectomy, loop recorder, lives at private residence with friend, p/w worsening of rt LBP today, sx are constant, worse with certain movements and position, better with rest, denies fever, tactile temp, chills, paresthesias, focal weakness, bowel or bladder dysfunction, urinary sx, LE weakness, saddle anesthesia, or other associated complaints at present. Pt denies recent heavy lifting or trauma. Pt reportedly had occipital HA earlier which now resolved.    Old chart reviewed.  I have reviewed and agree with the initial nursing note, except as documented in my note.    VSS, awake, alert, chest CTAB, +S1/S2, RRR, abdomen soft, NT, no pulsatile masses or bruits appreciated, no midline spinal tenderness, step-offs or deformities, no erythema, swelling or ecchymosis, no skin rash or lesions, able to dorsiflex b/l great toe, motor and sensation intact b/l LE and equal, NV intact, unable to ambulate without assistance.

## 2021-11-04 NOTE — ASSESSMENT
[FreeTextEntry1] : The patient had PAF on A/C has ILM , today had vision changes , nausea and back pain and headache . . She has had increased fagiue . On exam the patient was nonfocal on exam . ECG was normal. The concern with her symptoms and she is on A/C , R/O bleeding , CVA et al

## 2021-11-04 NOTE — H&P ADULT - NSHPLABSRESULTS_GEN_ALL_CORE
VITAL SIGNS: Last 24 Hours  T(C): 36.3 (2021 11:58), Max: 36.3 (2021 11:58)  T(F): 97.3 (2021 11:58), Max: 97.3 (2021 11:58)  HR: 73 (2021 11:58) (73 - 73)  BP: 153/74 (2021 11:58) (153/74 - 153/74)  BP(mean): --  RR: 18 (2021 11:58) (18 - 18)  SpO2: 99% (2021 11:58) (99% - 99%)    LABS:                        12.5   6.59  )-----------( 227      ( 2021 12:33 )             38.9     11-04    139  |  102  |  25<H>  ----------------------------<  121<H>  4.7   |  24  |  1.4    Ca    9.8      2021 12:33    TPro  7.3  /  Alb  4.6  /  TBili  0.3  /  DBili  x   /  AST  19  /  ALT  12  /  AlkPhos  99  11-04      Urinalysis Basic - ( 2021 11:20 )    Color: Light Yellow / Appearance: Clear / S.018 / pH: x  Gluc: x / Ketone: Negative  / Bili: Negative / Urobili: <2 mg/dL   Blood: x / Protein: Trace / Nitrite: Negative   Leuk Esterase: Negative / RBC: x / WBC x   Sq Epi: x / Non Sq Epi: x / Bacteria: x

## 2021-11-04 NOTE — H&P ADULT - NSHPPHYSICALEXAM_GEN_ALL_CORE
CONSTITUTIONAL: No acute distress, well-developed, well-groomed, AAOx3  HEAD: Atraumatic, normocephalic  EYES: EOM intact, PERRLA, conjunctiva and sclera clear  ENT: Supple, no masses, no thyromegaly, no bruits, no JVD; moist mucous membranes  PULMONARY: Clear to auscultation bilaterally; no wheezes, rales, or rhonchi  CARDIOVASCULAR: Regular rate and rhythm; no murmurs, rubs, or gallops  GASTROINTESTINAL: Soft, non-tender, non-distended; bowel sounds present  MUSCULOSKELETAL: 2+ peripheral pulses; no clubbing, no cyanosis, no edema  NEUROLOGY: non-focal  SKIN: No rashes or lesions; warm and dry CONSTITUTIONAL: No acute distress, well-developed, well-groomed, AAOx3  HEAD: Atraumatic, normocephalic  EYES: EOM intact, PERRLA, conjunctiva and sclera clear  PULMONARY: Clear to auscultation bilaterally  CARDIOVASCULAR: Regular rate and rhythm  GASTROINTESTINAL: Soft, non-tender, non-distended; bowel sounds present  MUSCULOSKELETAL:  midline lumbosacral tenderness with mild tenderness, no obvious deformity, skin lesions, or ecchymosis   NEUROLOGY: sensation and strength intact in the LE

## 2021-11-05 ENCOUNTER — TRANSCRIPTION ENCOUNTER (OUTPATIENT)
Age: 86
End: 2021-11-05

## 2021-11-05 VITALS
HEART RATE: 82 BPM | RESPIRATION RATE: 18 BRPM | OXYGEN SATURATION: 99 % | TEMPERATURE: 98 F | SYSTOLIC BLOOD PRESSURE: 149 MMHG | DIASTOLIC BLOOD PRESSURE: 68 MMHG

## 2021-11-05 LAB
ALBUMIN SERPL ELPH-MCNC: 4.5 G/DL — SIGNIFICANT CHANGE UP (ref 3.5–5.2)
ALP SERPL-CCNC: 89 U/L — SIGNIFICANT CHANGE UP (ref 30–115)
ALT FLD-CCNC: 11 U/L — SIGNIFICANT CHANGE UP (ref 0–41)
ANION GAP SERPL CALC-SCNC: 14 MMOL/L — SIGNIFICANT CHANGE UP (ref 7–14)
AST SERPL-CCNC: 17 U/L — SIGNIFICANT CHANGE UP (ref 0–41)
BASOPHILS # BLD AUTO: 0.03 K/UL — SIGNIFICANT CHANGE UP (ref 0–0.2)
BASOPHILS NFR BLD AUTO: 0.5 % — SIGNIFICANT CHANGE UP (ref 0–1)
BILIRUB SERPL-MCNC: 0.5 MG/DL — SIGNIFICANT CHANGE UP (ref 0.2–1.2)
BUN SERPL-MCNC: 19 MG/DL — SIGNIFICANT CHANGE UP (ref 10–20)
CALCIUM SERPL-MCNC: 9.7 MG/DL — SIGNIFICANT CHANGE UP (ref 8.5–10.1)
CHLORIDE SERPL-SCNC: 103 MMOL/L — SIGNIFICANT CHANGE UP (ref 98–110)
CO2 SERPL-SCNC: 23 MMOL/L — SIGNIFICANT CHANGE UP (ref 17–32)
CREAT SERPL-MCNC: 1.2 MG/DL — SIGNIFICANT CHANGE UP (ref 0.7–1.5)
EOSINOPHIL # BLD AUTO: 0.1 K/UL — SIGNIFICANT CHANGE UP (ref 0–0.7)
EOSINOPHIL NFR BLD AUTO: 1.6 % — SIGNIFICANT CHANGE UP (ref 0–8)
GLUCOSE SERPL-MCNC: 94 MG/DL — SIGNIFICANT CHANGE UP (ref 70–99)
HCT VFR BLD CALC: 38.4 % — SIGNIFICANT CHANGE UP (ref 37–47)
HGB BLD-MCNC: 12.3 G/DL — SIGNIFICANT CHANGE UP (ref 12–16)
IMM GRANULOCYTES NFR BLD AUTO: 0.3 % — SIGNIFICANT CHANGE UP (ref 0.1–0.3)
LYMPHOCYTES # BLD AUTO: 1.42 K/UL — SIGNIFICANT CHANGE UP (ref 1.2–3.4)
LYMPHOCYTES # BLD AUTO: 23.4 % — SIGNIFICANT CHANGE UP (ref 20.5–51.1)
MAGNESIUM SERPL-MCNC: 1.8 MG/DL — SIGNIFICANT CHANGE UP (ref 1.8–2.4)
MCHC RBC-ENTMCNC: 29.8 PG — SIGNIFICANT CHANGE UP (ref 27–31)
MCHC RBC-ENTMCNC: 32 G/DL — SIGNIFICANT CHANGE UP (ref 32–37)
MCV RBC AUTO: 93 FL — SIGNIFICANT CHANGE UP (ref 81–99)
MONOCYTES # BLD AUTO: 0.52 K/UL — SIGNIFICANT CHANGE UP (ref 0.1–0.6)
MONOCYTES NFR BLD AUTO: 8.6 % — SIGNIFICANT CHANGE UP (ref 1.7–9.3)
NEUTROPHILS # BLD AUTO: 3.98 K/UL — SIGNIFICANT CHANGE UP (ref 1.4–6.5)
NEUTROPHILS NFR BLD AUTO: 65.6 % — SIGNIFICANT CHANGE UP (ref 42.2–75.2)
NRBC # BLD: 0 /100 WBCS — SIGNIFICANT CHANGE UP (ref 0–0)
PLATELET # BLD AUTO: 225 K/UL — SIGNIFICANT CHANGE UP (ref 130–400)
POTASSIUM SERPL-MCNC: 4.2 MMOL/L — SIGNIFICANT CHANGE UP (ref 3.5–5)
POTASSIUM SERPL-SCNC: 4.2 MMOL/L — SIGNIFICANT CHANGE UP (ref 3.5–5)
PROT SERPL-MCNC: 7.2 G/DL — SIGNIFICANT CHANGE UP (ref 6–8)
RBC # BLD: 4.13 M/UL — LOW (ref 4.2–5.4)
RBC # FLD: 13.4 % — SIGNIFICANT CHANGE UP (ref 11.5–14.5)
SODIUM SERPL-SCNC: 140 MMOL/L — SIGNIFICANT CHANGE UP (ref 135–146)
TSH SERPL-MCNC: 6.15 UIU/ML — HIGH (ref 0.27–4.2)
WBC # BLD: 6.07 K/UL — SIGNIFICANT CHANGE UP (ref 4.8–10.8)
WBC # FLD AUTO: 6.07 K/UL — SIGNIFICANT CHANGE UP (ref 4.8–10.8)

## 2021-11-05 PROCEDURE — 99239 HOSP IP/OBS DSCHRG MGMT >30: CPT

## 2021-11-05 RX ORDER — LIDOCAINE 4 G/100G
1 CREAM TOPICAL EVERY 24 HOURS
Refills: 0 | Status: DISCONTINUED | OUTPATIENT
Start: 2021-11-05 | End: 2021-11-05

## 2021-11-05 RX ORDER — LIDOCAINE 4 G/100G
1 CREAM TOPICAL
Qty: 14 | Refills: 0
Start: 2021-11-05

## 2021-11-05 RX ADMIN — Medication 25 MICROGRAM(S): at 06:40

## 2021-11-05 RX ADMIN — LIDOCAINE 1 PATCH: 4 CREAM TOPICAL at 09:55

## 2021-11-05 RX ADMIN — APIXABAN 2.5 MILLIGRAM(S): 2.5 TABLET, FILM COATED ORAL at 06:40

## 2021-11-05 RX ADMIN — AMIODARONE HYDROCHLORIDE 200 MILLIGRAM(S): 400 TABLET ORAL at 06:40

## 2021-11-05 RX ADMIN — METHOCARBAMOL 750 MILLIGRAM(S): 500 TABLET, FILM COATED ORAL at 09:44

## 2021-11-05 RX ADMIN — MEMANTINE HYDROCHLORIDE 10 MILLIGRAM(S): 10 TABLET ORAL at 06:40

## 2021-11-05 RX ADMIN — Medication 650 MILLIGRAM(S): at 09:44

## 2021-11-05 NOTE — PROGRESS NOTE ADULT - SUBJECTIVE AND OBJECTIVE BOX
pt seen and examined.   she wants to go home  pt states that her back pain is ok now.     HPI:  Patient is a 86 year old female with PMHx of idiopathic pericarditis, GERD, Atrial fibrillation on AC with recent loop recorder implantation , and anxiety presents to the hospital with complaint of acute onset lumbar back pain. Patient has history of polio as a child and notes chronic back discomfort since childhood. She had lumbar spine laminectomy and fusion at [facility] > 15 years ago, unknown vertebrae. She denies ever having any trauma or injury to the back. The patient had implantation of spinal stimulators for pain management. Pain usually controlled by Tylenol PRN but notes acute onset worsening of lumbar pain this afternoon when she was at her cardiologist office. Patient notes lying in an uncomfortable position while having echocardiogram and following the completion of the test patient had severe lumbar back pain (9/10, sharp, non radiating) and was not able to get up from the exam table. Denies LE weakness, paresthesia, urinary or bowel incontinence, chest pain, shortness of breath, abdominal pain, nausea, vomiting, diarrhea, and fever   In the ED patient was found to be hemodynamically stable. Labs overall unrevealing. Patient had CT head due to complaint of headache, scan was negative for acute pathology, showing microvascular changes.  She was given percocet x1 with improvement of symptoms. Admitted to medicine for further management.  (04 Nov 2021 14:47)      ROS: no cp, no sob, no n/v, no fever    Vital Signs Last 24 Hrs  T(C): 36.7 (05 Nov 2021 06:42), Max: 36.7 (05 Nov 2021 06:42)  T(F): 98 (05 Nov 2021 06:42), Max: 98 (05 Nov 2021 06:42)  HR: 82 (05 Nov 2021 06:42) (76 - 85)  BP: 149/68 (05 Nov 2021 06:42) (139/69 - 149/68)  BP(mean): --  RR: 18 (05 Nov 2021 06:42) (18 - 18)  SpO2: 99% (05 Nov 2021 06:42) (96% - 99%)    Physical exam:   constitutional NAD, AAOX3, Respiratory  lungs CTA, CVS heart RRR, GI: abdomen Soft NT, ND, BS+, skin: intact  neuro exam Motor, sensory and CN normal, no deficit                             12.3   6.07  )-----------( 225      ( 05 Nov 2021 04:30 )             38.4     11-05    140  |  103  |  19  ----------------------------<  94  4.2   |  23  |  1.2    Ca    9.7      05 Nov 2021 04:30  Mg     1.8     11-05    TPro  7.2  /  Alb  4.5  /  TBili  0.5  /  DBili  x   /  AST  17  /  ALT  11  /  AlkPhos  89  11-05      COVID-19 PCR: NotDetec (11-04-21 @ 12:33)                MEDICATIONS  (STANDING):  aMIOdarone    Tablet 200 milliGRAM(s) Oral daily  apixaban 2.5 milliGRAM(s) Oral two times a day  chlorhexidine 4% Liquid 1 Application(s) Topical <User Schedule>  levothyroxine 25 MICROGram(s) Oral daily  lidocaine 4% Transdermal Patch - Peds 1 Patch Transdermal every 24 hours  memantine 10 milliGRAM(s) Oral two times a day    MEDICATIONS  (PRN):  acetaminophen     Tablet .. 650 milliGRAM(s) Oral every 6 hours PRN Temp greater or equal to 38C (100.4F), Mild Pain (1 - 3)  aluminum hydroxide/magnesium hydroxide/simethicone Suspension 30 milliLiter(s) Oral every 4 hours PRN Dyspepsia  melatonin 3 milliGRAM(s) Oral at bedtime PRN Insomnia  methocarbamol 750 milliGRAM(s) Oral two times a day PRN back pain  ondansetron Injectable 4 milliGRAM(s) IV Push every 8 hours PRN Nausea and/or Vomiting      PAST MEDICAL & SURGICAL HISTORY:  Hypothyroid    UTI (urinary tract infection)    Atrial fibrillation, unspecified type  no longer in atrial fifrillation    Anxiety    Mild intermittent asthma, unspecified whether complicated  no longer present    Cellulitis of finger, unspecified laterality  resolved    Diverticulitis  resolved    Reflux gastritis    Mitral valve insufficiency, unspecified etiology    Idiopathic pericarditis, unspecified chronicity    History of appendectomy    Status post placement of implantable loop recorder      a/p  # chronic back pain, improved with tylenol, added lidocaine patch, followup with Dr Matta as outpt   # dementia, 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 she is able to participate in activities and she is a volunteer in the hospital     #Progress Note Handoff  discharged, time spent 35m in   Family discussion: [place] pt and her sister at the bedside.   followup with pain management as outpt

## 2021-11-05 NOTE — DISCHARGE NOTE PROVIDER - NSDCCPCAREPLAN_GEN_ALL_CORE_FT
PRINCIPAL DISCHARGE DIAGNOSIS  Diagnosis: Low back pain with right-sided sciatica  Assessment and Plan of Treatment: Back Pain  Back pain is very common in adults. The cause of back pain is rarely dangerous and the pain often gets better over time. The cause of your back pain may not be known and may include strain of muscles or ligaments, degeneration of the spinal disks, or arthritis. Occasionally the pain may radiate down your leg(s). Over-the-counter medicines to reduce pain and inflammation are often the most helpful. Stretching and remaining active frequently helps the healing process.   SEEK IMMEDIATE MEDICAL CARE IF YOU HAVE ANY OF THE FOLLOWING SYMPTOMS: bowel or bladder control problems, unusual weakness or numbness in your arms or legs, nausea or vomiting, abdominal pain, fever, dizziness/lightheadedness.        SECONDARY DISCHARGE DIAGNOSES  Diagnosis: Unable to walk  Assessment and Plan of Treatment:

## 2021-11-05 NOTE — DISCHARGE NOTE PROVIDER - NSDCFUSCHEDAPPT_GEN_ALL_CORE_FT
[facility] ; 11/09/2021 ; [facility] PULMED [address] ; 11/11/2021 ; N[facility] Cardio [address] [facility] [address] [address] ; 11/15/2021 ; NPP Neuro [address] ; 11/23/2021 ; [facility] Cardio [address] ; 12/08/2021 ; N[facility] PulMed [address] ; 12/21/2021 ; NPP Cardio [address]

## 2021-11-05 NOTE — DISCHARGE NOTE NURSING/CASE MANAGEMENT/SOCIAL WORK - PATIENT PORTAL LINK FT
You can access the FollowMyHealth Patient Portal offered by [facility] by registering at the following website: [email or web address]. By joining [facility]’s FollowMyHealth portal, you will also be able to view your health information using other applications (apps) compatible with our system.

## 2021-11-05 NOTE — DISCHARGE NOTE PROVIDER - HOSPITAL COURSE
Patient is a 86 year old female with PMHx of idiopathic pericarditis, GERD, Atrial fibrillation on AC with recent loop recorder implantation , and anxiety presents to the hospital with complaint of acute onset lumbar back pain. Patient has history of polio as a child and notes chronic back discomfort since childhood. She had lumbar spine laminectomy and fusion at [facility] > 15 years ago, unknown vertebrae. She denies ever having any trauma or injury to the back. The patient had implantation of spinal stimulators for pain management. Pain usually controlled by Tylenol PRN but notes acute onset worsening of lumbar pain this afternoon when she was at her cardiologist office. Patient notes lying in an uncomfortable position while having echocardiogram and following the completion of the test patient had severe lumbar back pain (9/10, sharp, non radiating) and was not able to get up from the exam table. Denies LE weakness, paresthesia, urinary or bowel incontinence, chest pain, shortness of breath, abdominal pain, nausea, vomiting, diarrhea, and fever   In the ED patient was found to be hemodynamically stable. Labs overall unrevealing. Patient had CT head due to complaint of headache, scan was negative for acute pathology, showing microvascular changes.  She was given percocet x1 with improvement of symptoms  I saw and examined the patient at bedside. she reports no pain and wants to leave AMA.  She understands the risks of leaving and isnists on leaving AMA.  She can ambulate on her own and her sister is already on her way to pick her up.  PAtient is medically stable and can be discharged. Patient is a 86 year old female with PMHx of idiopathic pericarditis, GERD, Atrial fibrillation on AC with recent loop recorder implantation , and anxiety presents to the hospital with complaint of acute onset lumbar back pain. Patient has history of polio as a child and notes chronic back discomfort since childhood. She had lumbar spine laminectomy and fusion at [facility] > 15 years ago, unknown vertebrae. She denies ever having any trauma or injury to the back. The patient had implantation of spinal stimulators for pain management. Pain usually controlled by Tylenol PRN but notes acute onset worsening of lumbar pain this afternoon when she was at her cardiologist office. Patient notes lying in an uncomfortable position while having echocardiogram and following the completion of the test patient had severe lumbar back pain (9/10, sharp, non radiating) and was not able to get up from the exam table. Denies LE weakness, paresthesia, urinary or bowel incontinence, chest pain, shortness of breath, abdominal pain, nausea, vomiting, diarrhea, and fever   In the ED patient was found to be hemodynamically stable. Labs overall unrevealing. Patient had CT head due to complaint of headache, scan was negative for acute pathology, showing microvascular changes.  She was given percocet x1 with improvement of symptoms  I saw and examined the patient at bedside. she reports no pain and wants to leave.  She can ambulate on her own. She has an appointment with Dr Matta on wednesday in clinic.  We agreed on lidocaine patch daily to be applied for 12 hours.  PAtient is medically stable and can be discharged.

## 2021-11-05 NOTE — DISCHARGE NOTE PROVIDER - NSDCMRMEDTOKEN_GEN_ALL_CORE_FT
amiodarone 200 mg oral tablet: 1 tab(s) orally once a day  Colace 100 mg oral capsule: 1 cap(s) orally 2 times a day  diazePAM 5 mg oral tablet: 1 tab(s) orally 3 times a day, As Needed  Eliquis 2.5 mg oral tablet: 1 tab(s) orally 2 times a day  famotidine 40 mg oral tablet: 1 tab(s) orally once a day (at bedtime)  levothyroxine 25 mcg (0.025 mg) oral tablet: 1 tab(s) orally once a day  memantine 10 mg oral tablet: 1 tab(s) orally 2 times a day  Restasis 0.05% ophthalmic emulsion: 1 drop(s) to each affected eye every 12 hours  Restasis 0.05% ophthalmic emulsion: 1 drop(s) to each affected eye every 12 hours  Senna 8.6 mg oral tablet: 1 tab(s) orally once a day (at bedtime)  Ventolin HFA 90 mcg/inh inhalation aerosol: 2 puff(s) inhaled 4-6 times a day, As Needed

## 2021-11-05 NOTE — DISCHARGE NOTE NURSING/CASE MANAGEMENT/SOCIAL WORK - NSDCVIVACCINE_GEN_ALL_CORE_FT
Tdap; 21-Jan-2019 16:42; Esperanza Kenney ([place]); [employer]; [id number] (Exp. Date: 27-Jul-2020); IntraMuscular; Deltoid Right.; 0.5 milliLiter(s); VIS (VIS Published: 09-May-2013, VIS Presented: 21-Jan-2019);

## 2021-11-05 NOTE — DISCHARGE NOTE PROVIDER - CARE PROVIDER_API CALL
Leonel Marte  Cardiology  [address] [place] [address]  Phone: [phone]  Fax: [phone]  Follow Up Time: 1 week

## 2021-11-06 LAB
COVID-19 NUCLEOCAPSID GAM AB INTERP: NEGATIVE — SIGNIFICANT CHANGE UP
COVID-19 NUCLEOCAPSID TOTAL GAM ANTIBODY RESULT: 0.08 INDEX — SIGNIFICANT CHANGE UP
COVID-19 SPIKE DOMAIN AB INTERP: POSITIVE
COVID-19 SPIKE DOMAIN ANTIBODY RESULT: >250 U/ML — HIGH
SARS-COV-2 IGG+IGM SERPL QL IA: 0.08 INDEX — SIGNIFICANT CHANGE UP
SARS-COV-2 IGG+IGM SERPL QL IA: >250 U/ML — HIGH
SARS-COV-2 IGG+IGM SERPL QL IA: NEGATIVE — SIGNIFICANT CHANGE UP
SARS-COV-2 IGG+IGM SERPL QL IA: POSITIVE

## 2021-11-09 ENCOUNTER — APPOINTMENT (OUTPATIENT)
Age: 86
End: 2021-11-09
Payer: MEDICARE

## 2021-11-09 VITALS
DIASTOLIC BLOOD PRESSURE: 76 MMHG | HEIGHT: 66 IN | WEIGHT: 146 LBS | BODY MASS INDEX: 23.46 KG/M2 | SYSTOLIC BLOOD PRESSURE: 96 MMHG | RESPIRATION RATE: 14 BRPM | OXYGEN SATURATION: 96 % | HEART RATE: 93 BPM

## 2021-11-09 PROCEDURE — 99212 OFFICE O/P EST SF 10 MIN: CPT

## 2021-11-11 ENCOUNTER — APPOINTMENT (OUTPATIENT)
Dept: CARDIOLOGY | Facility: CLINIC | Age: 86
End: 2021-11-11
Payer: MEDICARE

## 2021-11-11 ENCOUNTER — NON-APPOINTMENT (OUTPATIENT)
Age: 86
End: 2021-11-11

## 2021-11-11 PROCEDURE — G2066: CPT | Mod: NC

## 2021-11-11 PROCEDURE — 93298 REM INTERROG DEV EVAL SCRMS: CPT

## 2021-11-14 LAB
ALBUMIN SERPL ELPH-MCNC: 4.8 G/DL
ALP BLD-CCNC: 91 U/L
ALT SERPL-CCNC: 14 U/L
ANION GAP SERPL CALC-SCNC: 20 MMOL/L
AST SERPL-CCNC: 21 U/L
BASOPHILS # BLD AUTO: 0.05 K/UL
BASOPHILS NFR BLD AUTO: 0.9 %
BILIRUB SERPL-MCNC: 0.4 MG/DL
BUN SERPL-MCNC: 24 MG/DL
CALCIUM SERPL-MCNC: 10 MG/DL
CHLORIDE SERPL-SCNC: 103 MMOL/L
CHOLEST SERPL-MCNC: 188 MG/DL
CO2 SERPL-SCNC: 19 MMOL/L
CREAT SERPL-MCNC: 1.5 MG/DL
EOSINOPHIL # BLD AUTO: 0.14 K/UL
EOSINOPHIL NFR BLD AUTO: 2.4 %
GLUCOSE SERPL-MCNC: 110 MG/DL
HCT VFR BLD CALC: 41 %
HDLC SERPL-MCNC: 83 MG/DL
HGB BLD-MCNC: 12.8 G/DL
IMM GRANULOCYTES NFR BLD AUTO: 0.3 %
LDLC SERPL CALC-MCNC: 68 MG/DL
LYMPHOCYTES # BLD AUTO: 1.44 K/UL
LYMPHOCYTES NFR BLD AUTO: 25.2 %
MAN DIFF?: NORMAL
MCHC RBC-ENTMCNC: 29.8 PG
MCHC RBC-ENTMCNC: 31.2 G/DL
MCV RBC AUTO: 95.3 FL
MONOCYTES # BLD AUTO: 0.35 K/UL
MONOCYTES NFR BLD AUTO: 6.1 %
NEUTROPHILS # BLD AUTO: 3.72 K/UL
NEUTROPHILS NFR BLD AUTO: 65.1 %
NONHDLC SERPL-MCNC: 105 MG/DL
PLATELET # BLD AUTO: 264 K/UL
POTASSIUM SERPL-SCNC: 4.7 MMOL/L
PROT SERPL-MCNC: 7.5 G/DL
RBC # BLD: 4.3 M/UL
RBC # FLD: 13.6 %
SODIUM SERPL-SCNC: 142 MMOL/L
TRIGL SERPL-MCNC: 184 MG/DL
WBC # FLD AUTO: 5.72 K/UL

## 2021-11-15 ENCOUNTER — APPOINTMENT (OUTPATIENT)
Dept: NEUROLOGY | Facility: CLINIC | Age: 86
End: 2021-11-15
Payer: MEDICARE

## 2021-11-15 VITALS
BODY MASS INDEX: 23.46 KG/M2 | HEIGHT: 66 IN | DIASTOLIC BLOOD PRESSURE: 72 MMHG | SYSTOLIC BLOOD PRESSURE: 110 MMHG | WEIGHT: 146 LBS | HEART RATE: 66 BPM | OXYGEN SATURATION: 99 % | TEMPERATURE: 96.9 F

## 2021-11-15 PROCEDURE — 99213 OFFICE O/P EST LOW 20 MIN: CPT

## 2021-11-15 NOTE — PHYSICAL EXAM
[FreeTextEntry1] : A/A/Ox3\par follows 2-3 step commands\par good mood and good attention\par well groomed makes good conversation\par slight  difficulty with word finding\par Normal CN\par no drift\par no dysmetria\par significant scoliotic posture

## 2021-11-15 NOTE — HISTORY OF PRESENT ILLNESS
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 It is something for her to look forward to. She is excited about it. \par Her sleep is described as good.\par she is playing her piano as good as always. She does good amount of cooking and does it well without any mistakes.\par She was recently in such a pain from her low back that she was taken to the hospital by the ambulance.\par she then F/U with her pain management and reportedly the Lidocaine patch  had helped .\par No other issues.NO falls\par As it appears she is very good with how she is being careful with her mechanics\par No issues with the bladder

## 2021-11-15 NOTE — ASSESSMENT
[FreeTextEntry1] : MCI / mild dementia\par \par Mood D/o------ improving \par \par plan\par continue the current level of care \par discussed the gait and spinal isssues

## 2021-11-16 DIAGNOSIS — N18.30 CHRONIC KIDNEY DISEASE, STAGE 3 UNSPECIFIED: ICD-10-CM

## 2021-11-16 DIAGNOSIS — E03.9 HYPOTHYROIDISM, UNSPECIFIED: ICD-10-CM

## 2021-11-16 DIAGNOSIS — M54.9 DORSALGIA, UNSPECIFIED: ICD-10-CM

## 2021-11-16 DIAGNOSIS — F03.90 UNSPECIFIED DEMENTIA, UNSPECIFIED SEVERITY, WITHOUT BEHAVIORAL DISTURBANCE, PSYCHOTIC DISTURBANCE, MOOD DISTURBANCE, AND ANXIETY: ICD-10-CM

## 2021-11-16 DIAGNOSIS — Z95.818 PRESENCE OF OTHER CARDIAC IMPLANTS AND GRAFTS: ICD-10-CM

## 2021-11-16 DIAGNOSIS — K21.9 GASTRO-ESOPHAGEAL REFLUX DISEASE WITHOUT ESOPHAGITIS: ICD-10-CM

## 2021-11-16 DIAGNOSIS — Z88.2 ALLERGY STATUS TO SULFONAMIDES: ICD-10-CM

## 2021-11-16 DIAGNOSIS — J45.20 MILD INTERMITTENT ASTHMA, UNCOMPLICATED: ICD-10-CM

## 2021-11-16 DIAGNOSIS — Z79.01 LONG TERM (CURRENT) USE OF ANTICOAGULANTS: ICD-10-CM

## 2021-11-16 DIAGNOSIS — M54.31 SCIATICA, RIGHT SIDE: ICD-10-CM

## 2021-11-16 DIAGNOSIS — M51.36 OTHER INTERVERTEBRAL DISC DEGENERATION, LUMBAR REGION: ICD-10-CM

## 2021-11-16 DIAGNOSIS — I48.0 PAROXYSMAL ATRIAL FIBRILLATION: ICD-10-CM

## 2021-11-16 DIAGNOSIS — I30.0 ACUTE NONSPECIFIC IDIOPATHIC PERICARDITIS: ICD-10-CM

## 2021-11-16 DIAGNOSIS — F41.9 ANXIETY DISORDER, UNSPECIFIED: ICD-10-CM

## 2021-11-23 ENCOUNTER — APPOINTMENT (OUTPATIENT)
Dept: CARDIOLOGY | Facility: CLINIC | Age: 86
End: 2021-11-23
Payer: MEDICARE

## 2021-11-23 VITALS
WEIGHT: 142 LBS | HEIGHT: 66 IN | DIASTOLIC BLOOD PRESSURE: 60 MMHG | HEART RATE: 80 BPM | SYSTOLIC BLOOD PRESSURE: 115 MMHG | TEMPERATURE: 97.7 F | BODY MASS INDEX: 22.82 KG/M2

## 2021-11-23 PROCEDURE — 93000 ELECTROCARDIOGRAM COMPLETE: CPT

## 2021-11-23 PROCEDURE — 99214 OFFICE O/P EST MOD 30 MIN: CPT

## 2021-11-23 NOTE — HISTORY OF PRESENT ILLNESS
[Palpitations] : no palpitations [SOB] : no dyspnea [Syncope] : no syncope [Dizziness] : no dizziness [Chest Pain] : no chest pain or discomfort [Shoulder Pain] : no shoulder pain [Pain at Site] : no pain at device site [Erythema at Site] : no erythema at device site [Swelling at Site] : no swelling at device site [de-identified] : PERICARDIAL EFFUSION DEC 2012 COMPLICATED by AF\par NERVE STIMULATOR IMPLANTED FOR LOW BACK PAIN ON 8/27/2020\par Pauses in Dec 2019 to Jan 2020\par AF episode on 9/28/2018 lasting 1 hour and 56 seconds (No AF between 9/2018 and 9/2020)\par 8/13/2021 to 8/19/2021: 14 "AF" episodes, burden 0.2%, totalling 7 hours- multiple episodes of AF and Sinus with bursts of Pulmonary veins firing;\par 8/24/2021: started on Amiodarone\par 11/23/2021: Last burst of APCs, non-sustained SVT on 9/27/2021\par \par -------------------------------------------------\par Referring Physician: Dr. Leonel Marte\par -------------------------------------------------\par \par \par CARDIAC TESTING:\par ECG (11/23/2021): sinus rhythm at 77 bpm, short , no significant ST/T abnormalities\par ECG (8/24/2021): sinus rhythm at 83 bpm, short , no significant ST/T abnormalities\par ECG (4/27/2021): sinus rhythm at 87 bpm, short [place], no significant ST/T abnormalities\par ECG (2/9/2021): sinus rhythm at 79 bpm, short [place], no significant ST/T abnormalities, QTc 409\par \par

## 2021-11-23 NOTE — PROCEDURE
[No] : not [NSR] : normal sinus rhythm [See Scanned Paceart Report] : See scanned paceart report [See Device Printout] : See device printout [Normal] : The battery status is normal. [Sensing Amplitude ___mv] : sensing amplitude was [unfilled] mv [de-identified] : [employer] [de-identified] : [id number] [de-identified] : [id number] [de-identified] : 3/11/21 [de-identified] : "normal" [de-identified] : 1 "tachy" episode on 8/27/21 lasting 20 seconds ( 3 days after initiation of Amio): suspicious for PV firing (burst APCs, non-sustained SVT)

## 2021-11-23 NOTE — DISCUSSION/SUMMARY
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 She underwent loop explant and reimplant on 3/10/2021.\par \par Her CHADSVASC is 4 (female, age, hypertension). She is on Eliquis 2.5 mg.\par \par I recommend to continue same mediations, including Eliquis.\par \par NO pauses\par \par I recommend to continue Amiodarone 200 mg daily to suppress AF/PV firing. No Amio load. No betablockers due to prior pauses; I will reassess in 3 months; LFTs within range; TSH 8.0 seeing Dr. Marshall next month; PFTs pending; she will have it scheduled with Dr. Urbano.\par \par I recommend to continue same medications;\par \par I interrogated and reprogrammed her device as described in procedure. Her wound is healed properly, with no signs of inflammation, infection or bleeding. I discussed with patient plan of care in great details. 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 Patient was pleased with the visit.  \par \par Patient will follow with me in 9 months’ time. Please do not hesitate to contact me at [phone] if you have any further questions regarding this patient care.\par \par \par

## 2021-11-24 ENCOUNTER — BRIEF FOLLOW-UP (OUTPATIENT)
Dept: URBAN - METROPOLITAN AREA CLINIC 21 | Facility: CLINIC | Age: 86
End: 2021-11-24

## 2021-11-24 DIAGNOSIS — H16.232: ICD-10-CM

## 2021-11-24 DIAGNOSIS — H04.123: ICD-10-CM

## 2021-11-24 DIAGNOSIS — H17.812: ICD-10-CM

## 2021-11-24 PROCEDURE — 92012 INTRM OPH EXAM EST PATIENT: CPT

## 2021-11-24 ASSESSMENT — VISUAL ACUITY
OD_CC: 20/30-2
OU_CC: J1-2
OD_PH: 20/30-2
OS_PH: 20/40-1
OS_CC: 20/40-1
OU_CC: 20/30+2

## 2021-11-24 ASSESSMENT — TONOMETRY
OD_IOP_MMHG: 12
OS_IOP_MMHG: 10

## 2021-12-08 ENCOUNTER — APPOINTMENT (OUTPATIENT)
Dept: PULMONOLOGY | Facility: CLINIC | Age: 86
End: 2021-12-08

## 2021-12-21 ENCOUNTER — APPOINTMENT (OUTPATIENT)
Dept: CARDIOLOGY | Facility: CLINIC | Age: 86
End: 2021-12-21
Payer: MEDICARE

## 2021-12-21 VITALS
HEART RATE: 72 BPM | DIASTOLIC BLOOD PRESSURE: 70 MMHG | BODY MASS INDEX: 22.82 KG/M2 | TEMPERATURE: 97.3 F | WEIGHT: 142 LBS | HEIGHT: 66 IN | SYSTOLIC BLOOD PRESSURE: 118 MMHG

## 2021-12-21 LAB
ALBUMIN SERPL ELPH-MCNC: 4.6 G/DL
ALP BLD-CCNC: 84 U/L
ALT SERPL-CCNC: 10 U/L
ANION GAP SERPL CALC-SCNC: 13 MMOL/L
AST SERPL-CCNC: 16 U/L
BASOPHILS # BLD AUTO: 0.05 K/UL
BASOPHILS NFR BLD AUTO: 0.7 %
BILIRUB SERPL-MCNC: 0.4 MG/DL
BUN SERPL-MCNC: 27 MG/DL
CALCIUM SERPL-MCNC: 9.8 MG/DL
CHLORIDE SERPL-SCNC: 104 MMOL/L
CO2 SERPL-SCNC: 24 MMOL/L
CREAT SERPL-MCNC: 1.4 MG/DL
EOSINOPHIL # BLD AUTO: 0.13 K/UL
EOSINOPHIL NFR BLD AUTO: 1.9 %
GLUCOSE SERPL-MCNC: 77 MG/DL
HCT VFR BLD CALC: 40.9 %
HGB BLD-MCNC: 12.9 G/DL
IMM GRANULOCYTES NFR BLD AUTO: 0.3 %
LYMPHOCYTES # BLD AUTO: 1.79 K/UL
LYMPHOCYTES NFR BLD AUTO: 26.6 %
MAN DIFF?: NORMAL
MCHC RBC-ENTMCNC: 30.4 PG
MCHC RBC-ENTMCNC: 31.5 G/DL
MCV RBC AUTO: 96.2 FL
MONOCYTES # BLD AUTO: 0.44 K/UL
MONOCYTES NFR BLD AUTO: 6.5 %
NEUTROPHILS # BLD AUTO: 4.29 K/UL
NEUTROPHILS NFR BLD AUTO: 64 %
PLATELET # BLD AUTO: 220 K/UL
POTASSIUM SERPL-SCNC: 4.4 MMOL/L
PROT SERPL-MCNC: 7.8 G/DL
RBC # BLD: 4.25 M/UL
RBC # FLD: 13.5 %
SODIUM SERPL-SCNC: 141 MMOL/L
T4 FREE SERPL-MCNC: 1.1 NG/DL
TSH SERPL-ACNC: 6.75 UIU/ML
WBC # FLD AUTO: 6.72 K/UL

## 2021-12-21 PROCEDURE — 93000 ELECTROCARDIOGRAM COMPLETE: CPT

## 2021-12-21 PROCEDURE — 99214 OFFICE O/P EST MOD 30 MIN: CPT

## 2021-12-21 NOTE — PHYSICAL EXAM
[General Appearance - Well Developed] : well developed [Normal Appearance] : normal appearance [Well Groomed] : well groomed [General Appearance - Well Nourished] : well nourished [No Deformities] : no deformities [General Appearance - In No Acute Distress] : no acute distress [Normal Conjunctiva] : the conjunctiva exhibited no abnormalities [Eyelids - No Xanthelasma] : the eyelids demonstrated no xanthelasmas [Normal Oral Mucosa] : normal oral mucosa [No Oral Pallor] : no oral pallor [No Oral Cyanosis] : no oral cyanosis [Respiration, Rhythm And Depth] : normal respiratory rhythm and effort [Exaggerated Use Of Accessory Muscles For Inspiration] : no accessory muscle use [Auscultation Breath Sounds / Voice Sounds] : lungs were clear to auscultation bilaterally [Abdomen Soft] : soft [Abdomen Tenderness] : non-tender [Abdomen Mass (___ Cm)] : no abdominal mass palpated [Abnormal Walk] : normal gait [Nail Clubbing] : no clubbing of the fingernails [Cyanosis, Localized] : no localized cyanosis [Petechial Hemorrhages (___cm)] : no petechial hemorrhages [Skin Color & Pigmentation] : normal skin color and pigmentation [] : no rash [No Venous Stasis] : no venous stasis [No Skin Ulcers] : no skin ulcer [No Xanthoma] : no  xanthoma was observed [Oriented To Time, Place, And Person] : oriented to person, place, and time [Affect] : the affect was normal [Mood] : the mood was normal [No Anxiety] : not feeling anxious [Normal Rate] : normal [Rhythm Regular] : regular [No Murmur] : no murmurs heard [No Pitting Edema] : no pitting edema present [de-identified] : grssly nonfocal  [FreeTextEntry1] : multiple areas of ecchymosis. arems and legs.  [Rt] : no varicose veins of the right leg [Lt] : no varicose veins of the left leg

## 2021-12-21 NOTE — ASSESSMENT
[FreeTextEntry1] : The patient has had palpitations last week after having coffee. No further episodes . The patient has been in NSR . . She has had progressive dementia . She is on Amiodaropne

## 2021-12-21 NOTE — HISTORY OF PRESENT ILLNESS
[FreeTextEntry1] : The patient had episodes of palpitations last week after having coffee. No chest pain or SOB . Her back remains an issue with severe back issues .

## 2021-12-27 ENCOUNTER — APPOINTMENT (OUTPATIENT)
Dept: CARDIOLOGY | Facility: CLINIC | Age: 86
End: 2021-12-27

## 2021-12-27 ENCOUNTER — NON-APPOINTMENT (OUTPATIENT)
Age: 86
End: 2021-12-27

## 2022-01-02 ENCOUNTER — FORM ENCOUNTER (OUTPATIENT)
Age: 87
End: 2022-01-02

## 2022-01-17 ENCOUNTER — RX RENEWAL (OUTPATIENT)
Age: 87
End: 2022-01-17

## 2022-01-28 ENCOUNTER — NON-APPOINTMENT (OUTPATIENT)
Age: 87
End: 2022-01-28

## 2022-01-28 ENCOUNTER — APPOINTMENT (OUTPATIENT)
Dept: CARDIOLOGY | Facility: CLINIC | Age: 87
End: 2022-01-28
Payer: MEDICARE

## 2022-01-28 PROCEDURE — G2066: CPT

## 2022-01-28 PROCEDURE — 93298 REM INTERROG DEV EVAL SCRMS: CPT

## 2022-02-07 NOTE — DISCHARGE NOTE ADULT - FLU SEASON?
Your current Plastic Surgery problem we are working together to treat is: Trigger Finger.    DISCHARGE INSTRUCTIONS AFTER TRIGGER FINGER RELEASE      ACTIVITY:    Start moving the fingers and wrist immediately after surgery to minimize long term stiffness.   No lifting more than 10 pounds (about a gallon of milk) with the affected hand for two weeks.  No restrictions of activity after sutures are removed.      MEDICATIONS:    Your routine medications can be re-started unless otherwise specified. Tylenol and ibuprofen can be used for discomfort.  Prescribed pain medications should not be required.      CARING FOR THE INCISION:    A soft, compressive bandage was applied after surgery. Please remove the bandage the third day after surgery. You may then shower and wash your hand with soap and water. I recommend re-applying a small compressive ACE bandage to the wrist and hand for 2 weeks after surgery to minimize swelling and discomfort.     EATING:   Normal diet; eating whatever is appetizing.    DON’T WORRY:   It is common to experience swelling and bruising following surgery. Healing after hand surgery takes time. You may notice intermittent stiffness and swelling within the hand for several months after surgery. Continue to modify activities to alleviate symptoms.     AVOID SMOKING: Complications from surgery are increased in those patients who smoke prior to or following surgery.    FOLLOW UP:   Please call my office 2 weeks after the procedure if the sutures are still in place.  The sutures will be removed at that time.      UNEXPECTED EVENTS:   If you notice a sudden increase in swelling, pain or fever; contact us at [phone]. After hours we may be reached by calling [phone] and asking the  to contact Dr. Jones.          FOLLOW-UP  It is recommended you schedule a follow-up appointment with Dr. Jones: 2 weeks only if sutures are still in place.      Office hours are 8:00 am to 5:00 pm Monday  through Friday. If it is urgent that you speak with someone outside of these hours, the [facility] will be able to assist you. You can reach the office by calling [phone].    Thank you for choosing Norris Jones M.D. as your Plastic Surgery provider!    At [facility], one important tool we use to improve our patient services is our Patient Survey. Following your visit you may receive our survey in the mail.     · Please take the time to complete the survey.     · If your visit with us was great, we want to hear about it.     · If we can improve, please let us know how.        Yes...

## 2022-02-09 ENCOUNTER — BRIEF FOLLOW-UP (OUTPATIENT)
Dept: URBAN - METROPOLITAN AREA CLINIC 21 | Facility: CLINIC | Age: 87
End: 2022-02-09

## 2022-02-09 DIAGNOSIS — H04.123: ICD-10-CM

## 2022-02-09 DIAGNOSIS — H16.232: ICD-10-CM

## 2022-02-09 DIAGNOSIS — H17.812: ICD-10-CM

## 2022-02-09 DIAGNOSIS — H25.811: ICD-10-CM

## 2022-02-09 PROCEDURE — 92014 COMPRE OPH EXAM EST PT 1/>: CPT

## 2022-02-09 ASSESSMENT — TONOMETRY
OD_IOP_MMHG: 13
OS_IOP_MMHG: 13

## 2022-02-09 ASSESSMENT — VISUAL ACUITY
OD_CC: 20/30
OS_CC: 20/50

## 2022-03-03 ENCOUNTER — BRIEF FOLLOW-UP (OUTPATIENT)
Dept: URBAN - METROPOLITAN AREA CLINIC 21 | Facility: CLINIC | Age: 87
End: 2022-03-03

## 2022-03-03 DIAGNOSIS — H16.232: ICD-10-CM

## 2022-03-03 DIAGNOSIS — H25.811: ICD-10-CM

## 2022-03-03 DIAGNOSIS — H17.812: ICD-10-CM

## 2022-03-03 DIAGNOSIS — H04.123: ICD-10-CM

## 2022-03-03 PROCEDURE — 92012 INTRM OPH EXAM EST PATIENT: CPT

## 2022-03-03 ASSESSMENT — TONOMETRY
OS_IOP_MMHG: 13
OD_IOP_MMHG: 14

## 2022-03-03 ASSESSMENT — VISUAL ACUITY
OD_SC: 20/30
OS_SC: 20/50+2

## 2022-03-04 ENCOUNTER — NON-APPOINTMENT (OUTPATIENT)
Age: 87
End: 2022-03-04

## 2022-03-04 ENCOUNTER — APPOINTMENT (OUTPATIENT)
Dept: CARDIOLOGY | Facility: CLINIC | Age: 87
End: 2022-03-04
Payer: MEDICARE

## 2022-03-04 PROCEDURE — G2066: CPT

## 2022-03-04 PROCEDURE — 93298 REM INTERROG DEV EVAL SCRMS: CPT

## 2022-03-07 LAB
BASOPHILS # BLD AUTO: 0.04 K/UL
BASOPHILS NFR BLD AUTO: 0.6 %
EOSINOPHIL # BLD AUTO: 0.1 K/UL
EOSINOPHIL NFR BLD AUTO: 1.5 %
HCT VFR BLD CALC: 40.3 %
HGB BLD-MCNC: 13 G/DL
IMM GRANULOCYTES NFR BLD AUTO: 0.4 %
LYMPHOCYTES # BLD AUTO: 1.49 K/UL
LYMPHOCYTES NFR BLD AUTO: 22.3 %
MAN DIFF?: NORMAL
MCHC RBC-ENTMCNC: 30.9 PG
MCHC RBC-ENTMCNC: 32.3 G/DL
MCV RBC AUTO: 95.7 FL
MONOCYTES # BLD AUTO: 0.29 K/UL
MONOCYTES NFR BLD AUTO: 4.3 %
NEUTROPHILS # BLD AUTO: 4.72 K/UL
NEUTROPHILS NFR BLD AUTO: 70.9 %
PLATELET # BLD AUTO: 245 K/UL
RBC # BLD: 4.21 M/UL
RBC # FLD: 12.7 %
WBC # FLD AUTO: 6.67 K/UL

## 2022-03-13 LAB
ALBUMIN SERPL ELPH-MCNC: 4.5 G/DL
ALP BLD-CCNC: 79 U/L
ALT SERPL-CCNC: 11 U/L
ANION GAP SERPL CALC-SCNC: 15 MMOL/L
AST SERPL-CCNC: 18 U/L
BILIRUB SERPL-MCNC: 0.5 MG/DL
BUN SERPL-MCNC: 25 MG/DL
CALCIUM SERPL-MCNC: 9.3 MG/DL
CHLORIDE SERPL-SCNC: 104 MMOL/L
CHOLEST SERPL-MCNC: 185 MG/DL
CO2 SERPL-SCNC: 21 MMOL/L
CREAT SERPL-MCNC: 1.6 MG/DL
EGFR: 31 ML/MIN/1.73M2
GLUCOSE SERPL-MCNC: 159 MG/DL
HDLC SERPL-MCNC: 94 MG/DL
LDLC SERPL CALC-MCNC: 73 MG/DL
NONHDLC SERPL-MCNC: 91 MG/DL
POTASSIUM SERPL-SCNC: 4.8 MMOL/L
PROT SERPL-MCNC: 7.1 G/DL
SODIUM SERPL-SCNC: 140 MMOL/L
T3 SERPL-MCNC: 68 NG/DL
T4 FREE SERPL-MCNC: 1.2 NG/DL
TRIGL SERPL-MCNC: 79 MG/DL
TSH SERPL-ACNC: 6.44 UIU/ML

## 2022-03-21 ENCOUNTER — EMERGENCY VISIT (OUTPATIENT)
Dept: URBAN - METROPOLITAN AREA CLINIC 21 | Facility: CLINIC | Age: 87
End: 2022-03-21

## 2022-03-21 DIAGNOSIS — S05.02XA: ICD-10-CM

## 2022-03-21 PROCEDURE — 92012 INTRM OPH EXAM EST PATIENT: CPT

## 2022-03-21 ASSESSMENT — VISUAL ACUITY
OS_CC: 20/50+0
OD_CC: 20/30

## 2022-03-21 ASSESSMENT — TONOMETRY
OS_IOP_MMHG: 14
OD_IOP_MMHG: 14

## 2022-03-24 ENCOUNTER — APPOINTMENT (OUTPATIENT)
Dept: CARDIOLOGY | Facility: CLINIC | Age: 87
End: 2022-03-24
Payer: MEDICARE

## 2022-03-24 VITALS
WEIGHT: 140 LBS | BODY MASS INDEX: 22.5 KG/M2 | HEIGHT: 66 IN | SYSTOLIC BLOOD PRESSURE: 130 MMHG | HEART RATE: 73 BPM | TEMPERATURE: 97.3 F | DIASTOLIC BLOOD PRESSURE: 80 MMHG

## 2022-03-24 PROCEDURE — 93000 ELECTROCARDIOGRAM COMPLETE: CPT

## 2022-03-24 PROCEDURE — 99214 OFFICE O/P EST MOD 30 MIN: CPT

## 2022-03-24 NOTE — HISTORY OF PRESENT ILLNESS
[FreeTextEntry1] : The patient has been feeling better. The patient has not had further palpitations . The patient had 1 episode of weakness which was self limited .

## 2022-03-24 NOTE — PHYSICAL EXAM
[General Appearance - Well Developed] : well developed [Normal Appearance] : normal appearance [Well Groomed] : well groomed [General Appearance - Well Nourished] : well nourished [No Deformities] : no deformities [General Appearance - In No Acute Distress] : no acute distress [Normal Conjunctiva] : the conjunctiva exhibited no abnormalities [Eyelids - No Xanthelasma] : the eyelids demonstrated no xanthelasmas [Normal Oral Mucosa] : normal oral mucosa [No Oral Pallor] : no oral pallor [No Oral Cyanosis] : no oral cyanosis [Respiration, Rhythm And Depth] : normal respiratory rhythm and effort [Exaggerated Use Of Accessory Muscles For Inspiration] : no accessory muscle use [Auscultation Breath Sounds / Voice Sounds] : lungs were clear to auscultation bilaterally [Abdomen Soft] : soft [Abdomen Tenderness] : non-tender [Abdomen Mass (___ Cm)] : no abdominal mass palpated [Abnormal Walk] : normal gait [Nail Clubbing] : no clubbing of the fingernails [Cyanosis, Localized] : no localized cyanosis [Petechial Hemorrhages (___cm)] : no petechial hemorrhages [Skin Color & Pigmentation] : normal skin color and pigmentation [] : no rash [No Venous Stasis] : no venous stasis [No Skin Ulcers] : no skin ulcer [No Xanthoma] : no  xanthoma was observed [Oriented To Time, Place, And Person] : oriented to person, place, and time [Affect] : the affect was normal [Mood] : the mood was normal [No Anxiety] : not feeling anxious [Normal Rate] : normal [Rhythm Regular] : regular [No Murmur] : no murmurs heard [No Pitting Edema] : no pitting edema present [de-identified] : grssly nonfocal  [FreeTextEntry1] : multiple areas of ecchymosis. arems and legs.  [Rt] : no varicose veins of the right leg [Lt] : no varicose veins of the left leg

## 2022-03-24 NOTE — ASSESSMENT
[FreeTextEntry1] : The patient has been feeling well. She has MCI . She has PAF but none clinicaly . she is on Amio and Namenda and she is not significantly bradycardic on ECG . No CP or SOB

## 2022-03-24 NOTE — CARDIOLOGY SUMMARY
[___] : [unfilled] [de-identified] : 6-8-2021 NSR Short TN interval \par 9- NSR NS ST change \par 3- NSR Normal ECG

## 2022-03-30 ENCOUNTER — FOLLOW UP (OUTPATIENT)
Dept: URBAN - METROPOLITAN AREA CLINIC 21 | Facility: CLINIC | Age: 87
End: 2022-03-30

## 2022-03-30 DIAGNOSIS — H16.232: ICD-10-CM

## 2022-03-30 DIAGNOSIS — H25.811: ICD-10-CM

## 2022-03-30 DIAGNOSIS — H04.123: ICD-10-CM

## 2022-03-30 PROCEDURE — 92012 INTRM OPH EXAM EST PATIENT: CPT

## 2022-03-30 ASSESSMENT — VISUAL ACUITY
OS_CC: 20/40-2
OD_CC: 20/30-2

## 2022-03-30 ASSESSMENT — TONOMETRY
OS_IOP_MMHG: 10
OD_IOP_MMHG: 12

## 2022-04-03 ENCOUNTER — LABORATORY RESULT (OUTPATIENT)
Age: 87
End: 2022-04-03

## 2022-04-06 ENCOUNTER — APPOINTMENT (OUTPATIENT)
Age: 87
End: 2022-04-06
Payer: MEDICARE

## 2022-04-06 VITALS
HEIGHT: 66 IN | OXYGEN SATURATION: 91 % | DIASTOLIC BLOOD PRESSURE: 68 MMHG | RESPIRATION RATE: 14 BRPM | WEIGHT: 140 LBS | BODY MASS INDEX: 22.5 KG/M2 | SYSTOLIC BLOOD PRESSURE: 116 MMHG | HEART RATE: 73 BPM

## 2022-04-06 DIAGNOSIS — J45.909 UNSPECIFIED ASTHMA, UNCOMPLICATED: ICD-10-CM

## 2022-04-06 PROCEDURE — 94010 BREATHING CAPACITY TEST: CPT

## 2022-04-06 PROCEDURE — 94727 GAS DIL/WSHOT DETER LNG VOL: CPT

## 2022-04-06 PROCEDURE — 94729 DIFFUSING CAPACITY: CPT

## 2022-04-08 ENCOUNTER — NON-APPOINTMENT (OUTPATIENT)
Age: 87
End: 2022-04-08

## 2022-04-08 ENCOUNTER — APPOINTMENT (OUTPATIENT)
Dept: CARDIOLOGY | Facility: CLINIC | Age: 87
End: 2022-04-08
Payer: MEDICARE

## 2022-04-08 PROCEDURE — G2066: CPT

## 2022-04-08 PROCEDURE — 93298 REM INTERROG DEV EVAL SCRMS: CPT

## 2022-04-13 ENCOUNTER — APPOINTMENT (OUTPATIENT)
Dept: CARDIOLOGY | Facility: CLINIC | Age: 87
End: 2022-04-13
Payer: MEDICARE

## 2022-04-13 DIAGNOSIS — I45.81 LONG QT SYNDROME: ICD-10-CM

## 2022-04-13 PROCEDURE — 93306 TTE W/DOPPLER COMPLETE: CPT

## 2022-04-15 ENCOUNTER — EMERGENCY (EMERGENCY)
Facility: HOSPITAL | Age: 87
LOS: 0 days | Discharge: HOME | End: 2022-04-15
Attending: STUDENT IN AN ORGANIZED HEALTH CARE EDUCATION/TRAINING PROGRAM | Admitting: STUDENT IN AN ORGANIZED HEALTH CARE EDUCATION/TRAINING PROGRAM
Payer: MEDICARE

## 2022-04-15 VITALS
OXYGEN SATURATION: 98 % | TEMPERATURE: 98 F | SYSTOLIC BLOOD PRESSURE: 188 MMHG | HEIGHT: 66 IN | DIASTOLIC BLOOD PRESSURE: 81 MMHG | WEIGHT: 139.99 LBS | RESPIRATION RATE: 18 BRPM | HEART RATE: 86 BPM

## 2022-04-15 VITALS
OXYGEN SATURATION: 98 % | TEMPERATURE: 98 F | SYSTOLIC BLOOD PRESSURE: 132 MMHG | DIASTOLIC BLOOD PRESSURE: 76 MMHG | RESPIRATION RATE: 18 BRPM | HEART RATE: 80 BPM

## 2022-04-15 DIAGNOSIS — F41.9 ANXIETY DISORDER, UNSPECIFIED: ICD-10-CM

## 2022-04-15 DIAGNOSIS — M25.532 PAIN IN LEFT WRIST: ICD-10-CM

## 2022-04-15 DIAGNOSIS — Z90.49 ACQUIRED ABSENCE OF OTHER SPECIFIED PARTS OF DIGESTIVE TRACT: ICD-10-CM

## 2022-04-15 DIAGNOSIS — E03.9 HYPOTHYROIDISM, UNSPECIFIED: ICD-10-CM

## 2022-04-15 DIAGNOSIS — Z95.818 PRESENCE OF OTHER CARDIAC IMPLANTS AND GRAFTS: Chronic | ICD-10-CM

## 2022-04-15 DIAGNOSIS — Z88.2 ALLERGY STATUS TO SULFONAMIDES: ICD-10-CM

## 2022-04-15 DIAGNOSIS — Z79.01 LONG TERM (CURRENT) USE OF ANTICOAGULANTS: ICD-10-CM

## 2022-04-15 DIAGNOSIS — L03.114 CELLULITIS OF LEFT UPPER LIMB: ICD-10-CM

## 2022-04-15 DIAGNOSIS — Z87.440 PERSONAL HISTORY OF URINARY (TRACT) INFECTIONS: ICD-10-CM

## 2022-04-15 DIAGNOSIS — J45.20 MILD INTERMITTENT ASTHMA, UNCOMPLICATED: ICD-10-CM

## 2022-04-15 DIAGNOSIS — Z90.49 ACQUIRED ABSENCE OF OTHER SPECIFIED PARTS OF DIGESTIVE TRACT: Chronic | ICD-10-CM

## 2022-04-15 DIAGNOSIS — Z87.19 PERSONAL HISTORY OF OTHER DISEASES OF THE DIGESTIVE SYSTEM: ICD-10-CM

## 2022-04-15 DIAGNOSIS — I48.91 UNSPECIFIED ATRIAL FIBRILLATION: ICD-10-CM

## 2022-04-15 DIAGNOSIS — M79.632 PAIN IN LEFT FOREARM: ICD-10-CM

## 2022-04-15 DIAGNOSIS — I34.0 NONRHEUMATIC MITRAL (VALVE) INSUFFICIENCY: ICD-10-CM

## 2022-04-15 DIAGNOSIS — Z95.818 PRESENCE OF OTHER CARDIAC IMPLANTS AND GRAFTS: ICD-10-CM

## 2022-04-15 DIAGNOSIS — I10 ESSENTIAL (PRIMARY) HYPERTENSION: ICD-10-CM

## 2022-04-15 PROCEDURE — 73090 X-RAY EXAM OF FOREARM: CPT | Mod: 26,LT

## 2022-04-15 PROCEDURE — 99283 EMERGENCY DEPT VISIT LOW MDM: CPT | Mod: FS

## 2022-04-15 PROCEDURE — 73110 X-RAY EXAM OF WRIST: CPT | Mod: 26,LT

## 2022-04-15 PROCEDURE — 73070 X-RAY EXAM OF ELBOW: CPT | Mod: 26,LT

## 2022-04-15 RX ORDER — ACETAMINOPHEN 500 MG
650 TABLET ORAL ONCE
Refills: 0 | Status: COMPLETED | OUTPATIENT
Start: 2022-04-15 | End: 2022-04-15

## 2022-04-15 RX ORDER — CEPHALEXIN 500 MG
1 CAPSULE ORAL
Qty: 40 | Refills: 0
Start: 2022-04-15 | End: 2022-04-24

## 2022-04-15 RX ORDER — IBUPROFEN 200 MG
600 TABLET ORAL ONCE
Refills: 0 | Status: COMPLETED | OUTPATIENT
Start: 2022-04-15 | End: 2022-04-15

## 2022-04-15 RX ADMIN — Medication 600 MILLIGRAM(S): at 16:10

## 2022-04-15 RX ADMIN — Medication 650 MILLIGRAM(S): at 15:40

## 2022-04-15 RX ADMIN — Medication 300 MILLIGRAM(S): at 16:44

## 2022-04-15 RX ADMIN — Medication 600 MILLIGRAM(S): at 15:40

## 2022-04-15 RX ADMIN — Medication 650 MILLIGRAM(S): at 00:00

## 2022-04-15 NOTE — ED PROVIDER NOTE - ATTENDING CONTRIBUTION TO CARE
86-year-old female past medical history of hypertension presents with left arm redness.  Left arm redness and pain that has progressively been spreading to the forearm for the past 3 days.  Associated with mild pain to the forearm.  No trauma, no fever/chills, no pus drainage, no decreased range of motion, no numbness/tingling, no paresthesias, no pain up to the elbow, no shoulder pain, no headache, no focal weakness.    CONSTITUTIONAL: Well-developed; well-nourished; in no acute distress. Sitting up and providing appropriate history and physical examination  SKIN: + Mild erythema with no induration or fluctuance to left forearm and wrist.  Otherwise skin exam is warm and dry, no acute rash.  HEAD: Normocephalic; atraumatic.  EYES: PERRL, 3 mm bilateral, no nystagmus, EOM intact; conjunctiva and sclera clear.  ENT: No nasal discharge; airway clear.  NECK: Supple; non tender. + full passive ROM in all directions. No JVD  EXT: Normal ROM. No clubbing, cyanosis or edema. Dp and Pt Pulses intact. Cap refill less than 3 seconds  NEURO: CN 2-12 intact, normal finger to nose, normal romberg, stable gait, no sensory or motor deficits, Alert, oriented, grossly unremarkable. No Focal deficits. GCS 15. NIH 0  PSYCH: Cooperative, appropriate.

## 2022-04-15 NOTE — ED PROVIDER NOTE - PATIENT PORTAL LINK FT
You can access the FollowMyHealth Patient Portal offered by [facility] by registering at the following website: [email or web address]. By joining [employer]’s FollowMyHealth portal, you will also be able to view your health information using other applications (apps) compatible with our system.

## 2022-04-15 NOTE — ED PROVIDER NOTE - NS ED ATTENDING STATEMENT MOD
This was a shared visit with the CATE. I reviewed and verified the documentation and independently performed the documented:

## 2022-04-15 NOTE — ED PROVIDER NOTE - OBJECTIVE STATEMENT
86 year old female, past medical history htn, who presents with L arm redness. patient with L wrist pain/redness and forearm redness/swelling that progressively worsened x3 days. denies recent falls/trauma. denies fever, chills, numbness/weakness/paresthesias, elbow pain, shoulder pain. no hx similar.

## 2022-04-15 NOTE — ED PROVIDER NOTE - CARE PROVIDER_API CALL
Leonel Marte (MD)  Cardiology; Cardiovascular Disease; Internal Medicine  [address] [place] [address]  Phone: [phone]  Fax: [phone]  Follow Up Time: Routine

## 2022-04-15 NOTE — ED ADULT TRIAGE NOTE - CHIEF COMPLAINT QUOTE
Pt c/o LUE redness, tender and warm to touch X 1 day. Pt states it started with the pain of L 3rd digit

## 2022-04-15 NOTE — ED PROVIDER NOTE - PROGRESS NOTE DETAILS
will give clindamycin and return precautions. patient and family at bedside verbalize understanding and agreeable with plan.

## 2022-04-15 NOTE — ED PROVIDER NOTE - NS ED ROS FT
none Review of Systems:  	•	CONSTITUTIONAL: no fever   	•	SKIN: +LUE redness/swelling  	•	RESPIRATORY: no shortness of breath, no cough  	•	CARDIAC: no chest pain, no palpitations  	•	GI: no abd pain, no nausea, no vomiting, no diarrhea    	•	MUSCULOSKELETAL: no joint paint, no swelling, no redness  	•	NEUROLOGIC: no weakness, no headache   	•	PSYCH: no anxiety, non suicidal, non homicidal, no hallucination, no depression

## 2022-04-15 NOTE — ED PROVIDER NOTE - PHYSICAL EXAMINATION
CONSTITUTIONAL: Well-developed; well-nourished; in no acute distress, nontoxic appearing  SKIN: erythema/warmth to dorsal aspect of distal L forearm, no abrasions/lacerations, no induration/fluctuance  CARD: S1, S2 normal, no murmur  RESP: No wheezes, rales or rhonchi. Good air movement bilaterally   EXT: Normal ROM. No cyanosis or edema. pulses 2+. no bony deformity   NEURO: awake, alert, following commands, oriented, grossly unremarkable. No Focal deficits. GCS 15.   PSYCH: Cooperative, appropriate.

## 2022-04-15 NOTE — ED PROVIDER NOTE - CLINICAL SUMMARY MEDICAL DECISION MAKING FREE TEXT BOX
I personally evaluated the patient. I reviewed the Resident´s or Physician Assistant´s note (as assigned above), and agree with the findings and plan except as documented in my note.  Patient evaluated for forearm and wrist erythema.  X-rays of left upper extremity performed, patient given medication for pain.  No fluctuance, induration, rapid spreading, crepitus, systemic symptoms noted on exam or review of systems.  Patient given antibiotic prescription and 1 dose in the ED.  Given strict return precautions.  I have fully discussed the medical management and delivery of care with the patient. I have discussed any available labs, imaging and treatment options with the patient. Patient confirms understanding and has been given detailed return precautions. Patient instructed to return to the ED should symptoms persist or worsen. Patient has demonstrated capacity and has verbalized understanding. Patient is well appearing upon discharge.

## 2022-04-15 NOTE — ED PROVIDER NOTE - NSFOLLOWUPINSTRUCTIONS_ED_ALL_ED_FT
CELLULITIS - Discharge Care     Cellulitis    WHAT YOU NEED TO KNOW:    Cellulitis is a skin infection caused by bacteria. Cellulitis may go away on its own or you may need treatment. Your healthcare provider may draw a [place] around the outside edges of your cellulitis. If your cellulitis spreads, your healthcare provider will see it outside of the [place]. Cellulitis          DISCHARGE INSTRUCTIONS:    Call 911 if:     You have sudden trouble breathing or chest pain.        Seek care immediately if:     Your wound gets larger and more painful.       You feel a crackling under your skin when you touch it.      You have purple dots or bumps on your skin, or you see bleeding under your skin.      You have new swelling and pain in your legs.      The red, warm, swollen area gets larger.      You see red streaks coming from the infected area.    Contact your healthcare provider if:     You have a fever.      Your fever or pain does not go away or gets worse.      The area does not get smaller after 2 days of antibiotics.      Your skin is flaking or peeling off.      You have questions or concerns about your condition or care.    Medicines:     Antibiotics help treat the bacterial infection.       NSAIDs, such as ibuprofen, help decrease swelling, pain, and fever. NSAIDs can cause stomach bleeding or kidney problems in certain people. If you take blood thinner medicine, always ask if NSAIDs are safe for you. Always read the medicine label and follow directions. Do not give these medicines to children under 6 months of age without direction from your child's healthcare provider.      Acetaminophen decreases pain and fever. It is available without a doctor's order. Ask how much to take and how often to take it. Follow directions. Read the labels of all other medicines you are using to see if they also contain acetaminophen, or ask your doctor or pharmacist. Acetaminophen can cause liver damage if not taken correctly. Do not use more than 4 grams (4,000 milligrams) total of acetaminophen in one day.       Take your medicine as directed. Contact your healthcare provider if you think your medicine is not helping or if you have side effects. Tell him or her if you are allergic to any medicine. Keep a list of the medicines, vitamins, and herbs you take. Include the amounts, and when and why you take them. Bring the list or the pill bottles to follow-up visits. Carry your medicine list with you in case of an emergency.    Self-care:     Elevate the area above the level of your heart as often as you can. This will help decrease swelling and pain. Prop the area on pillows or blankets to keep it elevated comfortably.       Clean the area daily until the wound scabs over. Gently wash the area with soap and water. Pat dry. Use dressings as directed.       Place cool or warm, wet cloths on the area as directed. Use clean cloths and clean water. Leave it on the area until the cloth is room temperature. Pat the area dry with a clean, dry cloth. The cloths may help decrease pain.     Prevent cellulitis:     Do not scratch bug bites or areas of injury. You increase your risk for cellulitis by scratching these areas.       Do not share personal items, such as towels, clothing, and razors.       Clean exercise equipment with germ-killing  before and after you use it.      Wash your hands often. Use soap and water. Wash your hands after you use the bathroom, change a child's diapers, or sneeze. Wash your hands before you prepare or eat food. Use lotion to prevent dry, cracked skin. Handwashing           Wear pressure stockings as directed. You may be told to wear the stockings if you have peripheral edema. The stockings improve blood flow and decrease swelling.      Treat athlete’s foot. This can help prevent the spread of a bacterial skin infection.    Follow up with your healthcare provider within 3 days, or as directed: Your healthcare provider will check if your cellulitis is getting better. You may need different medicine. Write down your questions so you remember to ask them during your visits.

## 2022-04-16 ENCOUNTER — EMERGENCY (EMERGENCY)
Facility: HOSPITAL | Age: 87
LOS: 0 days | Discharge: HOME | End: 2022-04-16
Admitting: EMERGENCY MEDICINE

## 2022-04-16 ENCOUNTER — INPATIENT (INPATIENT)
Facility: HOSPITAL | Age: 87
LOS: 0 days | Discharge: HOME | End: 2022-04-17
Attending: INTERNAL MEDICINE | Admitting: INTERNAL MEDICINE
Payer: MEDICARE

## 2022-04-16 VITALS
RESPIRATION RATE: 18 BRPM | OXYGEN SATURATION: 98 % | HEART RATE: 85 BPM | DIASTOLIC BLOOD PRESSURE: 74 MMHG | WEIGHT: 139.99 LBS | HEIGHT: 66 IN | TEMPERATURE: 98 F | SYSTOLIC BLOOD PRESSURE: 160 MMHG

## 2022-04-16 DIAGNOSIS — Z90.49 ACQUIRED ABSENCE OF OTHER SPECIFIED PARTS OF DIGESTIVE TRACT: Chronic | ICD-10-CM

## 2022-04-16 DIAGNOSIS — Z95.818 PRESENCE OF OTHER CARDIAC IMPLANTS AND GRAFTS: Chronic | ICD-10-CM

## 2022-04-16 DIAGNOSIS — L03.114 CELLULITIS OF LEFT UPPER LIMB: ICD-10-CM

## 2022-04-16 DIAGNOSIS — K21.9 GASTRO-ESOPHAGEAL REFLUX DISEASE WITHOUT ESOPHAGITIS: ICD-10-CM

## 2022-04-16 DIAGNOSIS — F41.9 ANXIETY DISORDER, UNSPECIFIED: ICD-10-CM

## 2022-04-16 DIAGNOSIS — F03.90 UNSPECIFIED DEMENTIA WITHOUT BEHAVIORAL DISTURBANCE: ICD-10-CM

## 2022-04-16 DIAGNOSIS — E78.5 HYPERLIPIDEMIA, UNSPECIFIED: ICD-10-CM

## 2022-04-16 DIAGNOSIS — M79.644 PAIN IN RIGHT FINGER(S): ICD-10-CM

## 2022-04-16 DIAGNOSIS — I48.0 PAROXYSMAL ATRIAL FIBRILLATION: ICD-10-CM

## 2022-04-16 DIAGNOSIS — Z79.01 LONG TERM (CURRENT) USE OF ANTICOAGULANTS: ICD-10-CM

## 2022-04-16 DIAGNOSIS — I10 ESSENTIAL (PRIMARY) HYPERTENSION: ICD-10-CM

## 2022-04-16 DIAGNOSIS — E03.9 HYPOTHYROIDISM, UNSPECIFIED: ICD-10-CM

## 2022-04-16 DIAGNOSIS — Z86.59 PERSONAL HISTORY OF OTHER MENTAL AND BEHAVIORAL DISORDERS: ICD-10-CM

## 2022-04-16 DIAGNOSIS — Z88.2 ALLERGY STATUS TO SULFONAMIDES: ICD-10-CM

## 2022-04-16 DIAGNOSIS — Z20.822 CONTACT WITH AND (SUSPECTED) EXPOSURE TO COVID-19: ICD-10-CM

## 2022-04-16 LAB
ALBUMIN SERPL ELPH-MCNC: 4.3 G/DL — SIGNIFICANT CHANGE UP (ref 3.5–5.2)
ALP SERPL-CCNC: 77 U/L — SIGNIFICANT CHANGE UP (ref 30–115)
ALT FLD-CCNC: 18 U/L — SIGNIFICANT CHANGE UP (ref 0–41)
ANION GAP SERPL CALC-SCNC: 14 MMOL/L — SIGNIFICANT CHANGE UP (ref 7–14)
AST SERPL-CCNC: 22 U/L — SIGNIFICANT CHANGE UP (ref 0–41)
BASOPHILS # BLD AUTO: 0.02 K/UL — SIGNIFICANT CHANGE UP (ref 0–0.2)
BASOPHILS NFR BLD AUTO: 0.3 % — SIGNIFICANT CHANGE UP (ref 0–1)
BILIRUB SERPL-MCNC: 0.5 MG/DL — SIGNIFICANT CHANGE UP (ref 0.2–1.2)
BUN SERPL-MCNC: 28 MG/DL — HIGH (ref 10–20)
CALCIUM SERPL-MCNC: 9.6 MG/DL — SIGNIFICANT CHANGE UP (ref 8.5–10.1)
CHLORIDE SERPL-SCNC: 105 MMOL/L — SIGNIFICANT CHANGE UP (ref 98–110)
CO2 SERPL-SCNC: 21 MMOL/L — SIGNIFICANT CHANGE UP (ref 17–32)
CREAT SERPL-MCNC: 1.5 MG/DL — SIGNIFICANT CHANGE UP (ref 0.7–1.5)
EGFR: 34 ML/MIN/1.73M2 — LOW
EOSINOPHIL # BLD AUTO: 0.04 K/UL — SIGNIFICANT CHANGE UP (ref 0–0.7)
EOSINOPHIL NFR BLD AUTO: 0.5 % — SIGNIFICANT CHANGE UP (ref 0–8)
GLUCOSE SERPL-MCNC: 109 MG/DL — HIGH (ref 70–99)
HCT VFR BLD CALC: 38.2 % — SIGNIFICANT CHANGE UP (ref 37–47)
HGB BLD-MCNC: 12.8 G/DL — SIGNIFICANT CHANGE UP (ref 12–16)
IMM GRANULOCYTES NFR BLD AUTO: 0.5 % — HIGH (ref 0.1–0.3)
LYMPHOCYTES # BLD AUTO: 1.5 K/UL — SIGNIFICANT CHANGE UP (ref 1.2–3.4)
LYMPHOCYTES # BLD AUTO: 19.3 % — LOW (ref 20.5–51.1)
MCHC RBC-ENTMCNC: 30.2 PG — SIGNIFICANT CHANGE UP (ref 27–31)
MCHC RBC-ENTMCNC: 33.5 G/DL — SIGNIFICANT CHANGE UP (ref 32–37)
MCV RBC AUTO: 90.1 FL — SIGNIFICANT CHANGE UP (ref 81–99)
MONOCYTES # BLD AUTO: 0.72 K/UL — HIGH (ref 0.1–0.6)
MONOCYTES NFR BLD AUTO: 9.3 % — SIGNIFICANT CHANGE UP (ref 1.7–9.3)
NEUTROPHILS # BLD AUTO: 5.44 K/UL — SIGNIFICANT CHANGE UP (ref 1.4–6.5)
NEUTROPHILS NFR BLD AUTO: 70.1 % — SIGNIFICANT CHANGE UP (ref 42.2–75.2)
NRBC # BLD: 0 /100 WBCS — SIGNIFICANT CHANGE UP (ref 0–0)
PLATELET # BLD AUTO: 226 K/UL — SIGNIFICANT CHANGE UP (ref 130–400)
POTASSIUM SERPL-MCNC: 4.4 MMOL/L — SIGNIFICANT CHANGE UP (ref 3.5–5)
POTASSIUM SERPL-SCNC: 4.4 MMOL/L — SIGNIFICANT CHANGE UP (ref 3.5–5)
PROT SERPL-MCNC: 6.7 G/DL — SIGNIFICANT CHANGE UP (ref 6–8)
RBC # BLD: 4.24 M/UL — SIGNIFICANT CHANGE UP (ref 4.2–5.4)
RBC # FLD: 12.6 % — SIGNIFICANT CHANGE UP (ref 11.5–14.5)
SARS-COV-2 RNA SPEC QL NAA+PROBE: SIGNIFICANT CHANGE UP
SODIUM SERPL-SCNC: 140 MMOL/L — SIGNIFICANT CHANGE UP (ref 135–146)
WBC # BLD: 7.76 K/UL — SIGNIFICANT CHANGE UP (ref 4.8–10.8)
WBC # FLD AUTO: 7.76 K/UL — SIGNIFICANT CHANGE UP (ref 4.8–10.8)

## 2022-04-16 PROCEDURE — 73140 X-RAY EXAM OF FINGER(S): CPT | Mod: 26,RT

## 2022-04-16 PROCEDURE — 93010 ELECTROCARDIOGRAM REPORT: CPT

## 2022-04-16 PROCEDURE — 99223 1ST HOSP IP/OBS HIGH 75: CPT

## 2022-04-16 PROCEDURE — 99285 EMERGENCY DEPT VISIT HI MDM: CPT | Mod: GC

## 2022-04-16 RX ORDER — VANCOMYCIN HCL 1 G
1000 VIAL (EA) INTRAVENOUS ONCE
Refills: 0 | Status: COMPLETED | OUTPATIENT
Start: 2022-04-16 | End: 2022-04-16

## 2022-04-16 RX ORDER — ALBUTEROL 90 UG/1
2 AEROSOL, METERED ORAL
Qty: 0 | Refills: 0 | DISCHARGE

## 2022-04-16 RX ORDER — CYCLOSPORINE 0.5 MG/ML
1 EMULSION OPHTHALMIC
Qty: 0 | Refills: 0 | DISCHARGE

## 2022-04-16 RX ORDER — CHLORHEXIDINE GLUCONATE 213 G/1000ML
1 SOLUTION TOPICAL
Refills: 0 | Status: DISCONTINUED | OUTPATIENT
Start: 2022-04-16 | End: 2022-04-17

## 2022-04-16 RX ORDER — SENNA PLUS 8.6 MG/1
1 TABLET ORAL
Qty: 0 | Refills: 0 | DISCHARGE

## 2022-04-16 RX ORDER — PREGABALIN 225 MG/1
1000 CAPSULE ORAL DAILY
Refills: 0 | Status: DISCONTINUED | OUTPATIENT
Start: 2022-04-16 | End: 2022-04-17

## 2022-04-16 RX ORDER — DIAZEPAM 5 MG
5 TABLET ORAL THREE TIMES A DAY
Refills: 0 | Status: DISCONTINUED | OUTPATIENT
Start: 2022-04-16 | End: 2022-04-17

## 2022-04-16 RX ORDER — APIXABAN 2.5 MG/1
2.5 TABLET, FILM COATED ORAL EVERY 12 HOURS
Refills: 0 | Status: DISCONTINUED | OUTPATIENT
Start: 2022-04-16 | End: 2022-04-17

## 2022-04-16 RX ORDER — SODIUM CHLORIDE 9 MG/ML
1000 INJECTION INTRAMUSCULAR; INTRAVENOUS; SUBCUTANEOUS
Refills: 0 | Status: DISCONTINUED | OUTPATIENT
Start: 2022-04-16 | End: 2022-04-17

## 2022-04-16 RX ORDER — AMIODARONE HYDROCHLORIDE 400 MG/1
200 TABLET ORAL DAILY
Refills: 0 | Status: DISCONTINUED | OUTPATIENT
Start: 2022-04-16 | End: 2022-04-17

## 2022-04-16 RX ORDER — MEMANTINE HYDROCHLORIDE 10 MG/1
10 TABLET ORAL
Refills: 0 | Status: DISCONTINUED | OUTPATIENT
Start: 2022-04-16 | End: 2022-04-17

## 2022-04-16 RX ORDER — FAMOTIDINE 10 MG/ML
40 INJECTION INTRAVENOUS AT BEDTIME
Refills: 0 | Status: DISCONTINUED | OUTPATIENT
Start: 2022-04-16 | End: 2022-04-17

## 2022-04-16 RX ORDER — LEVOTHYROXINE SODIUM 125 MCG
25 TABLET ORAL DAILY
Refills: 0 | Status: DISCONTINUED | OUTPATIENT
Start: 2022-04-16 | End: 2022-04-17

## 2022-04-16 RX ADMIN — APIXABAN 2.5 MILLIGRAM(S): 2.5 TABLET, FILM COATED ORAL at 17:28

## 2022-04-16 RX ADMIN — SODIUM CHLORIDE 75 MILLILITER(S): 9 INJECTION INTRAMUSCULAR; INTRAVENOUS; SUBCUTANEOUS at 17:28

## 2022-04-16 RX ADMIN — Medication 250 MILLIGRAM(S): at 11:43

## 2022-04-16 RX ADMIN — FAMOTIDINE 40 MILLIGRAM(S): 10 INJECTION INTRAVENOUS at 22:24

## 2022-04-16 RX ADMIN — MEMANTINE HYDROCHLORIDE 10 MILLIGRAM(S): 10 TABLET ORAL at 17:28

## 2022-04-16 NOTE — H&P ADULT - HISTORY OF PRESENT ILLNESS
Patient is a 85yo F with PMH of HTN, HLD, paroxysmal a-fib, mitral valve regurgitation, hypothyroidism, GERD, SLE, anxiety, dementia presenting with L forearm/wrist worsening cellulitis. Pt was seen in the ED yesterdayand started on clindamycin, but the erythema has extended up the mid forearm out of the lines that were drawn around it yesterday, and it hurts more. Afebrile. No headache, chest pain, SOB, N/V, abdominal danika   Patient is a 87yo F with PMH of HTN, HLD, paroxysmal a-fib, mitral valve regurgitation, hypothyroidism, GERD, SLE, anxiety, dementia presenting with left forearm and wrist worsening cellulitis. Patient has been seen in ED yesterday and was discharged on clindamycin 300 mg Q6H and cephalexin 500mg Q6H, but the erythema has extended up to the elbow. Pt reports pain 7/10 now, but was 10/10 yesterday. Pt took tylenol with improvement in pain.  Otherwise, denies fever, chills, headache, SOB, chest pain, palpitation, abdominal pain, n/v/d/c, dysuria, LE edema.      IN ED, VS /74, HR 85, RR 18, T 97.9F, Spo2 98% on RA.  Labs showed WBC 7.7, Cr 1.5( 1.2 baseline in 2021), GFR 34.  Pt was started on vancomycin 1000mg IV in ED, admitted for cellulitis treatment.

## 2022-04-16 NOTE — ED PROVIDER NOTE - NS ED ROS FT
Constitutional: (-) fever  Eyes/ENT: (-) blurry vision, (-) epistaxis  Cardiovascular: (-) chest pain, (-) syncope  Respiratory: (-) cough, (-) shortness of breath  Gastrointestinal: (-) vomiting, (-) diarrhea  Musculoskeletal: (-) neck pain, (-) back pain, (-) joint pain  Integumentary: (-) rash, (-) edema, (+) L arm cellulitis   Neurological: (-) headache, (-) altered mental status  Psychiatric: (-) hallucinations  Allergic/Immunologic: (-) pruritus

## 2022-04-16 NOTE — PATIENT PROFILE ADULT - PATIENT REPRESENTATIVE: ( YOU CAN CHOOSE ANY PERSON THAT CAN ASSIST YOU WITH YOUR HEALTH CARE PREFERENCES, DOES NOT HAVE TO BE A SPOUSE, IMMEDIATE FAMILY OR SIGNIFICANT OTHER/PARTNER)
Psychiatry Progress Note    Subjective: Interval History     The patient is out on pass today  There are no concerns per staff  They state he has been medication and meal compliant  He continues to have a flat affect and is isolative  He did go to p m  group yesterday  Patient was noted to be responding to internal stimuli yesterday  He however continues to deny hallucinations  Patient continues to not be interactive with peers      Current medications:    Current Facility-Administered Medications:     [MAR Hold - Suspended Admission] bisacodyl (DULCOLAX) EC tablet 10 mg, 10 mg, Oral, Daily PRN, Provider Cutover, 10 mg at 10/19/18 0647    [MAR Hold - Suspended Admission] bisacodyl (DULCOLAX) rectal suppository 10 mg, 10 mg, Rectal, Daily PRN, LIZETH Ash    [facility] Hold - Suspended Admission] buPROPion (WELLBUTRIN XL) 24 hr tablet 300 mg, 300 mg, Oral, Daily, Provider Cutover, 300 mg at 10/28/18 0829    [MAR Hold - Suspended Admission] cloZAPine (CLOZARIL) tablet 450 mg, 450 mg, Oral, HS, Ranjit Del Cid MD, 450 mg at 10/27/18 2059    [MAR Hold - Suspended Admission] divalproex sodium (DEPAKOTE ER) 24 hr tablet 1,000 mg, 1,000 mg, Oral, HS, Jacob Villalobos MD, 1,000 mg at 10/27/18 2059    [MAR Hold - Suspended Admission] fluPHENAZine (PROLIXIN) tablet 20 mg, 20 mg, Oral, , Ranjit Del Cid MD, 20 mg at 10/27/18 2059    [MAR Hold - Suspended Admission] levothyroxine tablet 75 mcg, 75 mcg, Oral, Early Morning, Provider Cutover, 75 mcg at 10/28/18 0647    [MAR Hold - Suspended Admission] midodrine (PROAMATINE) tablet 10 mg, 10 mg, Oral, TID, Wally Lynn MD, 10 mg at 10/28/18 0647    [MAR Hold - Suspended Admission] polyethylene glycol (MIRALAX) packet 17 g, 17 g, Oral, Daily, Provider Cutover, 17 g at 10/28/18 0830    [MAR Hold - Suspended Admission] senna (SENOKOT) tablet 8 6 mg, 1 tablet, Oral, BID, LIZETH Meyer, 8 6 mg at 10/28/18 6249    Current Outpatient Prescriptions:     acetaminophen (TYLENOL) 100 mg/mL solution, Take 10 mg/kg by mouth every 4 (four) hours as needed for fever, Disp: , Rfl:     acetaminophen (TYLENOL) 325 mg tablet, Take 650 mg by mouth every 4 (four) hours as needed for mild pain, Disp: , Rfl:     clozapine (CLOZARIL) 200 MG tablet, Take 600 mg by mouth daily at bedtime, Disp: , Rfl:     divalproex sodium (DEPAKOTE) 500 mg EC tablet, Take 250 mg by mouth every 8 (eight) hours, Disp: , Rfl:     divalproex sodium (DEPAKOTE) 500 mg EC tablet, Take 1,250 mg by mouth daily at bedtime, Disp: , Rfl:     lithium carbonate (LITHOBID) 450 mg CR tablet, Take 450 mg by mouth daily at bedtime, Disp: , Rfl:     PARoxetine (PAXIL) 20 mg tablet, Take 20 mg by mouth daily, Disp: , Rfl:     Current Problem List:    Patient Active Problem List   Diagnosis    [facility]    Encounter for medical assessment       Problem list reviewed 10/28/18     Objective:     Vital Signs:  Vitals:    10/27/18 1505 10/27/18 1510 10/28/18 0730 10/28/18 0731   BP: 116/72 110/70 136/81 133/74   BP Location: Left arm Left arm Left arm Left arm   Pulse: (!) 112 (!) 124 (!) 109 (!) 120   Resp:   18    Temp:   (!) 96 8 °F (36 °C)    TempSrc:   Temporal    SpO2:       Weight:   89 2 kg (196 lb 9 6 oz)    Height:             Appearance:  age appropriate and disheveled   Behavior:  guarded   Speech:  soft   Mood:  constricted   Affect:  constricted   Thought Process:  blocked   Thought Content:  normal   Perceptual Disturbances: None   Risk Potential: none   Sensorium:  person, place and time   Cognition:  intact   Consciousness:  alert and awake    Attention: attention span and concentration were age appropriate   Intellect: average   Insight:  limited   Judgment: limited      Motor Activity: no abnormal movements           Recent Labs:  Results Reviewed     None          I/O Past 24 hours:  No intake/output data recorded  No intake/output data recorded          Assessment / Plan: Chronic paranoid schizophrenia ([facility] [address] [facility] )    Recommended Treatment:      Medication changes:  1) continue current medication regimen  Non-pharmacological treatments  1) Continue with group therapy, milieu therapy and occupational therapy  Safety  1) Safety/communication plan established targeting dynamic risk factors above  2) Risks, benefits, and possible side effects of medications explained to patient and patient verbalizes understanding  Counseling / Coordination of Care    Total floor / unit time spent today 20 minutes  Greater than 50% of total time was spent with the patient and / or family counseling and / or coordination of care  A description of the counseling / coordination of care  Patient's Rights, confidentiality and exceptions to confidentiality, use of automated medical record, [facility] Jens Ibanez staff access to medical record, and consent to treatment reviewed      Renetta Burleson PA-C declines

## 2022-04-16 NOTE — H&P ADULT - ATTENDING COMMENTS
Patient seen and examined on 2022 at 21:31    HPI:  87yo woman with a PMH of HTN, HLD, paroxysmal a-fib, mitral valve regurgitation, hypothyroidism, GERD, SLE, anxiety, dementia admitted for L forearm cellulitis. She was seen in ED on 4/15 and was discharged on clindamycin 300 mg Q6H and cephalexin 500mg Q6H, but the erythema has extended up to the elbow. Pt reports pain 7/10 now, but was 10/10 yesterday. Pt took tylenol with improvement in pain.  Otherwise, denies fever, chills, headache, SOB, chest pain, palpitation, abdominal pain, n/v/d/c, dysuria, LE edema.    At this time she has no complaints. She doesn't recall ay scratches, trauma, or puncture wounds to the area but overall isn't a great historian.     REVIEW OF SYSTEMS:  CONSTITUTIONAL:  No weakness, fevers, chills, night sweats, weight loss  EYES/ENT: No visual changes. No vertigo or dysphagia  NECK: No neck pain or stiffness  RESPIRATORY: No cough, wheezing, hemoptysis. No shortness of breath  CARDIOVASCULAR: No chest pain or palpitations. No lower extremity edema  GASTROINTESTINAL: No abdominal pain. No nausea, vomiting, diarrhea, or hematemesis  GENITOURINARY: No dysuria or hematuria   NEUROLOGICAL: No focal numbness or weakness  SKIN: +erythema, swelling, and pain of L forearm   HEMATOLOGIC: No easy bruising or prolonged bleeding.      PHYSICAL EXAM:  GENERAL: NAD, well-developed, Non-toxic, elderly and frail, stated age   HEAD:  Atraumatic, Normocephalic, slightly dry mucus membranes  EYES: EOMI, Sclera White   NECK: Supple, No JVD  CHEST/LUNG: Clear to auscultation bilaterally; No wheezing, rhonchi, or crackles  HEART: Regular rate and rhythm; s1, s2, No murmurs, rubs, or gallops  ABDOMEN: Soft, Nontender, Nondistended; Bowel sounds present, No rebound or guarding noted   EXTREMITIES:  No lower extremity edema or calf tenderness to palpation.  No clubbing or cyanosis  PSYCH: AAOx1 (name, , location and date were incorrect), pleasant, cooperative, not anxious  NEUROLOGY: 5/5 strength in all extremities, no downward drift. Sensation grossly intact.   SKIN: L forearm erythema, swelling, and warmth extending from wrist up to below the antecubital area. Mild tenderness to touch but no evidence of abscess, puncture, drainage, or induration.       ASSESSMENT AND PLAN:  L forearm cellulitis: SIRS not present on admission   -Cont with vanco for now  -Check MRSA Nares, if negative would d/c vanco and restart cephalexin (may have been too early to see the abx effect)   -f/u blood cultures, ESR, CRP, and procal     Mild TONO on CKD III: appears slightly dry on exam  -Cont with NS at 75cc for now  -Trend Cr, if getting worse will need urine studies    Paroxysmal atrial fibrillation: on eliquis   -Rate and rhythm controlled on amio     Hypothyroidism: cont with levothyroixine     SLE: not currently on medications     Dementia:   -Cont with memantine     -Minimize sleep disturbances. Encourage family visitation when able: familiar voices/faces/objects. Exposure to daylight during wake time hours. Limit sedating medications. Frequent reorientation.     DVT ppx: on eliquis   GI ppx: home famotidine   GOC: Full code.    My note supersedes the residents in the event of a discrepancy.

## 2022-04-16 NOTE — ED ADULT NURSE NOTE - HISTORY OF COVID-19 VACCINATION
Constitutional: no fevers; no chills  HEENT: no visual changes, no sore throat, no rhinorrhea  CV: no cp; no palpitations  Resp: sob; cough  GI: abd pain, no nausea, no vomiting, no diarrhea, no constipation  : no dysuria, no hematuria  MSK: no myalgais; no arthralgias  skin: no rashes  neuro: no HA, no numbness; no weakness, no tingling  ROS statement: all other ROS negative except as per HPI
Yes

## 2022-04-16 NOTE — H&P ADULT - NSHPLABSRESULTS_GEN_ALL_CORE
LABS:                        12.8   7.76  )-----------( 226      ( 16 Apr 2022 11:20 )             38.2       04-16    140  |  105  |  28<H>  ----------------------------<  109<H>  4.4   |  21  |  1.5    Ca    9.6      16 Apr 2022 11:20    TPro  6.7  /  Alb  4.3  /  TBili  0.5  /  DBili  x   /  AST  22  /  ALT  18  /  AlkPhos  77  04-16      RADIOLOGY:    < from: Xray Finger, Right Hand (04.16.22 @ 12:54) >    Impression: No acute fracture or dislocation. Phalangeal and carpal   degenerative changes. Unchanged joint body in the region of the basal   joint    < end of copied text >    < from: Xray Elbow AP + Lateral, Left (04.15.22 @ 15:55) >    Impression:    Osteopenia without evidence of an acute fracture.    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    < end of copied text >    < from: Xray Forearm, Left (04.15.22 @ 15:53) >    IMPRESSION:    Osteopenia without evidence of an acute fracture.    < end of copied text >

## 2022-04-16 NOTE — PATIENT PROFILE ADULT - FUNCTIONAL ASSESSMENT - DAILY ACTIVITY ASSESSMENT TYPE
"Division of Gastroenterology, Hepatology and Nutrition Department of Medicine       Assessment/Plan:  Chronic Hepatitis C,  genotype 1b Stage 0 Fibrosis (Fibrosis Scan 12/29/16)         Frank Orellana  has chronic hepatitis C, genotype 1b.  He has stage 0 fibrosis as seen on the Fibrosis scan done 12/29/16.  He also had an US today that showed the liver has normal echotexture and no masses.  He  has finished week 4 of treatment with Harvoni. He is part of the Prioritize Study.  He is tolerating treatment very well.  He  will be done with the 12 week course of treatment on 5/15/17.  The 3 month s/p HCV RNA quant is due 8/14/17 to see if He has a sustained virological response.  This would be considered a cure.   If he  achieves the SVR, he  will no loner need to be followed in hepatology as he  has stage 0 fibrosis.      He is advised to watch for any new or increased side effects with the medication and call our hepatology nurse if this were to happen.    Thank you for allowing me to participate in the care of this patient.  If you have any questions regarding this visit and plan of care, please do not hesitate to page me at [phone].    Caty Shetty, MS, PA-C  Division of Gastroenterology, Hepatology and Nutrition      HPI  Frank Orellana is a pleasant 67 year old  male with chronic hepatitis C.  He is genotype 1b.  He  had  a fibrosis evaluation 12/29/16 and the results showed stage 0 fibrosis.  He  is  treatment naive.   He started HCV treatment on 2/20/17.   The patient is taking Harvoni for 12 weeks as part of the Prioritize Study.   He returns to clinic today , 4 weeks into treatment.    He  is experiencing these symptoms: \" a little tired\" with exertion.  His  labs today show  ALT of 16 and AST of 12.      ROS:  Comprehensive review of systems is negative, unless otherwise noted above    Past Medical History   Diagnosis Date     Actinic keratosis 2009     Atrial fibrillation (H) 1996     " paroxysmal on 3-4 occasions     Atrial fibrillation (H) 6/15/96     Afib - 2 or 3 extended occurrences since     Hepatitis B 1969     acute infection at age 20; IV drug use     WINNIE (obstructive sleep apnea)      AHI 34.2     Pain in both hands      Patient is Buddhist      Refusal of blood transfusions as patient is Buddhist      Right shoulder pain      Tinnitus 1 or 2 years ago     both ears     Tubular adenoma of colon 17     ascending colon, 17       Current Outpatient Prescriptions   Medication Sig Dispense Refill     ledipasvir-sofosbuvir (HARVONI)  MG per tablet Take 1 tablet by mouth daily       order for DME Equipment being ordered: CPAP Equipment ordered: RESMED Auto PAP Mask type: Nasal Settings: 7-9 CM H2O       alfuzosin (UROXATRAL) 10 MG 24 hr tablet Take 1 tablet (10 mg) by mouth daily (Patient taking differently: Take 10 mg by mouth every evening ) 90 tablet 1     aspirin 81 MG EC tablet Take 1 tablet (81 mg) by mouth daily 90 tablet 3     diltiazem 180 MG 24 hr capsule Take 1 capsule (180 mg) by mouth daily (Patient taking differently: Take 180 mg by mouth every evening ) 90 capsule 3       Allergies   Allergen Reactions     Blood Transfusion Related (Informational Only)      Jewish.  Declines blood transfusions.     Contrast Dye Rash       Family History   Problem Relation Age of Onset     Arthritis Paternal Grandmother      GASTROINTESTINAL DISEASE Father       age 77 after colon surgery     Alcohol/Drug Mother       age 64     HEART DISEASE Brother      Cardiac Sudden Death Brother      Rheumatoid Arthritis Paternal Grandmother        Social History     Social History     Marital status:      Spouse name: N/A     Number of children: N/A     Years of education: N/A     Social History Main Topics     Smoking status: Former Smoker     Packs/day: 0.50     Years: 10.00     Types: Cigarettes, Other     Start date: 6/15/1964     Quit date:  "7/1/1974     Smokeless tobacco: Never Used     Alcohol use 1.8 - 2.4 oz/week      Comment: 4 or 5 drinks/week, limit of 1     Drug use: Yes     Special: Marijuana, Methamphetamines, IV      Comment: 1960's     Sexual activity: Yes     Partners: Female     Birth control/ protection: None     Other Topics Concern     Special Diet No     eats healthy     Exercise No     Social History Narrative    Chemical manufacturing solvents    Cleaning     [employer]    Retired    , no kids           OBJECTIVE  Vitals: Blood pressure 116/73, pulse 57, temperature 97.7  F (36.5  C), temperature source Oral, height 1.753 m (5' 9\"), weight 86.8 kg (191 lb 4.8 oz), SpO2 96 %. Body mass index is 28.25 kg/(m^2).  Gen: No acute distress, well nourished  Eyes: Sclera anicteric  Skin:  no jaundice  Psych: Normal speech, normal affect    Recent Laboratory Test Results  Lab Results   Component Value Date    WBC 5.5 12/02/2016    HGB 14.5 12/02/2016    HCT 42.4 12/02/2016     12/02/2016    CHOL 172 10/06/2016    TRIG 81 10/06/2016    HDL 56 10/06/2016    ALT 16 03/21/2017    AST 12 03/21/2017     03/21/2017    TSH 1.59 12/02/2016    PSA 5.42 (H) 11/29/2016    INR 1.04 12/02/2016    ALBUMIN 3.7 03/21/2017         " Admission

## 2022-04-16 NOTE — H&P ADULT - ASSESSMENT
Patient is a 87yo F with PMH of HTN, HLD, paroxysmal a-fib, mitral valve regurgitation, hypothyroidism, GERD, SLE, anxiety, dementia presenting with left forearm and wrist worsening cellulitis. Patient has been seen in ED yesterday and was discharged on clindamycin 300 mg Q6H and cephalexin 500mg Q6H, but the erythema has extended up to the elbow. Pt reports pain 7/10 now, but was 10/10 yesterday. Pt took tylenol with improvement in pain.     # left hand and forearm cellulitis  - failed outpatient antibiotics: clindamycin 300 mg Q6H and cephalexin 500mg Q6H,  - X- ray left forearm/ wrist: Osteopenia without evidence of an acute fracture. 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  - s/p 1 gr Vancomycin in ED  - will continue with Vancomycin 1Gr Q24H for now (renally dosed)  - F/u ID consult  - f/u Blood cx and MRSA nasal PCR  - f/u procalcitonin, ESR, CRP    # TONO on CKD  - Cr 1.5 on admission ( baseline 1.2- 1.4 2021)  - IVF NS for 12H  - continue to monitor bmp / scr    # paroxysmal atrial fibrillation, heart rate controlled  - c/w apixiban 2.5 mg BID   - c/w amiodarone 200 mg qd  - follows Dr. Marte    # HTN/HLD  - /74  - continue to monitor, start medication if not improving    # Hypothyroidism  - c/w home dose levothyroxine 25 mcg    # Anxiety  - home medication: valium TID PRN    # Asthma, mild intermittent   - no evidence of exacerbation    # chronic back pain  - follows Dr Matta pain management outside, s/p epidural injection    # GERD  - c/w famotidine    # dementia  - c/w [id number] mg BID    #Misc  - DVT Prophylaxis: on apixaban for a-fib  - GI Prophylaxis: famotidine  - Diet: DASH/TLC  - Activity: IAT  - Code Status: Full code   - Dispo: admit to medicine

## 2022-04-16 NOTE — ED PROVIDER NOTE - PHYSICAL EXAMINATION
Vital Signs: I have reviewed the initial vital signs.  Constitutional: well-nourished, appears stated age, no acute distress  Cardiovascular: regular rate, regular rhythm, well-perfused extremities  Respiratory: unlabored respiratory effort, clear to auscultation bilaterally  Gastrointestinal: soft, non-tender abdomen  Musculoskeletal: supple neck, no lower extremity edema, (+) L forearm volar aspect erythematous from wrist to elbow, mildly swollen. Erythema extended past marking made yesterday. (+) R hand 3rd digit tender to palpation diffuse without obvious deformity, swelling, or ecchymosis.   Integumentary: warm, dry, no rash  Neurologic: awake, alert, extremities’ motor and sensory functions grossly intact  Psychiatric: appropriate mood, appropriate affect

## 2022-04-16 NOTE — ED ADULT TRIAGE NOTE - CHIEF COMPLAINT QUOTE
Pt instructed to come to ED by PMD for evaluation of cellulitis to left forearm. Pt started on clindamycin but reports the redness is worse and spreading since yesterday. Pt denies fever.

## 2022-04-16 NOTE — ED ADULT NURSE NOTE - NSICDXPASTSURGICALHX_GEN_ALL_CORE_FT
Brief Operative Note    Patient: Paula Chirinos 69 year old male    MRN: [id number]    Surgeon(s): Evens Sanchez MD  Phone Number: [phone]                       Surgeon(s) and Role:  Panel 1:     * Evens Sanchez MD - Primary  Panel 2:     * Mary Bullock MD - Primary    Assistant(s): n    Pre-Op Diagnosis: AAA     Post-Op Diagnosis: Left IIA aneurysm     Procedure: Procedure(s):  PTA WITH STENT - CV  PTA WITH STENT - CV  UPPER EXTREMITY ANGIOGRAM    Anesthesia Type: MAC                                   Complications: None    Description: n    Findings: Aneurysm excluded post Viabahn stents    Specimens Removed: No specimens collected     Estimated Blood Loss: No Value n    Assistant Tasks: None     Implants:   Implant Name Type Inv. Item Serial No.  Lot No. LRB No. Used Action   STENT VIABAHN 7MM 10CM 120CM FLXB SELF EXPAND - [id number] Stent STENT VIABAHN 7MM 10CM 120CM FLXB SELF EXPAND [id number] [employer]  Left 1 Implanted   STENT GORE VIABAHN 10MM 13MM 8FR 39MM 135CM BLN - [id number] Stent STENT GORE VIABAHN 10MM 13MM 8FR 39MM 135CM BLN [id number] [employer]  Left 1 Implanted   STENT GORE VIABAHN 8MM 16SQ MM 8FR 79MM 135CM BLN - [id number] Stent STENT GORE VIABAHN 8MM 16SQ MM 8FR 79MM 135CM BLN [id number] [employer]  Left 1 Implanted         I was present for the key portions of the procedure and was immediately available for the non-key portions       PAST SURGICAL HISTORY:  History of appendectomy     Status post placement of implantable loop recorder

## 2022-04-16 NOTE — PATIENT PROFILE ADULT - FALL HARM RISK - HARM RISK INTERVENTIONS

## 2022-04-16 NOTE — ED PROVIDER NOTE - ATTENDING CONTRIBUTION TO CARE
85yo woman h/o HTN, HLD, Pafib, MVR, GERD, mild dementia presents with worsening cellulitis of the L forearm/wrist. No trauma; was seen in the ED yesterday for similar sx and was given clindamycin, but the erythema has extended up the mid forearm out of the lines that were drawn around it yesterday. No constitutional sx. VS, exam as noted, pt is very well appearing. Plan is for labs, IV abx, admit.

## 2022-04-16 NOTE — H&P ADULT - NSHPPHYSICALEXAM_GEN_ALL_CORE
PHYSICAL EXAM:  GENERAL: NAD, well-developed  HEENT:  Atraumatic, Normocephalic; EOMI, PERRLA, conjunctiva pink, sclera white, Supple, No JVD, thyromegally or LYAD appreciated  CHEST/LUNG: Clear to auscultation bilaterally; No wheeze, ronchi or rales  HEART: Regular rate and rhythm; No murmurs, rubs, or gallops  ABDOMEN: Soft, Nontender, Nondistended; Bowel sounds present  EXTREMITIES:  2+ Peripheral Pulses, No peripheral pitting edema  PSYCH: AAOx3  NEUROLOGY: non-focal  SKIN: No rashes/lesions appreciated PHYSICAL EXAM:  GENERAL: NAD, well-developed  HEENT:  Atraumatic, Normocephalic; EOMI, conjunctiva pink, sclera white, Supple,   CHEST/LUNG: Clear to auscultation bilaterally; No wheeze, ronchi or rales  HEART: Regular rate and rhythm; No murmurs, rubs, or gallops  ABDOMEN: Soft, Nontender, Nondistended; Bowel sounds present  EXTREMITIES:  2+ Peripheral Pulses, No peripheral pitting edema. LUE: erythema and induration of left hand and forearm extending up to the elbow, tenderness to palpation of the right forearm. warm to touch  PSYCH: AAOx3  NEUROLOGY: non-focal

## 2022-04-16 NOTE — ED PROVIDER NOTE - OBJECTIVE STATEMENT
The pt is a 86y F w/ hx of HTN, HLD, paroxysmal a-fib, mitral valve regurgitation, hypothyroidism, GERD, SLE, anxiety, dementia presenting with L arm worsening cellulitis. Pt was seen in the ED yesterday for it and started on clindamycin. Despite this, erythema extended and arm hurts more. Afebrile. No headache, chest pain, SOB, N/V, abdominal pain.

## 2022-04-17 ENCOUNTER — TRANSCRIPTION ENCOUNTER (OUTPATIENT)
Age: 87
End: 2022-04-17

## 2022-04-17 VITALS
SYSTOLIC BLOOD PRESSURE: 171 MMHG | TEMPERATURE: 97 F | DIASTOLIC BLOOD PRESSURE: 70 MMHG | RESPIRATION RATE: 18 BRPM | HEART RATE: 79 BPM

## 2022-04-17 LAB
ALBUMIN SERPL ELPH-MCNC: 3.9 G/DL — SIGNIFICANT CHANGE UP (ref 3.5–5.2)
ALP SERPL-CCNC: 67 U/L — SIGNIFICANT CHANGE UP (ref 30–115)
ALT FLD-CCNC: 13 U/L — SIGNIFICANT CHANGE UP (ref 0–41)
ANION GAP SERPL CALC-SCNC: 11 MMOL/L — SIGNIFICANT CHANGE UP (ref 7–14)
AST SERPL-CCNC: 14 U/L — SIGNIFICANT CHANGE UP (ref 0–41)
BASOPHILS # BLD AUTO: 0.02 K/UL — SIGNIFICANT CHANGE UP (ref 0–0.2)
BASOPHILS NFR BLD AUTO: 0.4 % — SIGNIFICANT CHANGE UP (ref 0–1)
BILIRUB SERPL-MCNC: 0.5 MG/DL — SIGNIFICANT CHANGE UP (ref 0.2–1.2)
BUN SERPL-MCNC: 21 MG/DL — HIGH (ref 10–20)
CALCIUM SERPL-MCNC: 9 MG/DL — SIGNIFICANT CHANGE UP (ref 8.5–10.1)
CHLORIDE SERPL-SCNC: 107 MMOL/L — SIGNIFICANT CHANGE UP (ref 98–110)
CO2 SERPL-SCNC: 22 MMOL/L — SIGNIFICANT CHANGE UP (ref 17–32)
CREAT SERPL-MCNC: 1.2 MG/DL — SIGNIFICANT CHANGE UP (ref 0.7–1.5)
EGFR: 44 ML/MIN/1.73M2 — LOW
EOSINOPHIL # BLD AUTO: 0.11 K/UL — SIGNIFICANT CHANGE UP (ref 0–0.7)
EOSINOPHIL NFR BLD AUTO: 2 % — SIGNIFICANT CHANGE UP (ref 0–8)
ERYTHROCYTE [SEDIMENTATION RATE] IN BLOOD: 30 MM/HR — HIGH (ref 0–20)
GLUCOSE SERPL-MCNC: 131 MG/DL — HIGH (ref 70–99)
HCT VFR BLD CALC: 37.4 % — SIGNIFICANT CHANGE UP (ref 37–47)
HGB BLD-MCNC: 12.5 G/DL — SIGNIFICANT CHANGE UP (ref 12–16)
IMM GRANULOCYTES NFR BLD AUTO: 0.6 % — HIGH (ref 0.1–0.3)
LYMPHOCYTES # BLD AUTO: 1.44 K/UL — SIGNIFICANT CHANGE UP (ref 1.2–3.4)
LYMPHOCYTES # BLD AUTO: 26.6 % — SIGNIFICANT CHANGE UP (ref 20.5–51.1)
MAGNESIUM SERPL-MCNC: 1.7 MG/DL — LOW (ref 1.8–2.4)
MCHC RBC-ENTMCNC: 30.6 PG — SIGNIFICANT CHANGE UP (ref 27–31)
MCHC RBC-ENTMCNC: 33.4 G/DL — SIGNIFICANT CHANGE UP (ref 32–37)
MCV RBC AUTO: 91.4 FL — SIGNIFICANT CHANGE UP (ref 81–99)
MONOCYTES # BLD AUTO: 0.43 K/UL — SIGNIFICANT CHANGE UP (ref 0.1–0.6)
MONOCYTES NFR BLD AUTO: 7.9 % — SIGNIFICANT CHANGE UP (ref 1.7–9.3)
NEUTROPHILS # BLD AUTO: 3.39 K/UL — SIGNIFICANT CHANGE UP (ref 1.4–6.5)
NEUTROPHILS NFR BLD AUTO: 62.5 % — SIGNIFICANT CHANGE UP (ref 42.2–75.2)
NRBC # BLD: 0 /100 WBCS — SIGNIFICANT CHANGE UP (ref 0–0)
PLATELET # BLD AUTO: 196 K/UL — SIGNIFICANT CHANGE UP (ref 130–400)
POTASSIUM SERPL-MCNC: 4.4 MMOL/L — SIGNIFICANT CHANGE UP (ref 3.5–5)
POTASSIUM SERPL-SCNC: 4.4 MMOL/L — SIGNIFICANT CHANGE UP (ref 3.5–5)
PROT SERPL-MCNC: 5.9 G/DL — LOW (ref 6–8)
RBC # BLD: 4.09 M/UL — LOW (ref 4.2–5.4)
RBC # FLD: 12.8 % — SIGNIFICANT CHANGE UP (ref 11.5–14.5)
SODIUM SERPL-SCNC: 140 MMOL/L — SIGNIFICANT CHANGE UP (ref 135–146)
WBC # BLD: 5.42 K/UL — SIGNIFICANT CHANGE UP (ref 4.8–10.8)
WBC # FLD AUTO: 5.42 K/UL — SIGNIFICANT CHANGE UP (ref 4.8–10.8)

## 2022-04-17 PROCEDURE — 99239 HOSP IP/OBS DSCHRG MGMT >30: CPT

## 2022-04-17 RX ORDER — CEFAZOLIN SODIUM 1 G
VIAL (EA) INJECTION
Refills: 0 | Status: DISCONTINUED | OUTPATIENT
Start: 2022-04-17 | End: 2022-04-17

## 2022-04-17 RX ORDER — CEFAZOLIN SODIUM 1 G
1000 VIAL (EA) INJECTION ONCE
Refills: 0 | Status: COMPLETED | OUTPATIENT
Start: 2022-04-17 | End: 2022-04-17

## 2022-04-17 RX ORDER — CEFAZOLIN SODIUM 1 G
1000 VIAL (EA) INJECTION EVERY 8 HOURS
Refills: 0 | Status: DISCONTINUED | OUTPATIENT
Start: 2022-04-17 | End: 2022-04-17

## 2022-04-17 RX ORDER — VANCOMYCIN HCL 1 G
1000 VIAL (EA) INTRAVENOUS EVERY 24 HOURS
Refills: 0 | Status: DISCONTINUED | OUTPATIENT
Start: 2022-04-17 | End: 2022-04-17

## 2022-04-17 RX ORDER — MAGNESIUM SULFATE 500 MG/ML
1 VIAL (ML) INJECTION ONCE
Refills: 0 | Status: COMPLETED | OUTPATIENT
Start: 2022-04-17 | End: 2022-04-17

## 2022-04-17 RX ADMIN — APIXABAN 2.5 MILLIGRAM(S): 2.5 TABLET, FILM COATED ORAL at 06:10

## 2022-04-17 RX ADMIN — Medication 100 MILLIGRAM(S): at 10:33

## 2022-04-17 RX ADMIN — PREGABALIN 1000 MICROGRAM(S): 225 CAPSULE ORAL at 11:24

## 2022-04-17 RX ADMIN — Medication 100 GRAM(S): at 10:33

## 2022-04-17 RX ADMIN — Medication 25 MICROGRAM(S): at 06:10

## 2022-04-17 RX ADMIN — MEMANTINE HYDROCHLORIDE 10 MILLIGRAM(S): 10 TABLET ORAL at 06:10

## 2022-04-17 RX ADMIN — AMIODARONE HYDROCHLORIDE 200 MILLIGRAM(S): 400 TABLET ORAL at 06:10

## 2022-04-17 RX ADMIN — CHLORHEXIDINE GLUCONATE 1 APPLICATION(S): 213 SOLUTION TOPICAL at 06:10

## 2022-04-17 NOTE — CONSULT NOTE ADULT - ASSESSMENT
85yo F with PMH of HTN, HLD, paroxysmal a-fib, mitral valve regurgitation, hypothyroidism, GERD, SLE, anxiety, dementia presenting with left forearm and wrist worsening cellulitis. Patient has been seen in ED one day PTA and was discharged on clindamycin 300 mg Q6H and cephalexin 500mg Q6H, but the erythema has extended up to the elbow. Pt reports pain 7/10 now, but was 10/10 yesterday. Pt took tylenol with improvement in pain.   No fevers/trauma.    IMPRESSION;    87yo F with PMH of HTN, HLD, paroxysmal a-fib, mitral valve regurgitation, hypothyroidism, GERD, SLE, anxiety, dementia presenting with left forearm and wrist worsening cellulitis. Patient has been seen in ED one day PTA and was discharged on clindamycin 300 mg Q6H and cephalexin 500mg Q6H, but the erythema has extended up to the elbow. Pt reports pain 7/10 now, but was 10/10 yesterday. Pt took tylenol with improvement in pain.   No fevers/trauma.    IMPRESSION;     Left forearm cellulitis. No abscess. No thrombophlebitis  No inflammatory synovitis    RECOMMENDATIONS;  Po Doxycycline 100 mg q12h ( taken sitting/standing upright to avoid esophagitis ) for 8 more days  Please do not hesitate to recall ID if any questions arise either through [facility] or through [employer]

## 2022-04-17 NOTE — DISCHARGE NOTE PROVIDER - ATTENDING DISCHARGE PHYSICAL EXAMINATION:
T(C): 36.2 (04-17-22 @ 08:01), Max: 36.8 (04-16-22 @ 16:38)  HR: 79 (04-17-22 @ 08:01) (77 - 79)  BP: 171/70 (04-17-22 @ 08:01) (135/82 - 171/70)  RR: 18 (04-17-22 @ 08:01) (18 - 18)  SpO2: --    General: no acute distress  HEENT: atraumatic, EOMI  Chest: clear to auscultation bilaterally, no wheezes noted  CVS: regular rate/rhythm, no murmurs/gallops  ABDOMEN: soft, nontender, nondistended, normoactive bowel sounds  EXT: 2+ radial pulses, no edema noted. left forearm cellulitis resolving  NEURO: proper affect  SKIN: intact, no new lesions noted

## 2022-04-17 NOTE — DISCHARGE NOTE PROVIDER - NSDCMRMEDTOKEN_GEN_ALL_CORE_FT
amiodarone 200 mg oral tablet: 1 tab(s) orally once a day  Calcium 600+D 600 mg-5 mcg (200 intl units) oral tablet: 1 tab(s) orally once a day  Colace 100 mg oral capsule: 1 cap(s) orally 2 times a day  diazePAM 5 mg oral tablet: 1 tab(s) orally 3 times a day, As Needed  doxycycline hyclate 100 mg oral tablet: 1 tab(s) orally every 12 hours   Eliquis 2.5 mg oral tablet: 1 tab(s) orally 2 times a day  famotidine 40 mg oral tablet: 1 tab(s) orally once a day (at bedtime)  levothyroxine 25 mcg (0.025 mg) oral tablet: 1 tab(s) orally once a day  memantine 10 mg oral tablet: 1 tab(s) orally 2 times a day  Restasis 0.05% ophthalmic emulsion: 1 drop(s) to each affected eye every 12 hours  Vitamin B12 50 mcg oral tablet: 1 tab(s) orally once a day

## 2022-04-17 NOTE — CONSULT NOTE ADULT - SUBJECTIVE AND OBJECTIVE BOX
BERT MU  86y, Female  Allergy: sulfa drugs (Unknown)      All historical available data reviewed.    HPI:  Patient is a 85yo F with PMH of HTN, HLD, paroxysmal a-fib, mitral valve regurgitation, hypothyroidism, GERD, SLE, anxiety, dementia procalcitonin 0.  , not suggestive of a bacterial PNA.  Ferritin  CRP  Ddimersesenting with left forearm and wrist worsening cellulitis. Patient has been seen in ED yesterday and was discharged on clindamycin 300 mg Q6H and cephalexin 500mg Q6H, but the erythema has extended up to the elbow. Pt reports pain 7/10 now, but was 10/10 yesterday. Pt took tylenol with improvement in pain.  Otherwise, denies fever, chills, headache, SOB, chest pain, palpitation, abdominal pain, n/v/d/c, dysuria, LE edema.      IN ED, VS /74, HR 85, RR 18, T 97.9F, Spo2 98% on RA.  Labs showed WBC 7.7, Cr 1.5( 1.2 baseline in 2021), GFR 34.  Pt was started on vancomycin 1000mg IV in ED, admitted for cellulitis treatment.      (16 Apr 2022 14:46)    FAMILY HISTORY:    PAST MEDICAL & SURGICAL HISTORY:  Hypothyroid    UTI (urinary tract infection)    Atrial fibrillation, unspecified type  no longer in atrial fifrillation    Anxiety    Mild intermittent asthma, unspecified whether complicated  no longer present    Cellulitis of finger, unspecified laterality  resolved    Diverticulitis  resolved    Reflux gastritis    Mitral valve insufficiency, unspecified etiology    Idiopathic pericarditis, unspecified chronicity    History of appendectomy    Status post placement of implantable loop recorder          VITALS:  T(F): 97.6, Max: 98.3 (04-16-22 @ 16:38)  HR: 78  BP: 135/82  RR: 18Vital Signs Last 24 Hrs  T(C): 36.4 (17 Apr 2022 00:34), Max: 36.8 (16 Apr 2022 16:38)  T(F): 97.6 (17 Apr 2022 00:34), Max: 98.3 (16 Apr 2022 16:38)  HR: 78 (17 Apr 2022 00:34) (77 - 85)  BP: 135/82 (17 Apr 2022 00:34) (135/82 - 160/74)  BP(mean): --  RR: 18 (17 Apr 2022 00:34) (18 - 18)  SpO2: 98% (16 Apr 2022 10:33) (98% - 98%)    TESTS & MEASUREMENTS:                        12.8   7.76  )-----------( 226      ( 16 Apr 2022 11:20 )             38.2     04-16    140  |  105  |  28<H>  ----------------------------<  109<H>  4.4   |  21  |  1.5    Ca    9.6      16 Apr 2022 11:20    TPro  6.7  /  Alb  4.3  /  TBili  0.5  /  DBili  x   /  AST  22  /  ALT  18  /  AlkPhos  77  04-16    LIVER FUNCTIONS - ( 16 Apr 2022 11:20 )  Alb: 4.3 g/dL / Pro: 6.7 g/dL / ALK PHOS: 77 U/L / ALT: 18 U/L / AST: 22 U/L / GGT: x                   RADIOLOGY & ADDITIONAL TESTS:  Personal review of radiological diagnostics performed  Echo and EKG results noted when applicable.     MEDICATIONS:  aMIOdarone    Tablet 200 milliGRAM(s) Oral daily  apixaban 2.5 milliGRAM(s) Oral every 12 hours  chlorhexidine 4% Liquid 1 Application(s) Topical <User Schedule>  cyanocobalamin 1000 MICROGram(s) Oral daily  diazepam    Tablet 5 milliGRAM(s) Oral three times a day PRN  famotidine  Oral Tab/Cap - Peds 40 milliGRAM(s) Oral at bedtime  levothyroxine 25 MICROGram(s) Oral daily  memantine 10 milliGRAM(s) Oral two times a day  sodium chloride 0.9%. 1000 milliLiter(s) IV Continuous <Continuous>  vancomycin  IVPB 1000 milliGRAM(s) IV Intermittent every 24 hours      ANTIBIOTICS:  vancomycin  IVPB 1000 milliGRAM(s) IV Intermittent every 24 hours

## 2022-04-17 NOTE — DISCHARGE NOTE NURSING/CASE MANAGEMENT/SOCIAL WORK - NSDCVIVACCINE_GEN_ALL_CORE_FT
Tdap; 21-Jan-2019 16:42; Esperanza Kenney ([place]); [employer]; [id number] (Exp. Date: 27-Jul-2020); IntraMuscular; Deltoid Right.; 0.5 milliLiter(s); VIS (VIS Published: 09-May-2013, VIS Presented: 21-Jan-2019);

## 2022-04-17 NOTE — DISCHARGE NOTE PROVIDER - CARE PROVIDER_API CALL
Leonel Marte)  Cardiology; Cardiovascular Disease; Internal Medicine  [address] [place] [address]  Phone: [phone]  Fax: [phone]  Follow Up Time: 2 weeks

## 2022-04-17 NOTE — DISCHARGE NOTE NURSING/CASE MANAGEMENT/SOCIAL WORK - NSDCPEFALRISK_GEN_ALL_CORE
For information on Fall & Injury Prevention, visit: [email or web address] OR  [email or web address] OR  https://www.cdc.gov/steadi/patient.html

## 2022-04-17 NOTE — CONSULT NOTE ADULT - EXTREMITIES COMMENTS
L forearm medially : erythema, no palpable, non tender, no opening. L wrist non tender, movement unremarkable

## 2022-04-17 NOTE — DISCHARGE NOTE PROVIDER - HOSPITAL COURSE
87yo F with PMH of HTN, HLD, paroxysmal a-fib, mitral valve regurgitation, hypothyroidism, GERD, SLE, anxiety, dementia presenting with left forearm and wrist worsening cellulitis. Patient has been seen in ED one day PTA and was discharged on clindamycin 300 mg Q6H and cephalexin 500mg Q6H, but the erythema has extended up to the elbow. Pt reports pain 7/10 now, but was 10/10 yesterday. Pt took tylenol with improvement in pain.   No fevers/trauma.    # Left forearm cellulitis -resolving  - No abscess. No thrombophlebitis. No inflammatory synovitis  - no sepsis POA  -Sedimentation Rate, Erythrocyte: 30 mm/Hr (04.17.22 @ 07:58)  - S/p Ancef  - ID eval: Po Doxycycline 100 mg q12h ( taken sitting/standing upright to avoid esophagitis ) for 8 more days    # Acute kidney injury, prerenal  on CKD stage2-resolving  - S/p IV fluids    # Hypomagnesemia  - repleted with  mg    #Paroxysmal Atrial Fibrillation - in SR  - c/w eliquis, amiodraone  - follows with Dr. Marte    #Hypothyroidism  - c/w synthroid    # H/o Anxiety   - c/w home meds    #GERD  - c/w famotidine    #Dementia  - c/w namenda

## 2022-04-17 NOTE — DISCHARGE NOTE PROVIDER - NSDCCPCAREPLAN_GEN_ALL_CORE_FT
PRINCIPAL DISCHARGE DIAGNOSIS  Diagnosis: Cellulitis  Assessment and Plan of Treatment: # Left forearm cellulitis-resolving  - No abscess. No thrombophlebitis. No inflammatory synovitis  - no sepsis POA  -Sedimentation Rate, Erythrocyte: 30 mm/Hr (04.17.22 @ 07:58)  - S/p Ancef  - ID eval: Po Doxycycline 100 mg q12h ( taken sitting/standing upright to avoid esophagitis ) for 8 more days

## 2022-04-17 NOTE — DISCHARGE NOTE PROVIDER - NSDCFUSCHEDAPPT_GEN_ALL_CORE_FT
MU NOEL ; 05/13/2022 ; [facility] Cardio [address] [facility] ; 05/19/2022 ; [facility] Neuro [address] [facility] Brown ; 06/14/2022 ; NPP Cardio [address]

## 2022-04-17 NOTE — PROGRESS NOTE ADULT - SUBJECTIVE AND OBJECTIVE BOX
MU NOEL 86y Female  MRN#: [id number]   CODE STATUS: Full  Hospital Day: 1d  Pt is currently admitted with the primary diagnosis of: LUE Cellulitis    SUBJECTIVE  Overnight events: None reported.  Subjective complaints: Denies LUE pain.                                          ----------------------------------------------------------  OBJECTIVE  PAST MEDICAL & SURGICAL HISTORY  Hypothyroid    UTI (urinary tract infection)    Atrial fibrillation, unspecified type  no longer in atrial fifrillation    Anxiety    Mild intermittent asthma, unspecified whether complicated  no longer present    Cellulitis of finger, unspecified laterality  resolved    Diverticulitis  resolved    Reflux gastritis    Mitral valve insufficiency, unspecified etiology    Idiopathic pericarditis, unspecified chronicity    History of appendectomy    Status post placement of implantable loop recorder                                          -----------------------------------------------------------  ALLERGIES:  sulfa drugs (Unknown)                                          ------------------------------------------------------------  HOME MEDICATIONS  Home Medications:  amiodarone 200 mg oral tablet: 1 tab(s) orally once a day (04 Nov 2021 14:42)  Calcium 600+D 600 mg-5 mcg (200 intl units) oral tablet: 1 tab(s) orally once a day (16 Apr 2022 16:00)  Colace 100 mg oral capsule: 1 cap(s) orally 2 times a day (04 Nov 2021 14:30)  diazePAM 5 mg oral tablet: 1 tab(s) orally 3 times a day, As Needed (25 Jan 2019 10:35)  Eliquis 2.5 mg oral tablet: 1 tab(s) orally 2 times a day (25 Jan 2019 10:35)  famotidine 40 mg oral tablet: 1 tab(s) orally once a day (at bedtime) (04 Nov 2021 14:30)  levothyroxine 25 mcg (0.025 mg) oral tablet: 1 tab(s) orally once a day (25 Jan 2019 10:35)  memantine 10 mg oral tablet: 1 tab(s) orally 2 times a day (25 Jan 2019 10:35)  Restasis 0.05% ophthalmic emulsion: 1 drop(s) to each affected eye every 12 hours (04 Nov 2021 14:30)  Vitamin B12 50 mcg oral tablet: 1 tab(s) orally once a day (16 Apr 2022 15:58)                         MEDICATIONS:  STANDING MEDICATIONS  aMIOdarone    Tablet 200 milliGRAM(s) Oral daily  apixaban 2.5 milliGRAM(s) Oral every 12 hours  chlorhexidine 4% Liquid 1 Application(s) Topical <User Schedule>  cyanocobalamin 1000 MICROGram(s) Oral daily  famotidine  Oral Tab/Cap - Peds 40 milliGRAM(s) Oral at bedtime  levothyroxine 25 MICROGram(s) Oral daily  memantine 10 milliGRAM(s) Oral two times a day  sodium chloride 0.9%. 1000 milliLiter(s) IV Continuous <Continuous>    PRN MEDICATIONS  diazepam    Tablet 5 milliGRAM(s) Oral three times a day PRN                                          ------------------------------------------------------------  VITAL SIGNS: Last 24 Hours  T(C): 36.8 (16 Apr 2022 16:38), Max: 36.8 (16 Apr 2022 16:38)  T(F): 98.3 (16 Apr 2022 16:38), Max: 98.3 (16 Apr 2022 16:38)  HR: 77 (16 Apr 2022 16:38) (77 - 85)  BP: 150/71 (16 Apr 2022 16:38) (150/71 - 160/74)  BP(mean): --  RR: 18 (16 Apr 2022 16:38) (18 - 18)  SpO2: 98% (16 Apr 2022 10:33) (98% - 98%)                                         --------------------------------------------------------------  LABS:             12.8   7.76  )-----------( 226      ( 16 Apr 2022 11:20 )             38.2     04-16    140  |  105  |  28<H>  ----------------------------<  109<H>  4.4   |  21  |  1.5    Ca    9.6      16 Apr 2022 11:20    TPro  6.7  /  Alb  4.3  /  TBili  0.5  /  DBili  x   /  AST  22  /  ALT  18  /  AlkPhos  77  04-16                                          -------------------------------------------------------------  RADIOLOGY:   < from: Xray Finger, Right Hand (04.16.22 @ 12:54) >  Findings/impression: No acute fracture or dislocation. Phalangeal and carpal degenerative changes. Unchanged joint body in the region of the basal joint  < end of copied text >    < from: Xray Elbow AP + Lateral, Left (04.15.22 @ 15:55) >  Impression: Osteopenia without evidence of an acute fracture.  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 Osteopenia.  < end of copied text >                                          --------------------------------------------------------------  PHYSICAL EXAM:  General: no acute distress  HEENT: atraumatic  LUNGS: clear to auscultation bilaterally, no wheezes noted  HEART: regular rate/rhythm, no murmurs/gallops  ABDOMEN: soft, nontender, nondistended, normoactive bowel sounds  EXT: 2+ radial pulses, no edema noted  NEURO: proper affect  SKIN: intact, no new lesions noted                                         --------------------------------------------------------------  ASSESSMENT & PLAN  Past medical history and hospital course:  Patient is a 85yo F with PMH of HTN, HLD, paroxysmal a-fib, mitral valve regurgitation, hypothyroidism, GERD, SLE, anxiety, dementia presenting with left forearm and wrist worsening cellulitis. Patient has been seen in ED yesterday and was discharged on clindamycin 300 mg Q6H and cephalexin 500mg Q6H, but the erythema has extended up to the elbow. Pt reports pain 7/10 now, but was 10/10 yesterday. Pt took tylenol with improvement in pain.     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 - baseline creatinine ~1.2 per chart review, monitor  - running NS @75cc/h for 12h    #Paroxysmal Atrial Fibrillation - rate is controlled, on Eliquis 2.5mg PO BID, Amiodraone 200mg PO daily  - follows with Dr. Marte    #HTN/HLD - no home BP meds? last BP better, monitor  #Hypothyroidism - on Levothyroxine 25ug PO daily  #Anxiety - on Valium 5mg PO TID PRN  #Mild Intermittent Asthma - not in exacerbation, monitor  #Chronic Back Pain - follows Dr. Matta (pain management), monitor, s/p Epidural injection  #GERD - on Famotidine 40mg PO @bedtime  #Dementia - on Memantine 10mg PO BID                                                                            ----------------------------------------------------  # DVT prophylaxis: Eliquis 2.5mg PO BID  # GI prophylaxis: Famotidine 40mg PO @bedtime  # Diet: DASH/TLC  # Activity: Ambulate as Tolerated  # Code status: Full  # Disposition: Remain on Floor                                                                           --------------------------------------------------------  # Handoff: f/u ID

## 2022-04-17 NOTE — DISCHARGE NOTE PROVIDER - CARE PROVIDERS DIRECT ADDRESSES
[email or web address]

## 2022-04-17 NOTE — DISCHARGE NOTE NURSING/CASE MANAGEMENT/SOCIAL WORK - PATIENT PORTAL LINK FT
You can access the FollowMyHealth Patient Portal offered by [facility] by registering at the following website: [email or web address]. By joining [employer]’s FollowMyHealth portal, you will also be able to view your health information using other applications (apps) compatible with our system.

## 2022-04-18 DIAGNOSIS — Z02.9 ENCOUNTER FOR ADMINISTRATIVE EXAMINATIONS, UNSPECIFIED: ICD-10-CM

## 2022-04-18 LAB
CRP SERPL-MCNC: 14 MG/L — HIGH
PROCALCITONIN SERPL-MCNC: 0.06 NG/ML — SIGNIFICANT CHANGE UP (ref 0.02–0.1)

## 2022-04-20 PROBLEM — I45.81 LONG QT SYNDROME: Status: ACTIVE | Noted: 2020-09-22

## 2022-04-21 LAB
CULTURE RESULTS: SIGNIFICANT CHANGE UP
CULTURE RESULTS: SIGNIFICANT CHANGE UP
SPECIMEN SOURCE: SIGNIFICANT CHANGE UP
SPECIMEN SOURCE: SIGNIFICANT CHANGE UP

## 2022-04-27 ENCOUNTER — OUTPATIENT (OUTPATIENT)
Dept: OUTPATIENT SERVICES | Facility: HOSPITAL | Age: 87
LOS: 1 days | Discharge: HOME | End: 2022-04-27
Payer: MEDICARE

## 2022-04-27 DIAGNOSIS — I34.0 NONRHEUMATIC MITRAL (VALVE) INSUFFICIENCY: ICD-10-CM

## 2022-04-27 DIAGNOSIS — Z95.818 PRESENCE OF OTHER CARDIAC IMPLANTS AND GRAFTS: Chronic | ICD-10-CM

## 2022-04-27 DIAGNOSIS — G31.84 MILD COGNITIVE IMPAIRMENT OF UNCERTAIN OR UNKNOWN ETIOLOGY: ICD-10-CM

## 2022-04-27 DIAGNOSIS — Z90.49 ACQUIRED ABSENCE OF OTHER SPECIFIED PARTS OF DIGESTIVE TRACT: Chronic | ICD-10-CM

## 2022-04-27 PROCEDURE — 76700 US EXAM ABDOM COMPLETE: CPT | Mod: 26

## 2022-05-02 DIAGNOSIS — I48.0 PAROXYSMAL ATRIAL FIBRILLATION: ICD-10-CM

## 2022-05-02 DIAGNOSIS — N17.9 ACUTE KIDNEY FAILURE, UNSPECIFIED: ICD-10-CM

## 2022-05-02 DIAGNOSIS — K21.9 GASTRO-ESOPHAGEAL REFLUX DISEASE WITHOUT ESOPHAGITIS: ICD-10-CM

## 2022-05-02 DIAGNOSIS — J45.20 MILD INTERMITTENT ASTHMA, UNCOMPLICATED: ICD-10-CM

## 2022-05-02 DIAGNOSIS — G89.29 OTHER CHRONIC PAIN: ICD-10-CM

## 2022-05-02 DIAGNOSIS — F41.9 ANXIETY DISORDER, UNSPECIFIED: ICD-10-CM

## 2022-05-02 DIAGNOSIS — Z87.440 PERSONAL HISTORY OF URINARY (TRACT) INFECTIONS: ICD-10-CM

## 2022-05-02 DIAGNOSIS — L03.114 CELLULITIS OF LEFT UPPER LIMB: ICD-10-CM

## 2022-05-02 DIAGNOSIS — M54.9 DORSALGIA, UNSPECIFIED: ICD-10-CM

## 2022-05-02 DIAGNOSIS — E83.42 HYPOMAGNESEMIA: ICD-10-CM

## 2022-05-02 DIAGNOSIS — E03.9 HYPOTHYROIDISM, UNSPECIFIED: ICD-10-CM

## 2022-05-02 DIAGNOSIS — I12.9 HYPERTENSIVE CHRONIC KIDNEY DISEASE WITH STAGE 1 THROUGH STAGE 4 CHRONIC KIDNEY DISEASE, OR UNSPECIFIED CHRONIC KIDNEY DISEASE: ICD-10-CM

## 2022-05-02 DIAGNOSIS — N18.2 CHRONIC KIDNEY DISEASE, STAGE 2 (MILD): ICD-10-CM

## 2022-05-02 DIAGNOSIS — M32.9 SYSTEMIC LUPUS ERYTHEMATOSUS, UNSPECIFIED: ICD-10-CM

## 2022-05-02 DIAGNOSIS — Z79.2 LONG TERM (CURRENT) USE OF ANTIBIOTICS: ICD-10-CM

## 2022-05-02 DIAGNOSIS — Z87.19 PERSONAL HISTORY OF OTHER DISEASES OF THE DIGESTIVE SYSTEM: ICD-10-CM

## 2022-05-02 DIAGNOSIS — Z79.01 LONG TERM (CURRENT) USE OF ANTICOAGULANTS: ICD-10-CM

## 2022-05-02 DIAGNOSIS — F03.90 UNSPECIFIED DEMENTIA WITHOUT BEHAVIORAL DISTURBANCE: ICD-10-CM

## 2022-05-13 ENCOUNTER — NON-APPOINTMENT (OUTPATIENT)
Age: 87
End: 2022-05-13

## 2022-05-13 ENCOUNTER — APPOINTMENT (OUTPATIENT)
Dept: CARDIOLOGY | Facility: CLINIC | Age: 87
End: 2022-05-13
Payer: MEDICARE

## 2022-05-13 PROCEDURE — G2066: CPT

## 2022-05-13 PROCEDURE — 93298 REM INTERROG DEV EVAL SCRMS: CPT

## 2022-05-18 ENCOUNTER — FOLLOW UP (OUTPATIENT)
Dept: URBAN - METROPOLITAN AREA CLINIC 21 | Facility: CLINIC | Age: 87
End: 2022-05-18

## 2022-05-18 ENCOUNTER — EMERGENCY (EMERGENCY)
Facility: HOSPITAL | Age: 87
LOS: 0 days | Discharge: HOME | End: 2022-05-18
Attending: STUDENT IN AN ORGANIZED HEALTH CARE EDUCATION/TRAINING PROGRAM | Admitting: STUDENT IN AN ORGANIZED HEALTH CARE EDUCATION/TRAINING PROGRAM
Payer: MEDICARE

## 2022-05-18 VITALS
OXYGEN SATURATION: 97 % | HEART RATE: 79 BPM | HEIGHT: 66 IN | RESPIRATION RATE: 16 BRPM | SYSTOLIC BLOOD PRESSURE: 156 MMHG | DIASTOLIC BLOOD PRESSURE: 87 MMHG | TEMPERATURE: 97 F

## 2022-05-18 DIAGNOSIS — Z79.01 LONG TERM (CURRENT) USE OF ANTICOAGULANTS: ICD-10-CM

## 2022-05-18 DIAGNOSIS — Z90.49 ACQUIRED ABSENCE OF OTHER SPECIFIED PARTS OF DIGESTIVE TRACT: Chronic | ICD-10-CM

## 2022-05-18 DIAGNOSIS — M79.89 OTHER SPECIFIED SOFT TISSUE DISORDERS: ICD-10-CM

## 2022-05-18 DIAGNOSIS — M25.571 PAIN IN RIGHT ANKLE AND JOINTS OF RIGHT FOOT: ICD-10-CM

## 2022-05-18 DIAGNOSIS — F03.90 UNSPECIFIED DEMENTIA, UNSPECIFIED SEVERITY, WITHOUT BEHAVIORAL DISTURBANCE, PSYCHOTIC DISTURBANCE, MOOD DISTURBANCE, AND ANXIETY: ICD-10-CM

## 2022-05-18 DIAGNOSIS — I48.91 UNSPECIFIED ATRIAL FIBRILLATION: ICD-10-CM

## 2022-05-18 DIAGNOSIS — Z95.818 PRESENCE OF OTHER CARDIAC IMPLANTS AND GRAFTS: ICD-10-CM

## 2022-05-18 DIAGNOSIS — H17.812: ICD-10-CM

## 2022-05-18 DIAGNOSIS — Z90.49 ACQUIRED ABSENCE OF OTHER SPECIFIED PARTS OF DIGESTIVE TRACT: ICD-10-CM

## 2022-05-18 DIAGNOSIS — L53.9 ERYTHEMATOUS CONDITION, UNSPECIFIED: ICD-10-CM

## 2022-05-18 DIAGNOSIS — L03.115 CELLULITIS OF RIGHT LOWER LIMB: ICD-10-CM

## 2022-05-18 DIAGNOSIS — H35.723: ICD-10-CM

## 2022-05-18 DIAGNOSIS — Z86.79 PERSONAL HISTORY OF OTHER DISEASES OF THE CIRCULATORY SYSTEM: ICD-10-CM

## 2022-05-18 DIAGNOSIS — I10 ESSENTIAL (PRIMARY) HYPERTENSION: ICD-10-CM

## 2022-05-18 DIAGNOSIS — Z87.440 PERSONAL HISTORY OF URINARY (TRACT) INFECTIONS: ICD-10-CM

## 2022-05-18 DIAGNOSIS — H16.142: ICD-10-CM

## 2022-05-18 DIAGNOSIS — H16.232: ICD-10-CM

## 2022-05-18 DIAGNOSIS — J45.909 UNSPECIFIED ASTHMA, UNCOMPLICATED: ICD-10-CM

## 2022-05-18 DIAGNOSIS — E75.5 OTHER LIPID STORAGE DISORDERS: ICD-10-CM

## 2022-05-18 DIAGNOSIS — K21.9 GASTRO-ESOPHAGEAL REFLUX DISEASE WITHOUT ESOPHAGITIS: ICD-10-CM

## 2022-05-18 DIAGNOSIS — Z95.818 PRESENCE OF OTHER CARDIAC IMPLANTS AND GRAFTS: Chronic | ICD-10-CM

## 2022-05-18 DIAGNOSIS — F41.9 ANXIETY DISORDER, UNSPECIFIED: ICD-10-CM

## 2022-05-18 DIAGNOSIS — H25.811: ICD-10-CM

## 2022-05-18 DIAGNOSIS — E03.9 HYPOTHYROIDISM, UNSPECIFIED: ICD-10-CM

## 2022-05-18 PROCEDURE — 92012 INTRM OPH EXAM EST PATIENT: CPT

## 2022-05-18 PROCEDURE — 73610 X-RAY EXAM OF ANKLE: CPT | Mod: 26,RT

## 2022-05-18 PROCEDURE — 99283 EMERGENCY DEPT VISIT LOW MDM: CPT | Mod: FS

## 2022-05-18 ASSESSMENT — VISUAL ACUITY
OD_CC: 20/30
OS_CC: 20/100
OS_PH: 20/50

## 2022-05-18 ASSESSMENT — TONOMETRY
OS_IOP_MMHG: 10
OD_IOP_MMHG: 12

## 2022-05-18 NOTE — ED PROVIDER NOTE - ATTENDING APP SHARED VISIT CONTRIBUTION OF CARE
85 yo F pmhx afib on eliquis, dementia, hypothyroidism, htn, hld presenting to the ED for evaluation of R ankle pain, redness and swelling since last night. states this feels similar to her previous cellulitis to the arm. unsure of any direct trauma. ambulating without issue. denies fevers.    CONSTITUTIONAL: Well-developed; well-nourished; in no acute distress.   SKIN: warm, dry  HEAD: Normocephalic; atraumatic.  EYES: EOMI, no conjunctival erythema  EXT: Normal ROM.  approx 3 x 3 cm area of erythema to the right lateral malleolus with central scabbing, no purulent discharge or crepitus. no significant bony tenderness.   NEURO: Alert, grossly unremarkable  PSYCH: Cooperative, appropriate.    plan for xray given cellulitis overlying the bone.  otherwise, discharge with abx.

## 2022-05-18 NOTE — ED ADULT NURSE NOTE - NSIMPLEMENTINTERV_GEN_ALL_ED
Implemented All Fall Risk Interventions:  [place] to call system. Call bell, personal items and telephone within reach. Instruct patient to call for assistance. Room bathroom lighting operational. Non-slip footwear when patient is off stretcher. Physically safe environment: no spills, clutter or unnecessary equipment. Stretcher in lowest position, wheels locked, appropriate side rails in place. Provide visual cue, wrist band, yellow gown, etc. Monitor gait and stability. Monitor for mental status changes and reorient to person, place, and time. Review medications for side effects contributing to fall risk. Reinforce activity limits and safety measures with patient and family.

## 2022-05-18 NOTE — ED PROVIDER NOTE - PATIENT PORTAL LINK FT
You can access the FollowMyHealth Patient Portal offered by [facility] by registering at the following website: [email or web address]. By joining [employer]’s FollowMyHealth portal, you will also be able to view your health information using other applications (apps) compatible with our system.

## 2022-05-18 NOTE — ED PROVIDER NOTE - OBJECTIVE STATEMENT
87 yo F pmhx afib on eliquis, dementia, hypothyroidism, htn, hld presenting to the ED for evaluation of R ankle pain, redness and swelling since last night. Pt daughter at bedside, states she does not think the patient had any trauma to the area, reports 1 month ago pt developed a cellulitis to forearm resolved with antibiotics. Pt reports pain is mild, constant, worse with touch. Denies fever, chills, numbness/tingling, weakness.

## 2022-05-18 NOTE — ED PROVIDER NOTE - CLINICAL SUMMARY MEDICAL DECISION MAKING FREE TEXT BOX
85 yo F w/ cellulitis to the right lateral malleolus.   Imaging reviewed without osteomyelitis.  discharged with abx.   strict return precautions discussed with patient and friend bedside.

## 2022-05-18 NOTE — ED PROVIDER NOTE - PHYSICAL EXAMINATION
GENERAL: Well-nourished, Well-developed. NAD.  HEAD: No visible or palpable bumps or hematomas. No ecchymosis behind ears B/L.  Eyes: PERRLA, EOMI. No asymmetry. No nystagmus. No conjunctival injection. Non-icteric sclera.  Neck: Supple. FROM  CVS: RRR  MSK: FROM R ankle and foot. (+) mild ttp R lateral malleolar region with swelling and erythema.   Skin: (+)mild localized small area of warmth and erythema to R ankle, lateral malleolar region with small scab. no palpable abscess or crepitus.   EXT: Radial and pedal pulses present B/L. No calf tenderness or swelling B/L. No palpable cords. No pedal edema B/L.

## 2022-05-18 NOTE — ED PROVIDER NOTE - NSFOLLOWUPINSTRUCTIONS_ED_ALL_ED_FT
**follow up with your primary care doctor within 1-3 days, take antibiotic as prescribed**    Cellulitis    Cellulitis is a skin infection caused by bacteria. This condition occurs most often in the arms and lower legs but can occur anywhere over the body. Symptoms include redness, swelling, warm skin, tenderness, and chills/fever. If you were prescribed an antibiotic medicine, take it as told by your health care provider. Do not stop taking the antibiotic even if you start to feel better.    SEEK IMMEDIATE MEDICAL CARE IF YOU HAVE THE FOLLOWING SYMPTOMS: worsening fever, red streaks coming from affected area, vomiting or diarrhea, or dizziness/lightheadedness.

## 2022-05-26 ENCOUNTER — APPOINTMENT (OUTPATIENT)
Dept: BURN CARE | Facility: CLINIC | Age: 87
End: 2022-05-26
Payer: MEDICARE

## 2022-05-26 ENCOUNTER — OUTPATIENT (OUTPATIENT)
Dept: OUTPATIENT SERVICES | Facility: HOSPITAL | Age: 87
LOS: 1 days | Discharge: HOME | End: 2022-05-26

## 2022-05-26 DIAGNOSIS — Z90.49 ACQUIRED ABSENCE OF OTHER SPECIFIED PARTS OF DIGESTIVE TRACT: Chronic | ICD-10-CM

## 2022-05-26 DIAGNOSIS — Z95.818 PRESENCE OF OTHER CARDIAC IMPLANTS AND GRAFTS: Chronic | ICD-10-CM

## 2022-05-26 PROCEDURE — 97597 DBRDMT OPN WND 1ST 20 CM/<: CPT

## 2022-05-29 LAB — BACTERIA SPEC CULT: ABNORMAL

## 2022-06-01 LAB
ALBUMIN SERPL ELPH-MCNC: 4.4 G/DL
ALP BLD-CCNC: 73 U/L
ALT SERPL-CCNC: 12 U/L
ANION GAP SERPL CALC-SCNC: 15 MMOL/L
AST SERPL-CCNC: 18 U/L
BASOPHILS # BLD AUTO: 0.04 K/UL
BASOPHILS NFR BLD AUTO: 0.6 %
BILIRUB SERPL-MCNC: 0.5 MG/DL
BUN SERPL-MCNC: 30 MG/DL
CALCIUM SERPL-MCNC: 9.6 MG/DL
CHLORIDE SERPL-SCNC: 103 MMOL/L
CHOLEST SERPL-MCNC: 177 MG/DL
CO2 SERPL-SCNC: 21 MMOL/L
CREAT SERPL-MCNC: 1.6 MG/DL
EGFR: 31 ML/MIN/1.73M2
EOSINOPHIL # BLD AUTO: 0.08 K/UL
EOSINOPHIL NFR BLD AUTO: 1.2 %
ESTIMATED AVERAGE GLUCOSE: 108 MG/DL
GLUCOSE SERPL-MCNC: 71 MG/DL
HBA1C MFR BLD HPLC: 5.4 %
HCT VFR BLD CALC: 41.1 %
HDLC SERPL-MCNC: 88 MG/DL
HGB BLD-MCNC: 13.2 G/DL
IMM GRANULOCYTES NFR BLD AUTO: 0.3 %
LDLC SERPL CALC-MCNC: 68 MG/DL
LYMPHOCYTES # BLD AUTO: 1.77 K/UL
LYMPHOCYTES NFR BLD AUTO: 27.4 %
MAN DIFF?: NORMAL
MCHC RBC-ENTMCNC: 30.4 PG
MCHC RBC-ENTMCNC: 32.1 G/DL
MCV RBC AUTO: 94.7 FL
MONOCYTES # BLD AUTO: 0.36 K/UL
MONOCYTES NFR BLD AUTO: 5.6 %
NEUTROPHILS # BLD AUTO: 4.18 K/UL
NEUTROPHILS NFR BLD AUTO: 64.9 %
NONHDLC SERPL-MCNC: 89 MG/DL
PLATELET # BLD AUTO: 240 K/UL
POTASSIUM SERPL-SCNC: 4.4 MMOL/L
PROT SERPL-MCNC: 6.9 G/DL
RBC # BLD: 4.34 M/UL
RBC # FLD: 12.9 %
SODIUM SERPL-SCNC: 139 MMOL/L
T4 FREE SERPL-MCNC: 1.4 NG/DL
TRIGL SERPL-MCNC: 107 MG/DL
TSH SERPL-ACNC: 6.05 UIU/ML
WBC # FLD AUTO: 6.45 K/UL

## 2022-06-09 ENCOUNTER — OUTPATIENT (OUTPATIENT)
Dept: OUTPATIENT SERVICES | Facility: HOSPITAL | Age: 87
LOS: 1 days | Discharge: HOME | End: 2022-06-09

## 2022-06-09 ENCOUNTER — APPOINTMENT (OUTPATIENT)
Dept: BURN CARE | Facility: CLINIC | Age: 87
End: 2022-06-09
Payer: MEDICARE

## 2022-06-09 ENCOUNTER — INPATIENT (INPATIENT)
Facility: HOSPITAL | Age: 87
LOS: 1 days | Discharge: HOME | End: 2022-06-11
Attending: PLASTIC SURGERY | Admitting: PLASTIC SURGERY
Payer: MEDICARE

## 2022-06-09 VITALS
SYSTOLIC BLOOD PRESSURE: 126 MMHG | OXYGEN SATURATION: 100 % | HEART RATE: 67 BPM | RESPIRATION RATE: 15 BRPM | DIASTOLIC BLOOD PRESSURE: 60 MMHG | TEMPERATURE: 98 F | HEIGHT: 66 IN | WEIGHT: 130.07 LBS

## 2022-06-09 DIAGNOSIS — Z90.49 ACQUIRED ABSENCE OF OTHER SPECIFIED PARTS OF DIGESTIVE TRACT: Chronic | ICD-10-CM

## 2022-06-09 DIAGNOSIS — Z95.818 PRESENCE OF OTHER CARDIAC IMPLANTS AND GRAFTS: Chronic | ICD-10-CM

## 2022-06-09 LAB
ALBUMIN SERPL ELPH-MCNC: 4.2 G/DL — SIGNIFICANT CHANGE UP (ref 3.5–5.2)
ALP SERPL-CCNC: 82 U/L — SIGNIFICANT CHANGE UP (ref 30–115)
ALT FLD-CCNC: 11 U/L — SIGNIFICANT CHANGE UP (ref 0–41)
ANION GAP SERPL CALC-SCNC: 15 MMOL/L — HIGH (ref 7–14)
AST SERPL-CCNC: 16 U/L — SIGNIFICANT CHANGE UP (ref 0–41)
BASOPHILS # BLD AUTO: 0.04 K/UL — SIGNIFICANT CHANGE UP (ref 0–0.2)
BASOPHILS NFR BLD AUTO: 0.5 % — SIGNIFICANT CHANGE UP (ref 0–1)
BILIRUB SERPL-MCNC: 0.4 MG/DL — SIGNIFICANT CHANGE UP (ref 0.2–1.2)
BUN SERPL-MCNC: 29 MG/DL — HIGH (ref 10–20)
CALCIUM SERPL-MCNC: 9.8 MG/DL — SIGNIFICANT CHANGE UP (ref 8.5–10.1)
CHLORIDE SERPL-SCNC: 102 MMOL/L — SIGNIFICANT CHANGE UP (ref 98–110)
CO2 SERPL-SCNC: 21 MMOL/L — SIGNIFICANT CHANGE UP (ref 17–32)
CREAT SERPL-MCNC: 1.5 MG/DL — SIGNIFICANT CHANGE UP (ref 0.7–1.5)
EGFR: 34 ML/MIN/1.73M2 — LOW
EOSINOPHIL # BLD AUTO: 0.19 K/UL — SIGNIFICANT CHANGE UP (ref 0–0.7)
EOSINOPHIL NFR BLD AUTO: 2.5 % — SIGNIFICANT CHANGE UP (ref 0–8)
GLUCOSE SERPL-MCNC: 87 MG/DL — SIGNIFICANT CHANGE UP (ref 70–99)
HCT VFR BLD CALC: 38 % — SIGNIFICANT CHANGE UP (ref 37–47)
HGB BLD-MCNC: 12.6 G/DL — SIGNIFICANT CHANGE UP (ref 12–16)
IMM GRANULOCYTES NFR BLD AUTO: 0.3 % — SIGNIFICANT CHANGE UP (ref 0.1–0.3)
LACTATE SERPL-SCNC: 1.5 MMOL/L — SIGNIFICANT CHANGE UP (ref 0.7–2)
LYMPHOCYTES # BLD AUTO: 1.84 K/UL — SIGNIFICANT CHANGE UP (ref 1.2–3.4)
LYMPHOCYTES # BLD AUTO: 24.5 % — SIGNIFICANT CHANGE UP (ref 20.5–51.1)
MCHC RBC-ENTMCNC: 30.3 PG — SIGNIFICANT CHANGE UP (ref 27–31)
MCHC RBC-ENTMCNC: 33.2 G/DL — SIGNIFICANT CHANGE UP (ref 32–37)
MCV RBC AUTO: 91.3 FL — SIGNIFICANT CHANGE UP (ref 81–99)
MONOCYTES # BLD AUTO: 0.46 K/UL — SIGNIFICANT CHANGE UP (ref 0.1–0.6)
MONOCYTES NFR BLD AUTO: 6.1 % — SIGNIFICANT CHANGE UP (ref 1.7–9.3)
NEUTROPHILS # BLD AUTO: 4.95 K/UL — SIGNIFICANT CHANGE UP (ref 1.4–6.5)
NEUTROPHILS NFR BLD AUTO: 66.1 % — SIGNIFICANT CHANGE UP (ref 42.2–75.2)
NRBC # BLD: 0 /100 WBCS — SIGNIFICANT CHANGE UP (ref 0–0)
PLATELET # BLD AUTO: 241 K/UL — SIGNIFICANT CHANGE UP (ref 130–400)
POTASSIUM SERPL-MCNC: 4.8 MMOL/L — SIGNIFICANT CHANGE UP (ref 3.5–5)
POTASSIUM SERPL-SCNC: 4.8 MMOL/L — SIGNIFICANT CHANGE UP (ref 3.5–5)
PROT SERPL-MCNC: 7 G/DL — SIGNIFICANT CHANGE UP (ref 6–8)
RBC # BLD: 4.16 M/UL — LOW (ref 4.2–5.4)
RBC # FLD: 13.1 % — SIGNIFICANT CHANGE UP (ref 11.5–14.5)
SARS-COV-2 RNA SPEC QL NAA+PROBE: SIGNIFICANT CHANGE UP
SODIUM SERPL-SCNC: 138 MMOL/L — SIGNIFICANT CHANGE UP (ref 135–146)
WBC # BLD: 7.5 K/UL — SIGNIFICANT CHANGE UP (ref 4.8–10.8)
WBC # FLD AUTO: 7.5 K/UL — SIGNIFICANT CHANGE UP (ref 4.8–10.8)

## 2022-06-09 PROCEDURE — 99285 EMERGENCY DEPT VISIT HI MDM: CPT | Mod: GC

## 2022-06-09 PROCEDURE — 73610 X-RAY EXAM OF ANKLE: CPT | Mod: 26,RT

## 2022-06-09 PROCEDURE — 97597 DBRDMT OPN WND 1ST 20 CM/<: CPT

## 2022-06-09 RX ORDER — VANCOMYCIN HCL 1 G
1000 VIAL (EA) INTRAVENOUS
Refills: 0 | Status: DISCONTINUED | OUTPATIENT
Start: 2022-06-10 | End: 2022-06-10

## 2022-06-09 RX ORDER — SODIUM CHLORIDE 9 MG/ML
1000 INJECTION, SOLUTION INTRAVENOUS
Refills: 0 | Status: DISCONTINUED | OUTPATIENT
Start: 2022-06-09 | End: 2022-06-10

## 2022-06-09 RX ORDER — PREGABALIN 225 MG/1
1000 CAPSULE ORAL DAILY
Refills: 0 | Status: DISCONTINUED | OUTPATIENT
Start: 2022-06-09 | End: 2022-06-11

## 2022-06-09 RX ORDER — VANCOMYCIN HCL 1 G
1000 VIAL (EA) INTRAVENOUS EVERY 12 HOURS
Refills: 0 | Status: DISCONTINUED | OUTPATIENT
Start: 2022-06-09 | End: 2022-06-09

## 2022-06-09 RX ORDER — MULTIVIT-MIN/FERROUS GLUCONATE 9 MG/15 ML
1 LIQUID (ML) ORAL DAILY
Refills: 0 | Status: DISCONTINUED | OUTPATIENT
Start: 2022-06-09 | End: 2022-06-11

## 2022-06-09 RX ORDER — MORPHINE SULFATE 50 MG/1
2 CAPSULE, EXTENDED RELEASE ORAL
Refills: 0 | Status: DISCONTINUED | OUTPATIENT
Start: 2022-06-09 | End: 2022-06-11

## 2022-06-09 RX ORDER — POLYETHYLENE GLYCOL 3350 17 G/17G
17 POWDER, FOR SOLUTION ORAL DAILY
Refills: 0 | Status: DISCONTINUED | OUTPATIENT
Start: 2022-06-09 | End: 2022-06-11

## 2022-06-09 RX ORDER — APIXABAN 2.5 MG/1
2.5 TABLET, FILM COATED ORAL EVERY 12 HOURS
Refills: 0 | Status: DISCONTINUED | OUTPATIENT
Start: 2022-06-09 | End: 2022-06-11

## 2022-06-09 RX ORDER — MEMANTINE HYDROCHLORIDE 10 MG/1
10 TABLET ORAL
Refills: 0 | Status: DISCONTINUED | OUTPATIENT
Start: 2022-06-09 | End: 2022-06-11

## 2022-06-09 RX ORDER — ACETAMINOPHEN 500 MG
650 TABLET ORAL EVERY 6 HOURS
Refills: 0 | Status: DISCONTINUED | OUTPATIENT
Start: 2022-06-09 | End: 2022-06-11

## 2022-06-09 RX ORDER — LEVOTHYROXINE SODIUM 125 MCG
25 TABLET ORAL DAILY
Refills: 0 | Status: DISCONTINUED | OUTPATIENT
Start: 2022-06-10 | End: 2022-06-11

## 2022-06-09 RX ORDER — BACITRACIN ZINC 500 UNIT/G
1 OINTMENT IN PACKET (EA) TOPICAL
Refills: 0 | Status: DISCONTINUED | OUTPATIENT
Start: 2022-06-09 | End: 2022-06-11

## 2022-06-09 RX ORDER — FAMOTIDINE 10 MG/ML
40 INJECTION INTRAVENOUS DAILY
Refills: 0 | Status: DISCONTINUED | OUTPATIENT
Start: 2022-06-10 | End: 2022-06-11

## 2022-06-09 RX ORDER — SENNA PLUS 8.6 MG/1
2 TABLET ORAL AT BEDTIME
Refills: 0 | Status: DISCONTINUED | OUTPATIENT
Start: 2022-06-09 | End: 2022-06-11

## 2022-06-09 RX ORDER — PREGABALIN 225 MG/1
1 CAPSULE ORAL
Qty: 0 | Refills: 0 | DISCHARGE

## 2022-06-09 RX ORDER — VANCOMYCIN HCL 1 G
1000 VIAL (EA) INTRAVENOUS
Refills: 0 | Status: DISCONTINUED | OUTPATIENT
Start: 2022-06-09 | End: 2022-06-09

## 2022-06-09 RX ORDER — VANCOMYCIN HCL 1 G
1000 VIAL (EA) INTRAVENOUS ONCE
Refills: 0 | Status: COMPLETED | OUTPATIENT
Start: 2022-06-09 | End: 2022-06-09

## 2022-06-09 RX ORDER — ACETAMINOPHEN 500 MG
650 TABLET ORAL ONCE
Refills: 0 | Status: COMPLETED | OUTPATIENT
Start: 2022-06-09 | End: 2022-06-09

## 2022-06-09 RX ORDER — AMIODARONE HYDROCHLORIDE 400 MG/1
200 TABLET ORAL DAILY
Refills: 0 | Status: DISCONTINUED | OUTPATIENT
Start: 2022-06-10 | End: 2022-06-11

## 2022-06-09 RX ADMIN — Medication 1000 MILLIGRAM(S): at 11:55

## 2022-06-09 RX ADMIN — Medication 650 MILLIGRAM(S): at 10:54

## 2022-06-09 RX ADMIN — MEMANTINE HYDROCHLORIDE 10 MILLIGRAM(S): 10 TABLET ORAL at 17:04

## 2022-06-09 RX ADMIN — Medication 1 APPLICATION(S): at 15:00

## 2022-06-09 RX ADMIN — SODIUM CHLORIDE 50 MILLILITER(S): 9 INJECTION, SOLUTION INTRAVENOUS at 17:32

## 2022-06-09 RX ADMIN — Medication 650 MILLIGRAM(S): at 13:05

## 2022-06-09 RX ADMIN — Medication 250 MILLIGRAM(S): at 10:55

## 2022-06-09 RX ADMIN — APIXABAN 2.5 MILLIGRAM(S): 2.5 TABLET, FILM COATED ORAL at 17:04

## 2022-06-09 NOTE — ED PROVIDER NOTE - PHYSICAL EXAMINATION
SKIN: warm, dry  HEAD: Normocephalic  EYES: no conjunctival erythema  ENT: no nasal discharge, airway clear  NECK: full ROM  CARD: regular rate and rhythm  RESP: normal respiratory effort, no wheezes, rales or rhonchi  ABD: soft, non-distended, non-tender  EXT: moving all extremities spontaneously, right lateral malleolus has overlying erythema warmth, no fluctuance   NEURO: alert and oriented, grossly unremarkable  PSYCH: cooperative, appropriate

## 2022-06-09 NOTE — PATIENT PROFILE ADULT - NSPROMEDSADMININFO_GEN_A_NUR
Quality 226: Preventive Care And Screening: Tobacco Use: Screening And Cessation Intervention: Patient screened for tobacco use and is an ex/non-smoker Detail Level: Detailed Quality 130: Documentation Of Current Medications In The Medical Record: Current Medications Documented Quality 431: Preventive Care And Screening: Unhealthy Alcohol Use - Screening: Unhealthy alcohol use screening not performed, reason not otherwise specified Quality 47: Advance Care Plan: Advance Care Planning discussed and documented in the medical record; patient did not wish or was not able to name a surrogate decision maker or provide an advance care plan. no concerns

## 2022-06-09 NOTE — H&P ADULT - ASSESSMENT
Patient is 85 y/o Female with PMH of HTN, HLD, paroxysmal A-fib (on Eliquis 2.5mg bid), mitral valve regurgitation, Asthma, mild intermittent , GERD, hypothyroidism, SLE, anxiety, dementia presenting, seen by Dr Pride in Clinic today for non healing Right ankle wound despite completed course of multiple antibiotics, as outpatient, sent to ED for admission for IV abx.     # R ankle non healing wound:  - admit to BURN service  - f/u labs, R ankle X-Ray  - start Vanco IV -> f/u trough before 4th dose  - consider ID c/s  - dressing with baci/X/K (pt has allergy to sulfa)  -  pain meds prn  - ambulate as tolerate    # Hx HTN, HLD, A-fib:  - monitor vitals  - continue home meds: Amiodarone 200mg daily, and Eliquis 2.5mg bid  - f/u with Dr Marte as outpatient     # Asthma, mild intermittent   - no evidence of exacerbation  - monitor O2sat  - nebs prn    # GERD:  - on Famotidine 40mg daily    # Dementia  - c/w [id number] mg BID    #Misc  - DVT Prophylaxis: on apixaban for a-fib  - GI Prophylaxis: famotidine  - Diet: DASH/TLC  - Activity: ambulate as tolerate  - Code Status: Full code

## 2022-06-09 NOTE — ED PROVIDER NOTE - OBJECTIVE STATEMENT
86 female with past medical history of A. fib, MV insufficiency, hypothyroidism, who was sent in from Dr. Pride's clinic for admission.  She has had right ankle cellulitis for the past 3 to 4 weeks and has failed outpatient oral antibiotics.  Denies fever chills nausea vomiting.  She just has pain at the right ankle.
Patient has no objection to blood transfusions.

## 2022-06-09 NOTE — H&P ADULT - NSHPLABSRESULTS_GEN_ALL_CORE
LABS:      CBC Full  -  ( 09 Jun 2022 10:54 )  WBC Count : 7.50 K/uL  RBC Count : 4.16 M/uL  Hemoglobin : 12.6 g/dL  Hematocrit : 38.0 %  Platelet Count - Automated : 241 K/uL  Mean Cell Volume : 91.3 fL  Mean Cell Hemoglobin : 30.3 pg  Mean Cell Hemoglobin Concentration : 33.2 g/dL  Auto Neutrophil # : 4.95 K/uL  Auto Lymphocyte # : 1.84 K/uL  Auto Monocyte # : 0.46 K/uL  Auto Eosinophil # : 0.19 K/uL  Auto Basophil # : 0.04 K/uL  Auto Neutrophil % : 66.1 %  Auto Lymphocyte % : 24.5 %  Auto Monocyte % : 6.1 %  Auto Eosinophil % : 2.5 %  Auto Basophil % : 0.5 %    06-09    138  |  102  |  29<H>  ----------------------------<  87  4.8   |  21  |  1.5    Ca    9.8      09 Jun 2022 10:54    TPro  7.0  /  Alb  4.2  /  TBili  0.4  /  DBili  x   /  AST  16  /  ALT  11  /  AlkPhos  82  06-09        RADIOLOGY & ADDITIONAL STUDIES (The following images were personally reviewed):

## 2022-06-09 NOTE — PHARMACOTHERAPY INTERVENTION NOTE - COMMENTS
PK calculations: Vanco 1gm q24h  Peak 34   Trough 18.3
I spoke with Burn PA (spectra [id number]) and recommended to adjust dose from vanco 1 gm q12h to q24h bc gfr=34. PA will adjust frequency

## 2022-06-09 NOTE — H&P ADULT - NS ATTEND AMEND GEN_ALL_CORE FT
large dressing change -. right lateral ankle with edema and erythema and pain --. local wound care , intravenous antibiotics

## 2022-06-09 NOTE — ED PROVIDER NOTE - ATTENDING CONTRIBUTION TO CARE
86-year-old woman, history of A. fib, MVI, hypothyroid, mild dementia, was sent in from Dr. Pride's office for admission.  Patient has had a nonhealing wound with associated cellulitis on the right ankle, and has been on p.o. antibiotics without much improvement.  No constitutional symptoms.  Pain is localized to the wound, otherwise exam is unremarkable.  We will consult burn team, give IV antibiotics, admit.

## 2022-06-09 NOTE — PATIENT PROFILE ADULT - FALL HARM RISK - HARM RISK INTERVENTIONS

## 2022-06-09 NOTE — H&P ADULT - NSHPPHYSICALEXAM_GEN_ALL_CORE
PHYSICAL EXAM:  GENERAL: NAD, well-developed, not in distress, cooperative  HEAD:  Atraumatic, Normocephalic  EYES: EOMI, PERRLA, conjunctiva and sclera clear  NECK: Supple,   CHEST/LUNG: Clear bilaterally;   HEART: Regular rate and rhythm;   ABDOMEN: Soft, Nontender, Bowel sounds present  EXTREMITIES:  2+ Peripheral Pulses, Right ankle lateral malleolus dry brown eschar, no drainage, no bleeding, no pus.   NEUROLOGY: Alert, oriented, non-focal  SKIN: Right ankle lateral malleolus dry brown eschar, no drainage, no bleeding, no pus.

## 2022-06-09 NOTE — ED PROVIDER NOTE - NS ED MD TWO NIGHTS YN
Khurramsharlene Ozzy  1974  07/01/20    Chief Complaint   Patient presents with    Otalgia     right ear pain since Saturday        SUBJECTIVE:      Mrs Maribeth Clark is a current patient of Dr Jojo Buckner who presents to the office this afternoon for c/o ongoing right ear pain over the last week. She did reach out to Dr Jojo Buckner previously and was treated for likely eustachian tube dysfunction with Zyrtec and Flonase, however, her symptoms continue to worse. Pain radiates to surrounding outer ear and jaw region. Denies fevers or chills. Review of Systems   Constitutional: Negative for activity change, appetite change, fatigue and fever. HENT: Positive for ear pain. Negative for congestion, hearing loss, nosebleeds, sinus pressure and sinus pain. Respiratory: Negative for apnea, cough, chest tightness and shortness of breath. Cardiovascular: Negative for chest pain and palpitations. Gastrointestinal: Negative for abdominal pain, diarrhea, nausea and vomiting. Genitourinary: Negative for difficulty urinating, flank pain and hematuria. Musculoskeletal: Negative for arthralgias, joint swelling and myalgias. Skin: Negative for color change and rash. Neurological: Negative for dizziness, light-headedness and headaches. Psychiatric/Behavioral: Negative. Negative for behavioral problems. OBJECTIVE:    /80   Pulse 100   Resp 18   SpO2 96%     Physical Exam  Constitutional:       General: She is not in acute distress. Appearance: She is well-developed. She is not diaphoretic. HENT:      Head: Normocephalic and atraumatic. Right Ear: Hearing normal. Swelling and tenderness present. Left Ear: Hearing, tympanic membrane and ear canal normal.      Nose: Nose normal.   Eyes:      Conjunctiva/sclera: Conjunctivae normal.      Pupils: Pupils are equal, round, and reactive to light. Neck:      Musculoskeletal: Normal range of motion and neck supple.  No edema, erythema or muscular Yes

## 2022-06-09 NOTE — H&P ADULT - HISTORY OF PRESENT ILLNESS
Patient is 87 y/o Female with PMH of HTN, HLD, paroxysmal A-fib (on Eliquis 2.5mg bid), mitral valve regurgitation, GERD, hypothyroidism, SLE, anxiety, dementia presenting, seen by Dr Pride in Clinic today for non healing Right ankle wound despite completed course of Clinda, Doxy, as Augmentin as outpatient, sent to ED for admission for IV abx. Pt statesbernard has had right ankle cellulitis for the past 3 to 4 weeks and has failed outpatient oral antibiotics. She complaining of R ankle pain, but no fever, chills, cough, chest pain, abdom pain, nausea, or vomiting.

## 2022-06-09 NOTE — ED PROVIDER NOTE - NS ED ROS FT
Constitutional: No fever   Eyes:  No visual changes  Ears:  No hearing changes  Neck: No neck pain  Cardiac:  No chest pain  Respiratory:  No SOB   GI:  No abdominal pain, nausea, or vomiting  :  No dysuria  MS:  No back pain +right ankle rash and pain  Neuro:  No headache or weakness.  No LOC  Skin:  +skin rash

## 2022-06-10 DIAGNOSIS — Z02.9 ENCOUNTER FOR ADMINISTRATIVE EXAMINATIONS, UNSPECIFIED: ICD-10-CM

## 2022-06-10 LAB
ANION GAP SERPL CALC-SCNC: 14 MMOL/L — SIGNIFICANT CHANGE UP (ref 7–14)
BASOPHILS # BLD AUTO: 0.03 K/UL — SIGNIFICANT CHANGE UP (ref 0–0.2)
BASOPHILS NFR BLD AUTO: 0.5 % — SIGNIFICANT CHANGE UP (ref 0–1)
BUN SERPL-MCNC: 21 MG/DL — HIGH (ref 10–20)
CALCIUM SERPL-MCNC: 9.2 MG/DL — SIGNIFICANT CHANGE UP (ref 8.5–10.1)
CHLORIDE SERPL-SCNC: 104 MMOL/L — SIGNIFICANT CHANGE UP (ref 98–110)
CO2 SERPL-SCNC: 21 MMOL/L — SIGNIFICANT CHANGE UP (ref 17–32)
CREAT SERPL-MCNC: 1.3 MG/DL — SIGNIFICANT CHANGE UP (ref 0.7–1.5)
CRP SERPL-MCNC: 3 MG/L — SIGNIFICANT CHANGE UP
EGFR: 40 ML/MIN/1.73M2 — LOW
EOSINOPHIL # BLD AUTO: 0.16 K/UL — SIGNIFICANT CHANGE UP (ref 0–0.7)
EOSINOPHIL NFR BLD AUTO: 2.5 % — SIGNIFICANT CHANGE UP (ref 0–8)
ERYTHROCYTE [SEDIMENTATION RATE] IN BLOOD: 23 MM/HR — HIGH (ref 0–20)
GLUCOSE SERPL-MCNC: 121 MG/DL — HIGH (ref 70–99)
HCT VFR BLD CALC: 35.6 % — LOW (ref 37–47)
HGB BLD-MCNC: 11.9 G/DL — LOW (ref 12–16)
IMM GRANULOCYTES NFR BLD AUTO: 0.5 % — HIGH (ref 0.1–0.3)
LYMPHOCYTES # BLD AUTO: 1.33 K/UL — SIGNIFICANT CHANGE UP (ref 1.2–3.4)
LYMPHOCYTES # BLD AUTO: 21 % — SIGNIFICANT CHANGE UP (ref 20.5–51.1)
MAGNESIUM SERPL-MCNC: 1.8 MG/DL — SIGNIFICANT CHANGE UP (ref 1.8–2.4)
MCHC RBC-ENTMCNC: 29.9 PG — SIGNIFICANT CHANGE UP (ref 27–31)
MCHC RBC-ENTMCNC: 33.4 G/DL — SIGNIFICANT CHANGE UP (ref 32–37)
MCV RBC AUTO: 89.4 FL — SIGNIFICANT CHANGE UP (ref 81–99)
MONOCYTES # BLD AUTO: 0.38 K/UL — SIGNIFICANT CHANGE UP (ref 0.1–0.6)
MONOCYTES NFR BLD AUTO: 6 % — SIGNIFICANT CHANGE UP (ref 1.7–9.3)
NEUTROPHILS # BLD AUTO: 4.4 K/UL — SIGNIFICANT CHANGE UP (ref 1.4–6.5)
NEUTROPHILS NFR BLD AUTO: 69.5 % — SIGNIFICANT CHANGE UP (ref 42.2–75.2)
NRBC # BLD: 0 /100 WBCS — SIGNIFICANT CHANGE UP (ref 0–0)
PLATELET # BLD AUTO: 219 K/UL — SIGNIFICANT CHANGE UP (ref 130–400)
POTASSIUM SERPL-MCNC: 4.2 MMOL/L — SIGNIFICANT CHANGE UP (ref 3.5–5)
POTASSIUM SERPL-SCNC: 4.2 MMOL/L — SIGNIFICANT CHANGE UP (ref 3.5–5)
RBC # BLD: 3.98 M/UL — LOW (ref 4.2–5.4)
RBC # FLD: 12.9 % — SIGNIFICANT CHANGE UP (ref 11.5–14.5)
SODIUM SERPL-SCNC: 139 MMOL/L — SIGNIFICANT CHANGE UP (ref 135–146)
WBC # BLD: 6.33 K/UL — SIGNIFICANT CHANGE UP (ref 4.8–10.8)
WBC # FLD AUTO: 6.33 K/UL — SIGNIFICANT CHANGE UP (ref 4.8–10.8)

## 2022-06-10 PROCEDURE — 99232 SBSQ HOSP IP/OBS MODERATE 35: CPT

## 2022-06-10 RX ORDER — ASCORBIC ACID 60 MG
500 TABLET,CHEWABLE ORAL DAILY
Refills: 0 | Status: DISCONTINUED | OUTPATIENT
Start: 2022-06-10 | End: 2022-06-11

## 2022-06-10 RX ORDER — AMPICILLIN SODIUM AND SULBACTAM SODIUM 250; 125 MG/ML; MG/ML
1.5 INJECTION, POWDER, FOR SUSPENSION INTRAMUSCULAR; INTRAVENOUS EVERY 8 HOURS
Refills: 0 | Status: DISCONTINUED | OUTPATIENT
Start: 2022-06-10 | End: 2022-06-11

## 2022-06-10 RX ORDER — HYDROXYZINE HCL 10 MG
25 TABLET ORAL EVERY 6 HOURS
Refills: 0 | Status: DISCONTINUED | OUTPATIENT
Start: 2022-06-10 | End: 2022-06-11

## 2022-06-10 RX ADMIN — Medication 1 APPLICATION(S): at 09:56

## 2022-06-10 RX ADMIN — APIXABAN 2.5 MILLIGRAM(S): 2.5 TABLET, FILM COATED ORAL at 18:57

## 2022-06-10 RX ADMIN — FAMOTIDINE 40 MILLIGRAM(S): 10 INJECTION INTRAVENOUS at 11:49

## 2022-06-10 RX ADMIN — Medication 25 MILLIGRAM(S): at 19:44

## 2022-06-10 RX ADMIN — AMIODARONE HYDROCHLORIDE 200 MILLIGRAM(S): 400 TABLET ORAL at 05:09

## 2022-06-10 RX ADMIN — AMPICILLIN SODIUM AND SULBACTAM SODIUM 100 GRAM(S): 250; 125 INJECTION, POWDER, FOR SUSPENSION INTRAMUSCULAR; INTRAVENOUS at 13:35

## 2022-06-10 RX ADMIN — Medication 500 MILLIGRAM(S): at 23:31

## 2022-06-10 RX ADMIN — Medication 25 MICROGRAM(S): at 05:10

## 2022-06-10 RX ADMIN — Medication 650 MILLIGRAM(S): at 11:50

## 2022-06-10 RX ADMIN — Medication 1 TABLET(S): at 11:48

## 2022-06-10 RX ADMIN — MEMANTINE HYDROCHLORIDE 10 MILLIGRAM(S): 10 TABLET ORAL at 05:10

## 2022-06-10 RX ADMIN — PREGABALIN 1000 MICROGRAM(S): 225 CAPSULE ORAL at 11:49

## 2022-06-10 RX ADMIN — AMPICILLIN SODIUM AND SULBACTAM SODIUM 100 GRAM(S): 250; 125 INJECTION, POWDER, FOR SUSPENSION INTRAMUSCULAR; INTRAVENOUS at 21:59

## 2022-06-10 RX ADMIN — APIXABAN 2.5 MILLIGRAM(S): 2.5 TABLET, FILM COATED ORAL at 05:09

## 2022-06-10 RX ADMIN — Medication 650 MILLIGRAM(S): at 11:20

## 2022-06-10 RX ADMIN — Medication 1 APPLICATION(S): at 21:59

## 2022-06-10 RX ADMIN — MEMANTINE HYDROCHLORIDE 10 MILLIGRAM(S): 10 TABLET ORAL at 18:57

## 2022-06-10 NOTE — PROGRESS NOTE ADULT - SUBJECTIVE AND OBJECTIVE BOX
Patient is 87 y/o Female with PMH of HTN, HLD, paroxysmal A-fib (on Eliquis 2.5mg bid), mitral valve regurgitation, GERD, hypothyroidism, SLE, anxiety, dementia presenting for non healing Right ankle wound.    AM Rounds   INTERVAL HISTORY:  No acute events overnight. Afebrile  Patient seen at bedside. Dressing change performed. Patient tolerated well.     Vital Signs Last 24 Hrs  T(C): 36.3 (10 Kaiden 2022 17:41), Max: 36.3 (10 Kaiden 2022 07:00)  T(F): 97.3 (10 Kaiden 2022 17:41), Max: 97.4 (10 Kaiden 2022 07:00)  HR: 71 (10 Kaiden 2022 17:41) (65 - 71)  BP: 140/65 (10 Kaiden 2022 17:41) (94/67 - 170/70)  BP(mean): 89 (10 Kaiden 2022 15:00) (89 - 105)  RR: 18 (10 Kaiden 2022 17:41) (18 - 18)  SpO2: 98% (10 Kaiden 2022 17:41) (98% - 100%)    I&O's Detail    09 Jun 2022 07:01  -  10 Kaiden 2022 07:00  --------------------------------------------------------  IN:    Lactated Ringers: 600 mL  Total IN: 600 mL    OUT:  Total OUT: 0 mL    Total NET: 600 mL      10 Kaiden 2022 07:01  -  10 Kaiden 2022 18:22  --------------------------------------------------------  IN:    Lactated Ringers: 100 mL  Total IN: 100 mL    OUT:  Total OUT: 0 mL    Total NET: 100 mL            MEDICATIONS  (STANDING):  aMIOdarone    Tablet 200 milliGRAM(s) Oral daily  ampicillin/sulbactam  IVPB 1.5 Gram(s) IV Intermittent every 8 hours  apixaban 2.5 milliGRAM(s) Oral every 12 hours  BACItracin   Ointment 1 Application(s) Topical two times a day  calcium carbonate 1250 mG  + Vitamin D (OsCal 500 + D) 1 Tablet(s) Oral daily  cyanocobalamin 1000 MICROGram(s) Oral daily  famotidine    Tablet 40 milliGRAM(s) Oral daily  levothyroxine 25 MICROGram(s) Oral daily  memantine 10 milliGRAM(s) Oral two times a day  multivitamin/minerals 1 Tablet(s) Oral daily    MEDICATIONS  (PRN):  acetaminophen     Tablet .. 650 milliGRAM(s) Oral every 6 hours PRN Mild Pain (1 - 3), Moderate Pain (4 - 6)  morphine  - Injectable 2 milliGRAM(s) IV Push two times a day PRN wound care  polyethylene glycol 3350 17 Gram(s) Oral daily PRN Constipation  senna 2 Tablet(s) Oral at bedtime PRN Constipation    Allergies    sulfa drugs (Unknown)    Intolerances        Lab Results:                        11.9   6.33  )-----------( 219      ( 10 Kaiden 2022 11:55 )             35.6     06-10    139  |  104  |  21<H>  ----------------------------<  121<H>  4.2   |  21  |  1.3    Ca    9.2      10 Kaiden 2022 11:55  Mg     1.8     06-10    TPro  7.0  /  Alb  4.2  /  TBili  0.4  /  DBili  x   /  AST  16  /  ALT  11  /  AlkPhos  82  06-09        LIVER FUNCTIONS - ( 09 Jun 2022 10:54 )  Alb: 4.2 g/dL / Pro: 7.0 g/dL / ALK PHOS: 82 U/L / ALT: 11 U/L / AST: 16 U/L / GGT: x           CAPILLARY BLOOD GLUCOSE                  IMAGING STUDIES:   < from: Xray Ankle Complete 3 Views, Right (06.09.22 @ 11:15) >    INTERPRETATION:  Clinical History / Reason for exam: Ankle pain and   swelling.    3 views of the right ankle. Comparison is made with astudy dated May 18,   2022.    Findings/  impression:    The bones are diffusely demineralized without acute fracture or   malalignment. There is a plantar calcaneal spur. Minimal soft tissue   swelling is seen.    Findings are unchanged.    < end of copied text >      EXAM:  PHYSICAL EXAM:  GENERAL: NAD, in good spirits   HEAD:  Atraumatic, Normocephalic  CHEST/LUNG: Breathing comfortably on room air. No increased work of breathing noted.   HEART: In no acute cardiopulmonary distress.   SKIN: Right ankle: lateral malleolus: ~1cm x 1cm full thickness wound                    Patient is 87 y/o Female with PMH of HTN, HLD, paroxysmal A-fib (on Eliquis 2.5mg bid), mitral valve regurgitation, GERD, hypothyroidism, SLE, anxiety, dementia presenting for non healing Right ankle wound.    AM Rounds   INTERVAL HISTORY:  No acute events overnight. Afebrile  Patient seen at bedside. Dressing change performed. Patient tolerated well.     Vital Signs Last 24 Hrs  T(C): 36.3 (10 Kaiden 2022 17:41), Max: 36.3 (10 Kaiden 2022 07:00)  T(F): 97.3 (10 Kaiden 2022 17:41), Max: 97.4 (10 Kaiden 2022 07:00)  HR: 71 (10 Kaiden 2022 17:41) (65 - 71)  BP: 140/65 (10 Kaiden 2022 17:41) (94/67 - 170/70)  BP(mean): 89 (10 Kaiden 2022 15:00) (89 - 105)  RR: 18 (10 Kaiden 2022 17:41) (18 - 18)  SpO2: 98% (10 Kaiden 2022 17:41) (98% - 100%)    I&O's Detail    09 Jun 2022 07:01  -  10 Kaiden 2022 07:00  --------------------------------------------------------  IN:    Lactated Ringers: 600 mL  Total IN: 600 mL    OUT:  Total OUT: 0 mL    Total NET: 600 mL      10 Kaiden 2022 07:01  -  10 Kaiden 2022 18:22  --------------------------------------------------------  IN:    Lactated Ringers: 100 mL  Total IN: 100 mL    OUT:  Total OUT: 0 mL    Total NET: 100 mL            MEDICATIONS  (STANDING):  aMIOdarone    Tablet 200 milliGRAM(s) Oral daily  ampicillin/sulbactam  IVPB 1.5 Gram(s) IV Intermittent every 8 hours  apixaban 2.5 milliGRAM(s) Oral every 12 hours  BACItracin   Ointment 1 Application(s) Topical two times a day  calcium carbonate 1250 mG  + Vitamin D (OsCal 500 + D) 1 Tablet(s) Oral daily  cyanocobalamin 1000 MICROGram(s) Oral daily  famotidine    Tablet 40 milliGRAM(s) Oral daily  levothyroxine 25 MICROGram(s) Oral daily  memantine 10 milliGRAM(s) Oral two times a day  multivitamin/minerals 1 Tablet(s) Oral daily    MEDICATIONS  (PRN):  acetaminophen     Tablet .. 650 milliGRAM(s) Oral every 6 hours PRN Mild Pain (1 - 3), Moderate Pain (4 - 6)  morphine  - Injectable 2 milliGRAM(s) IV Push two times a day PRN wound care  polyethylene glycol 3350 17 Gram(s) Oral daily PRN Constipation  senna 2 Tablet(s) Oral at bedtime PRN Constipation    Allergies    sulfa drugs (Unknown)    Intolerances        Lab Results:                        11.9   6.33  )-----------( 219      ( 10 Kaiden 2022 11:55 )             35.6     06-10    139  |  104  |  21<H>  ----------------------------<  121<H>  4.2   |  21  |  1.3    Ca    9.2      10 Kaiden 2022 11:55  Mg     1.8     06-10    TPro  7.0  /  Alb  4.2  /  TBili  0.4  /  DBili  x   /  AST  16  /  ALT  11  /  AlkPhos  82  06-09        LIVER FUNCTIONS - ( 09 Jun 2022 10:54 )  Alb: 4.2 g/dL / Pro: 7.0 g/dL / ALK PHOS: 82 U/L / ALT: 11 U/L / AST: 16 U/L / GGT: x           CAPILLARY BLOOD GLUCOSE                  IMAGING STUDIES:   < from: Xray Ankle Complete 3 Views, Right (06.09.22 @ 11:15) >    INTERPRETATION:  Clinical History / Reason for exam: Ankle pain and   swelling.    3 views of the right ankle. Comparison is made with astudy dated May 18,   2022.    Findings/  impression:    The bones are diffusely demineralized without acute fracture or   malalignment. There is a plantar calcaneal spur. Minimal soft tissue   swelling is seen.    Findings are unchanged.    < end of copied text >      EXAM:  PHYSICAL EXAM:  GENERAL: NAD, in good spirits   HEAD:  Atraumatic, Normocephalic  CHEST/LUNG: Breathing comfortably on room air. No increased work of breathing noted.   HEART: In no acute cardiopulmonary distress.   SKIN: Right ankle: lateral malleolus: ~1cm x 1cm full thickness wound, with adherent yellow eschar, mild surrounding erythema noted. no edema, purulent drainage malodor, or active bleeding evident.       Dressing change performed. Patient tolerated well.

## 2022-06-11 ENCOUNTER — TRANSCRIPTION ENCOUNTER (OUTPATIENT)
Age: 87
End: 2022-06-11

## 2022-06-11 VITALS
DIASTOLIC BLOOD PRESSURE: 76 MMHG | SYSTOLIC BLOOD PRESSURE: 156 MMHG | TEMPERATURE: 98 F | HEART RATE: 82 BPM | RESPIRATION RATE: 18 BRPM | OXYGEN SATURATION: 96 %

## 2022-06-11 LAB
ANION GAP SERPL CALC-SCNC: 10 MMOL/L — SIGNIFICANT CHANGE UP (ref 7–14)
BASOPHILS # BLD AUTO: 0.03 K/UL — SIGNIFICANT CHANGE UP (ref 0–0.2)
BASOPHILS NFR BLD AUTO: 0.5 % — SIGNIFICANT CHANGE UP (ref 0–1)
BUN SERPL-MCNC: 20 MG/DL — SIGNIFICANT CHANGE UP (ref 10–20)
CALCIUM SERPL-MCNC: 9.3 MG/DL — SIGNIFICANT CHANGE UP (ref 8.5–10.1)
CHLORIDE SERPL-SCNC: 100 MMOL/L — SIGNIFICANT CHANGE UP (ref 98–110)
CO2 SERPL-SCNC: 26 MMOL/L — SIGNIFICANT CHANGE UP (ref 17–32)
CREAT SERPL-MCNC: 1.3 MG/DL — SIGNIFICANT CHANGE UP (ref 0.7–1.5)
EGFR: 40 ML/MIN/1.73M2 — LOW
EOSINOPHIL # BLD AUTO: 0.15 K/UL — SIGNIFICANT CHANGE UP (ref 0–0.7)
EOSINOPHIL NFR BLD AUTO: 2.3 % — SIGNIFICANT CHANGE UP (ref 0–8)
GLUCOSE SERPL-MCNC: 91 MG/DL — SIGNIFICANT CHANGE UP (ref 70–99)
HCT VFR BLD CALC: 35 % — LOW (ref 37–47)
HGB BLD-MCNC: 11.8 G/DL — LOW (ref 12–16)
IMM GRANULOCYTES NFR BLD AUTO: 0.3 % — SIGNIFICANT CHANGE UP (ref 0.1–0.3)
LYMPHOCYTES # BLD AUTO: 1.32 K/UL — SIGNIFICANT CHANGE UP (ref 1.2–3.4)
LYMPHOCYTES # BLD AUTO: 20 % — LOW (ref 20.5–51.1)
MAGNESIUM SERPL-MCNC: 1.9 MG/DL — SIGNIFICANT CHANGE UP (ref 1.8–2.4)
MCHC RBC-ENTMCNC: 30.6 PG — SIGNIFICANT CHANGE UP (ref 27–31)
MCHC RBC-ENTMCNC: 33.7 G/DL — SIGNIFICANT CHANGE UP (ref 32–37)
MCV RBC AUTO: 90.7 FL — SIGNIFICANT CHANGE UP (ref 81–99)
MONOCYTES # BLD AUTO: 0.43 K/UL — SIGNIFICANT CHANGE UP (ref 0.1–0.6)
MONOCYTES NFR BLD AUTO: 6.5 % — SIGNIFICANT CHANGE UP (ref 1.7–9.3)
NEUTROPHILS # BLD AUTO: 4.65 K/UL — SIGNIFICANT CHANGE UP (ref 1.4–6.5)
NEUTROPHILS NFR BLD AUTO: 70.4 % — SIGNIFICANT CHANGE UP (ref 42.2–75.2)
NRBC # BLD: 0 /100 WBCS — SIGNIFICANT CHANGE UP (ref 0–0)
PLATELET # BLD AUTO: 208 K/UL — SIGNIFICANT CHANGE UP (ref 130–400)
POTASSIUM SERPL-MCNC: 4.4 MMOL/L — SIGNIFICANT CHANGE UP (ref 3.5–5)
POTASSIUM SERPL-SCNC: 4.4 MMOL/L — SIGNIFICANT CHANGE UP (ref 3.5–5)
RBC # BLD: 3.86 M/UL — LOW (ref 4.2–5.4)
RBC # FLD: 13.1 % — SIGNIFICANT CHANGE UP (ref 11.5–14.5)
SODIUM SERPL-SCNC: 136 MMOL/L — SIGNIFICANT CHANGE UP (ref 135–146)
WBC # BLD: 6.6 K/UL — SIGNIFICANT CHANGE UP (ref 4.8–10.8)
WBC # FLD AUTO: 6.6 K/UL — SIGNIFICANT CHANGE UP (ref 4.8–10.8)

## 2022-06-11 PROCEDURE — 99232 SBSQ HOSP IP/OBS MODERATE 35: CPT

## 2022-06-11 RX ORDER — ACETAMINOPHEN 500 MG
2 TABLET ORAL
Qty: 0 | Refills: 0 | DISCHARGE
Start: 2022-06-11

## 2022-06-11 RX ORDER — BACITRACIN ZINC 500 UNIT/G
1 OINTMENT IN PACKET (EA) TOPICAL
Qty: 2 | Refills: 0
Start: 2022-06-11

## 2022-06-11 RX ADMIN — PREGABALIN 1000 MICROGRAM(S): 225 CAPSULE ORAL at 13:13

## 2022-06-11 RX ADMIN — MEMANTINE HYDROCHLORIDE 10 MILLIGRAM(S): 10 TABLET ORAL at 05:54

## 2022-06-11 RX ADMIN — FAMOTIDINE 40 MILLIGRAM(S): 10 INJECTION INTRAVENOUS at 13:13

## 2022-06-11 RX ADMIN — Medication 25 MICROGRAM(S): at 05:54

## 2022-06-11 RX ADMIN — AMPICILLIN SODIUM AND SULBACTAM SODIUM 100 GRAM(S): 250; 125 INJECTION, POWDER, FOR SUSPENSION INTRAMUSCULAR; INTRAVENOUS at 05:51

## 2022-06-11 RX ADMIN — APIXABAN 2.5 MILLIGRAM(S): 2.5 TABLET, FILM COATED ORAL at 05:53

## 2022-06-11 RX ADMIN — Medication 1 TABLET(S): at 13:14

## 2022-06-11 RX ADMIN — Medication 500 MILLIGRAM(S): at 13:15

## 2022-06-11 RX ADMIN — Medication 1 APPLICATION(S): at 05:57

## 2022-06-11 RX ADMIN — AMIODARONE HYDROCHLORIDE 200 MILLIGRAM(S): 400 TABLET ORAL at 05:54

## 2022-06-11 RX ADMIN — Medication 1 TABLET(S): at 13:12

## 2022-06-11 NOTE — DISCHARGE NOTE PROVIDER - NSFOLLOWUPCLINICS_GEN_ALL_ED_FT
[facility]  Burn  [address], [address]  [place] [address]  Phone: [phone]  Fax:

## 2022-06-11 NOTE — PROGRESS NOTE ADULT - ASSESSMENT
Patient is 85 y/o Female with PMH of HTN, HLD, paroxysmal A-fib (on Eliquis 2.5mg bid), mitral valve regurgitation, Asthma, mild intermittent , GERD, hypothyroidism, SLE, anxiety, dementia presenting, seen by Dr Pride in Clinic for non healing Right ankle wound despite completed course of multiple antibiotics, as outpatient, sent to ED for admission for IV abx.     #Infected R ankle non healing wound:  -Continue LWC:- Please wash wound with soap and water, apply baci/X/K twice a day (pt has allergy to sulfa)   -R ankle X-Ray- neg for fracture   - Continue IV antibiotic, switch to PO on discharge  -Continue IVF  -Activity as tolerated   -Pain control  -PT c/s    # Hx HTN, HLD, A-fib:  - monitor vitals  - continue home meds: Amiodarone 200mg daily, and Eliquis 2.5mg bid  - f/u with Dr Marte as outpatient   -DASH/TLC diet     # Resp  -Hx of Asthma, mild intermittent   - no evidence of exacerbation  - monitor O2sat  - nebs prn  -Encourage incentive spirometer     # GERD:  - on Famotidine 40mg daily    # Dementia  - c/w [id number] mg BID    #Misc  - DVT Prophylaxis: on apixaban for a-fib  - GI Prophylaxis: famotidine  - Diet: DASH/TLC  - Activity: ambulate as tolerate  - Code Status: Full code     Dispo: Home with local wound care    Plan of care discussed with patient, Concerns addressed.   
Patient is 87 y/o Female with PMH of HTN, HLD, paroxysmal A-fib (on Eliquis 2.5mg bid), mitral valve regurgitation, Asthma, mild intermittent , GERD, hypothyroidism, SLE, anxiety, dementia presenting, seen by Dr Pride in Clinic for non healing Right ankle wound despite completed course of multiple antibiotics, as outpatient, sent to ED for admission for IV abx.     #Infected R ankle non healing wound:  -Continue LWC:- Please wash wound with soap and water, apply baci/X/K twice a day (pt has allergy to sulfa)   -R ankle X-Ray- neg for fracture   - Continue IV antibiotic   -Continue IVF  -Activity as tolerated   -Pain control  -PT c/s    # Hx HTN, HLD, A-fib:  - monitor vitals  - continue home meds: Amiodarone 200mg daily, and Eliquis 2.5mg bid  - f/u with Dr Marte as outpatient   -DASH/TLC diet     # Resp  -Hx of Asthma, mild intermittent   - no evidence of exacerbation  - monitor O2sat  - nebs prn  -Encourage incentive spirometer     # GERD:  - on Famotidine 40mg daily    # Dementia  - c/w [id number] mg BID    #Misc  - DVT Prophylaxis: on apixaban for a-fib  - GI Prophylaxis: famotidine  - Diet: DASH/TLC  - Activity: ambulate as tolerate  - Code Status: Full code     Plan of care discussed with patient, Concerns addressed.

## 2022-06-11 NOTE — DISCHARGE NOTE PROVIDER - CARE PROVIDER_API CALL
Beto Pride)  Plastic Surgery  [address] [place] [address]  Phone: [phone]  Fax: [phone]  Follow Up Time:     Nataliya Capellan)  Plastic Surgery  [address] [place] [address]  Phone: [phone]  Fax: [phone]  Follow Up Time:

## 2022-06-11 NOTE — DISCHARGE NOTE NURSING/CASE MANAGEMENT/SOCIAL WORK - NSDCFUADDAPPT_GEN_ALL_CORE_FT
Please call [phone] to make a follow up appointment within 1 week with Dr. Pride or Dr. Capellan. Clinic is located at [address] [place] [address] on Tuesdays (2-4pm) or Thursdays (9am-1pm).

## 2022-06-11 NOTE — DISCHARGE NOTE PROVIDER - CARE PROVIDERS DIRECT ADDRESSES
[email or web address]

## 2022-06-11 NOTE — DISCHARGE NOTE PROVIDER - NSDCFUADDAPPT_GEN_ALL_CORE_FT
Please call [phone] to make a follow up appointment within 1 week with Dr. Pride or Dr. Capellan. Clinic is located at [address] [place] [address] on Tuesdays (2-4pm) or Thursdays (9am-1pm).

## 2022-06-11 NOTE — DISCHARGE NOTE PROVIDER - NSDCFUADDINST_GEN_ALL_CORE_FT
Monitor for signs of infection, including increasing redness, swelling, pus drainage, or fever. Please call the Burn Clinic for guidance or go to the ER if you seek immediate evaluation.

## 2022-06-11 NOTE — PROGRESS NOTE ADULT - NS ATTEND AMEND GEN_ALL_CORE FT
large dressing change --> infected wound -> local wound care , intravenous antibiotics
large dressing change --> healing --> local wound care

## 2022-06-11 NOTE — DISCHARGE NOTE NURSING/CASE MANAGEMENT/SOCIAL WORK - NSDCPEFALRISK_GEN_ALL_CORE
For information on Fall & Injury Prevention, visit: [email or web address] OR  [email or web address] OR  https://www.cdc.gov/steadi/patient.html

## 2022-06-11 NOTE — DISCHARGE NOTE PROVIDER - HOSPITAL COURSE
Patient is 86 year old Female with PMH of HTN, HLD, paroxysmal A-fib (on Eliquis 2.5mg bid), mitral valve regurgitation, GERD, hypothyroidism, SLE, anxiety, dementia presenting, seen by Dr Pride in Clinic Thursday 6/9 for non healing Right ankle wound despite completed course of Clinda, Doxy, as Augmentin as outpatient, sent to ED for admission for IV abx. Pt states she has had right ankle cellulitis for the past 3 to 4 weeks and has failed outpatient oral antibiotics. She complaining of R ankle pain, but no fever, chills, cough, chest pain, abdominal pain, nausea, or vomiting.     Since admission patient has received local wound care, IV antibiotics, pain control.    # R ankle non healing wound:  - admitted to BURN service  - f/u labs, R ankle X-Ray  - Unasyn - switch to augmentin on D/c   - dressing with baci/X/K (pt has allergy to sulfa)  -  pain meds prn  - ambulate as tolerate    # Hx HTN, HLD, A-fib:  - monitor vitals  - continue home meds: Amiodarone 200mg daily, and Eliquis 2.5mg bid  - f/u with Dr Marte as outpatient     # Asthma, mild intermittent   - no evidence of exacerbation  - monitor O2sat  - nebs prn    # GERD:  - on Famotidine 40mg daily    # Dementia  - c/w memantine 10 mg BID    #Misc  - DVT Prophylaxis: on apixaban for a-fib  - GI Prophylaxis: famotidine  - Diet: DASH/TLC  - Activity: ambulate as tolerate      At this time, patient is stable and ready for discharge with local wound care, oral antibiotics and outpatient follow up 1 week from discharge.

## 2022-06-11 NOTE — DISCHARGE NOTE PROVIDER - NSDCMRMEDTOKEN_GEN_ALL_CORE_FT
acetaminophen 325 mg oral tablet: 2 tab(s) orally every 6 hours, As needed, Mild Pain (1 - 3), Moderate Pain (4 - 6)  amiodarone 200 mg oral tablet: 1 tab(s) orally once a day  Augmentin 500 mg-125 mg oral tablet: 1 tab(s) orally 2 times a day   bacitracin 500 units/g topical ointment: 1 application topically 2 times a day  Calcium 600+D 600 mg-5 mcg (200 intl units) oral tablet: 1 tab(s) orally once a day  Colace 100 mg oral capsule: 1 cap(s) orally 2 times a day  diazePAM 5 mg oral tablet: 1 tab(s) orally 3 times a day, As Needed  Eliquis 2.5 mg oral tablet: 1 tab(s) orally 2 times a day  famotidine 40 mg oral tablet: 1 tab(s) orally once a day (at bedtime)  levothyroxine 25 mcg (0.025 mg) oral tablet: 1 tab(s) orally once a day  memantine 10 mg oral tablet: 1 tab(s) orally 2 times a day  Restasis 0.05% ophthalmic emulsion: 1 drop(s) to each affected eye every 12 hours  Vitamin B12 1000 mcg oral tablet: 1 tab(s) orally once a day

## 2022-06-11 NOTE — DISCHARGE NOTE NURSING/CASE MANAGEMENT/SOCIAL WORK - NSDCVIVACCINE_GEN_ALL_CORE_FT
Tdap; 21-Jan-2019 16:42; Esperanza Kenney ([place]); [employer]; [id number] (Exp. Date: 27-Jul-2020); IntraMuscular; Deltoid Right.; 0.5 milliLiter(s); VIS (VIS Published: 09-May-2013, VIS Presented: 21-Jan-2019);

## 2022-06-11 NOTE — DISCHARGE NOTE PROVIDER - NSDCCPCAREPLAN_GEN_ALL_CORE_FT
PRINCIPAL DISCHARGE DIAGNOSIS  Diagnosis: Cellulitis  Assessment and Plan of Treatment: - continue local wound care daily  - wash with soap and apply bacitracin, xeroform, wrap with cling.  - take oral antibiotics as directed until complete  - tylenol as needed for pain  - follow up with BURN clinic 1 week from discharge.  - monitor for signs of infection, if suspected seek medical attention immediately.      SECONDARY DISCHARGE DIAGNOSES  Diagnosis: HTN (hypertension)  Assessment and Plan of Treatment: - continue home medication, follow up with primary care doctor.    Diagnosis: Paroxysmal A-fib  Assessment and Plan of Treatment: - continue home medication, follow up with primary care doctor.    Diagnosis: Asthma with COPD  Assessment and Plan of Treatment: - continue home medication, follow up with primary care doctor.    Diagnosis: GERD (gastroesophageal reflux disease)  Assessment and Plan of Treatment: - continue home medication, follow up with primary care doctor.

## 2022-06-11 NOTE — DISCHARGE NOTE NURSING/CASE MANAGEMENT/SOCIAL WORK - PATIENT PORTAL LINK FT
You can access the FollowMyHealth Patient Portal offered by [facility] by registering at the following website: [email or web address]. By joining [employer]’s FollowMyHealth portal, you will also be able to view your health information using other applications (apps) compatible with our system.

## 2022-06-11 NOTE — DISCHARGE NOTE PROVIDER - NSDCFUSCHEDAPPT_GEN_ALL_CORE_FT
Leonel Marte  [facility]  Cardio [address]  Scheduled Appointment: 06/14/2022    [facility]  CARDIOLOGY [address]  Scheduled Appointment: 06/17/2022    Hammad Hatch  [facility]  Neuro [address]  Scheduled Appointment: 07/11/2022    [facility]  Cardio [address]  Scheduled Appointment: 07/19/2022    Akin Cheema  [facility]  Cardio [address]  Scheduled Appointment: 07/26/2022

## 2022-06-11 NOTE — PROGRESS NOTE ADULT - SUBJECTIVE AND OBJECTIVE BOX
Patient is a 86y old  Female who presents with a chief complaint of Right ankle non healing wound (10 Kaiden 2022 18:22)  AM ROUNDS    Vital Signs Last 24 Hrs  T(C): 36.5 (11 Jun 2022 05:02), Max: 36.5 (11 Jun 2022 05:02)  T(F): 97.7 (11 Jun 2022 05:02), Max: 97.7 (11 Jun 2022 05:02)  HR: 82 (11 Jun 2022 05:02) (65 - 82)  BP: 156/76 (11 Jun 2022 05:02) (108/53 - 156/76)  BP(mean): 89 (10 Kaiden 2022 15:00) (89 - 89)  RR: 18 (11 Jun 2022 05:02) (18 - 18)  SpO2: 96% (11 Jun 2022 05:02) (96% - 98%)    LABS:                        11.9   6.33  )-----------( 219      ( 10 Kaiden 2022 11:55 )             35.6     06-10    139  |  104  |  21<H>  ----------------------------<  121<H>  4.2   |  21  |  1.3    Ca    9.2      10 Kaiden 2022 11:55  Mg     1.8     06-10    MEDICATIONS  (STANDING):  aMIOdarone    Tablet 200 milliGRAM(s) Oral daily  ampicillin/sulbactam  IVPB 1.5 Gram(s) IV Intermittent every 8 hours  apixaban 2.5 milliGRAM(s) Oral every 12 hours  ascorbic acid 500 milliGRAM(s) Oral daily  BACItracin   Ointment 1 Application(s) Topical two times a day  calcium carbonate 1250 mG  + Vitamin D (OsCal 500 + D) 1 Tablet(s) Oral daily  cyanocobalamin 1000 MICROGram(s) Oral daily  famotidine    Tablet 40 milliGRAM(s) Oral daily  levothyroxine 25 MICROGram(s) Oral daily  memantine 10 milliGRAM(s) Oral two times a day  multivitamin/minerals 1 Tablet(s) Oral daily    MEDICATIONS  (PRN):  acetaminophen     Tablet .. 650 milliGRAM(s) Oral every 6 hours PRN Mild Pain (1 - 3), Moderate Pain (4 - 6)  hydrOXYzine hydrochloride 25 milliGRAM(s) Oral every 6 hours PRN Agitation  morphine  - Injectable 2 milliGRAM(s) IV Push two times a day PRN wound care  polyethylene glycol 3350 17 Gram(s) Oral daily PRN Constipation  senna 2 Tablet(s) Oral at bedtime PRN Constipation      PHYSICAL EXAM:  GENERAL: NAD  HEAD:  Atraumatic, Normocephalic  CHEST/LUNG: Breathing comfortably on room air. No increased work of breathing noted.   HEART: In no acute cardiopulmonary distress.   SKIN: Right ankle: lateral malleolus: ~1cm x 1cm full thickness wound, with adherent yellow eschar, surrounding erythema improved. no edema, purulent drainage malodor, or active bleeding evident.       Dressing change performed. Patient tolerated well.

## 2022-06-14 ENCOUNTER — RESULT CHARGE (OUTPATIENT)
Age: 87
End: 2022-06-14

## 2022-06-14 ENCOUNTER — APPOINTMENT (OUTPATIENT)
Dept: CARDIOLOGY | Facility: CLINIC | Age: 87
End: 2022-06-14
Payer: MEDICARE

## 2022-06-14 VITALS
SYSTOLIC BLOOD PRESSURE: 116 MMHG | WEIGHT: 136 LBS | HEIGHT: 66 IN | TEMPERATURE: 97.3 F | DIASTOLIC BLOOD PRESSURE: 68 MMHG | BODY MASS INDEX: 21.86 KG/M2 | HEART RATE: 72 BPM

## 2022-06-14 DIAGNOSIS — R42 DIZZINESS AND GIDDINESS: ICD-10-CM

## 2022-06-14 DIAGNOSIS — F03.90 UNSPECIFIED DEMENTIA W/OUT BEHAVIORAL DISTURBANCE: ICD-10-CM

## 2022-06-14 DIAGNOSIS — R91.8 OTHER NONSPECIFIC ABNORMAL FINDING OF LUNG FIELD: ICD-10-CM

## 2022-06-14 DIAGNOSIS — L08.9 LOCAL INFECTION OF THE SKIN AND SUBCUTANEOUS TISSUE, UNSPECIFIED: ICD-10-CM

## 2022-06-14 DIAGNOSIS — R07.9 CHEST PAIN, UNSPECIFIED: ICD-10-CM

## 2022-06-14 DIAGNOSIS — K57.32 DIVERTICULITIS OF LARGE INTESTINE W/OUT PERFORATION OR ABSCESS W/OUT BLEEDING: ICD-10-CM

## 2022-06-14 DIAGNOSIS — M41.9 SCOLIOSIS, UNSPECIFIED: ICD-10-CM

## 2022-06-14 DIAGNOSIS — E03.9 HYPOTHYROIDISM, UNSPECIFIED: ICD-10-CM

## 2022-06-14 DIAGNOSIS — Z79.899 OTHER LONG TERM (CURRENT) DRUG THERAPY: ICD-10-CM

## 2022-06-14 DIAGNOSIS — G31.84 MILD COGNITIVE IMPAIRMENT, SO STATED: ICD-10-CM

## 2022-06-14 PROCEDURE — 93000 ELECTROCARDIOGRAM COMPLETE: CPT

## 2022-06-14 PROCEDURE — 99214 OFFICE O/P EST MOD 30 MIN: CPT

## 2022-06-14 NOTE — CARDIOLOGY SUMMARY
[de-identified] : 6-8-2021 NSR Short OK interval \par 9- NSR NS ST change \par 3- NSR Normal ECG  [de-identified] : 4- Normal LV systolic function mild AI mild concentric LVH . Trace MR .  [___] : [unfilled]

## 2022-06-14 NOTE — HISTORY OF PRESENT ILLNESS
[FreeTextEntry1] : The patient was admitted with right ankle cellulitis ( lateral ) . The patient is now on Augmentin . The patient has had no palpitations . She has had Dementia which is worse but she remains functional  .The patient has had no chest pain or SOB . The patient had LVH on echo with apparent controlled BP .

## 2022-06-14 NOTE — ASSESSMENT
[FreeTextEntry1] : The patient has cellulitis and is being followed by the burn team Dr. Vega. The patient is on Augmentin . She has no fevere. She has had CKD and creatinine increased to 1.6  Abdominal sonogram showed a R upper ple renal cyst only otherwise was unremakrable.

## 2022-06-14 NOTE — PHYSICAL EXAM
[de-identified] : grssly nonfocal  [General Appearance - Well Developed] : well developed [Normal Appearance] : normal appearance [Well Groomed] : well groomed [General Appearance - Well Nourished] : well nourished [No Deformities] : no deformities [General Appearance - In No Acute Distress] : no acute distress [Normal Conjunctiva] : the conjunctiva exhibited no abnormalities [Eyelids - No Xanthelasma] : the eyelids demonstrated no xanthelasmas [Normal Oral Mucosa] : normal oral mucosa [No Oral Pallor] : no oral pallor [No Oral Cyanosis] : no oral cyanosis [Respiration, Rhythm And Depth] : normal respiratory rhythm and effort [Exaggerated Use Of Accessory Muscles For Inspiration] : no accessory muscle use [Auscultation Breath Sounds / Voice Sounds] : lungs were clear to auscultation bilaterally [Abdomen Soft] : soft [Abdomen Tenderness] : non-tender [Abdomen Mass (___ Cm)] : no abdominal mass palpated [Abnormal Walk] : normal gait [Nail Clubbing] : no clubbing of the fingernails [Cyanosis, Localized] : no localized cyanosis [Petechial Hemorrhages (___cm)] : no petechial hemorrhages [Skin Color & Pigmentation] : normal skin color and pigmentation [] : no rash [No Venous Stasis] : no venous stasis [No Skin Ulcers] : no skin ulcer [No Xanthoma] : no  xanthoma was observed [FreeTextEntry1] : multiple areas of ecchymosis. arems and legs.  [Oriented To Time, Place, And Person] : oriented to person, place, and time [Affect] : the affect was normal [Mood] : the mood was normal [No Anxiety] : not feeling anxious [Normal Rate] : normal [Rhythm Regular] : regular [No Murmur] : no murmurs heard [No Pitting Edema] : no pitting edema present [Rt] : no varicose veins of the right leg [Lt] : no varicose veins of the left leg

## 2022-06-16 ENCOUNTER — APPOINTMENT (OUTPATIENT)
Dept: BURN CARE | Facility: CLINIC | Age: 87
End: 2022-06-16
Payer: MEDICARE

## 2022-06-16 ENCOUNTER — OUTPATIENT (OUTPATIENT)
Dept: OUTPATIENT SERVICES | Facility: HOSPITAL | Age: 87
LOS: 1 days | Discharge: HOME | End: 2022-06-16

## 2022-06-16 DIAGNOSIS — Z20.822 CONTACT WITH AND (SUSPECTED) EXPOSURE TO COVID-19: ICD-10-CM

## 2022-06-16 DIAGNOSIS — Z95.818 PRESENCE OF OTHER CARDIAC IMPLANTS AND GRAFTS: Chronic | ICD-10-CM

## 2022-06-16 DIAGNOSIS — M32.9 SYSTEMIC LUPUS ERYTHEMATOSUS, UNSPECIFIED: ICD-10-CM

## 2022-06-16 DIAGNOSIS — L03.115 CELLULITIS OF RIGHT LOWER LIMB: ICD-10-CM

## 2022-06-16 DIAGNOSIS — Z79.2 LONG TERM (CURRENT) USE OF ANTIBIOTICS: ICD-10-CM

## 2022-06-16 DIAGNOSIS — Z79.01 LONG TERM (CURRENT) USE OF ANTICOAGULANTS: ICD-10-CM

## 2022-06-16 DIAGNOSIS — F03.90 UNSPECIFIED DEMENTIA WITHOUT BEHAVIORAL DISTURBANCE: ICD-10-CM

## 2022-06-16 DIAGNOSIS — I48.0 PAROXYSMAL ATRIAL FIBRILLATION: ICD-10-CM

## 2022-06-16 DIAGNOSIS — Z90.49 ACQUIRED ABSENCE OF OTHER SPECIFIED PARTS OF DIGESTIVE TRACT: Chronic | ICD-10-CM

## 2022-06-16 DIAGNOSIS — I34.0 NONRHEUMATIC MITRAL (VALVE) INSUFFICIENCY: ICD-10-CM

## 2022-06-16 DIAGNOSIS — J45.30 MILD PERSISTENT ASTHMA, UNCOMPLICATED: ICD-10-CM

## 2022-06-16 DIAGNOSIS — F41.9 ANXIETY DISORDER, UNSPECIFIED: ICD-10-CM

## 2022-06-16 DIAGNOSIS — E03.9 HYPOTHYROIDISM, UNSPECIFIED: ICD-10-CM

## 2022-06-16 DIAGNOSIS — Z88.2 ALLERGY STATUS TO SULFONAMIDES: ICD-10-CM

## 2022-06-16 DIAGNOSIS — K21.9 GASTRO-ESOPHAGEAL REFLUX DISEASE WITHOUT ESOPHAGITIS: ICD-10-CM

## 2022-06-16 PROCEDURE — 99212 OFFICE O/P EST SF 10 MIN: CPT

## 2022-06-17 ENCOUNTER — NON-APPOINTMENT (OUTPATIENT)
Age: 87
End: 2022-06-17

## 2022-06-17 ENCOUNTER — APPOINTMENT (OUTPATIENT)
Dept: CARDIOLOGY | Facility: CLINIC | Age: 87
End: 2022-06-17
Payer: MEDICARE

## 2022-06-17 PROCEDURE — G2066: CPT

## 2022-06-17 PROCEDURE — 93298 REM INTERROG DEV EVAL SCRMS: CPT

## 2022-06-21 ENCOUNTER — LABORATORY RESULT (OUTPATIENT)
Age: 87
End: 2022-06-21

## 2022-06-22 LAB
ALBUMIN SERPL ELPH-MCNC: 4.7 G/DL
ALP BLD-CCNC: 93 U/L
ALT SERPL-CCNC: 12 U/L
ANION GAP SERPL CALC-SCNC: 19 MMOL/L
AST SERPL-CCNC: 18 U/L
BILIRUB SERPL-MCNC: 0.5 MG/DL
BUN SERPL-MCNC: 28 MG/DL
CALCIUM SERPL-MCNC: 9.9 MG/DL
CHLORIDE SERPL-SCNC: 101 MMOL/L
CO2 SERPL-SCNC: 21 MMOL/L
CREAT SERPL-MCNC: 1.5 MG/DL
EGFR: 34 ML/MIN/1.73M2
GLUCOSE SERPL-MCNC: 103 MG/DL
POTASSIUM SERPL-SCNC: 4.8 MMOL/L
PROT SERPL-MCNC: 7.4 G/DL
SODIUM SERPL-SCNC: 141 MMOL/L

## 2022-06-23 LAB
ALBUMIN MFR SERPL ELPH: 59.1 %
ALBUMIN SERPL-MCNC: 4.3 G/DL
ALBUMIN/GLOB SERPL: 1.4 RATIO
ALPHA1 GLOB MFR SERPL ELPH: 4.6 %
ALPHA1 GLOB SERPL ELPH-MCNC: 0.3 G/DL
ALPHA2 GLOB MFR SERPL ELPH: 10.8 %
ALPHA2 GLOB SERPL ELPH-MCNC: 0.8 G/DL
B-GLOBULIN MFR SERPL ELPH: 11.7 %
B-GLOBULIN SERPL ELPH-MCNC: 0.9 G/DL
DEPRECATED KAPPA LC FREE/LAMBDA SER: 1.46 RATIO
GAMMA GLOB FLD ELPH-MCNC: 1 G/DL
GAMMA GLOB MFR SERPL ELPH: 13.8 %
INTERPRETATION SERPL IEP-IMP: NORMAL
KAPPA LC CSF-MCNC: 2 MG/DL
KAPPA LC SERPL-MCNC: 2.91 MG/DL
PROT SERPL-MCNC: 7.3 G/DL
PROT SERPL-MCNC: 7.3 G/DL

## 2022-06-28 DIAGNOSIS — S91.001D UNSPECIFIED OPEN WOUND, RIGHT ANKLE, SUBSEQUENT ENCOUNTER: ICD-10-CM
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
## 2022-06-30 ENCOUNTER — APPOINTMENT (OUTPATIENT)
Dept: BURN CARE | Facility: CLINIC | Age: 87
End: 2022-06-30

## 2022-06-30 ENCOUNTER — OUTPATIENT (OUTPATIENT)
Dept: OUTPATIENT SERVICES | Facility: HOSPITAL | Age: 87
LOS: 1 days | Discharge: HOME | End: 2022-06-30

## 2022-06-30 DIAGNOSIS — Z95.818 PRESENCE OF OTHER CARDIAC IMPLANTS AND GRAFTS: Chronic | ICD-10-CM

## 2022-06-30 DIAGNOSIS — Z90.49 ACQUIRED ABSENCE OF OTHER SPECIFIED PARTS OF DIGESTIVE TRACT: Chronic | ICD-10-CM

## 2022-06-30 PROCEDURE — 97597 DBRDMT OPN WND 1ST 20 CM/<: CPT

## 2022-07-01 DIAGNOSIS — S91.001D UNSPECIFIED OPEN WOUND, RIGHT ANKLE, SUBSEQUENT ENCOUNTER: ICD-10-CM
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
## 2022-07-11 ENCOUNTER — APPOINTMENT (OUTPATIENT)
Dept: NEUROLOGY | Facility: CLINIC | Age: 87
End: 2022-07-11

## 2022-07-14 ENCOUNTER — OUTPATIENT (OUTPATIENT)
Dept: OUTPATIENT SERVICES | Facility: HOSPITAL | Age: 87
LOS: 1 days | Discharge: HOME | End: 2022-07-14

## 2022-07-14 ENCOUNTER — APPOINTMENT (OUTPATIENT)
Dept: BURN CARE | Facility: CLINIC | Age: 87
End: 2022-07-14

## 2022-07-14 DIAGNOSIS — Z90.49 ACQUIRED ABSENCE OF OTHER SPECIFIED PARTS OF DIGESTIVE TRACT: Chronic | ICD-10-CM

## 2022-07-14 DIAGNOSIS — Z95.818 PRESENCE OF OTHER CARDIAC IMPLANTS AND GRAFTS: Chronic | ICD-10-CM

## 2022-07-14 PROCEDURE — 97597 DBRDMT OPN WND 1ST 20 CM/<: CPT

## 2022-07-18 ENCOUNTER — OUTPATIENT (OUTPATIENT)
Dept: OUTPATIENT SERVICES | Facility: HOSPITAL | Age: 87
LOS: 1 days | Discharge: HOME | End: 2022-07-18

## 2022-07-18 ENCOUNTER — RESULT REVIEW (OUTPATIENT)
Age: 87
End: 2022-07-18

## 2022-07-18 ENCOUNTER — RX RENEWAL (OUTPATIENT)
Age: 87
End: 2022-07-18

## 2022-07-18 DIAGNOSIS — M54.2 CERVICALGIA: ICD-10-CM

## 2022-07-18 DIAGNOSIS — Z95.818 PRESENCE OF OTHER CARDIAC IMPLANTS AND GRAFTS: Chronic | ICD-10-CM

## 2022-07-18 DIAGNOSIS — R10.2 PELVIC AND PERINEAL PAIN: ICD-10-CM

## 2022-07-18 DIAGNOSIS — M54.9 DORSALGIA, UNSPECIFIED: ICD-10-CM

## 2022-07-18 DIAGNOSIS — Z90.49 ACQUIRED ABSENCE OF OTHER SPECIFIED PARTS OF DIGESTIVE TRACT: Chronic | ICD-10-CM

## 2022-07-18 DIAGNOSIS — M54.50 LOW BACK PAIN, UNSPECIFIED: ICD-10-CM

## 2022-07-18 PROCEDURE — 72202 X-RAY EXAM SI JOINTS 3/> VWS: CPT | Mod: 26

## 2022-07-18 PROCEDURE — 72190 X-RAY EXAM OF PELVIS: CPT | Mod: 26

## 2022-07-18 PROCEDURE — 72131 CT LUMBAR SPINE W/O DYE: CPT | Mod: 26,MH

## 2022-07-18 PROCEDURE — 72192 CT PELVIS W/O DYE: CPT | Mod: 26,MH

## 2022-07-18 PROCEDURE — 72110 X-RAY EXAM L-2 SPINE 4/>VWS: CPT | Mod: 26

## 2022-07-19 ENCOUNTER — APPOINTMENT (OUTPATIENT)
Dept: CARDIOLOGY | Facility: CLINIC | Age: 87
End: 2022-07-19

## 2022-07-19 ENCOUNTER — OUTPATIENT (OUTPATIENT)
Dept: OUTPATIENT SERVICES | Facility: HOSPITAL | Age: 87
LOS: 1 days | Discharge: HOME | End: 2022-07-19

## 2022-07-19 ENCOUNTER — APPOINTMENT (OUTPATIENT)
Dept: BURN CARE | Facility: CLINIC | Age: 87
End: 2022-07-19

## 2022-07-19 DIAGNOSIS — S91.001D UNSPECIFIED OPEN WOUND, RIGHT ANKLE, SUBSEQUENT ENCOUNTER: ICD-10-CM

## 2022-07-19 DIAGNOSIS — R10.2 PELVIC AND PERINEAL PAIN: ICD-10-CM
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
## 2022-07-19 DIAGNOSIS — Z90.49 ACQUIRED ABSENCE OF OTHER SPECIFIED PARTS OF DIGESTIVE TRACT: Chronic | ICD-10-CM

## 2022-07-19 DIAGNOSIS — Z95.818 PRESENCE OF OTHER CARDIAC IMPLANTS AND GRAFTS: Chronic | ICD-10-CM

## 2022-07-19 PROCEDURE — 99212 OFFICE O/P EST SF 10 MIN: CPT

## 2022-07-22 ENCOUNTER — APPOINTMENT (OUTPATIENT)
Dept: CARDIOLOGY | Facility: CLINIC | Age: 87
End: 2022-07-22

## 2022-07-22 ENCOUNTER — NON-APPOINTMENT (OUTPATIENT)
Age: 87
End: 2022-07-22

## 2022-07-22 PROCEDURE — 93298 REM INTERROG DEV EVAL SCRMS: CPT

## 2022-07-22 PROCEDURE — G2066: CPT
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
## 2022-07-25 DIAGNOSIS — Z02.9 ENCOUNTER FOR ADMINISTRATIVE EXAMINATIONS, UNSPECIFIED: ICD-10-CM

## 2022-07-25 DIAGNOSIS — S91.001D UNSPECIFIED OPEN WOUND, RIGHT ANKLE, SUBSEQUENT ENCOUNTER: ICD-10-CM

## 2022-07-25 DIAGNOSIS — M54.9 DORSALGIA, UNSPECIFIED: ICD-10-CM

## 2022-07-26 ENCOUNTER — APPOINTMENT (OUTPATIENT)
Dept: CARDIOLOGY | Facility: CLINIC | Age: 87
End: 2022-07-26

## 2022-07-26 VITALS
HEIGHT: 66 IN | TEMPERATURE: 97.8 F | HEART RATE: 82 BPM | WEIGHT: 130 LBS | DIASTOLIC BLOOD PRESSURE: 70 MMHG | SYSTOLIC BLOOD PRESSURE: 100 MMHG | BODY MASS INDEX: 20.89 KG/M2

## 2022-07-26 PROCEDURE — 93000 ELECTROCARDIOGRAM COMPLETE: CPT | Mod: 59

## 2022-07-26 PROCEDURE — 99215 OFFICE O/P EST HI 40 MIN: CPT

## 2022-07-26 PROCEDURE — 93285 PRGRMG DEV EVAL SCRMS IP: CPT

## 2022-07-26 RX ORDER — PNV NO.95/FERROUS FUM/FOLIC AC 28MG-0.8MG
TABLET ORAL DAILY
Refills: 0 | Status: ACTIVE | COMMUNITY

## 2022-07-26 RX ORDER — DOCUSATE SODIUM 100 MG/1
100 CAPSULE ORAL
Refills: 0 | Status: ACTIVE | COMMUNITY

## 2022-07-26 RX ORDER — AMOXICILLIN AND CLAVULANATE POTASSIUM 875; 125 MG/1; MG/1
875-125 TABLET, COATED ORAL
Qty: 14 | Refills: 0 | Status: COMPLETED | COMMUNITY
Start: 2022-06-07 | End: 2022-07-26

## 2022-07-26 RX ORDER — LEVOTHYROXINE SODIUM 0.03 MG/1
25 TABLET ORAL DAILY
Refills: 0 | Status: ACTIVE | COMMUNITY

## 2022-07-26 RX ORDER — MEMANTINE HYDROCHLORIDE 10 MG/1
10 TABLET ORAL TWICE DAILY
Refills: 0 | Status: ACTIVE | COMMUNITY
Start: 2019-09-20

## 2022-07-26 RX ORDER — CYCLOSPORINE 0.5 MG/ML
0.05 EMULSION OPHTHALMIC
Qty: 180 | Refills: 0 | Status: ACTIVE | COMMUNITY
Start: 2021-01-21

## 2022-07-26 RX ORDER — FAMOTIDINE 40 MG/1
40 TABLET, FILM COATED ORAL
Qty: 90 | Refills: 3 | Status: ACTIVE | COMMUNITY
Start: 2020-07-31

## 2022-07-26 NOTE — ASSESSMENT
[FreeTextEntry1] : ## paroxysmal atrial fibrillation s/p ILR\par ## Sinus Pauses\par \par - ILR interrogation revealed appropriate function. No events recorded. Adjustments made as needed. \par - - On Eliquis. Compliant. No bleeding issues. Patient to contact us if there is any bleeding issues, interruption or any issues with OAC. Patient to go to ER/call 911 if any major bleeding. \par - On Amiodarone.\par - QTc acceptable. LFTs 06/22. \par - PFTs with mildly reduced diffusion capacity- follows Dr. Govea\par - Remote Monitoring\par - Return in 6 months

## 2022-07-26 NOTE — PROCEDURE
[No] : not [NSR] : normal sinus rhythm [See Scanned Paceart Report] : See scanned paceart report [See Device Printout] : See device printout [Normal] : The battery status is normal. [Sensing Amplitude ___mv] : sensing amplitude was [unfilled] mv [de-identified] : [employer] [de-identified] : [id number] [de-identified] : [id number] [de-identified] : 3/11/21 [de-identified] : "normal" [de-identified] : no episodes

## 2022-07-26 NOTE — HISTORY OF PRESENT ILLNESS
[Palpitations] : no palpitations [SOB] : no dyspnea [Syncope] : no syncope [Dizziness] : no dizziness [Chest Pain] : no chest pain or discomfort [Shoulder Pain] : no shoulder pain [Pain at Site] : no pain at device site [Erythema at Site] : no erythema at device site [Swelling at Site] : no swelling at device site [de-identified] : PERICARDIAL EFFUSION DEC 2012 COMPLICATED by AF\par NERVE STIMULATOR IMPLANTED FOR LOW BACK PAIN ON 8/27/2020\par Pauses in Dec 2019 to Jan 2020\par AF episode on 9/28/2018 lasting 1 hour and 56 seconds (No AF between 9/2018 and 9/2020)\par 8/13/2021 to 8/19/2021: 14 "AF" episodes, burden 0.2%, totalling 7 hours- multiple episodes of AF and Sinus with bursts of Pulmonary veins firing;\par 8/24/2021: started on Amiodarone\par 11/23/2021: Last burst of APCs, non-sustained SVT on 9/27/2021\par 07/26/22: Feels fine. No further episodes.\par \par -------------------------------------------------\par Referring Physician: Dr. Leonel Marte\par -------------------------------------------------\par \par \par CARDIAC TESTING:\par EKG (07/25/22): SR@83, , QRSd 88\par ECG (11/23/2021): sinus rhythm at 77 bpm, short , no significant ST/T abnormalities\par ECG (8/24/2021): sinus rhythm at 83 bpm, short , no significant ST/T abnormalities\par ECG (4/27/2021): sinus rhythm at 87 bpm, short NM, no significant ST/T abnormalities\par ECG (2/9/2021): sinus rhythm at 79 bpm, short NM, no significant ST/T abnormalities, QTc 409\par \par

## 2022-07-26 NOTE — PHYSICAL EXAM
[General Appearance - Well Developed] : well developed [Normal Appearance] : normal appearance [Well Groomed] : well groomed [General Appearance - Well Nourished] : well nourished [No Deformities] : no deformities [General Appearance - In No Acute Distress] : no acute distress [Heart Rate And Rhythm] : heart rate and rhythm were normal [Heart Sounds] : normal S1 and S2 [Murmurs] : no murmurs present [Respiration, Rhythm And Depth] : normal respiratory rhythm and effort [Exaggerated Use Of Accessory Muscles For Inspiration] : no accessory muscle use [Auscultation Breath Sounds / Voice Sounds] : lungs were clear to auscultation bilaterally [Clean] : clean [Dry] : dry [Well-Healed] : well-healed [FreeTextEntry1] : Left parasternal [Abdomen Soft] : soft [Abdomen Tenderness] : non-tender [Abdomen Mass (___ Cm)] : no abdominal mass palpated [Nail Clubbing] : no clubbing of the fingernails [Cyanosis, Localized] : no localized cyanosis [Petechial Hemorrhages (___cm)] : no petechial hemorrhages [] : no ischemic changes [Normal Conjunctiva] : the conjunctiva exhibited no abnormalities [Eyelids - No Xanthelasma] : the eyelids demonstrated no xanthelasmas [Normal Oral Mucosa] : normal oral mucosa [No Oral Pallor] : no oral pallor [No Oral Cyanosis] : no oral cyanosis [Abnormal Walk] : normal gait [Gait - Sufficient For Exercise Testing] : the gait was sufficient for exercise testing [Normal Jugular Venous A Waves Present] : normal jugular venous A waves present [Normal Jugular Venous V Waves Present] : normal jugular venous V waves present [No Jugular Venous Bajwa A Waves] : no jugular venous bajwa A waves

## 2022-07-28 ENCOUNTER — APPOINTMENT (OUTPATIENT)
Dept: BURN CARE | Facility: CLINIC | Age: 87
End: 2022-07-28

## 2022-07-28 ENCOUNTER — OUTPATIENT (OUTPATIENT)
Dept: OUTPATIENT SERVICES | Facility: HOSPITAL | Age: 87
LOS: 1 days | Discharge: HOME | End: 2022-07-28

## 2022-07-28 DIAGNOSIS — Z90.49 ACQUIRED ABSENCE OF OTHER SPECIFIED PARTS OF DIGESTIVE TRACT: Chronic | ICD-10-CM

## 2022-07-28 DIAGNOSIS — S91.001D UNSPECIFIED OPEN WOUND, RIGHT ANKLE, SUBSEQUENT ENCOUNTER: ICD-10-CM
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
## 2022-07-28 DIAGNOSIS — Z95.818 PRESENCE OF OTHER CARDIAC IMPLANTS AND GRAFTS: Chronic | ICD-10-CM

## 2022-07-28 PROCEDURE — 99212 OFFICE O/P EST SF 10 MIN: CPT

## 2022-08-11 ENCOUNTER — APPOINTMENT (OUTPATIENT)
Dept: BURN CARE | Facility: CLINIC | Age: 87
End: 2022-08-11

## 2022-08-11 ENCOUNTER — OUTPATIENT (OUTPATIENT)
Dept: OUTPATIENT SERVICES | Facility: HOSPITAL | Age: 87
LOS: 1 days | Discharge: HOME | End: 2022-08-11

## 2022-08-11 DIAGNOSIS — Z90.49 ACQUIRED ABSENCE OF OTHER SPECIFIED PARTS OF DIGESTIVE TRACT: Chronic | ICD-10-CM

## 2022-08-11 DIAGNOSIS — Z95.818 PRESENCE OF OTHER CARDIAC IMPLANTS AND GRAFTS: Chronic | ICD-10-CM

## 2022-08-11 PROCEDURE — 99212 OFFICE O/P EST SF 10 MIN: CPT

## 2022-08-12 DIAGNOSIS — S91.001D UNSPECIFIED OPEN WOUND, RIGHT ANKLE, SUBSEQUENT ENCOUNTER: ICD-10-CM
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
## 2022-08-18 ENCOUNTER — OUTPATIENT (OUTPATIENT)
Dept: OUTPATIENT SERVICES | Facility: HOSPITAL | Age: 87
LOS: 1 days | Discharge: HOME | End: 2022-08-18

## 2022-08-18 ENCOUNTER — APPOINTMENT (OUTPATIENT)
Dept: BURN CARE | Facility: CLINIC | Age: 87
End: 2022-08-18

## 2022-08-18 ENCOUNTER — INPATIENT (INPATIENT)
Facility: HOSPITAL | Age: 87
LOS: 3 days | Discharge: HOME | End: 2022-08-22
Attending: PLASTIC SURGERY | Admitting: PLASTIC SURGERY
Payer: MEDICARE

## 2022-08-18 VITALS
HEIGHT: 63 IN | WEIGHT: 119.93 LBS | RESPIRATION RATE: 19 BRPM | SYSTOLIC BLOOD PRESSURE: 121 MMHG | HEART RATE: 78 BPM | OXYGEN SATURATION: 98 % | DIASTOLIC BLOOD PRESSURE: 68 MMHG | TEMPERATURE: 98 F

## 2022-08-18 DIAGNOSIS — Z95.818 PRESENCE OF OTHER CARDIAC IMPLANTS AND GRAFTS: Chronic | ICD-10-CM

## 2022-08-18 DIAGNOSIS — Z90.49 ACQUIRED ABSENCE OF OTHER SPECIFIED PARTS OF DIGESTIVE TRACT: Chronic | ICD-10-CM

## 2022-08-18 LAB
A1C WITH ESTIMATED AVERAGE GLUCOSE RESULT: 5.3 % — SIGNIFICANT CHANGE UP (ref 4–5.6)
ALBUMIN SERPL ELPH-MCNC: 4.3 G/DL — SIGNIFICANT CHANGE UP (ref 3.5–5.2)
ALP SERPL-CCNC: 78 U/L — SIGNIFICANT CHANGE UP (ref 30–115)
ALT FLD-CCNC: 11 U/L — SIGNIFICANT CHANGE UP (ref 0–41)
ANION GAP SERPL CALC-SCNC: 13 MMOL/L — SIGNIFICANT CHANGE UP (ref 7–14)
APTT BLD: 38.8 SEC — SIGNIFICANT CHANGE UP (ref 27–39.2)
AST SERPL-CCNC: 18 U/L — SIGNIFICANT CHANGE UP (ref 0–41)
BASOPHILS # BLD AUTO: 0.02 K/UL — SIGNIFICANT CHANGE UP (ref 0–0.2)
BASOPHILS NFR BLD AUTO: 0.3 % — SIGNIFICANT CHANGE UP (ref 0–1)
BILIRUB SERPL-MCNC: 0.5 MG/DL — SIGNIFICANT CHANGE UP (ref 0.2–1.2)
BLD GP AB SCN SERPL QL: SIGNIFICANT CHANGE UP
BUN SERPL-MCNC: 31 MG/DL — HIGH (ref 10–20)
CALCIUM SERPL-MCNC: 9.3 MG/DL — SIGNIFICANT CHANGE UP (ref 8.5–10.1)
CHLORIDE SERPL-SCNC: 103 MMOL/L — SIGNIFICANT CHANGE UP (ref 98–110)
CO2 SERPL-SCNC: 24 MMOL/L — SIGNIFICANT CHANGE UP (ref 17–32)
CREAT SERPL-MCNC: 1.3 MG/DL — SIGNIFICANT CHANGE UP (ref 0.7–1.5)
CRP SERPL-MCNC: 3.6 MG/L — SIGNIFICANT CHANGE UP
EGFR: 40 ML/MIN/1.73M2 — LOW
EOSINOPHIL # BLD AUTO: 0.15 K/UL — SIGNIFICANT CHANGE UP (ref 0–0.7)
EOSINOPHIL NFR BLD AUTO: 2.1 % — SIGNIFICANT CHANGE UP (ref 0–8)
ERYTHROCYTE [SEDIMENTATION RATE] IN BLOOD: 22 MM/HR — HIGH (ref 0–20)
ESTIMATED AVERAGE GLUCOSE: 105 MG/DL — SIGNIFICANT CHANGE UP (ref 68–114)
GLUCOSE SERPL-MCNC: 88 MG/DL — SIGNIFICANT CHANGE UP (ref 70–99)
HCT VFR BLD CALC: 36.4 % — LOW (ref 37–47)
HGB BLD-MCNC: 11.8 G/DL — LOW (ref 12–16)
IMM GRANULOCYTES NFR BLD AUTO: 0.4 % — HIGH (ref 0.1–0.3)
INR BLD: 1.13 RATIO — SIGNIFICANT CHANGE UP (ref 0.65–1.3)
LYMPHOCYTES # BLD AUTO: 1.56 K/UL — SIGNIFICANT CHANGE UP (ref 1.2–3.4)
LYMPHOCYTES # BLD AUTO: 21.8 % — SIGNIFICANT CHANGE UP (ref 20.5–51.1)
MAGNESIUM SERPL-MCNC: 1.8 MG/DL — SIGNIFICANT CHANGE UP (ref 1.8–2.4)
MCHC RBC-ENTMCNC: 30.2 PG — SIGNIFICANT CHANGE UP (ref 27–31)
MCHC RBC-ENTMCNC: 32.4 G/DL — SIGNIFICANT CHANGE UP (ref 32–37)
MCV RBC AUTO: 93.1 FL — SIGNIFICANT CHANGE UP (ref 81–99)
MONOCYTES # BLD AUTO: 0.62 K/UL — HIGH (ref 0.1–0.6)
MONOCYTES NFR BLD AUTO: 8.6 % — SIGNIFICANT CHANGE UP (ref 1.7–9.3)
NEUTROPHILS # BLD AUTO: 4.79 K/UL — SIGNIFICANT CHANGE UP (ref 1.4–6.5)
NEUTROPHILS NFR BLD AUTO: 66.8 % — SIGNIFICANT CHANGE UP (ref 42.2–75.2)
NRBC # BLD: 0 /100 WBCS — SIGNIFICANT CHANGE UP (ref 0–0)
PHOSPHATE SERPL-MCNC: 3.6 MG/DL — SIGNIFICANT CHANGE UP (ref 2.1–4.9)
PHOSPHATE SERPL-MCNC: 3.6 MG/DL — SIGNIFICANT CHANGE UP (ref 2.1–4.9)
PLATELET # BLD AUTO: 216 K/UL — SIGNIFICANT CHANGE UP (ref 130–400)
POTASSIUM SERPL-MCNC: 4.8 MMOL/L — SIGNIFICANT CHANGE UP (ref 3.5–5)
POTASSIUM SERPL-SCNC: 4.8 MMOL/L — SIGNIFICANT CHANGE UP (ref 3.5–5)
PROT SERPL-MCNC: 6.5 G/DL — SIGNIFICANT CHANGE UP (ref 6–8)
PROTHROM AB SERPL-ACNC: 13 SEC — HIGH (ref 9.95–12.87)
RBC # BLD: 3.91 M/UL — LOW (ref 4.2–5.4)
RBC # FLD: 14.2 % — SIGNIFICANT CHANGE UP (ref 11.5–14.5)
SARS-COV-2 RNA SPEC QL NAA+PROBE: SIGNIFICANT CHANGE UP
SODIUM SERPL-SCNC: 140 MMOL/L — SIGNIFICANT CHANGE UP (ref 135–146)
URATE SERPL-MCNC: 4.8 MG/DL — SIGNIFICANT CHANGE UP (ref 2.5–7)
WBC # BLD: 7.17 K/UL — SIGNIFICANT CHANGE UP (ref 4.8–10.8)
WBC # FLD AUTO: 7.17 K/UL — SIGNIFICANT CHANGE UP (ref 4.8–10.8)

## 2022-08-18 PROCEDURE — 99285 EMERGENCY DEPT VISIT HI MDM: CPT | Mod: FS

## 2022-08-18 PROCEDURE — 99213 OFFICE O/P EST LOW 20 MIN: CPT

## 2022-08-18 PROCEDURE — 99221 1ST HOSP IP/OBS SF/LOW 40: CPT | Mod: GC

## 2022-08-18 PROCEDURE — 73610 X-RAY EXAM OF ANKLE: CPT | Mod: 26,RT

## 2022-08-18 PROCEDURE — ZZZZZ: CPT

## 2022-08-18 PROCEDURE — 73630 X-RAY EXAM OF FOOT: CPT | Mod: 26,RT

## 2022-08-18 RX ORDER — CEFAZOLIN SODIUM 1 G
1000 VIAL (EA) INJECTION EVERY 8 HOURS
Refills: 0 | Status: DISCONTINUED | OUTPATIENT
Start: 2022-08-18 | End: 2022-08-22

## 2022-08-18 RX ORDER — APIXABAN 2.5 MG/1
2.5 TABLET, FILM COATED ORAL EVERY 12 HOURS
Refills: 0 | Status: DISCONTINUED | OUTPATIENT
Start: 2022-08-18 | End: 2022-08-22

## 2022-08-18 RX ORDER — POLYETHYLENE GLYCOL 3350 17 G/17G
17 POWDER, FOR SOLUTION ORAL DAILY
Refills: 0 | Status: DISCONTINUED | OUTPATIENT
Start: 2022-08-18 | End: 2022-08-22

## 2022-08-18 RX ORDER — AMIODARONE HYDROCHLORIDE 400 MG/1
200 TABLET ORAL DAILY
Refills: 0 | Status: DISCONTINUED | OUTPATIENT
Start: 2022-08-18 | End: 2022-08-22

## 2022-08-18 RX ORDER — OXYCODONE HYDROCHLORIDE 5 MG/1
5 TABLET ORAL EVERY 6 HOURS
Refills: 0 | Status: DISCONTINUED | OUTPATIENT
Start: 2022-08-18 | End: 2022-08-22

## 2022-08-18 RX ORDER — LEVOTHYROXINE SODIUM 125 MCG
25 TABLET ORAL DAILY
Refills: 0 | Status: DISCONTINUED | OUTPATIENT
Start: 2022-08-18 | End: 2022-08-22

## 2022-08-18 RX ORDER — MORPHINE SULFATE 50 MG/1
2 CAPSULE, EXTENDED RELEASE ORAL EVERY 6 HOURS
Refills: 0 | Status: DISCONTINUED | OUTPATIENT
Start: 2022-08-18 | End: 2022-08-22

## 2022-08-18 RX ORDER — PREGABALIN 225 MG/1
1000 CAPSULE ORAL DAILY
Refills: 0 | Status: DISCONTINUED | OUTPATIENT
Start: 2022-08-18 | End: 2022-08-22

## 2022-08-18 RX ORDER — CEFAZOLIN SODIUM 1 G
1000 VIAL (EA) INJECTION ONCE
Refills: 0 | Status: COMPLETED | OUTPATIENT
Start: 2022-08-18 | End: 2022-08-18

## 2022-08-18 RX ORDER — ACETAMINOPHEN 500 MG
650 TABLET ORAL EVERY 6 HOURS
Refills: 0 | Status: DISCONTINUED | OUTPATIENT
Start: 2022-08-18 | End: 2022-08-22

## 2022-08-18 RX ORDER — MEMANTINE HYDROCHLORIDE 10 MG/1
10 TABLET ORAL EVERY 12 HOURS
Refills: 0 | Status: DISCONTINUED | OUTPATIENT
Start: 2022-08-18 | End: 2022-08-22

## 2022-08-18 RX ORDER — SENNA PLUS 8.6 MG/1
2 TABLET ORAL AT BEDTIME
Refills: 0 | Status: DISCONTINUED | OUTPATIENT
Start: 2022-08-18 | End: 2022-08-22

## 2022-08-18 RX ORDER — FAMOTIDINE 10 MG/ML
40 INJECTION INTRAVENOUS AT BEDTIME
Refills: 0 | Status: DISCONTINUED | OUTPATIENT
Start: 2022-08-18 | End: 2022-08-22

## 2022-08-18 RX ORDER — DIAZEPAM 5 MG
5 TABLET ORAL THREE TIMES A DAY
Refills: 0 | Status: DISCONTINUED | OUTPATIENT
Start: 2022-08-18 | End: 2022-08-22

## 2022-08-18 RX ORDER — DOCUSATE SODIUM 100 MG
1 CAPSULE ORAL
Qty: 0 | Refills: 0 | DISCHARGE

## 2022-08-18 RX ADMIN — MEMANTINE HYDROCHLORIDE 10 MILLIGRAM(S): 10 TABLET ORAL at 17:08

## 2022-08-18 RX ADMIN — Medication 100 MILLIGRAM(S): at 22:15

## 2022-08-18 RX ADMIN — Medication 25 MICROGRAM(S): at 22:13

## 2022-08-18 RX ADMIN — Medication 100 MILLIGRAM(S): at 11:09

## 2022-08-18 RX ADMIN — APIXABAN 2.5 MILLIGRAM(S): 2.5 TABLET, FILM COATED ORAL at 17:32

## 2022-08-18 RX ADMIN — FAMOTIDINE 40 MILLIGRAM(S): 10 INJECTION INTRAVENOUS at 22:14

## 2022-08-18 RX ADMIN — SENNA PLUS 2 TABLET(S): 8.6 TABLET ORAL at 22:18

## 2022-08-18 RX ADMIN — PREGABALIN 1000 MICROGRAM(S): 225 CAPSULE ORAL at 22:13

## 2022-08-18 NOTE — H&P ADULT - NSHPLABSRESULTS_GEN_ALL_CORE
LABS:  CBC Full  -  ( 18 Aug 2022 10:58 )  WBC Count : 7.17 K/uL  RBC Count : 3.91 M/uL  Hemoglobin : 11.8 g/dL  Hematocrit : 36.4 %  Platelet Count - Automated : 216 K/uL  Mean Cell Volume : 93.1 fL  Mean Cell Hemoglobin : 30.2 pg  Mean Cell Hemoglobin Concentration : 32.4 g/dL  Auto Neutrophil # : 4.79 K/uL  Auto Lymphocyte # : 1.56 K/uL  Auto Monocyte # : 0.62 K/uL  Auto Eosinophil # : 0.15 K/uL  Auto Basophil # : 0.02 K/uL  Auto Neutrophil % : 66.8 %  Auto Lymphocyte % : 21.8 %  Auto Monocyte % : 8.6 %  Auto Eosinophil % : 2.1 %  Auto Basophil % : 0.3 %    08-18    140  |  103  |  31<H>  ----------------------------<  88  4.8   |  24  |  1.3    Ca    9.3      18 Aug 2022 10:58  Phos  3.6     08-18  Mg     1.8     08-18    TPro  6.5  /  Alb  4.3  /  TBili  0.5  /  DBili  x   /  AST  18  /  ALT  11  /  AlkPhos  78  08-18

## 2022-08-18 NOTE — H&P ADULT - HISTORY OF PRESENT ILLNESS
Patient is 86 y/o Female with PMHx of HTN, HLD, paroxysmal A-fib (on Amiodarone 200mg daily and Eliquis 2.5mg bid), mitral valve regurgitation, GERD, Hypothyroidism, SLE, anxiety, dementia presenting, recently (06/09-06/11) admitted for nonhealing Right ankle  wound, completed course of Unasyn IV and discharged home on Augmentin PO, now presented with R ankle worsening pain, swelling, and redness. Pt was seen by Dr Pride in Clinic today, and sent to ED for admission for IV abx. Pt states her Right ankle wound was healing well but for past  couple of days  became more red, swollen, and painful.  Pt denies fever, chills, cough, chest pain, abdominal pain, nausea, or vomiting.

## 2022-08-18 NOTE — H&P ADULT - ASSESSMENT
Patient is 86 y/o Female with PMHx of HTN, HLD, paroxysmal A-fib (on Amiodarone 200mg daily and Eliquis 2.5mg bid), mitral valve regurgitation, GERD, Hypothyroidism, SLE, anxiety, dementia presenting, recently (06/09-06/11) admitted for nonhealing Right ankle  wound, completed course of Unasyn IV and discharged home on Augmentin PO, now presented with R ankle cellulitis.    # R ankle cellulitis:  - admit to BURN service  - f/u labs, CRP, ESR, WCx, R ankle X-Ray  - start Cefazolin 1gm q8h for now, f/u with ID for further recommendations   -  pain meds prn  - ortho consult for possible Right ankle septic joint   - ambulate as tolerate    # Hx HTN, HLD, A-fib:  - monitor vitals, ECG  - continue home meds: Amiodarone 200mg daily, and Eliquis 2.5mg bid  - f/u with Dr Marte as outpatient     # Asthma, mild intermittent   - no evidence of exacerbation  - monitor O2sat  - nebs prn    # GERD:  - on Famotidine 40mg daily    # Dementia, anxiety   - c/w [id number] mg BID    #Misc  - DVT Prophylaxis: on apixaban for a-fib  - GI Prophylaxis: famotidine  - Diet: DASH/TLC  - Activity: ambulate as tolerate  - Code Status: Full code

## 2022-08-18 NOTE — ED PROVIDER NOTE - OBJECTIVE STATEMENT
87 year old F with hx of htn, hld, a fib on eliquis, MV replacement, gerd, hypothyroid, dementia, recent admission 06/09-06/11 for non healing rt ankle wound sent in by burn Dr. Pride for evaluation. Pt sts wound has not been healing well and was sent to ed for IV abx. Pt has not been on any recent po abx. Denies any purulent drainage, calf pain/swelling, inability to bare weight or ambulate, fever/chills, nausea, vomiting, cp, sob.

## 2022-08-18 NOTE — ED ADULT NURSE NOTE - NSIMPLEMENTINTERV_GEN_ALL_ED
Implemented All Fall with Harm Risk Interventions:  [place] to call system. Call bell, personal items and telephone within reach. Instruct patient to call for assistance. Room bathroom lighting operational. Non-slip footwear when patient is off stretcher. Physically safe environment: no spills, clutter or unnecessary equipment. Stretcher in lowest position, wheels locked, appropriate side rails in place. Provide visual cue, wrist band, yellow gown, etc. Monitor gait and stability. Monitor for mental status changes and reorient to person, place, and time. Review medications for side effects contributing to fall risk. Reinforce activity limits and safety measures with patient and family. Provide visual clues: red socks.

## 2022-08-18 NOTE — ED PROVIDER NOTE - WR ORDER STATUS 1
tiZANidine (ZANAFLEX) 4 MG tablet         [facility]   Performed

## 2022-08-18 NOTE — ED PROVIDER NOTE - PHYSICAL EXAMINATION
CONSTITUTIONAL: Well-appearing; well-nourished; in no apparent distress.   EYES: PERRL; EOM intact.   ENT: normal nose; no rhinorrhea; normal pharynx with no tonsillar hypertrophy.   NECK: Supple; non-tender; no cervical lymphadenopathy.   CARDIOVASCULAR: Normal S1, S2; no murmurs, rubs, or gallops.   RESPIRATORY: Normal chest excursion with respiration; breath sounds clear and equal bilaterally; no wheezes, rhonchi, or rales.  GI/: Normal bowel sounds; non-distended; non-tender; no palpable organomegaly.   MS/SKIN: No evidence of trauma or deformity. Normal ROM in all four extremities; Rt ankle: + swelling/erthyema/warmth noted to rt lateral malleolus. No purulent drainage noted. Full rom of ankle. Pedal pulses intact. No extension into foot/calf  NEURO/PSYCH: A & O x 3; grossly unremarkable. mood and manner are appropriate. neurovascular intact. Sensation intact

## 2022-08-18 NOTE — H&P ADULT - NSHPPHYSICALEXAM_GEN_ALL_CORE
PHYSICAL EXAM:  GENERAL: well built, well nourished, NAD, laying in bed comfortably, cooperative  HEAD:  Atraumatic, Normocephalic  EYES: EOMI, PERRLA, conjunctiva and sclera clear  NECK: Supple  CHEST/LUNG:  B/L good air entry, no tachypnea/increased WOB/retractions. Clear to auscultation bilaterally; No wheeze  HEART: Regular rate and rhythm; No murmurs, rubs, or gallops  ABDOMEN: Soft, Nontender, Nondistended; Bowel sounds present  EXTREMITIES:  2+ Peripheral Pulses,   NEUROLOGY: non-focal,  moves all four extremities  SKIN: Right ankle swelling, redness, tenderness to palpation, no drainage, no bleeding noted.

## 2022-08-18 NOTE — ED PROVIDER NOTE - CLINICAL SUMMARY MEDICAL DECISION MAKING FREE TEXT BOX
87yoF with h/o HTN, HLD, afib on Eliquis, sent in by Dr. Pride of burn for nonhealing R wound since May, of unknown etiology, for IV abx. Denies fever, chills, and all other symptoms. Hx obtained from sister. On exam, afebrile, hemodynamically stable, saturating well, NAD, well appearing, laying comfortably in bed, no WOB, speaking full sentences, head NCAT, EOMI grossly, anicteric, MMM, breathing comfortably on RA, CN's 3-12 grossly intact, [place] spontaneously, noted R lateral ankle swelling/fluctuance and local erythema, no lymphadenitis, no leg cyanosis or edema, skin warm, well perfused. No e/o nec fasc or limb ischemia. No fever or systemic s/s's. Patient well appearing, hemodynamically stable. Admitted to burn for further management, w/u, and care.

## 2022-08-18 NOTE — ED PROVIDER NOTE - NS ED ROS FT
Constitutional: no fever, chills, no recent weight loss, change in appetite or malaise  Eyes: no redness/discharge/pain/vision changes  ENT: no rhinorrhea/ear pain/sore throat  Cardiac: No chest pain, SOB or edema.  Respiratory: No cough or respiratory distress  GI: No nausea, vomiting, diarrhea or abdominal pain.  : No dysuria, frequency, urgency or hematuria  MS: no pain to back or extremities, no loss of ROM, no weakness  Neuro: No headache or weakness. No LOC.  Skin: + pain/redness/swelling to rt ankle.  Endocrine: + hx of thyroid dz  Except as documented in the HPI, all other systems are negative.

## 2022-08-18 NOTE — PATIENT PROFILE ADULT - FALL HARM RISK - HARM RISK INTERVENTIONS

## 2022-08-18 NOTE — CONSULT NOTE ADULT - ATTENDING COMMENTS
Orthopedic attending  Patient seen and examined.  PMHx, Psurg Hx, Medications and Allergies reviewed.  X-rays and CT reviewed.  Agree with findings, assessment and plan.  iMpressionL: chronic non healing wound with painless ankle rom.  Low suspicion septic joint.  Recommend MRI to r/o osteomyelitis.

## 2022-08-18 NOTE — H&P ADULT - NS ATTEND AMEND GEN_ALL_CORE FT
large dressing change --. infection right lateral ankle-> intravenous antibiotics , local wound care
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
## 2022-08-19 DIAGNOSIS — S91.001D UNSPECIFIED OPEN WOUND, RIGHT ANKLE, SUBSEQUENT ENCOUNTER: ICD-10-CM

## 2022-08-19 DIAGNOSIS — Z02.9 ENCOUNTER FOR ADMINISTRATIVE EXAMINATIONS, UNSPECIFIED: ICD-10-CM

## 2022-08-19 LAB
ANION GAP SERPL CALC-SCNC: 15 MMOL/L — HIGH (ref 7–14)
BASOPHILS # BLD AUTO: 0.04 K/UL — SIGNIFICANT CHANGE UP (ref 0–0.2)
BASOPHILS NFR BLD AUTO: 0.4 % — SIGNIFICANT CHANGE UP (ref 0–1)
BUN SERPL-MCNC: 21 MG/DL — HIGH (ref 10–20)
CALCIUM SERPL-MCNC: 9.5 MG/DL — SIGNIFICANT CHANGE UP (ref 8.5–10.1)
CHLORIDE SERPL-SCNC: 101 MMOL/L — SIGNIFICANT CHANGE UP (ref 98–110)
CO2 SERPL-SCNC: 22 MMOL/L — SIGNIFICANT CHANGE UP (ref 17–32)
CREAT SERPL-MCNC: 1.3 MG/DL — SIGNIFICANT CHANGE UP (ref 0.7–1.5)
EGFR: 40 ML/MIN/1.73M2 — LOW
EOSINOPHIL # BLD AUTO: 0.08 K/UL — SIGNIFICANT CHANGE UP (ref 0–0.7)
EOSINOPHIL NFR BLD AUTO: 0.9 % — SIGNIFICANT CHANGE UP (ref 0–8)
GLUCOSE SERPL-MCNC: 98 MG/DL — SIGNIFICANT CHANGE UP (ref 70–99)
HCT VFR BLD CALC: 38.7 % — SIGNIFICANT CHANGE UP (ref 37–47)
HGB BLD-MCNC: 13.1 G/DL — SIGNIFICANT CHANGE UP (ref 12–16)
IMM GRANULOCYTES NFR BLD AUTO: 0.3 % — SIGNIFICANT CHANGE UP (ref 0.1–0.3)
LYMPHOCYTES # BLD AUTO: 1.52 K/UL — SIGNIFICANT CHANGE UP (ref 1.2–3.4)
LYMPHOCYTES # BLD AUTO: 16.9 % — LOW (ref 20.5–51.1)
MAGNESIUM SERPL-MCNC: 1.8 MG/DL — SIGNIFICANT CHANGE UP (ref 1.8–2.4)
MCHC RBC-ENTMCNC: 30.5 PG — SIGNIFICANT CHANGE UP (ref 27–31)
MCHC RBC-ENTMCNC: 33.9 G/DL — SIGNIFICANT CHANGE UP (ref 32–37)
MCV RBC AUTO: 90.2 FL — SIGNIFICANT CHANGE UP (ref 81–99)
MONOCYTES # BLD AUTO: 0.63 K/UL — HIGH (ref 0.1–0.6)
MONOCYTES NFR BLD AUTO: 7 % — SIGNIFICANT CHANGE UP (ref 1.7–9.3)
NEUTROPHILS # BLD AUTO: 6.7 K/UL — HIGH (ref 1.4–6.5)
NEUTROPHILS NFR BLD AUTO: 74.5 % — SIGNIFICANT CHANGE UP (ref 42.2–75.2)
NRBC # BLD: 0 /100 WBCS — SIGNIFICANT CHANGE UP (ref 0–0)
PLATELET # BLD AUTO: 237 K/UL — SIGNIFICANT CHANGE UP (ref 130–400)
POTASSIUM SERPL-MCNC: 4.8 MMOL/L — SIGNIFICANT CHANGE UP (ref 3.5–5)
POTASSIUM SERPL-SCNC: 4.8 MMOL/L — SIGNIFICANT CHANGE UP (ref 3.5–5)
RBC # BLD: 4.29 M/UL — SIGNIFICANT CHANGE UP (ref 4.2–5.4)
RBC # FLD: 14.1 % — SIGNIFICANT CHANGE UP (ref 11.5–14.5)
SODIUM SERPL-SCNC: 138 MMOL/L — SIGNIFICANT CHANGE UP (ref 135–146)
WBC # BLD: 9 K/UL — SIGNIFICANT CHANGE UP (ref 4.8–10.8)
WBC # FLD AUTO: 9 K/UL — SIGNIFICANT CHANGE UP (ref 4.8–10.8)

## 2022-08-19 PROCEDURE — 99232 SBSQ HOSP IP/OBS MODERATE 35: CPT

## 2022-08-19 PROCEDURE — 73700 CT LOWER EXTREMITY W/O DYE: CPT | Mod: 26,RT

## 2022-08-19 RX ORDER — HYDROXYZINE HCL 10 MG
25 TABLET ORAL EVERY 6 HOURS
Refills: 0 | Status: DISCONTINUED | OUTPATIENT
Start: 2022-08-19 | End: 2022-08-22

## 2022-08-19 RX ADMIN — FAMOTIDINE 40 MILLIGRAM(S): 10 INJECTION INTRAVENOUS at 21:56

## 2022-08-19 RX ADMIN — Medication 25 MICROGRAM(S): at 05:39

## 2022-08-19 RX ADMIN — SENNA PLUS 2 TABLET(S): 8.6 TABLET ORAL at 21:56

## 2022-08-19 RX ADMIN — MEMANTINE HYDROCHLORIDE 10 MILLIGRAM(S): 10 TABLET ORAL at 05:40

## 2022-08-19 RX ADMIN — MEMANTINE HYDROCHLORIDE 10 MILLIGRAM(S): 10 TABLET ORAL at 17:07

## 2022-08-19 RX ADMIN — PREGABALIN 1000 MICROGRAM(S): 225 CAPSULE ORAL at 13:21

## 2022-08-19 RX ADMIN — APIXABAN 2.5 MILLIGRAM(S): 2.5 TABLET, FILM COATED ORAL at 05:41

## 2022-08-19 RX ADMIN — Medication 100 MILLIGRAM(S): at 21:56

## 2022-08-19 RX ADMIN — APIXABAN 2.5 MILLIGRAM(S): 2.5 TABLET, FILM COATED ORAL at 17:07

## 2022-08-19 RX ADMIN — Medication 5 MILLIGRAM(S): at 21:56

## 2022-08-19 RX ADMIN — Medication 25 MILLIGRAM(S): at 18:58

## 2022-08-19 RX ADMIN — Medication 1 MILLIGRAM(S): at 21:55

## 2022-08-19 RX ADMIN — Medication 100 MILLIGRAM(S): at 05:40

## 2022-08-19 RX ADMIN — AMIODARONE HYDROCHLORIDE 200 MILLIGRAM(S): 400 TABLET ORAL at 05:38

## 2022-08-19 RX ADMIN — Medication 100 MILLIGRAM(S): at 13:21

## 2022-08-19 NOTE — CONSULT NOTE ADULT - SUBJECTIVE AND OBJECTIVE BOX
Orthopaedic Surgery Consult Note      86yo Female presenting for worsening right ankle pain. On 06/09-06/11 admitted for nonhealing Right ankle  wound, completed course of Unasyn IV and discharged home on Augmentin PO. Patient states that over past two days, increased right ankle pain. patient denies any fevers,chills,SOB. Patient has been able to ambulate on the RLE most recent as of today to use the restroom. Patient denies any recent, trauma, stings, bites, penetrating trauma to the right ankle.    Since admission on 8/18/2022, patient started on ancef.   WBC 7.17  CRP: 3.6  ESR: 22    PMH/PSH  CELLULITIS    ^CELLULITIS    No pertinent family history in first degree relatives  MEWS Score  Hypothyroid  UTI (urinary tract infection)  Atrial fibrillation, unspecified type  Anxiety  Mild intermittent asthma, unspecified whether complicated  Cellulitis of finger, unspecified laterality  Diverticulitis  Reflux gastritis  Mitral valve insufficiency, unspecified etiology  Idiopathic pericarditis, unspecified chronicity  Cellulitis  History of appendectomy  Status post placement of implantable loop recorder  INFECTED R LOWER EXTREMITY    67    [email or web address]        Medications  acetaminophen     Tablet .. 650 milliGRAM(s) Oral every 6 hours PRN  aMIOdarone    Tablet 200 milliGRAM(s) Oral daily  apixaban 2.5 milliGRAM(s) Oral every 12 hours  ceFAZolin   IVPB 1000 milliGRAM(s) IV Intermittent every 8 hours  cyanocobalamin 1000 MICROGram(s) Oral daily  diazepam    Tablet 5 milliGRAM(s) Oral three times a day PRN  famotidine    Tablet 40 milliGRAM(s) Oral at bedtime  levothyroxine 25 MICROGram(s) Oral daily  memantine 10 milliGRAM(s) Oral every 12 hours  morphine  - Injectable 2 milliGRAM(s) IV Push every 6 hours PRN  oxyCODONE    IR 5 milliGRAM(s) Oral every 6 hours PRN  polyethylene glycol 3350 17 Gram(s) Oral daily PRN  senna 2 Tablet(s) Oral at bedtime      Home Medications:  acetaminophen 325 mg oral tablet: 2 tab(s) orally every 6 hours, As needed, Mild Pain (1 - 3), Moderate Pain (4 - 6) (11 Jun 2022 12:29)  amiodarone 200 mg oral tablet: 1 tab(s) orally once a day (09 Jun 2022 12:20)  Calcium 600+D 600 mg-5 mcg (200 intl units) oral tablet: 1 tab(s) orally once a day (09 Jun 2022 12:20)  diazePAM 5 mg oral tablet: 1 tab(s) orally 3 times a day, As Needed (09 Jun 2022 12:20)  Eliquis 2.5 mg oral tablet: 1 tab(s) orally 2 times a day (09 Jun 2022 12:20)  famotidine 40 mg oral tablet: 1 tab(s) orally once a day (at bedtime) (09 Jun 2022 12:20)  levothyroxine 25 mcg (0.025 mg) oral tablet: 1 tab(s) orally once a day (09 Jun 2022 12:20)  memantine 10 mg oral tablet: 1 tab(s) orally 2 times a day (09 Jun 2022 12:20)  Restasis 0.05% ophthalmic emulsion: 1 drop(s) to each affected eye every 12 hours (09 Jun 2022 12:20)  Vitamin B12 1000 mcg oral tablet: 1 tab(s) orally once a day (09 Jun 2022 12:20)        Allergies  sulfa drugs (Unknown)        T(C): 35.9 (08-18-22 @ 13:15), Max: 36.7 (08-18-22 @ 09:50)  HR: 75 (08-18-22 @ 13:15) (75 - 78)  BP: 147/67 (08-18-22 @ 13:15) (121/68 - 147/67)  RR: 18 (08-18-22 @ 13:15) (18 - 19)  SpO2: 99% (08-18-22 @ 13:15) (98% - 99%)    P/E:  AOx3, NAD  Non-labored breathing    RLE  Skin intact with 0.25cm healing lateral wound  No gross swelling/effusion/erythema  No gross purulence, drainage, evidence of deep infection  No deformity/laceration/abrasion noted  Full baseline ankle ROM  Patient able to initiate eversion/external rotation/eversion  5/5 plantarflexion strength  Dorsiflexion neutral/15 degree dorsiflexion  Compartments soft and compressible, no pain w/ passive stretch of digits  SILT sp/dp/t/sural/saph  Firing ta/ehl/fhl/gs  DP pulse 2+, cap refill <2        Labs                        11.8   7.17  )-----------( 216      ( 18 Aug 2022 10:58 )             36.4     08-18    140  |  103  |  31<H>  ----------------------------<  88  4.8   |  24  |  1.3    Ca    9.3      18 Aug 2022 10:58  Phos  3.6     08-18  Mg     1.8     08-18    TPro  6.5  /  Alb  4.3  /  TBili  0.5  /  DBili  x   /  AST  18  /  ALT  11  /  AlkPhos  78  08-18    LIVER FUNCTIONS - ( 18 Aug 2022 10:58 )  Alb: 4.3 g/dL / Pro: 6.5 g/dL / ALK PHOS: 78 U/L / ALT: 11 U/L / AST: 18 U/L / GGT: x               Imaging:  AP/Lateral/Oblique R foot/ankle demonstrate no fractures,masses,lesions,dislocations.  Mild soft tissue swelling of lateral malleolus    A/P:  86yo Female w/ history of right ankle pain presenting to [facility] for 2 days of increasing acute right ankle pain  No acute orthopedic intervention at this time. Low suspicion of septic arthritis based on clinical examination and objective inflammatory marker results  Given the chronicity of lateral malleolus pain, and inflammatory marker results, recommend MRI to evaluate osteomyelitis versus soft tissue infection  Weight bearing: WBAT  Pain control  Ice/elevation  Dressing change per primary  Diet  VTE prophylaxis per primary  PT/OT    
  MU NOEL  87y, Female  Allergy: sulfa drugs (Unknown)      All historical available data reviewed.    HPI:  Patient is 86 y/o Female with PMHx of HTN, HLD, paroxysmal A-fib (on Amiodarone 200mg daily and Eliquis 2.5mg bid), mitral valve regurgitation, GERD, Hypothyroidism, SLE, anxiety, dementia presenting, recently (06/09-06/11) admitted for nonhealing Right ankle  wound, completed course of Unasyn IV and discharged home on Augmentin PO, now presented with R ankle worsening pain, swelling, and redness. Pt was seen by Dr Pride in Clinic today, and sent to ED for admission for IV abx. Pt states her Right ankle wound was healing well but for past  couple of days  became more red, swollen, and painful.  Pt denies fever, chills, cough, chest pain, abdominal pain, nausea, or vomiting.   (18 Aug 2022 12:06)    FAMILY HISTORY:    PAST MEDICAL & SURGICAL HISTORY:  Hypothyroid      UTI (urinary tract infection)      Atrial fibrillation, unspecified type  no longer in atrial fifrillation      Anxiety      Mild intermittent asthma, unspecified whether complicated  no longer present      Cellulitis of finger, unspecified laterality  resolved      Diverticulitis  resolved      Reflux gastritis      Mitral valve insufficiency, unspecified etiology      Idiopathic pericarditis, unspecified chronicity      History of appendectomy      Status post placement of implantable loop recorder            VITALS:  T(F): 97.1, Max: 98.6 (08-18-22 @ 19:37)  HR: 83  BP: 166/80  RR: 20Vital Signs Last 24 Hrs  T(C): 36.2 (19 Aug 2022 05:51), Max: 37 (18 Aug 2022 19:37)  T(F): 97.1 (19 Aug 2022 05:51), Max: 98.6 (18 Aug 2022 19:37)  HR: 83 (19 Aug 2022 05:51) (69 - 83)  BP: 166/80 (19 Aug 2022 05:51) (114/55 - 167/76)  BP(mean): 113 (19 Aug 2022 05:51) (79 - 113)  RR: 20 (19 Aug 2022 05:51) (18 - 20)  SpO2: 100% (19 Aug 2022 05:51) (97% - 100%)    Parameters below as of 19 Aug 2022 05:51  Patient On (Oxygen Delivery Method): room air        TESTS & MEASUREMENTS:                        11.8   7.17  )-----------( 216      ( 18 Aug 2022 10:58 )             36.4     08-18    140  |  103  |  31<H>  ----------------------------<  88  4.8   |  24  |  1.3    Ca    9.3      18 Aug 2022 10:58  Phos  3.6     08-18  Mg     1.8     08-18    TPro  6.5  /  Alb  4.3  /  TBili  0.5  /  DBili  x   /  AST  18  /  ALT  11  /  AlkPhos  78  08-18    LIVER FUNCTIONS - ( 18 Aug 2022 10:58 )  Alb: 4.3 g/dL / Pro: 6.5 g/dL / ALK PHOS: 78 U/L / ALT: 11 U/L / AST: 18 U/L / GGT: x                   RADIOLOGY & ADDITIONAL TESTS:  Personal review of radiological diagnostics performed  Echo and EKG results noted when applicable.     MEDICATIONS:  acetaminophen     Tablet .. 650 milliGRAM(s) Oral every 6 hours PRN  aMIOdarone    Tablet 200 milliGRAM(s) Oral daily  apixaban 2.5 milliGRAM(s) Oral every 12 hours  ceFAZolin   IVPB 1000 milliGRAM(s) IV Intermittent every 8 hours  cyanocobalamin 1000 MICROGram(s) Oral daily  diazepam    Tablet 5 milliGRAM(s) Oral three times a day PRN  famotidine    Tablet 40 milliGRAM(s) Oral at bedtime  levothyroxine 25 MICROGram(s) Oral daily  memantine 10 milliGRAM(s) Oral every 12 hours  morphine  - Injectable 2 milliGRAM(s) IV Push every 6 hours PRN  oxyCODONE    IR 5 milliGRAM(s) Oral every 6 hours PRN  polyethylene glycol 3350 17 Gram(s) Oral daily PRN  senna 2 Tablet(s) Oral at bedtime      ANTIBIOTICS:  ceFAZolin   IVPB 1000 milliGRAM(s) IV Intermittent every 8 hours

## 2022-08-19 NOTE — CONSULT NOTE ADULT - ASSESSMENT
88yo Female presenting for worsening right ankle pain. On 06/09-06/11 admitted for nonhealing Right ankle  wound, completed course of Unasyn IV and discharged home on Augmentin PO. Patient states that over past two days, increased right ankle pain. patient denies any fevers,chills    IMPRESSION;  R/o chronic OM right lateral malleolus  Cellulitis  No abscess    RECOMMENDATIONS;  MRI right malleolus  Ancef 1 gm iv q8h  Off loading to prevent pressure sores and preventive measures to avoid aspiration

## 2022-08-19 NOTE — PROGRESS NOTE ADULT - ASSESSMENT
Patient is 88 y/o Female with PMHx of HTN, HLD, paroxysmal A-fib (on Amiodarone 200mg daily and Eliquis 2.5mg bid), mitral valve regurgitation, GERD, Hypothyroidism, SLE, anxiety, dementia presenting, recently (06/09-06/11) admitted for nonhealing Right ankle  wound, completed course of Unasyn IV and discharged home on Augmentin PO, now presented with R ankle cellulitis.    # R ankle cellulitis:  - Cefazolin 1gm q8h as per ID  - pain meds prn  - ortho consult for possible Right ankle septic joint, recommending CT of right ankle   -CT right ankle ordered and pending  - ambulate as tolerate    # Anxiety  -Started Atarax 25mg q6h prn    # Hx HTN, HLD, A-fib:  - monitor vitals, ECG  - continue home meds: Amiodarone 200mg daily, and Eliquis 2.5mg bid  - f/u with Dr Marte as outpatient     # Asthma, mild intermittent   - no evidence of exacerbation  - monitor O2sat  - nebs prn    # GERD:  - on Famotidine 40mg daily    # Dementia, anxiety   - c/w memantine 10 mg BID    #Misc  - DVT Prophylaxis: on apixaban for a-fib, compression device to left leg  - GI Prophylaxis: famotidine  - Diet: DASH/TLC  - Activity: ambulate as tolerate  - Code Status: Full code

## 2022-08-19 NOTE — PROGRESS NOTE ADULT - SUBJECTIVE AND OBJECTIVE BOX
Patient is a 87y old  Female who presents with a chief complaint of right ankle cellulitis. Pt seen at bedside, has no complains. No acute events overnight, pt afebrile.      INTERVAL HPI/OVERNIGHT EVENTS:  ICU Vital Signs Last 24 Hrs  T(C): 36.6 (19 Aug 2022 07:45), Max: 37 (18 Aug 2022 19:37)  T(F): 97.8 (19 Aug 2022 07:45), Max: 98.6 (18 Aug 2022 19:37)  HR: 75 (19 Aug 2022 07:45) (73 - 83)  BP: 148/66 (19 Aug 2022 07:45) (139/63 - 167/76)  BP(mean): 95 (19 Aug 2022 07:45) (90 - 113)  RR: 18 (19 Aug 2022 07:45) (18 - 20)  SpO2: 100% (19 Aug 2022 07:45) (97% - 100%)    O2 Parameters below as of 19 Aug 2022 07:45  Patient On (Oxygen Delivery Method): room air            LABS:                        13.1   9.00  )-----------( 237      ( 19 Aug 2022 11:07 )             38.7     08-19    138  |  101  |  21<H>  ----------------------------<  98  4.8   |  22  |  1.3    Ca    9.5      19 Aug 2022 11:07  Phos  3.6     08-18  Mg     1.8     08-19    TPro  6.5  /  Alb  4.3  /  TBili  0.5  /  DBili  x   /  AST  18  /  ALT  11  /  AlkPhos  78  08-18    PT/INR - ( 18 Aug 2022 15:55 )   PT: 13.00 sec;   INR: 1.13 ratio         PTT - ( 18 Aug 2022 15:55 )  PTT:38.8 sec    CAPILLARY BLOOD GLUCOSE            RADIOLOGY & ADDITIONAL TESTS:    8/18 x-ray of right ankle  Examination: Right ankle radiographs  Comparison: Right ankle 6/9/2022    Findings/  Impression:    No acute fracture or malalignment.    Stable calcaneal spur    Mild dorsal mid foot arthritic change.      Consultant(s) Notes Reviewed:  [x ] YES  [ ] NO    MEDICATIONS  (STANDING):  aMIOdarone    Tablet 200 milliGRAM(s) Oral daily  apixaban 2.5 milliGRAM(s) Oral every 12 hours  ceFAZolin   IVPB 1000 milliGRAM(s) IV Intermittent every 8 hours  cyanocobalamin 1000 MICROGram(s) Oral daily  famotidine    Tablet 40 milliGRAM(s) Oral at bedtime  levothyroxine 25 MICROGram(s) Oral daily  memantine 10 milliGRAM(s) Oral every 12 hours  senna 2 Tablet(s) Oral at bedtime    MEDICATIONS  (PRN):  acetaminophen     Tablet .. 650 milliGRAM(s) Oral every 6 hours PRN Mild Pain (1 - 3)  diazepam    Tablet 5 milliGRAM(s) Oral three times a day PRN anxiety  morphine  - Injectable 2 milliGRAM(s) IV Push every 6 hours PRN Severe Pain (7 - 10)  oxyCODONE    IR 5 milliGRAM(s) Oral every 6 hours PRN Moderate Pain (4 - 6)  polyethylene glycol 3350 17 Gram(s) Oral daily PRN Constipation      PHYSICAL EXAM:  GENERAL: well built, well nourished, NAD, laying in bed comfortably, cooperative  SKIN: mild right ankle edema and erythema improved from yesterday, mild tenderness to palpation, no pus, no drainage, no bleeding noted. Small partial thickness wound less than 1cm healing, clean and pink.    Care Discussed with Consultants/Other Providers [ x] YES  [ ] NO

## 2022-08-20 LAB
ANION GAP SERPL CALC-SCNC: 13 MMOL/L — SIGNIFICANT CHANGE UP (ref 7–14)
BASOPHILS # BLD AUTO: 0.03 K/UL — SIGNIFICANT CHANGE UP (ref 0–0.2)
BASOPHILS NFR BLD AUTO: 0.6 % — SIGNIFICANT CHANGE UP (ref 0–1)
BUN SERPL-MCNC: 20 MG/DL — SIGNIFICANT CHANGE UP (ref 10–20)
CALCIUM SERPL-MCNC: 9 MG/DL — SIGNIFICANT CHANGE UP (ref 8.5–10.1)
CHLORIDE SERPL-SCNC: 105 MMOL/L — SIGNIFICANT CHANGE UP (ref 98–110)
CO2 SERPL-SCNC: 24 MMOL/L — SIGNIFICANT CHANGE UP (ref 17–32)
CREAT SERPL-MCNC: 1.2 MG/DL — SIGNIFICANT CHANGE UP (ref 0.7–1.5)
EGFR: 44 ML/MIN/1.73M2 — LOW
EOSINOPHIL # BLD AUTO: 0.18 K/UL — SIGNIFICANT CHANGE UP (ref 0–0.7)
EOSINOPHIL NFR BLD AUTO: 3.4 % — SIGNIFICANT CHANGE UP (ref 0–8)
GLUCOSE SERPL-MCNC: 97 MG/DL — SIGNIFICANT CHANGE UP (ref 70–99)
HCT VFR BLD CALC: 35.6 % — LOW (ref 37–47)
HGB BLD-MCNC: 11.8 G/DL — LOW (ref 12–16)
IMM GRANULOCYTES NFR BLD AUTO: 0.4 % — HIGH (ref 0.1–0.3)
LYMPHOCYTES # BLD AUTO: 1.52 K/UL — SIGNIFICANT CHANGE UP (ref 1.2–3.4)
LYMPHOCYTES # BLD AUTO: 28.8 % — SIGNIFICANT CHANGE UP (ref 20.5–51.1)
MAGNESIUM SERPL-MCNC: 1.8 MG/DL — SIGNIFICANT CHANGE UP (ref 1.8–2.4)
MCHC RBC-ENTMCNC: 30.5 PG — SIGNIFICANT CHANGE UP (ref 27–31)
MCHC RBC-ENTMCNC: 33.1 G/DL — SIGNIFICANT CHANGE UP (ref 32–37)
MCV RBC AUTO: 92 FL — SIGNIFICANT CHANGE UP (ref 81–99)
MONOCYTES # BLD AUTO: 0.47 K/UL — SIGNIFICANT CHANGE UP (ref 0.1–0.6)
MONOCYTES NFR BLD AUTO: 8.9 % — SIGNIFICANT CHANGE UP (ref 1.7–9.3)
NEUTROPHILS # BLD AUTO: 3.06 K/UL — SIGNIFICANT CHANGE UP (ref 1.4–6.5)
NEUTROPHILS NFR BLD AUTO: 57.9 % — SIGNIFICANT CHANGE UP (ref 42.2–75.2)
NRBC # BLD: 0 /100 WBCS — SIGNIFICANT CHANGE UP (ref 0–0)
PLATELET # BLD AUTO: 188 K/UL — SIGNIFICANT CHANGE UP (ref 130–400)
POTASSIUM SERPL-MCNC: 4.9 MMOL/L — SIGNIFICANT CHANGE UP (ref 3.5–5)
POTASSIUM SERPL-SCNC: 4.9 MMOL/L — SIGNIFICANT CHANGE UP (ref 3.5–5)
RBC # BLD: 3.87 M/UL — LOW (ref 4.2–5.4)
RBC # FLD: 14.3 % — SIGNIFICANT CHANGE UP (ref 11.5–14.5)
SODIUM SERPL-SCNC: 142 MMOL/L — SIGNIFICANT CHANGE UP (ref 135–146)
WBC # BLD: 5.28 K/UL — SIGNIFICANT CHANGE UP (ref 4.8–10.8)
WBC # FLD AUTO: 5.28 K/UL — SIGNIFICANT CHANGE UP (ref 4.8–10.8)

## 2022-08-20 RX ADMIN — MORPHINE SULFATE 2 MILLIGRAM(S): 50 CAPSULE, EXTENDED RELEASE ORAL at 06:08

## 2022-08-20 RX ADMIN — SENNA PLUS 2 TABLET(S): 8.6 TABLET ORAL at 21:50

## 2022-08-20 RX ADMIN — MEMANTINE HYDROCHLORIDE 10 MILLIGRAM(S): 10 TABLET ORAL at 17:39

## 2022-08-20 RX ADMIN — FAMOTIDINE 40 MILLIGRAM(S): 10 INJECTION INTRAVENOUS at 22:30

## 2022-08-20 RX ADMIN — Medication 100 MILLIGRAM(S): at 13:16

## 2022-08-20 RX ADMIN — Medication 100 MILLIGRAM(S): at 21:50

## 2022-08-20 RX ADMIN — PREGABALIN 1000 MICROGRAM(S): 225 CAPSULE ORAL at 13:15

## 2022-08-20 RX ADMIN — Medication 100 MILLIGRAM(S): at 06:08

## 2022-08-20 RX ADMIN — AMIODARONE HYDROCHLORIDE 200 MILLIGRAM(S): 400 TABLET ORAL at 06:07

## 2022-08-20 RX ADMIN — APIXABAN 2.5 MILLIGRAM(S): 2.5 TABLET, FILM COATED ORAL at 17:40

## 2022-08-20 RX ADMIN — APIXABAN 2.5 MILLIGRAM(S): 2.5 TABLET, FILM COATED ORAL at 06:08

## 2022-08-20 RX ADMIN — Medication 25 MICROGRAM(S): at 06:08

## 2022-08-20 RX ADMIN — MEMANTINE HYDROCHLORIDE 10 MILLIGRAM(S): 10 TABLET ORAL at 06:08

## 2022-08-20 NOTE — PROGRESS NOTE ADULT - ASSESSMENT
Patient is 88 y/o Female with PMHx of HTN, HLD, paroxysmal A-fib (on Amiodarone 200mg daily and Eliquis 2.5mg bid), mitral valve regurgitation, GERD, Hypothyroidism, SLE, anxiety, dementia presenting, recently (06/09-06/11) admitted for nonhealing Right ankle  wound, completed course of Unasyn IV and discharged home on Augmentin PO, now presented with R ankle cellulitis.    # R ankle cellulitis:  - BCx x 2 8/18: prelim negative ;  WCx 8/18: pending  - Ortho: c/s 8/18 low suspicious for septic arthritis, recommended MRI but not able to do due to Loop recorder implant  - CT R ankle done 8/19 and negative for osteomyelitis  - as per IR recommendations started on Ancef 1 gm iv q8h  -  pain meds prn  - ambulate as tolerate    # Hx HTN, HLD, A-fib:  - monitor vitals, ECG  - continue home meds: Amiodarone 200mg daily, and Eliquis 2.5mg bid  - f/u with Dr Marte as outpatient     # Asthma, mild intermittent   - no evidence of exacerbation  - monitor O2sat  - nebs prn    # GERD:  - on Famotidine 40mg daily    # Dementia, anxiety   - c/w [id number] mg BID  - Atarax prn for agitation and anxiety   - currently on 1:1     #Misc  - DVT Prophylaxis: on apixaban for a-fib  - GI Prophylaxis: famotidine  - Diet: DASH/TLC  - Activity: ambulate as tolerate  - Code Status: Full code    Patient is 88 y/o Female with PMHx of HTN, HLD, paroxysmal A-fib (on Amiodarone 200mg daily and Eliquis 2.5mg bid), mitral valve regurgitation, GERD, Hypothyroidism, SLE, anxiety, dementia presenting, recently (06/09-06/11) admitted for nonhealing Right ankle  wound, completed course of Unasyn IV and discharged home on Augmentin PO, now presented with R ankle cellulitis.    # R ankle cellulitis:  - BCx x 2 8/18: prelim negative ;  WCx 8/18: pending  - Ortho: c/s 8/18 low suspicious for septic arthritis, recommended MRI but not able to do due to Loop recorder implant  - CT R ankle done 8/19 and negative for osteomyelitis  - as per ID recommendations pt started on Ancef 1 gm iv q8h  -  pain meds prn  - ambulate as tolerate    # Hx HTN, HLD, A-fib:  - monitor vitals, ECG  - continue home meds: Amiodarone 200mg daily, and Eliquis 2.5mg bid  - f/u with Dr Marte as outpatient     # Asthma, mild intermittent   - no evidence of exacerbation  - monitor O2sat  - nebs prn    # GERD:  - on Famotidine 40mg daily    # Dementia, anxiety   - c/w [id number] mg BID  - Atarax prn for agitation and anxiety   - currently on 1:1     #Misc  - DVT Prophylaxis: on apixaban for a-fib  - GI Prophylaxis: famotidine  - Diet: DASH/TLC  - Activity: ambulate as tolerate  - Code Status: Full code

## 2022-08-20 NOTE — PROGRESS NOTE ADULT - SUBJECTIVE AND OBJECTIVE BOX
Patient is a 87y old  Female who presents with a chief complaint of Right ankle cellulitis (19 Aug 2022 16:51)    AM rounds:  overnight pt agitated, getting up from bed and wondering in unit, trying to puling lines, refusing medications, placed on 1:1 overnight    Vital Signs Last 24 Hrs  T(C): 37.2 (19 Aug 2022 23:13), Max: 37.2 (19 Aug 2022 23:13)  T(F): 98.9 (19 Aug 2022 23:13), Max: 98.9 (19 Aug 2022 23:13)  HR: 74 (19 Aug 2022 23:13) (69 - 77)  BP: 133/59 (19 Aug 2022 23:13) (125/61 - 148/66)  BP(mean): 85 (19 Aug 2022 23:13) (85 - 95)  RR: 18 (19 Aug 2022 23:13) (18 - 18)  SpO2: 99% (19 Aug 2022 23:13) (99% - 100%)    O2 Parameters below as of 19 Aug 2022 19:25  Patient On (Oxygen Delivery Method): room air      I&O's Summary    18 Aug 2022 07:01  -  19 Aug 2022 07:00  --------------------------------------------------------  IN: 436 mL / OUT: 200 mL / NET: 236 mL    19 Aug 2022 07:01  -  20 Aug 2022 06:08  --------------------------------------------------------  IN: 50 mL / OUT: 0 mL / NET: 50 mL      Allergies  sulfa drugs (Unknown)      Lab Results:                        13.1   9.00  )-----------( 237      ( 19 Aug 2022 11:07 )             38.7     08-19    138  |  101  |  21<H>  ----------------------------<  98  4.8   |  22  |  1.3    Ca    9.5      19 Aug 2022 11:07  Phos  3.6     08-18  Mg     1.8     08-19    TPro  6.5  /  Alb  4.3  /  TBili  0.5  /  DBili  x   /  AST  18  /  ALT  11  /  AlkPhos  78  08-18    PT/INR - ( 18 Aug 2022 15:55 )   PT: 13.00 sec;   INR: 1.13 ratio         PTT - ( 18 Aug 2022 15:55 )  PTT:38.8 sec    LIVER FUNCTIONS - ( 18 Aug 2022 10:58 )  Alb: 4.3 g/dL / Pro: 6.5 g/dL / ALK PHOS: 78 U/L / ALT: 11 U/L / AST: 18 U/L / GGT: x           MEDICATIONS  (STANDING):  aMIOdarone    Tablet 200 milliGRAM(s) Oral daily  apixaban 2.5 milliGRAM(s) Oral every 12 hours  ceFAZolin   IVPB 1000 milliGRAM(s) IV Intermittent every 8 hours  cyanocobalamin 1000 MICROGram(s) Oral daily  famotidine    Tablet 40 milliGRAM(s) Oral at bedtime  levothyroxine 25 MICROGram(s) Oral daily  memantine 10 milliGRAM(s) Oral every 12 hours  senna 2 Tablet(s) Oral at bedtime    MEDICATIONS  (PRN):  acetaminophen     Tablet .. 650 milliGRAM(s) Oral every 6 hours PRN Mild Pain (1 - 3)  diazepam    Tablet 5 milliGRAM(s) Oral three times a day PRN anxiety  hydrOXYzine hydrochloride 25 milliGRAM(s) Oral every 6 hours PRN Agitation  morphine  - Injectable 2 milliGRAM(s) IV Push every 6 hours PRN Severe Pain (7 - 10)  oxyCODONE    IR 5 milliGRAM(s) Oral every 6 hours PRN Moderate Pain (4 - 6)  polyethylene glycol 3350 17 Gram(s) Oral daily PRN Constipation    PHYSICAL EXAM:  GENERAL: well built, well nourished  SKIN: mild right ankle edema and erythema improved from yesterday, mild tenderness to palpation, no pus, no drainage, no bleeding noted. Small partial thickness wound less than 1cm healing, clean and pink. Patient is a 87y old  Female who presented with a chief complaint of right ankle cellulitis. Pt seen at bedside, has no complains. Overnight pt was agitated, getting out of bed and wondering around the unit, trying to pull lines, refusing medications, placed on 1:1 watch.  Pt afebrile.      Vital Signs Last 24 Hrs  T(C): 37.2 (19 Aug 2022 23:13), Max: 37.2 (19 Aug 2022 23:13)  T(F): 98.9 (19 Aug 2022 23:13), Max: 98.9 (19 Aug 2022 23:13)  HR: 74 (19 Aug 2022 23:13) (69 - 77)  BP: 133/59 (19 Aug 2022 23:13) (125/61 - 148/66)  BP(mean): 85 (19 Aug 2022 23:13) (85 - 95)  RR: 18 (19 Aug 2022 23:13) (18 - 18)  SpO2: 99% (19 Aug 2022 23:13) (99% - 100%)    O2 Parameters below as of 19 Aug 2022 19:25  Patient On (Oxygen Delivery Method): room air        Allergies  sulfa drugs (Unknown)      Lab Results:                        13.1   9.00  )-----------( 237      ( 19 Aug 2022 11:07 )             38.7     08-19    138  |  101  |  21<H>  ----------------------------<  98  4.8   |  22  |  1.3    Ca    9.5      19 Aug 2022 11:07  Phos  3.6     08-18  Mg     1.8     08-19    TPro  6.5  /  Alb  4.3  /  TBili  0.5  /  DBili  x   /  AST  18  /  ALT  11  /  AlkPhos  78  08-18    PT/INR - ( 18 Aug 2022 15:55 )   PT: 13.00 sec;   INR: 1.13 ratio         PTT - ( 18 Aug 2022 15:55 )  PTT:38.8 sec    LIVER FUNCTIONS - ( 18 Aug 2022 10:58 )  Alb: 4.3 g/dL / Pro: 6.5 g/dL / ALK PHOS: 78 U/L / ALT: 11 U/L / AST: 18 U/L / GGT: x             MEDICATIONS  (STANDING):  aMIOdarone    Tablet 200 milliGRAM(s) Oral daily  apixaban 2.5 milliGRAM(s) Oral every 12 hours  ceFAZolin   IVPB 1000 milliGRAM(s) IV Intermittent every 8 hours  cyanocobalamin 1000 MICROGram(s) Oral daily  famotidine    Tablet 40 milliGRAM(s) Oral at bedtime  levothyroxine 25 MICROGram(s) Oral daily  memantine 10 milliGRAM(s) Oral every 12 hours  senna 2 Tablet(s) Oral at bedtime    MEDICATIONS  (PRN):  acetaminophen     Tablet .. 650 milliGRAM(s) Oral every 6 hours PRN Mild Pain (1 - 3)  diazepam    Tablet 5 milliGRAM(s) Oral three times a day PRN anxiety  hydrOXYzine hydrochloride 25 milliGRAM(s) Oral every 6 hours PRN Agitation  morphine  - Injectable 2 milliGRAM(s) IV Push every 6 hours PRN Severe Pain (7 - 10)  oxyCODONE    IR 5 milliGRAM(s) Oral every 6 hours PRN Moderate Pain (4 - 6)  polyethylene glycol 3350 17 Gram(s) Oral daily PRN Constipation    PHYSICAL EXAM:  GENERAL: well built, well nourished, sitting in bed in NAD, not agitated this morning  SKIN: mild right ankle edema and erythema improving daily, mild tenderness to palpation, no pus, no drainage, no bleeding noted. Small partial thickness wound less than 1cm healing, clean and pink.

## 2022-08-21 LAB
-  AMPICILLIN/SULBACTAM: SIGNIFICANT CHANGE UP
-  CEFAZOLIN: SIGNIFICANT CHANGE UP
-  CLINDAMYCIN: SIGNIFICANT CHANGE UP
-  ERYTHROMYCIN: SIGNIFICANT CHANGE UP
-  GENTAMICIN: SIGNIFICANT CHANGE UP
-  OXACILLIN: SIGNIFICANT CHANGE UP
-  PENICILLIN: SIGNIFICANT CHANGE UP
-  RIFAMPIN: SIGNIFICANT CHANGE UP
-  TETRACYCLINE: SIGNIFICANT CHANGE UP
-  TRIMETHOPRIM/SULFAMETHOXAZOLE: SIGNIFICANT CHANGE UP
-  VANCOMYCIN: SIGNIFICANT CHANGE UP
ANION GAP SERPL CALC-SCNC: 8 MMOL/L — SIGNIFICANT CHANGE UP (ref 7–14)
BACTERIA SPEC CULT: ABNORMAL
BASOPHILS # BLD AUTO: 0.05 K/UL — SIGNIFICANT CHANGE UP (ref 0–0.2)
BASOPHILS NFR BLD AUTO: 0.8 % — SIGNIFICANT CHANGE UP (ref 0–1)
BUN SERPL-MCNC: 25 MG/DL — HIGH (ref 10–20)
CALCIUM SERPL-MCNC: 8.7 MG/DL — SIGNIFICANT CHANGE UP (ref 8.5–10.1)
CHLORIDE SERPL-SCNC: 101 MMOL/L — SIGNIFICANT CHANGE UP (ref 98–110)
CO2 SERPL-SCNC: 23 MMOL/L — SIGNIFICANT CHANGE UP (ref 17–32)
CREAT SERPL-MCNC: 1.1 MG/DL — SIGNIFICANT CHANGE UP (ref 0.7–1.5)
CULTURE RESULTS: SIGNIFICANT CHANGE UP
EGFR: 49 ML/MIN/1.73M2 — LOW
EOSINOPHIL # BLD AUTO: 0.2 K/UL — SIGNIFICANT CHANGE UP (ref 0–0.7)
EOSINOPHIL NFR BLD AUTO: 3.1 % — SIGNIFICANT CHANGE UP (ref 0–8)
GLUCOSE SERPL-MCNC: 104 MG/DL — HIGH (ref 70–99)
HCT VFR BLD CALC: 34.1 % — LOW (ref 37–47)
HGB BLD-MCNC: 11.3 G/DL — LOW (ref 12–16)
IMM GRANULOCYTES NFR BLD AUTO: 0.5 % — HIGH (ref 0.1–0.3)
LYMPHOCYTES # BLD AUTO: 1.26 K/UL — SIGNIFICANT CHANGE UP (ref 1.2–3.4)
LYMPHOCYTES # BLD AUTO: 19.4 % — LOW (ref 20.5–51.1)
MAGNESIUM SERPL-MCNC: 1.7 MG/DL — LOW (ref 1.8–2.4)
MCHC RBC-ENTMCNC: 30.1 PG — SIGNIFICANT CHANGE UP (ref 27–31)
MCHC RBC-ENTMCNC: 33.1 G/DL — SIGNIFICANT CHANGE UP (ref 32–37)
MCV RBC AUTO: 90.7 FL — SIGNIFICANT CHANGE UP (ref 81–99)
METHOD TYPE: SIGNIFICANT CHANGE UP
MONOCYTES # BLD AUTO: 0.51 K/UL — SIGNIFICANT CHANGE UP (ref 0.1–0.6)
MONOCYTES NFR BLD AUTO: 7.8 % — SIGNIFICANT CHANGE UP (ref 1.7–9.3)
NEUTROPHILS # BLD AUTO: 4.46 K/UL — SIGNIFICANT CHANGE UP (ref 1.4–6.5)
NEUTROPHILS NFR BLD AUTO: 68.4 % — SIGNIFICANT CHANGE UP (ref 42.2–75.2)
NRBC # BLD: 0 /100 WBCS — SIGNIFICANT CHANGE UP (ref 0–0)
ORGANISM # SPEC MICROSCOPIC CNT: SIGNIFICANT CHANGE UP
ORGANISM # SPEC MICROSCOPIC CNT: SIGNIFICANT CHANGE UP
PLATELET # BLD AUTO: 215 K/UL — SIGNIFICANT CHANGE UP (ref 130–400)
POTASSIUM SERPL-MCNC: 4.6 MMOL/L — SIGNIFICANT CHANGE UP (ref 3.5–5)
POTASSIUM SERPL-SCNC: 4.6 MMOL/L — SIGNIFICANT CHANGE UP (ref 3.5–5)
RBC # BLD: 3.76 M/UL — LOW (ref 4.2–5.4)
RBC # FLD: 14.1 % — SIGNIFICANT CHANGE UP (ref 11.5–14.5)
SODIUM SERPL-SCNC: 132 MMOL/L — LOW (ref 135–146)
SPECIMEN SOURCE: SIGNIFICANT CHANGE UP
WBC # BLD: 6.51 K/UL — SIGNIFICANT CHANGE UP (ref 4.8–10.8)
WBC # FLD AUTO: 6.51 K/UL — SIGNIFICANT CHANGE UP (ref 4.8–10.8)

## 2022-08-21 PROCEDURE — 93010 ELECTROCARDIOGRAM REPORT: CPT

## 2022-08-21 PROCEDURE — 99232 SBSQ HOSP IP/OBS MODERATE 35: CPT

## 2022-08-21 RX ORDER — HALOPERIDOL DECANOATE 100 MG/ML
2.5 INJECTION INTRAMUSCULAR ONCE
Refills: 0 | Status: COMPLETED | OUTPATIENT
Start: 2022-08-21 | End: 2022-08-21

## 2022-08-21 RX ORDER — MAGNESIUM SULFATE 500 MG/ML
2 VIAL (ML) INJECTION ONCE
Refills: 0 | Status: COMPLETED | OUTPATIENT
Start: 2022-08-21 | End: 2022-08-21

## 2022-08-21 RX ADMIN — Medication 25 MICROGRAM(S): at 05:05

## 2022-08-21 RX ADMIN — Medication 25 MILLIGRAM(S): at 23:35

## 2022-08-21 RX ADMIN — AMIODARONE HYDROCHLORIDE 200 MILLIGRAM(S): 400 TABLET ORAL at 05:05

## 2022-08-21 RX ADMIN — MEMANTINE HYDROCHLORIDE 10 MILLIGRAM(S): 10 TABLET ORAL at 18:21

## 2022-08-21 RX ADMIN — PREGABALIN 1000 MICROGRAM(S): 225 CAPSULE ORAL at 11:58

## 2022-08-21 RX ADMIN — Medication 5 MILLIGRAM(S): at 17:56

## 2022-08-21 RX ADMIN — SENNA PLUS 2 TABLET(S): 8.6 TABLET ORAL at 21:19

## 2022-08-21 RX ADMIN — Medication 25 GRAM(S): at 23:35

## 2022-08-21 RX ADMIN — Medication 100 MILLIGRAM(S): at 05:05

## 2022-08-21 RX ADMIN — Medication 100 MILLIGRAM(S): at 13:35

## 2022-08-21 RX ADMIN — Medication 100 MILLIGRAM(S): at 21:18

## 2022-08-21 RX ADMIN — MEMANTINE HYDROCHLORIDE 10 MILLIGRAM(S): 10 TABLET ORAL at 05:04

## 2022-08-21 RX ADMIN — APIXABAN 2.5 MILLIGRAM(S): 2.5 TABLET, FILM COATED ORAL at 05:05

## 2022-08-21 RX ADMIN — FAMOTIDINE 40 MILLIGRAM(S): 10 INJECTION INTRAVENOUS at 21:18

## 2022-08-21 RX ADMIN — HALOPERIDOL DECANOATE 2.5 MILLIGRAM(S): 100 INJECTION INTRAMUSCULAR at 19:09

## 2022-08-21 RX ADMIN — APIXABAN 2.5 MILLIGRAM(S): 2.5 TABLET, FILM COATED ORAL at 18:21

## 2022-08-21 NOTE — PROGRESS NOTE ADULT - ASSESSMENT
Patient is 88 y/o Female with PMHx of HTN, HLD, paroxysmal A-fib (on Amiodarone 200mg daily and Eliquis 2.5mg bid), mitral valve regurgitation, GERD, Hypothyroidism, SLE, anxiety, dementia presenting, recently (06/09-06/11) admitted for nonhealing Right ankle  wound, completed course of Unasyn IV and discharged home on Augmentin PO, now presented with R ankle cellulitis.    # R ankle cellulitis:  - BCx x 2 8/18: NGTD;  WCx 8/18: few staph aureus  - Ortho: c/s 8/18 low suspicious for septic arthritis, recommended MRI but not able to do due to Loop recorder implant  - CT R ankle done 8/19 and negative for osteomyelitis  - as per ID recommendations pt started on Ancef 1 gm iv q8h - f/u for DC rec's   -  pain meds prn  - ambulate as tolerate  - Wound care - dry gauze/Kerlix/Ace BID    #New R shin skin tear:  - clean, not infected  - wound care BID - xeroform, kerlix, ACE    # Hx HTN, HLD, A-fib:  - monitor vitals, ECG  - continue home meds: Amiodarone 200mg daily, and Eliquis 2.5mg bid  - f/u with Dr Marte as outpatient     # Asthma, mild intermittent   - no evidence of exacerbation  - monitor O2sat  - nebs prn    # GERD:  - on Famotidine 40mg daily    # Dementia, anxiety   - c/w [id number] mg BID  - Atarax prn for agitation and anxiety   - currently on 1:1     #Misc  - DVT Prophylaxis: on apixaban for a-fib  - GI Prophylaxis: famotidine  - Diet: DASH/TLC  - Activity: ambulate as tolerate  - Code Status: Full code

## 2022-08-21 NOTE — PROGRESS NOTE ADULT - SUBJECTIVE AND OBJECTIVE BOX
Patient is a 87y old  Female who presented with a chief complaint of right ankle cellulitis.     AM rounds     Pt: no complaints; but reports a new wound to shin that she is unaware of how she got it.   No acute events o/n . No agitation overnight.     Vital Signs Last 24 Hrs  T(C): 36.8 (21 Aug 2022 12:00), Max: 37.2 (20 Aug 2022 23:23)  T(F): 98.2 (21 Aug 2022 12:00), Max: 98.9 (20 Aug 2022 23:23)  HR: 67 (21 Aug 2022 12:00) (63 - 67)  BP: 120/56 (21 Aug 2022 12:00) (110/54 - 155/72)  BP(mean): 104 (21 Aug 2022 05:17) (80 - 104)  RR: 18 (21 Aug 2022 12:00) (16 - 20)  SpO2: 100% (21 Aug 2022 12:00) (97% - 100%)    Parameters below as of 21 Aug 2022 12:00  Patient On (Oxygen Delivery Method): room air          08-21    132<L>  |  101  |  25<H>  ----------------------------<  104<H>  4.6   |  23  |  1.1    Ca    8.7      21 Aug 2022 12:35  Mg     1.7     08-21                            11.3   6.51  )-----------( 215      ( 21 Aug 2022 12:35 )             34.1             EXAM:   GENERAL: well built, well nourished, sitting in bed in NAD, not agitated this morning  SKIN: minimal right ankle edema ; no more erythema, nontender, no pus, no drainage, no bleeding noted. Small partial thickness wound less than 1cm healing, clean and pink.  Right anterior lower low - ~ 5x3cm skin tear - pink, moist, bloody drainage - but no active bleeding - no erythema, no purulence

## 2022-08-22 ENCOUNTER — TRANSCRIPTION ENCOUNTER (OUTPATIENT)
Age: 87
End: 2022-08-22

## 2022-08-22 ENCOUNTER — APPOINTMENT (OUTPATIENT)
Dept: CARDIOLOGY | Facility: CLINIC | Age: 87
End: 2022-08-22

## 2022-08-22 VITALS — OXYGEN SATURATION: 77 % | DIASTOLIC BLOOD PRESSURE: 60 MMHG | SYSTOLIC BLOOD PRESSURE: 130 MMHG

## 2022-08-22 LAB
ANION GAP SERPL CALC-SCNC: 11 MMOL/L — SIGNIFICANT CHANGE UP (ref 7–14)
APPEARANCE UR: CLEAR — SIGNIFICANT CHANGE UP
BACTERIA # UR AUTO: NEGATIVE — SIGNIFICANT CHANGE UP
BASOPHILS # BLD AUTO: 0.03 K/UL — SIGNIFICANT CHANGE UP (ref 0–0.2)
BASOPHILS NFR BLD AUTO: 0.4 % — SIGNIFICANT CHANGE UP (ref 0–1)
BILIRUB UR-MCNC: NEGATIVE — SIGNIFICANT CHANGE UP
BUN SERPL-MCNC: 23 MG/DL — HIGH (ref 10–20)
CALCIUM SERPL-MCNC: 8.8 MG/DL — SIGNIFICANT CHANGE UP (ref 8.5–10.1)
CHLORIDE SERPL-SCNC: 103 MMOL/L — SIGNIFICANT CHANGE UP (ref 98–110)
CO2 SERPL-SCNC: 24 MMOL/L — SIGNIFICANT CHANGE UP (ref 17–32)
COLOR SPEC: SIGNIFICANT CHANGE UP
CREAT SERPL-MCNC: 1.2 MG/DL — SIGNIFICANT CHANGE UP (ref 0.7–1.5)
DIFF PNL FLD: NEGATIVE — SIGNIFICANT CHANGE UP
EGFR: 44 ML/MIN/1.73M2 — LOW
EOSINOPHIL # BLD AUTO: 0.05 K/UL — SIGNIFICANT CHANGE UP (ref 0–0.7)
EOSINOPHIL NFR BLD AUTO: 0.6 % — SIGNIFICANT CHANGE UP (ref 0–8)
EPI CELLS # UR: 3 /HPF — SIGNIFICANT CHANGE UP (ref 0–5)
GLUCOSE SERPL-MCNC: 115 MG/DL — HIGH (ref 70–99)
GLUCOSE UR QL: NEGATIVE — SIGNIFICANT CHANGE UP
HCT VFR BLD CALC: 32.7 % — LOW (ref 37–47)
HGB BLD-MCNC: 10.8 G/DL — LOW (ref 12–16)
HYALINE CASTS # UR AUTO: 1 /LPF — SIGNIFICANT CHANGE UP (ref 0–7)
IMM GRANULOCYTES NFR BLD AUTO: 0.6 % — HIGH (ref 0.1–0.3)
KETONES UR-MCNC: NEGATIVE — SIGNIFICANT CHANGE UP
LEUKOCYTE ESTERASE UR-ACNC: ABNORMAL
LYMPHOCYTES # BLD AUTO: 1.07 K/UL — LOW (ref 1.2–3.4)
LYMPHOCYTES # BLD AUTO: 13.6 % — LOW (ref 20.5–51.1)
MAGNESIUM SERPL-MCNC: 1.9 MG/DL — SIGNIFICANT CHANGE UP (ref 1.8–2.4)
MCHC RBC-ENTMCNC: 29.6 PG — SIGNIFICANT CHANGE UP (ref 27–31)
MCHC RBC-ENTMCNC: 33 G/DL — SIGNIFICANT CHANGE UP (ref 32–37)
MCV RBC AUTO: 89.6 FL — SIGNIFICANT CHANGE UP (ref 81–99)
MONOCYTES # BLD AUTO: 0.63 K/UL — HIGH (ref 0.1–0.6)
MONOCYTES NFR BLD AUTO: 8 % — SIGNIFICANT CHANGE UP (ref 1.7–9.3)
NEUTROPHILS # BLD AUTO: 6.05 K/UL — SIGNIFICANT CHANGE UP (ref 1.4–6.5)
NEUTROPHILS NFR BLD AUTO: 76.8 % — HIGH (ref 42.2–75.2)
NITRITE UR-MCNC: NEGATIVE — SIGNIFICANT CHANGE UP
NRBC # BLD: 0 /100 WBCS — SIGNIFICANT CHANGE UP (ref 0–0)
PH UR: 6.5 — SIGNIFICANT CHANGE UP (ref 5–8)
PLATELET # BLD AUTO: 218 K/UL — SIGNIFICANT CHANGE UP (ref 130–400)
POTASSIUM SERPL-MCNC: 4.7 MMOL/L — SIGNIFICANT CHANGE UP (ref 3.5–5)
POTASSIUM SERPL-SCNC: 4.7 MMOL/L — SIGNIFICANT CHANGE UP (ref 3.5–5)
PROT UR-MCNC: NEGATIVE — SIGNIFICANT CHANGE UP
RBC # BLD: 3.65 M/UL — LOW (ref 4.2–5.4)
RBC # FLD: 13.8 % — SIGNIFICANT CHANGE UP (ref 11.5–14.5)
RBC CASTS # UR COMP ASSIST: 2 /HPF — SIGNIFICANT CHANGE UP (ref 0–4)
SODIUM SERPL-SCNC: 138 MMOL/L — SIGNIFICANT CHANGE UP (ref 135–146)
SP GR SPEC: 1.01 — SIGNIFICANT CHANGE UP (ref 1.01–1.03)
UROBILINOGEN FLD QL: SIGNIFICANT CHANGE UP
WBC # BLD: 7.88 K/UL — SIGNIFICANT CHANGE UP (ref 4.8–10.8)
WBC # FLD AUTO: 7.88 K/UL — SIGNIFICANT CHANGE UP (ref 4.8–10.8)
WBC UR QL: 5 /HPF — SIGNIFICANT CHANGE UP (ref 0–5)

## 2022-08-22 PROCEDURE — 11045 DBRDMT SUBQ TISS EACH ADDL: CPT

## 2022-08-22 PROCEDURE — 11042 DBRDMT SUBQ TIS 1ST 20SQCM/<: CPT

## 2022-08-22 PROCEDURE — 99238 HOSP IP/OBS DSCHRG MGMT 30/<: CPT | Mod: 25

## 2022-08-22 RX ORDER — HALOPERIDOL DECANOATE 100 MG/ML
2.5 INJECTION INTRAMUSCULAR ONCE
Refills: 0 | Status: COMPLETED | OUTPATIENT
Start: 2022-08-22 | End: 2022-08-22

## 2022-08-22 RX ORDER — CYCLOSPORINE 0.5 MG/ML
1 EMULSION OPHTHALMIC
Qty: 0 | Refills: 0 | DISCHARGE

## 2022-08-22 RX ORDER — SENNA PLUS 8.6 MG/1
2 TABLET ORAL
Qty: 0 | Refills: 0 | DISCHARGE
Start: 2022-08-22

## 2022-08-22 RX ORDER — GENTAMICIN SULFATE 0.1 %
1 OINTMENT (GRAM) TOPICAL
Qty: 2 | Refills: 4
Start: 2022-08-22 | End: 2023-01-18

## 2022-08-22 RX ORDER — CEPHALEXIN 500 MG
1 CAPSULE ORAL
Qty: 20 | Refills: 0
Start: 2022-08-22 | End: 2022-08-31

## 2022-08-22 RX ADMIN — PREGABALIN 1000 MICROGRAM(S): 225 CAPSULE ORAL at 12:48

## 2022-08-22 RX ADMIN — AMIODARONE HYDROCHLORIDE 200 MILLIGRAM(S): 400 TABLET ORAL at 05:29

## 2022-08-22 RX ADMIN — MEMANTINE HYDROCHLORIDE 10 MILLIGRAM(S): 10 TABLET ORAL at 05:28

## 2022-08-22 RX ADMIN — HALOPERIDOL DECANOATE 2.5 MILLIGRAM(S): 100 INJECTION INTRAMUSCULAR at 06:16

## 2022-08-22 RX ADMIN — Medication 100 MILLIGRAM(S): at 05:29

## 2022-08-22 RX ADMIN — APIXABAN 2.5 MILLIGRAM(S): 2.5 TABLET, FILM COATED ORAL at 05:28

## 2022-08-22 RX ADMIN — OXYCODONE HYDROCHLORIDE 5 MILLIGRAM(S): 5 TABLET ORAL at 09:20

## 2022-08-22 RX ADMIN — Medication 25 MICROGRAM(S): at 05:28

## 2022-08-22 RX ADMIN — OXYCODONE HYDROCHLORIDE 5 MILLIGRAM(S): 5 TABLET ORAL at 08:34

## 2022-08-22 NOTE — DISCHARGE NOTE NURSING/CASE MANAGEMENT/SOCIAL WORK - NSDCVIVACCINE_GEN_ALL_CORE_FT
Tdap; 21-Jan-2019 16:42; Esperanza Kenney ([place]); [employer]; [id number] (Exp. Date: 27-Jul-2020); IntraMuscular; Deltoid Right.; 0.5 milliLiter(s); VIS (VIS Published: 09-May-2013, VIS Presented: 21-Jan-2019);

## 2022-08-22 NOTE — DISCHARGE NOTE PROVIDER - NSDCCPCAREPLAN_GEN_ALL_CORE_FT
PRINCIPAL DISCHARGE DIAGNOSIS  Diagnosis: Cellulitis of right leg  Assessment and Plan of Treatment: Please conrinue wound care daily:  wash wound with soap and water, apply gentamycin, cover with xeroform, then wrap with kelrix an dACE wrap daily.  CT scan Right ankle done 8/19 and negative for osteomyelitis; Wound cultures 8/18 showed few staph aureus. As per ID recommendations continue Keflex 500 mg PO  q8h till 9/2. Please call [phone] to make a follow up appointment with Dr Pride in one week after discharge.

## 2022-08-22 NOTE — DISCHARGE NOTE PROVIDER - NSFOLLOWUPCLINICS_GEN_ALL_ED_FT
[facility]  Burn  [address] [place] [id number]  Phone: [phone]  Fax:     [facility]  Burn  [address] [facility], [address]  [place] [address]  Phone: [phone]  Fax:

## 2022-08-22 NOTE — DISCHARGE NOTE PROVIDER - CARE PROVIDER_API CALL
Beto Pride)  Plastic Surgery  [address] [place] [address]  Phone: [phone]  Fax: [phone]  Follow Up Time:

## 2022-08-22 NOTE — PROGRESS NOTE ADULT - SUBJECTIVE AND OBJECTIVE BOX
Patient is a 87y old  Female who presented with a chief complaint of right ankle cellulitis.     AM rounds   No acute events o/n . afebrile.    Vital Signs Last 24 Hrs  T(C): 36.3 (22 Aug 2022 07:40), Max: 37.2 (21 Aug 2022 19:56)  T(F): 97.4 (22 Aug 2022 07:40), Max: 98.9 (21 Aug 2022 19:56)  HR: 84 (22 Aug 2022 07:40) (70 - 86)  BP: 179/79 (22 Aug 2022 07:40) (117/54 - 179/79)  BP(mean): 113 (22 Aug 2022 07:40) (78 - 116)  RR: 19 (22 Aug 2022 07:40) (18 - 19)  SpO2: 88% (22 Aug 2022 07:40) (88% - 100%)    O2 Parameters below as of 21 Aug 2022 19:56  Patient On (Oxygen Delivery Method): room air      I&O's Summary    21 Aug 2022 07:01  -  22 Aug 2022 07:00  --------------------------------------------------------  IN: 150 mL / OUT: 0 mL / NET: 150 mL        Allergies  sulfa drugs (Unknown)      Lab Results:                        11.3   6.51  )-----------( 215      ( 21 Aug 2022 12:35 )             34.1     08-    132<L>  |  101  |  25<H>  ----------------------------<  104<H>  4.6   |  23  |  1.1    Ca    8.7      21 Aug 2022 12:35  Mg     1.7     08      Urinalysis Basic - ( 22 Aug 2022 01:10 )  Color: Light Yellow / Appearance: Clear / S.012 / pH: x  Gluc: x / Ketone: Negative  / Bili: Negative / Urobili: <2 mg/dL   Blood: x / Protein: Negative / Nitrite: Negative   Leuk Esterase: Moderate / RBC: 2 /HPF / WBC 5 /HPF   Sq Epi: x / Non Sq Epi: 3 /HPF / Bacteria: Negative    MEDICATIONS  (STANDING):  aMIOdarone    Tablet 200 milliGRAM(s) Oral daily  apixaban 2.5 milliGRAM(s) Oral every 12 hours  ceFAZolin   IVPB 1000 milliGRAM(s) IV Intermittent every 8 hours  cyanocobalamin 1000 MICROGram(s) Oral daily  famotidine    Tablet 40 milliGRAM(s) Oral at bedtime  levothyroxine 25 MICROGram(s) Oral daily  memantine 10 milliGRAM(s) Oral every 12 hours  senna 2 Tablet(s) Oral at bedtime    MEDICATIONS  (PRN):  acetaminophen     Tablet .. 650 milliGRAM(s) Oral every 6 hours PRN Mild Pain (1 - 3)  diazepam    Tablet 5 milliGRAM(s) Oral three times a day PRN anxiety  hydrOXYzine hydrochloride 25 milliGRAM(s) Oral every 6 hours PRN Agitation  morphine  - Injectable 2 milliGRAM(s) IV Push every 6 hours PRN Severe Pain (7 - 10)  oxyCODONE    IR 5 milliGRAM(s) Oral every 6 hours PRN Moderate Pain (4 - 6)  polyethylene glycol 3350 17 Gram(s) Oral daily PRN Constipation    PHYSICAL EXAM:GENERAL: well built, well nourished, sitting in bed in NAD, not agitated this morning  SKIN: minimal right ankle edema ; no more erythema, nontender, no pus, no drainage, no bleeding noted. Small partial thickness wound less than 1cm healing, clean and pink.  Right anterior lower low ~ 5x3cm skin tear - pink, moist, bloody drainage -  no active bleeding - no erythema, no purulence

## 2022-08-22 NOTE — DISCHARGE NOTE PROVIDER - NSDCMRMEDTOKEN_GEN_ALL_CORE_FT
acetaminophen 325 mg oral tablet: 2 tab(s) orally every 6 hours, As needed, Mild Pain (1 - 3), Moderate Pain (4 - 6)  amiodarone 200 mg oral tablet: 1 tab(s) orally once a day  Calcium 600+D 600 mg-5 mcg (200 intl units) oral tablet: 1 tab(s) orally once a day  diazePAM 5 mg oral tablet: 1 tab(s) orally 3 times a day, As Needed  Eliquis 2.5 mg oral tablet: 1 tab(s) orally 2 times a day  famotidine 40 mg oral tablet: 1 tab(s) orally once a day (at bedtime)  levothyroxine 25 mcg (0.025 mg) oral tablet: 1 tab(s) orally once a day  memantine 10 mg oral tablet: 1 tab(s) orally 2 times a day  Restasis 0.05% ophthalmic emulsion: 1 drop(s) to each affected eye every 12 hours  Vitamin B12 1000 mcg oral tablet: 1 tab(s) orally once a day   acetaminophen 325 mg oral tablet: 2 tab(s) orally every 6 hours, As needed, Mild Pain (1 - 3), Moderate Pain (4 - 6)  amiodarone 200 mg oral tablet: 1 tab(s) orally once a day  Calcium 600+D 600 mg-5 mcg (200 intl units) oral tablet: 1 tab(s) orally once a day  cephalexin 500 mg oral capsule: 1 cap(s) orally 2 times a day   diazePAM 5 mg oral tablet: 1 tab(s) orally 3 times a day, As Needed  Eliquis 2.5 mg oral tablet: 1 tab(s) orally 2 times a day  famotidine 40 mg oral tablet: 1 tab(s) orally once a day (at bedtime)  gentamicin 0.1% topical ointment: Apply topically to affected area once a day   levothyroxine 25 mcg (0.025 mg) oral tablet: 1 tab(s) orally once a day  memantine 10 mg oral tablet: 1 tab(s) orally 2 times a day  senna leaf extract oral tablet: 2 tab(s) orally once a day (at bedtime)  Vitamin B12 1000 mcg oral tablet: 1 tab(s) orally once a day

## 2022-08-22 NOTE — DISCHARGE NOTE PROVIDER - NSDCFUSCHEDAPPT_GEN_ALL_CORE_FT
[facility] Physician [facility]  BURNTREAT [address]  Scheduled Appointment: 08/25/2022    [facility]  CARDIOLOGY [address]  Scheduled Appointment: 08/26/2022    Leonel Marte  [facility]  CARDIOLOGY [address]  Scheduled Appointment: 09/20/2022    Hammad Hatch  [facility]  NEUROLOGY [address]  Scheduled Appointment: 10/19/2022    Stanislav De Jesus  [facility]  PULMMED [address]  Scheduled Appointment: 11/15/2022

## 2022-08-22 NOTE — DISCHARGE NOTE PROVIDER - NSDCFUADDAPPT_GEN_ALL_CORE_FT
Please call [phone] to make a follow up appointment within 1 week with Dr. Pride or Dr. Capellan. Clinic is located at [address] [place] [address] on Tuesdays (2-4pm) or Thursdays (9am-1pm).

## 2022-08-22 NOTE — PROGRESS NOTE ADULT - REASON FOR ADMISSION
Right ankle cellulitis

## 2022-08-22 NOTE — PROGRESS NOTE ADULT - ASSESSMENT
88yo Female presenting for worsening right ankle pain. On 06/09-06/11 admitted for nonhealing Right ankle  wound, completed course of Unasyn IV and discharged home on Augmentin PO. Patient states that over past two days, increased right ankle pain. patient denies any fevers,chills    IMPRESSION;  CT no OM right malleolus  Resolving Cellulitis  No abscess    RECOMMENDATIONS;  Could change to po Keflex 500 mg q8h till 9/2  Off loading to prevent pressure sores and preventive measures to avoid aspiration  Please do not hesitate to recall ID if any questions arise either through [employer] or through [email or web address] teams

## 2022-08-22 NOTE — PROGRESS NOTE ADULT - ASSESSMENT
Patient is 86 y/o Female with PMHx of HTN, HLD, paroxysmal A-fib (on Amiodarone 200mg daily and Eliquis 2.5mg bid), mitral valve regurgitation, GERD, Hypothyroidism, SLE, anxiety, dementia presenting, recently (06/09-06/11) admitted for nonhealing Right ankle  wound, completed course of Unasyn IV and discharged home on Augmentin PO, now presented with R ankle cellulitis.    # R ankle cellulitis:  - BCx x 2 8/18: NGTD;  WCx 8/18: few staph aureus  - Ortho: c/s 8/18 low suspicious for septic arthritis, recommended MRI but not able to do due to Loop recorder implant  - CT R ankle done 8/19 and negative for osteomyelitis  - as per ID recommendations pt started on Ancef 1 gm iv q8h while inpatient  - on discharge change to po Keflex 500 mg q8h till 9/2  -  pain meds prn  - ambulate as tolerate  - Wound care - gent/xeroform/gauze/Kerlix/Ace BID    #New R shin skin tear:  - clean, not infected  - wound care BID - gent/xeroform, kerlix, ACE    # Hx HTN, HLD, A-fib:  - monitor vitals, ECG  - continue home meds: Amiodarone 200mg daily, and Eliquis 2.5mg bid  - f/u with Dr Marte as outpatient     # Asthma, mild intermittent   - no evidence of exacerbation  - monitor O2sat  - nebs prn    # GERD:  - on Famotidine 40mg daily    # Dementia, anxiety   - c/w [id number] mg BID  - Atarax prn for agitation and anxiety   - currently on 1:1     #Misc  - DVT Prophylaxis: on apixaban for a-fib  - GI Prophylaxis: famotidine  - Diet: DASH/TLC  - Activity: ambulate as tolerate  - Code Status: Full code

## 2022-08-22 NOTE — DISCHARGE NOTE NURSING/CASE MANAGEMENT/SOCIAL WORK - NSDCPEFALRISK_GEN_ALL_CORE
For information on Fall & Injury Prevention, visit: [email or web address] OR  [email or web address] OR  https://www.cdc.gov/steadi/patient.html

## 2022-08-22 NOTE — DISCHARGE NOTE NURSING/CASE MANAGEMENT/SOCIAL WORK - PATIENT PORTAL LINK FT
You can access the FollowMyHealth Patient Portal offered by [facility] by registering at the following website: [email or web address]. By joining [employer]’s FollowMyHealth portal, you will also be able to view your health information using other applications (apps) compatible with our system.

## 2022-08-22 NOTE — DISCHARGE NOTE PROVIDER - HOSPITAL COURSE
Patient is 88 y/o Female with PMHx of HTN, HLD, paroxysmal A-fib (on Amiodarone 200mg daily and Eliquis 2.5mg bid), mitral valve regurgitation, GERD, Hypothyroidism, SLE, anxiety, dementia presenting, recently (06/09-06/11) admitted for nonhealing Right ankle  wound, completed course of Unasyn IV and discharged home on Augmentin PO, now presented with R ankle worsening pain, swelling, and redness. Pt was seen by Dr Pride in Clinic today, and sent to ED for admission for IV abx.    Hospital course:    # R ankle cellulitis:  - Ortho: c/s 8/18 low suspicious for septic arthritis, recommended MRI but not able to do due to Loop recorder implant  - CT R ankle done 8/19 and negative for osteomyelitis  -  BCx x 2 8/18: NGTD;  WCx 8/18: few staph aureus  - as per ID recommendations pt started on Ancef 1 gm iv q8h while inpatient  - on discharge change to po Keflex 500 mg q8h till 9/2  -  pain meds prn  - ambulate as tolerate  - Wound care: wash wound with soap and water, apply gentamycin, cover with xeroform, then wrap with kelrix an dACE wrap daily.    # Hx HTN, HLD, A-fib:  - monitor vitals, ECG  - continue home meds: Amiodarone 200mg daily, and Eliquis 2.5mg bid  - f/u with Dr Marte as outpatient     # Asthma, mild intermittent   - no evidence of exacerbation  - monitor O2sat  - nebs prn    # GERD:  - on Famotidine 40mg daily    # Dementia, anxiety   - c/w [id number] mg BID  - Atarax prn for agitation and anxiety       Pt is stale to be discharge with recommendations to continue wound care at home and follow up in [facility] in one week for further recommendations.

## 2022-08-22 NOTE — PROGRESS NOTE ADULT - NS ATTEND AMEND GEN_ALL_CORE FT
large dressing change -> decreased infection right lateral ankle    traumatic wound right lower leg-. healing -> local wound care
large dressing change -> decreased infection right lateral ankle    traumatic wound right lower leg-. healing -> local wound care
large dressing change -. decreased infection right ankle-> local wound care , intravenous antibiotics

## 2022-08-22 NOTE — DISCHARGE NOTE NURSING/CASE MANAGEMENT/SOCIAL WORK - NSDCFUADDAPPT_GEN_ALL_CORE_FT
Please call [phone] to make a follow up appointment within 1 week with Dr. Pride or Dr. Capellan. Clinic is located at [address] [place] [address] on Tuesdays (2-4pm) or Thursdays (9am-1pm).

## 2022-08-22 NOTE — PROGRESS NOTE ADULT - TIME BILLING
wound care
Counseled staff about diagnostic testing and treatment plan. All questions answered.
local wound care
wound care

## 2022-08-25 ENCOUNTER — APPOINTMENT (OUTPATIENT)
Dept: BURN CARE | Facility: CLINIC | Age: 87
End: 2022-08-25

## 2022-08-25 ENCOUNTER — OUTPATIENT (OUTPATIENT)
Dept: OUTPATIENT SERVICES | Facility: HOSPITAL | Age: 87
LOS: 1 days | Discharge: HOME | End: 2022-08-25

## 2022-08-25 DIAGNOSIS — F03.91 UNSPECIFIED DEMENTIA WITH BEHAVIORAL DISTURBANCE: ICD-10-CM

## 2022-08-25 DIAGNOSIS — Z88.2 ALLERGY STATUS TO SULFONAMIDES: ICD-10-CM

## 2022-08-25 DIAGNOSIS — Z53.8 PROCEDURE AND TREATMENT NOT CARRIED OUT FOR OTHER REASONS: ICD-10-CM

## 2022-08-25 DIAGNOSIS — K21.9 GASTRO-ESOPHAGEAL REFLUX DISEASE WITHOUT ESOPHAGITIS: ICD-10-CM

## 2022-08-25 DIAGNOSIS — Z95.818 PRESENCE OF OTHER CARDIAC IMPLANTS AND GRAFTS: Chronic | ICD-10-CM

## 2022-08-25 DIAGNOSIS — M32.9 SYSTEMIC LUPUS ERYTHEMATOSUS, UNSPECIFIED: ICD-10-CM

## 2022-08-25 DIAGNOSIS — J45.20 MILD INTERMITTENT ASTHMA, UNCOMPLICATED: ICD-10-CM
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
## 2022-08-25 DIAGNOSIS — E78.5 HYPERLIPIDEMIA, UNSPECIFIED: ICD-10-CM
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
## 2022-08-25 DIAGNOSIS — I10 ESSENTIAL (PRIMARY) HYPERTENSION: ICD-10-CM

## 2022-08-25 DIAGNOSIS — Z90.49 ACQUIRED ABSENCE OF OTHER SPECIFIED PARTS OF DIGESTIVE TRACT: Chronic | ICD-10-CM

## 2022-08-25 DIAGNOSIS — L03.115 CELLULITIS OF RIGHT LOWER LIMB: ICD-10-CM

## 2022-08-25 DIAGNOSIS — I48.0 PAROXYSMAL ATRIAL FIBRILLATION: ICD-10-CM

## 2022-08-25 DIAGNOSIS — E03.9 HYPOTHYROIDISM, UNSPECIFIED: ICD-10-CM

## 2022-08-25 DIAGNOSIS — S81.801A UNSPECIFIED OPEN WOUND, RIGHT LOWER LEG, INITIAL ENCOUNTER: ICD-10-CM

## 2022-08-25 DIAGNOSIS — Z79.01 LONG TERM (CURRENT) USE OF ANTICOAGULANTS: ICD-10-CM

## 2022-08-25 DIAGNOSIS — F41.9 ANXIETY DISORDER, UNSPECIFIED: ICD-10-CM

## 2022-08-25 DIAGNOSIS — Z20.822 CONTACT WITH AND (SUSPECTED) EXPOSURE TO COVID-19: ICD-10-CM

## 2022-08-25 DIAGNOSIS — Z95.2 PRESENCE OF PROSTHETIC HEART VALVE: ICD-10-CM

## 2022-08-25 DIAGNOSIS — B95.61 METHICILLIN SUSCEPTIBLE STAPHYLOCOCCUS AUREUS INFECTION AS THE CAUSE OF DISEASES CLASSIFIED ELSEWHERE: ICD-10-CM

## 2022-08-25 PROCEDURE — 97597 DBRDMT OPN WND 1ST 20 CM/<: CPT

## 2022-08-25 NOTE — HISTORY OF PRESENT ILLNESS
[Did you have an operation on your burn/wound injury?] : Did you have an operation on your burn/wound injury? No [Did this injury occur on the job?] : Did this injury occur on the job? No [de-identified] : wound right ankle and right lower leg [de-identified] : healing

## 2022-08-25 NOTE — PHYSICAL EXAM
[Healing] : healing [Size%: ______] : Size: [unfilled]% [Infected?] : Infected: No [3] : 3 out of 10 [Abnormal] : abnormal [Medium] : medium [] : yes [de-identified] : right lateral ankle wound --  decreased infection and tenderness post IV abx-. local wound care \par \par traumatic wound right lower leg-. debrided -. . local wound care  [TWNoteComboBox1] : xeroform

## 2022-08-25 NOTE — REASON FOR VISIT
[Revisit] : revisit [Were you seen in the Emergency Room?] : not seen in the emergency room [Were you admitted to the burn center at [facility]?] : not admitted to the burn center at [facility] [Friend] : friend

## 2022-08-25 NOTE — ASSESSMENT
[FreeTextEntry1] : right lateral ankle wound --  decreased infection and tenderness post IV abx-. local wound care \par \par traumatic wound right lower leg-. debrided -. . local wound care  [Wound Care] : wound care

## 2022-08-26 ENCOUNTER — APPOINTMENT (OUTPATIENT)
Dept: CARDIOLOGY | Facility: CLINIC | Age: 87
End: 2022-08-26

## 2022-08-26 ENCOUNTER — NON-APPOINTMENT (OUTPATIENT)
Age: 87
End: 2022-08-26

## 2022-08-26 DIAGNOSIS — S91.001A UNSPECIFIED OPEN WOUND, RIGHT ANKLE, INITIAL ENCOUNTER: ICD-10-CM
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
## 2022-08-26 DIAGNOSIS — S81.801A UNSPECIFIED OPEN WOUND, RIGHT LOWER LEG, INITIAL ENCOUNTER: ICD-10-CM

## 2022-08-26 PROCEDURE — G2066: CPT

## 2022-08-26 PROCEDURE — 93298 REM INTERROG DEV EVAL SCRMS: CPT

## 2022-09-14 LAB
ALBUMIN SERPL ELPH-MCNC: 4.4 G/DL
ALP BLD-CCNC: 77 U/L
ALT SERPL-CCNC: 11 U/L
ANION GAP SERPL CALC-SCNC: 16 MMOL/L
AST SERPL-CCNC: 19 U/L
BASOPHILS # BLD AUTO: 0.05 K/UL
BASOPHILS NFR BLD AUTO: 0.7 %
BILIRUB SERPL-MCNC: 0.5 MG/DL
BUN SERPL-MCNC: 26 MG/DL
CALCIUM SERPL-MCNC: 9.6 MG/DL
CHLORIDE SERPL-SCNC: 97 MMOL/L
CHOLEST SERPL-MCNC: 174 MG/DL
CO2 SERPL-SCNC: 21 MMOL/L
CREAT SERPL-MCNC: 1.5 MG/DL
DEPRECATED KAPPA LC FREE/LAMBDA SER: 1.28 RATIO
EGFR: 34 ML/MIN/1.73M2
EOSINOPHIL # BLD AUTO: 0.17 K/UL
EOSINOPHIL NFR BLD AUTO: 2.2 %
GLUCOSE SERPL-MCNC: 75 MG/DL
HCT VFR BLD CALC: 38.9 %
HDLC SERPL-MCNC: 90 MG/DL
HGB BLD-MCNC: 12.5 G/DL
IMM GRANULOCYTES NFR BLD AUTO: 0.5 %
KAPPA LC CSF-MCNC: 2.05 MG/DL
KAPPA LC SERPL-MCNC: 2.63 MG/DL
LDLC SERPL CALC-MCNC: 68 MG/DL
LYMPHOCYTES # BLD AUTO: 1.37 K/UL
LYMPHOCYTES NFR BLD AUTO: 18.1 %
MAN DIFF?: NORMAL
MCHC RBC-ENTMCNC: 29.9 PG
MCHC RBC-ENTMCNC: 32.1 G/DL
MCV RBC AUTO: 93.1 FL
MONOCYTES # BLD AUTO: 0.39 K/UL
MONOCYTES NFR BLD AUTO: 5.1 %
NEUTROPHILS # BLD AUTO: 5.56 K/UL
NEUTROPHILS NFR BLD AUTO: 73.4 %
NONHDLC SERPL-MCNC: 84 MG/DL
PLATELET # BLD AUTO: 216 K/UL
POTASSIUM SERPL-SCNC: 4.4 MMOL/L
PROT SERPL-MCNC: 6.6 G/DL
RBC # BLD: 4.18 M/UL
RBC # FLD: 13.1 %
SODIUM SERPL-SCNC: 134 MMOL/L
T4 FREE SERPL-MCNC: 1.3 NG/DL
TRIGL SERPL-MCNC: 80 MG/DL
TSH SERPL-ACNC: 9.26 UIU/ML
WBC # FLD AUTO: 7.58 K/UL

## 2022-09-15 ENCOUNTER — APPOINTMENT (OUTPATIENT)
Dept: BURN CARE | Facility: CLINIC | Age: 87
End: 2022-09-15

## 2022-09-15 ENCOUNTER — OUTPATIENT (OUTPATIENT)
Dept: OUTPATIENT SERVICES | Facility: HOSPITAL | Age: 87
LOS: 1 days | Discharge: HOME | End: 2022-09-15

## 2022-09-15 DIAGNOSIS — Z95.818 PRESENCE OF OTHER CARDIAC IMPLANTS AND GRAFTS: Chronic | ICD-10-CM

## 2022-09-15 DIAGNOSIS — Z90.49 ACQUIRED ABSENCE OF OTHER SPECIFIED PARTS OF DIGESTIVE TRACT: Chronic | ICD-10-CM

## 2022-09-15 PROCEDURE — 97597 DBRDMT OPN WND 1ST 20 CM/<: CPT

## 2022-09-16 DIAGNOSIS — S81.801A UNSPECIFIED OPEN WOUND, RIGHT LOWER LEG, INITIAL ENCOUNTER: ICD-10-CM
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
## 2022-09-16 DIAGNOSIS — S91.001A UNSPECIFIED OPEN WOUND, RIGHT ANKLE, INITIAL ENCOUNTER: ICD-10-CM

## 2022-09-20 ENCOUNTER — APPOINTMENT (OUTPATIENT)
Dept: CARDIOLOGY | Facility: CLINIC | Age: 87
End: 2022-09-20

## 2022-09-20 VITALS
BODY MASS INDEX: 21.05 KG/M2 | WEIGHT: 131 LBS | HEIGHT: 66 IN | HEART RATE: 71 BPM | DIASTOLIC BLOOD PRESSURE: 60 MMHG | TEMPERATURE: 96 F | SYSTOLIC BLOOD PRESSURE: 100 MMHG

## 2022-09-20 VITALS — SYSTOLIC BLOOD PRESSURE: 112 MMHG | DIASTOLIC BLOOD PRESSURE: 70 MMHG

## 2022-09-20 DIAGNOSIS — I49.1 ATRIAL PREMATURE DEPOLARIZATION: ICD-10-CM

## 2022-09-20 PROCEDURE — 93000 ELECTROCARDIOGRAM COMPLETE: CPT

## 2022-09-20 PROCEDURE — 99214 OFFICE O/P EST MOD 30 MIN: CPT

## 2022-09-20 NOTE — CARDIOLOGY SUMMARY
[___] : [unfilled] [de-identified] : 6-8-2021 NSR Short FL interval \par 9- NSR NS ST change \par 3- NSR Normal ECG \par 9- NSR Normal ECG .  [de-identified] : 4- Normal LV systolic function mild AI mild concentric LVH . Trace MR .

## 2022-09-20 NOTE — PHYSICAL EXAM
[General Appearance - Well Developed] : well developed [Normal Appearance] : normal appearance [Well Groomed] : well groomed [General Appearance - Well Nourished] : well nourished [No Deformities] : no deformities [General Appearance - In No Acute Distress] : no acute distress [Normal Conjunctiva] : the conjunctiva exhibited no abnormalities [Eyelids - No Xanthelasma] : the eyelids demonstrated no xanthelasmas [Normal Oral Mucosa] : normal oral mucosa [No Oral Pallor] : no oral pallor [No Oral Cyanosis] : no oral cyanosis [Respiration, Rhythm And Depth] : normal respiratory rhythm and effort [Exaggerated Use Of Accessory Muscles For Inspiration] : no accessory muscle use [Auscultation Breath Sounds / Voice Sounds] : lungs were clear to auscultation bilaterally [Abdomen Soft] : soft [Abdomen Tenderness] : non-tender [Abdomen Mass (___ Cm)] : no abdominal mass palpated [Abnormal Walk] : normal gait [Nail Clubbing] : no clubbing of the fingernails [Cyanosis, Localized] : no localized cyanosis [Petechial Hemorrhages (___cm)] : no petechial hemorrhages [Skin Color & Pigmentation] : normal skin color and pigmentation [] : no rash [No Venous Stasis] : no venous stasis [No Skin Ulcers] : no skin ulcer [No Xanthoma] : no  xanthoma was observed [Oriented To Time, Place, And Person] : oriented to person, place, and time [Affect] : the affect was normal [Mood] : the mood was normal [No Anxiety] : not feeling anxious [Normal Rate] : normal [Rhythm Regular] : regular [No Murmur] : no murmurs heard [No Pitting Edema] : no pitting edema present [de-identified] : grssly nonfocal  [FreeTextEntry1] : multiple areas of ecchymosis. arems and legs.  [Rt] : no varicose veins of the right leg [Lt] : no varicose veins of the left leg

## 2022-09-20 NOTE — ASSESSMENT
[FreeTextEntry1] : The patient has been feeling well overall. No chest pain or SOB or lightheadedness . The patient has progressive dementia. Mild LVH on echo but BP is well controlled. Mild increased Kappa immunoglobin and ratio to lambda is normal . Protein electrophoresis  is normal . She has had no clinical AF . She has an ILM and is monitored by Dr. Cheema

## 2022-09-20 NOTE — HISTORY OF PRESENT ILLNESS
[FreeTextEntry1] : The patient was admitted with right ankle cellulitis . This has resolved. She has had progressive dementia . Mild increase in Kappa immunoglobin ,uncertain significance Kappa Lambda ratio is normal however.

## 2022-09-28 RX ORDER — DIAZEPAM 5 MG/1
5 TABLET ORAL
Qty: 90 | Refills: 0 | Status: ACTIVE | COMMUNITY
Start: 2018-08-16 | End: 1900-01-01

## 2022-09-30 ENCOUNTER — APPOINTMENT (OUTPATIENT)
Dept: CARDIOLOGY | Facility: CLINIC | Age: 87
End: 2022-09-30

## 2022-09-30 ENCOUNTER — NON-APPOINTMENT (OUTPATIENT)
Age: 87
End: 2022-09-30

## 2022-09-30 PROCEDURE — G2066: CPT

## 2022-09-30 PROCEDURE — 93298 REM INTERROG DEV EVAL SCRMS: CPT

## 2022-10-06 ENCOUNTER — APPOINTMENT (OUTPATIENT)
Dept: BURN CARE | Facility: CLINIC | Age: 87
End: 2022-10-06

## 2022-10-18 DIAGNOSIS — T14.8XXA OTHER INJURY OF UNSPECIFIED BODY REGION, INITIAL ENCOUNTER: ICD-10-CM

## 2022-10-19 ENCOUNTER — APPOINTMENT (OUTPATIENT)
Dept: NEUROLOGY | Facility: CLINIC | Age: 87
End: 2022-10-19

## 2022-10-19 VITALS
OXYGEN SATURATION: 99 % | HEART RATE: 66 BPM | BODY MASS INDEX: 20.89 KG/M2 | HEIGHT: 66 IN | DIASTOLIC BLOOD PRESSURE: 77 MMHG | SYSTOLIC BLOOD PRESSURE: 115 MMHG | TEMPERATURE: 97.6 F | WEIGHT: 130 LBS

## 2022-10-19 PROCEDURE — 99213 OFFICE O/P EST LOW 20 MIN: CPT

## 2022-10-19 NOTE — ASSESSMENT
[FreeTextEntry1] : PA-Fib.\par dementia ( mild)\par Spine disease\par \par \par plan\par F/U\par discussed the risks for the fall and the ergonomiocs

## 2022-10-19 NOTE — HISTORY OF PRESENT ILLNESS
[FreeTextEntry1] : aCrolyn is here with her friend. She is doing well at her baseline . No significant change in her memory. \par She continues to volunteer twice a week. She plays piano and also reads and does games on her lap top.\par She is sleeping Ok/well.  works a lot in the yard.\par She calls friends . Reportedly she does have good , meaningful conversation on the phone.\par No falls\par Has some back pain\par She does every thing for herself and also helps  a little with the cooking\par No issues with the B/B function\par

## 2022-10-19 NOTE — PHYSICAL EXAM
[FreeTextEntry1] : A/A/Ox day, person and place\par good attention\par good math.\par aware of the current events\par follows 4 step commands\par crosses the midline well\par No drift\par no dysmetria\par scoliotic posture\par stable gait slightly hesitant\par

## 2022-10-23 LAB
ALBUMIN SERPL ELPH-MCNC: 4.7 G/DL
ALP BLD-CCNC: 74 U/L
ALT SERPL-CCNC: 13 U/L
ANION GAP SERPL CALC-SCNC: 16 MMOL/L
AST SERPL-CCNC: 19 U/L
BASOPHILS # BLD AUTO: 0.05 K/UL
BASOPHILS NFR BLD AUTO: 0.5 %
BILIRUB SERPL-MCNC: 0.6 MG/DL
BUN SERPL-MCNC: 36 MG/DL
CALCIUM SERPL-MCNC: 9.7 MG/DL
CHLORIDE SERPL-SCNC: 100 MMOL/L
CHOLEST SERPL-MCNC: 176 MG/DL
CO2 SERPL-SCNC: 23 MMOL/L
CREAT SERPL-MCNC: 1.6 MG/DL
EGFR: 31 ML/MIN/1.73M2
EOSINOPHIL # BLD AUTO: 0.11 K/UL
EOSINOPHIL NFR BLD AUTO: 1.2 %
GLUCOSE SERPL-MCNC: 64 MG/DL
HCT VFR BLD CALC: 39.4 %
HDLC SERPL-MCNC: 94 MG/DL
HGB BLD-MCNC: 12.6 G/DL
IMM GRANULOCYTES NFR BLD AUTO: 0.5 %
LDLC SERPL CALC-MCNC: 67 MG/DL
LYMPHOCYTES # BLD AUTO: 1.57 K/UL
LYMPHOCYTES NFR BLD AUTO: 17.2 %
M PROTEIN SPEC IFE-MCNC: NORMAL
MAN DIFF?: NORMAL
MCHC RBC-ENTMCNC: 30 PG
MCHC RBC-ENTMCNC: 32 G/DL
MCV RBC AUTO: 93.8 FL
MONOCYTES # BLD AUTO: 0.52 K/UL
MONOCYTES NFR BLD AUTO: 5.7 %
NEUTROPHILS # BLD AUTO: 6.84 K/UL
NEUTROPHILS NFR BLD AUTO: 74.9 %
NONHDLC SERPL-MCNC: 82 MG/DL
PLATELET # BLD AUTO: 279 K/UL
POTASSIUM SERPL-SCNC: 4.8 MMOL/L
PROT SERPL-MCNC: 7.2 G/DL
RBC # BLD: 4.2 M/UL
RBC # FLD: 13.2 %
SODIUM SERPL-SCNC: 139 MMOL/L
T4 FREE SERPL-MCNC: 1.4 NG/DL
TRIGL SERPL-MCNC: 74 MG/DL
TSH SERPL-ACNC: 5.97 UIU/ML
WBC # FLD AUTO: 9.14 K/UL

## 2022-10-25 ENCOUNTER — APPOINTMENT (OUTPATIENT)
Dept: BURN CARE | Facility: CLINIC | Age: 87
End: 2022-10-25

## 2022-10-25 ENCOUNTER — OUTPATIENT (OUTPATIENT)
Dept: OUTPATIENT SERVICES | Facility: HOSPITAL | Age: 87
LOS: 1 days | Discharge: HOME | End: 2022-10-25

## 2022-10-25 VITALS
HEIGHT: 66 IN | OXYGEN SATURATION: 96 % | BODY MASS INDEX: 20.89 KG/M2 | DIASTOLIC BLOOD PRESSURE: 70 MMHG | HEART RATE: 72 BPM | WEIGHT: 130 LBS | SYSTOLIC BLOOD PRESSURE: 126 MMHG

## 2022-10-25 DIAGNOSIS — Z95.818 PRESENCE OF OTHER CARDIAC IMPLANTS AND GRAFTS: Chronic | ICD-10-CM

## 2022-10-25 DIAGNOSIS — Z90.49 ACQUIRED ABSENCE OF OTHER SPECIFIED PARTS OF DIGESTIVE TRACT: Chronic | ICD-10-CM

## 2022-10-25 PROCEDURE — 99212 OFFICE O/P EST SF 10 MIN: CPT

## 2022-10-25 NOTE — ASSESSMENT
[FreeTextEntry1] : right lateral ankle wound --  new open wound  -> .2x.1 -. . local wound care -> \par \par follow up 2 - 4  weeks  [Wound Care] : wound care

## 2022-10-25 NOTE — PHYSICAL EXAM
[Healing] : healing [Size%: ______] : Size: [unfilled]% [Infected?] : Infected: No [3] : 3 out of 10 [Abnormal] : abnormal [Medium] : medium [] : no [de-identified] : allevyne pads [de-identified] : right lateral ankle wound --  new open wound  -> .2x.1 -. . local wound care -> \par \par follow up 2 - 4  weeks  [TWNoteComboBox1] : False

## 2022-10-25 NOTE — HISTORY OF PRESENT ILLNESS
[Did you have an operation on your burn/wound injury?] : Did you have an operation on your burn/wound injury? No [Did this injury occur on the job?] : Did this injury occur on the job? No [de-identified] : wound right ankle and right lower leg [de-identified] : healing

## 2022-10-25 NOTE — REASON FOR VISIT
[Revisit] : revisit [Were you seen in the Emergency Room?] : not seen in the emergency room [Were you admitted to the burn center at [facility]?] : not admitted to the burn center at [facility] [Friend] : friend

## 2022-10-26 ENCOUNTER — FOLLOW UP (OUTPATIENT)
Dept: URBAN - METROPOLITAN AREA CLINIC 21 | Facility: CLINIC | Age: 87
End: 2022-10-26

## 2022-10-26 DIAGNOSIS — S91.001D UNSPECIFIED OPEN WOUND, RIGHT ANKLE, SUBSEQUENT ENCOUNTER: ICD-10-CM

## 2022-10-26 DIAGNOSIS — H17.812: ICD-10-CM

## 2022-10-26 DIAGNOSIS — H04.123: ICD-10-CM
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
## 2022-10-26 DIAGNOSIS — H16.232: ICD-10-CM

## 2022-10-26 DIAGNOSIS — H16.142: ICD-10-CM

## 2022-10-26 PROCEDURE — 92012 INTRM OPH EXAM EST PATIENT: CPT

## 2022-10-26 ASSESSMENT — TONOMETRY
OS_IOP_MMHG: 9
OD_IOP_MMHG: 9

## 2022-10-26 ASSESSMENT — VISUAL ACUITY
OS_CC: 20/30-3
OD_CC: 20/40

## 2022-11-02 ENCOUNTER — APPOINTMENT (OUTPATIENT)
Dept: CARDIOLOGY | Facility: CLINIC | Age: 87
End: 2022-11-02

## 2022-11-02 ENCOUNTER — NON-APPOINTMENT (OUTPATIENT)
Age: 87
End: 2022-11-02

## 2022-11-02 PROCEDURE — 93298 REM INTERROG DEV EVAL SCRMS: CPT

## 2022-11-02 PROCEDURE — G2066: CPT

## 2022-11-03 ENCOUNTER — APPOINTMENT (OUTPATIENT)
Dept: BURN CARE | Facility: CLINIC | Age: 87
End: 2022-11-03

## 2022-11-03 ENCOUNTER — OUTPATIENT (OUTPATIENT)
Dept: OUTPATIENT SERVICES | Facility: HOSPITAL | Age: 87
LOS: 1 days | Discharge: HOME | End: 2022-11-03

## 2022-11-03 ENCOUNTER — EMERGENCY (EMERGENCY)
Facility: HOSPITAL | Age: 87
LOS: 0 days | Discharge: NURSING, REHAB OR LONG-TERM CARE | End: 2022-11-03
Admitting: STUDENT IN AN ORGANIZED HEALTH CARE EDUCATION/TRAINING PROGRAM

## 2022-11-03 ENCOUNTER — INPATIENT (INPATIENT)
Facility: HOSPITAL | Age: 87
LOS: 0 days | Discharge: HOME | End: 2022-11-04
Attending: PLASTIC SURGERY | Admitting: PLASTIC SURGERY

## 2022-11-03 VITALS
RESPIRATION RATE: 20 BRPM | SYSTOLIC BLOOD PRESSURE: 139 MMHG | WEIGHT: 130.07 LBS | DIASTOLIC BLOOD PRESSURE: 64 MMHG | HEART RATE: 63 BPM | TEMPERATURE: 98 F | OXYGEN SATURATION: 99 %

## 2022-11-03 VITALS
HEART RATE: 75 BPM | HEIGHT: 66 IN | OXYGEN SATURATION: 95 % | SYSTOLIC BLOOD PRESSURE: 155 MMHG | DIASTOLIC BLOOD PRESSURE: 78 MMHG

## 2022-11-03 DIAGNOSIS — I10 ESSENTIAL (PRIMARY) HYPERTENSION: ICD-10-CM

## 2022-11-03 DIAGNOSIS — Z95.818 PRESENCE OF OTHER CARDIAC IMPLANTS AND GRAFTS: Chronic | ICD-10-CM

## 2022-11-03 DIAGNOSIS — E03.9 HYPOTHYROIDISM, UNSPECIFIED: ICD-10-CM

## 2022-11-03 DIAGNOSIS — Z86.79 PERSONAL HISTORY OF OTHER DISEASES OF THE CIRCULATORY SYSTEM: ICD-10-CM

## 2022-11-03 DIAGNOSIS — F03.90 UNSPECIFIED DEMENTIA WITHOUT BEHAVIORAL DISTURBANCE: ICD-10-CM

## 2022-11-03 DIAGNOSIS — Z90.49 ACQUIRED ABSENCE OF OTHER SPECIFIED PARTS OF DIGESTIVE TRACT: ICD-10-CM

## 2022-11-03 DIAGNOSIS — I48.91 UNSPECIFIED ATRIAL FIBRILLATION: ICD-10-CM

## 2022-11-03 DIAGNOSIS — E78.5 HYPERLIPIDEMIA, UNSPECIFIED: ICD-10-CM

## 2022-11-03 DIAGNOSIS — Z95.818 PRESENCE OF OTHER CARDIAC IMPLANTS AND GRAFTS: ICD-10-CM

## 2022-11-03 DIAGNOSIS — I34.0 NONRHEUMATIC MITRAL (VALVE) INSUFFICIENCY: ICD-10-CM

## 2022-11-03 DIAGNOSIS — Z79.01 LONG TERM (CURRENT) USE OF ANTICOAGULANTS: ICD-10-CM

## 2022-11-03 DIAGNOSIS — Z88.2 ALLERGY STATUS TO SULFONAMIDES: ICD-10-CM

## 2022-11-03 DIAGNOSIS — J45.909 UNSPECIFIED ASTHMA, UNCOMPLICATED: ICD-10-CM

## 2022-11-03 DIAGNOSIS — Z90.49 ACQUIRED ABSENCE OF OTHER SPECIFIED PARTS OF DIGESTIVE TRACT: Chronic | ICD-10-CM

## 2022-11-03 DIAGNOSIS — Z87.19 PERSONAL HISTORY OF OTHER DISEASES OF THE DIGESTIVE SYSTEM: ICD-10-CM

## 2022-11-03 DIAGNOSIS — L97.319 NON-PRESSURE CHRONIC ULCER OF RIGHT ANKLE WITH UNSPECIFIED SEVERITY: ICD-10-CM

## 2022-11-03 DIAGNOSIS — F41.9 ANXIETY DISORDER, UNSPECIFIED: ICD-10-CM

## 2022-11-03 LAB
ALBUMIN SERPL ELPH-MCNC: 4.1 G/DL — SIGNIFICANT CHANGE UP (ref 3.5–5.2)
ALP SERPL-CCNC: 72 U/L — SIGNIFICANT CHANGE UP (ref 30–115)
ALT FLD-CCNC: 11 U/L — SIGNIFICANT CHANGE UP (ref 0–41)
ANION GAP SERPL CALC-SCNC: 11 MMOL/L — SIGNIFICANT CHANGE UP (ref 7–14)
AST SERPL-CCNC: 17 U/L — SIGNIFICANT CHANGE UP (ref 0–41)
BASOPHILS # BLD AUTO: 0.04 K/UL — SIGNIFICANT CHANGE UP (ref 0–0.2)
BASOPHILS NFR BLD AUTO: 0.6 % — SIGNIFICANT CHANGE UP (ref 0–1)
BILIRUB SERPL-MCNC: 0.4 MG/DL — SIGNIFICANT CHANGE UP (ref 0.2–1.2)
BUN SERPL-MCNC: 19 MG/DL — SIGNIFICANT CHANGE UP (ref 10–20)
CALCIUM SERPL-MCNC: 9.3 MG/DL — SIGNIFICANT CHANGE UP (ref 8.4–10.5)
CHLORIDE SERPL-SCNC: 102 MMOL/L — SIGNIFICANT CHANGE UP (ref 98–110)
CO2 SERPL-SCNC: 25 MMOL/L — SIGNIFICANT CHANGE UP (ref 17–32)
CREAT SERPL-MCNC: 1.3 MG/DL — SIGNIFICANT CHANGE UP (ref 0.7–1.5)
EGFR: 40 ML/MIN/1.73M2 — LOW
EOSINOPHIL # BLD AUTO: 0.06 K/UL — SIGNIFICANT CHANGE UP (ref 0–0.7)
EOSINOPHIL NFR BLD AUTO: 0.9 % — SIGNIFICANT CHANGE UP (ref 0–8)
GLUCOSE SERPL-MCNC: 84 MG/DL — SIGNIFICANT CHANGE UP (ref 70–99)
HCT VFR BLD CALC: 38.6 % — SIGNIFICANT CHANGE UP (ref 37–47)
HGB BLD-MCNC: 13 G/DL — SIGNIFICANT CHANGE UP (ref 12–16)
IMM GRANULOCYTES NFR BLD AUTO: 0.3 % — SIGNIFICANT CHANGE UP (ref 0.1–0.3)
LYMPHOCYTES # BLD AUTO: 1.08 K/UL — LOW (ref 1.2–3.4)
LYMPHOCYTES # BLD AUTO: 16.1 % — LOW (ref 20.5–51.1)
MAGNESIUM SERPL-MCNC: 1.9 MG/DL — SIGNIFICANT CHANGE UP (ref 1.8–2.4)
MCHC RBC-ENTMCNC: 30.6 PG — SIGNIFICANT CHANGE UP (ref 27–31)
MCHC RBC-ENTMCNC: 33.7 G/DL — SIGNIFICANT CHANGE UP (ref 32–37)
MCV RBC AUTO: 90.8 FL — SIGNIFICANT CHANGE UP (ref 81–99)
MONOCYTES # BLD AUTO: 0.48 K/UL — SIGNIFICANT CHANGE UP (ref 0.1–0.6)
MONOCYTES NFR BLD AUTO: 7.2 % — SIGNIFICANT CHANGE UP (ref 1.7–9.3)
NEUTROPHILS # BLD AUTO: 5.01 K/UL — SIGNIFICANT CHANGE UP (ref 1.4–6.5)
NEUTROPHILS NFR BLD AUTO: 74.9 % — SIGNIFICANT CHANGE UP (ref 42.2–75.2)
NRBC # BLD: 0 /100 WBCS — SIGNIFICANT CHANGE UP (ref 0–0)
PHOSPHATE SERPL-MCNC: 3.1 MG/DL — SIGNIFICANT CHANGE UP (ref 2.1–4.9)
PLATELET # BLD AUTO: 224 K/UL — SIGNIFICANT CHANGE UP (ref 130–400)
POTASSIUM SERPL-MCNC: 4.4 MMOL/L — SIGNIFICANT CHANGE UP (ref 3.5–5)
POTASSIUM SERPL-SCNC: 4.4 MMOL/L — SIGNIFICANT CHANGE UP (ref 3.5–5)
PROT SERPL-MCNC: 6.5 G/DL — SIGNIFICANT CHANGE UP (ref 6–8)
RBC # BLD: 4.25 M/UL — SIGNIFICANT CHANGE UP (ref 4.2–5.4)
RBC # FLD: 13.2 % — SIGNIFICANT CHANGE UP (ref 11.5–14.5)
SARS-COV-2 RNA SPEC QL NAA+PROBE: SIGNIFICANT CHANGE UP
SODIUM SERPL-SCNC: 138 MMOL/L — SIGNIFICANT CHANGE UP (ref 135–146)
WBC # BLD: 6.69 K/UL — SIGNIFICANT CHANGE UP (ref 4.8–10.8)
WBC # FLD AUTO: 6.69 K/UL — SIGNIFICANT CHANGE UP (ref 4.8–10.8)

## 2022-11-03 PROCEDURE — 99285 EMERGENCY DEPT VISIT HI MDM: CPT | Mod: GC

## 2022-11-03 PROCEDURE — 99221 1ST HOSP IP/OBS SF/LOW 40: CPT | Mod: FS

## 2022-11-03 PROCEDURE — 99213 OFFICE O/P EST LOW 20 MIN: CPT

## 2022-11-03 RX ORDER — MULTIVIT-MIN/FERROUS GLUCONATE 9 MG/15 ML
1 LIQUID (ML) ORAL DAILY
Refills: 0 | Status: DISCONTINUED | OUTPATIENT
Start: 2022-11-03 | End: 2022-11-04

## 2022-11-03 RX ORDER — POLYETHYLENE GLYCOL 3350 17 G/17G
17 POWDER, FOR SOLUTION ORAL DAILY
Refills: 0 | Status: DISCONTINUED | OUTPATIENT
Start: 2022-11-03 | End: 2022-11-04

## 2022-11-03 RX ORDER — ACETAMINOPHEN 500 MG
650 TABLET ORAL EVERY 6 HOURS
Refills: 0 | Status: DISCONTINUED | OUTPATIENT
Start: 2022-11-03 | End: 2022-11-04

## 2022-11-03 RX ORDER — CEFAZOLIN SODIUM 1 G
2000 VIAL (EA) INJECTION ONCE
Refills: 0 | Status: COMPLETED | OUTPATIENT
Start: 2022-11-03 | End: 2022-11-03

## 2022-11-03 RX ORDER — SENNA PLUS 8.6 MG/1
2 TABLET ORAL AT BEDTIME
Refills: 0 | Status: DISCONTINUED | OUTPATIENT
Start: 2022-11-03 | End: 2022-11-04

## 2022-11-03 RX ORDER — ASCORBIC ACID 60 MG
500 TABLET,CHEWABLE ORAL DAILY
Refills: 0 | Status: DISCONTINUED | OUTPATIENT
Start: 2022-11-03 | End: 2022-11-04

## 2022-11-03 RX ORDER — DIAZEPAM 5 MG
1 TABLET ORAL
Qty: 0 | Refills: 0 | DISCHARGE

## 2022-11-03 RX ORDER — CEFAZOLIN SODIUM 1 G
1000 VIAL (EA) INJECTION EVERY 8 HOURS
Refills: 0 | Status: DISCONTINUED | OUTPATIENT
Start: 2022-11-03 | End: 2022-11-04

## 2022-11-03 RX ORDER — PREGABALIN 225 MG/1
1000 CAPSULE ORAL DAILY
Refills: 0 | Status: DISCONTINUED | OUTPATIENT
Start: 2022-11-03 | End: 2022-11-04

## 2022-11-03 RX ORDER — FAMOTIDINE 10 MG/ML
1 INJECTION INTRAVENOUS
Qty: 0 | Refills: 0 | DISCHARGE

## 2022-11-03 RX ORDER — HALOPERIDOL DECANOATE 100 MG/ML
2.5 INJECTION INTRAMUSCULAR ONCE
Refills: 0 | Status: DISCONTINUED | OUTPATIENT
Start: 2022-11-03 | End: 2022-11-04

## 2022-11-03 RX ORDER — APIXABAN 2.5 MG/1
2.5 TABLET, FILM COATED ORAL EVERY 12 HOURS
Refills: 0 | Status: DISCONTINUED | OUTPATIENT
Start: 2022-11-03 | End: 2022-11-04

## 2022-11-03 RX ORDER — AMIODARONE HYDROCHLORIDE 400 MG/1
1 TABLET ORAL
Qty: 0 | Refills: 0 | DISCHARGE

## 2022-11-03 RX ORDER — LEVOTHYROXINE SODIUM 125 MCG
25 TABLET ORAL DAILY
Refills: 0 | Status: DISCONTINUED | OUTPATIENT
Start: 2022-11-03 | End: 2022-11-04

## 2022-11-03 RX ORDER — AMIODARONE HYDROCHLORIDE 400 MG/1
200 TABLET ORAL DAILY
Refills: 0 | Status: DISCONTINUED | OUTPATIENT
Start: 2022-11-03 | End: 2022-11-04

## 2022-11-03 RX ORDER — MEMANTINE HYDROCHLORIDE 10 MG/1
1 TABLET ORAL
Qty: 0 | Refills: 0 | DISCHARGE

## 2022-11-03 RX ORDER — APIXABAN 2.5 MG/1
1 TABLET, FILM COATED ORAL
Qty: 0 | Refills: 0 | DISCHARGE

## 2022-11-03 RX ORDER — MEMANTINE HYDROCHLORIDE 10 MG/1
10 TABLET ORAL
Refills: 0 | Status: DISCONTINUED | OUTPATIENT
Start: 2022-11-03 | End: 2022-11-04

## 2022-11-03 RX ORDER — PREGABALIN 225 MG/1
1 CAPSULE ORAL
Qty: 0 | Refills: 0 | DISCHARGE

## 2022-11-03 RX ORDER — FAMOTIDINE 10 MG/ML
40 INJECTION INTRAVENOUS AT BEDTIME
Refills: 0 | Status: DISCONTINUED | OUTPATIENT
Start: 2022-11-03 | End: 2022-11-04

## 2022-11-03 RX ORDER — HYDROXYZINE HCL 10 MG
25 TABLET ORAL AT BEDTIME
Refills: 0 | Status: DISCONTINUED | OUTPATIENT
Start: 2022-11-03 | End: 2022-11-04

## 2022-11-03 RX ORDER — GENTAMICIN SULFATE 0.1 %
1 OINTMENT (GRAM) TOPICAL
Refills: 0 | Status: DISCONTINUED | OUTPATIENT
Start: 2022-11-03 | End: 2022-11-04

## 2022-11-03 RX ADMIN — Medication 100 MILLIGRAM(S): at 11:37

## 2022-11-03 RX ADMIN — APIXABAN 2.5 MILLIGRAM(S): 2.5 TABLET, FILM COATED ORAL at 18:36

## 2022-11-03 RX ADMIN — Medication 100 MILLIGRAM(S): at 18:36

## 2022-11-03 RX ADMIN — Medication 1 APPLICATION(S): at 18:38

## 2022-11-03 RX ADMIN — MEMANTINE HYDROCHLORIDE 10 MILLIGRAM(S): 10 TABLET ORAL at 18:36

## 2022-11-03 RX ADMIN — FAMOTIDINE 40 MILLIGRAM(S): 10 INJECTION INTRAVENOUS at 22:17

## 2022-11-03 RX ADMIN — Medication 100 MILLIGRAM(S): at 22:15

## 2022-11-03 RX ADMIN — Medication 25 MILLIGRAM(S): at 22:12

## 2022-11-03 NOTE — H&P ADULT - HISTORY OF PRESENT ILLNESS
Patient is 88 y/o Female with PMHx of HTN, HLD, paroxysmal A-fib (on Amiodarone 200mg daily and Eliquis 2.5mg bid),  Loop recorder, mitral valve regurgitation, GERD, Hypothyroidism, SLE, anxiety, dementia, recent admission for nonhealing Right ankle wound, CT showed lateral malleolar skin thickening and subcutaneous edema, with no CT evidence of acute osteomyelitis; wound cultures showed staph aur, pt completed course of Ancef IV while inpatient, and discharged home on Keflex PO; now presented with R ankle worsening pain, swelling, and redness. Pt was seen by Dr Pride in Clinic today, and sent to ED for admission for IV abx.

## 2022-11-03 NOTE — PATIENT PROFILE ADULT - FUNCTIONAL SCREEN CURRENT LEVEL: COMMUNICATION, MLM
at times  pt has dementia and is forgetful/2 = difficulty understanding (not related to language barrier)

## 2022-11-03 NOTE — PHYSICAL EXAM
[New] : new [Size%: ______] : Size: [unfilled]% [Infected?] : Infected: Yes [3] : 3 out of 10 [Abnormal] : abnormal [Medium] : medium [] : no [de-identified] : right lateral ankle wound --  new open wound  -> .2x.1 -. . local wound care -> \par \par infected -> will admit for iv abx and debridement \par  [TWNoteComboBox1] : bandaid [TWNoteComboBox2] : Bacitracin

## 2022-11-03 NOTE — REASON FOR VISIT
[Revisit] : revisit [Were you seen in the Emergency Room?] : not seen in the emergency room [Were you admitted to the burn center at [facility]?] : not admitted to the burn center at [facility] [Friend] : friend

## 2022-11-03 NOTE — ED PROVIDER NOTE - PROGRESS NOTE DETAILS
VIV -- call placed to burn who agrees to evaluate patient at bedside VIV -- patient resting comfortably, ancef 2g started. Spoke with burn, agree with plan to admit to [facility].

## 2022-11-03 NOTE — ED ADULT NURSE NOTE - NSIMPLEMENTINTERV_GEN_ALL_ED
Implemented All Universal Safety Interventions:  [place] to call system. Call bell, personal items and telephone within reach. Instruct patient to call for assistance. Room bathroom lighting operational. Non-slip footwear when patient is off stretcher. Physically safe environment: no spills, clutter or unnecessary equipment. Stretcher in lowest position, wheels locked, appropriate side rails in place.

## 2022-11-03 NOTE — H&P ADULT - VTE RISK ASSESSMENT
Aydee is a pleasant 86 year old female here for a consult at the request of Dr. De Anda for iron deficient anemia. Does complain of an increase in fatigue and intermittent nose bleeds. Has been started on Plavix 75 mg daily post stroke and ferrous sulfate 325 PO daily since November 2017.   Denies abdominal pain, dysphagia, hematochezia or melena. No use of aspirin or nonsteroidal anti-inflammatory medications. 2 small polyps identified and a colonoscopy in 2013. Last upper endoscopy was performed in 2005.  PROBLEM LIST INCLUDES:  Patient Active Problem List   Diagnosis   • Reflux esophagitis   • COPD: former smoker   •  OSTEOARTHRITIS   • Essential hypertension, benign   • HYPERLIPIDEMIA    • Rheumatoid arthritis involving multiple sites with positive rheumatoid factor (CMS/[facility])   • Senile cataract, unspecified   • Lens replaced by other means   • BELL'S PALSY: s/p with incomplete blink and lagoph   • Encounter for long-term (current) use leflunomide   • Acquired absence of both cervix and uterus   • ALBINO, AHI 28 on CPAP 17   • Pain in joint, lower leg   • Other tear of cartilage or meniscus of knee, current   • Lumbago   • Acquired hypothyroidism   • Spinal stenosis of lumbar region   • Stroke (cerebrum) (CMS/[facility])   • Numbness of left hand   • Decreased ambulation status         HISTORY:    Past Medical History:   Diagnosis Date   • Anemia, unspecified 5/03    low ferritin 9, iron def,   • Carpal tunnel syndrome 1/05    bilateral midl to mod, emg, 12/04,    • Chronic pain    • 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, unspecified site     stomach avm   • Fracture    • Gastroesophageal reflux disease    • Generalized osteoarthrosis, unspecified site    • Intrinsic asthma, unspecified    • Iron deficiency anemia, unspecified 5/03   • Lens replaced by other means 5/1/2007   • Obesity, unspecified    • Osteoporosis    • Other and unspecified hyperlipidemia    • Other and unspecified peripheral  vertigo(386.19)    • Pneumonia    • Polymyalgia rheumatica (CMS/[facility]) 09/2004    dramatic improvement in 24hr with prednisone   • Reflux esophagitis    • Rheumatoid arthritis(714.0)    • Senile cataract, unspecified 5/1/2007   • Sinusitis, chronic    • Thyroid condition    • Tic disorder, unspecified 1978    froms bells palsy on the right side   • Unspecified essential hypertension    • Unspecified sleep apnea     cpap    • Urinary incontinence    • Urinary tract infection          ALLERGIES:   Allergen Reactions   • Lisinopril Cough     Cough with lisinopril   • Tramadol RASH         Current Outpatient Prescriptions   Medication Sig Dispense Refill   • atorvastatin (LIPITOR) 80 MG tablet Take 1 tablet by mouth nightly. 90 tablet 2   • clopidogrel (PLAVIX) 75 MG tablet Take 1 tablet by mouth daily. 90 tablet 0   • amLODIPine (NORVASC) 10 MG tablet Take 1 tablet by mouth daily. 90 tablet 1   • gabapentin (NEURONTIN) 300 MG capsule Take 1 capsule by mouth nightly. 90 capsule 0   • losartan (COZAAR) 100 MG tablet Take 100 mg by mouth daily.     • hydrochlorothiazide (HYDRODIURIL) 12.5 MG tablet Take 12.5 mg by mouth daily.     • potassium chloride (K-DUR,KLOR-CON) 20 MEQ CR tablet Take 2 tablets by mouth daily. 60 tablet 1   • ketoconazole (NIZORAL) 2 % cream Apply topically 2 times daily. On the left arm 30 g 2   • DISPENSE 2 front wheeled walker, 1 Device 0   • Diclofenac Sodium 1 % Cream Place onto the skin 3 times daily. 600 g 2   • levothyroxine (SYNTHROID, LEVOTHROID) 88 MCG tablet TAKE 1 TABLET EVERY DAY 90 tablet 2   • ranitidine (ZANTAC) 150 MG tablet TAKE 1 TABLET TWICE DAILY 180 tablet 5   • polyethylene glycol (MIRALAX) powder MIX 1 CAPFUL (17 GRAMS) IN LIQUID AND DRINK DAILY 1581 g 2   • ferrous sulfate 325 (65 FE) MG tablet Take 1 tablet by mouth daily (with breakfast). 100 tablet 1   • fluticasone-salmeterol (ADVAIR DISKUS) 100-50 MCG/DOSE AEROSOL POWDER, BREATH ACTIVATED Inhale 1 puff into the lungs 2  times daily. 3 each 3   • albuterol (VENTOLIN HFA) 108 (90 BASE) MCG/ACT inhaler Inhale 2 puffs into the lungs every 4 hours as needed (asthma r cough. ). 3 Inhaler 0   • Elastic Bandages & Supports (KNEE BRACE) MISC Use on rt knee as support. 1 each 1   • Artificial Tears 0.1-0.2-0.3 % OP SOLN one drop as needed.     • VITAMIN D3 1000 UNIT PO TABS 2 tabs per day = 2000 units 400 3     No current facility-administered medications for this visit.          Past Surgical History:   Procedure Laterality Date   • Biopsy of breast, incisional      2 on left breast , one on rt all benign,   • Breast surgery     • Colonoscopy  5/13    Dr lilly, 2 adenomas found    • Colonoscopy w biopsy  6/9/03    Dr. Lilly - Hyperplastic Polyp - repeat in 5 yrs   • Colonoscopy w biopsy  8/5/08    Dr. Lilly, Hemorrhoids/Polyps, F/U 5 years   • Esophagogastroduodenoscopy transoral flex w/bx single or mult  8/24/05    GERD, For SBS, Dr. Mckeon   • Eye surgery     • Fracture surgery     • Hand/finger surgery unlisted  06/25/2007    Rt ring finger mass exc  Dr. Sarabia   • Hysterectomy     • Past surgical history      breeast exam, 3/13   • Remv cataract extracap insert lens  05/01/89    Cataract Removal Lens Implant right eye   • Total abdom hysterectomy      GHADA w BSO   • Upper gi endoscopy,exam  6/9/03    Dr. Lilly - with APC, Grade A Erosive Esophagitis       SOCIAL HISTORY:  Social History   Substance Use Topics   • Smoking status: Former Smoker     Packs/day: 0.00     Years: 57.00     Types: Cigarettes     Quit date: 1/1/1985   • Smokeless tobacco: Never Used      Comment: 9/12/11:  Smoked about a pack per month.   • Alcohol use 0.0 oz/week      Comment: almost none          REVIEW OF SYSTEMS:  Review of Systems   Constitutional: Positive for malaise/fatigue. Negative for chills, fever and weight loss.   Gastrointestinal: Negative for abdominal pain, blood in stool, diarrhea, heartburn, melena, nausea and vomiting.          PHYSICAL EXAMINATION:  Vitals:    02/26/18 0839   BP: 173/77   Pulse: 89     GENERAL:  Patient is alert, in no acute distress.  SKIN:  Unrevealing for any petechial lesions.  HEENT:  Sclerae are nonicteric.  LUNGS:  Clear to auscultation.  CORONARY:  S1-S2, no appreciable murmur.  ABDOMEN:  Positive bowel sounds, soft, non-tender with palpation.    ASSESSMENT AND PLAN:  Iron Deficiency Anemia- Repeat CBC, iron and TIBC levels today. Will continue to monitor blood counts. Differential diagnosis includes anemia of chronic disease or possible slow GI blood loss from use of Plavix. Continue on PO ferrous sulfate. F/U in office if counts drop or new symptoms develop such as melena, hematochezia or new abdominal pain. A repeat EGD maybe warranted at that time.     Winifred Looney RN/Student N.P. Scribed for Dr. Lilly. Seen in conjunction with Dr. TARHSA Lilly MD.    The documentation recorded by the scribe accurately and completely reflects the service(s) I personally performed and the decisions made by me.        VTE Assessment already completed for this visit

## 2022-11-03 NOTE — HISTORY OF PRESENT ILLNESS
[Did you have an operation on your burn/wound injury?] : Did you have an operation on your burn/wound injury? No [Did this injury occur on the job?] : Did this injury occur on the job? No [de-identified] : wound right ankle and right lower leg [de-identified] : new pain and erythema right lateral ankle wound

## 2022-11-03 NOTE — ED PROVIDER NOTE - PHYSICAL EXAMINATION
CONSTITUTIONAL: awake, sitting in stretcher in no acute distress  SKIN: Warm, dry  HEAD: Normocephalic; atraumatic  EYES: No conjunctival injection. EOMI. PERRL  ENT: No nasal discharge; oropharynx nonerythematous; airway clear  NECK: Supple; non tender, no lymphadenopathy  CARD:  Regular rate and rhythm; S1, S2 normal; no murmurs, gallops, or rubs  RESP: CTAB; No wheezes, crackles, rales or rhonchi  ABD: Soft, nontender, nondistended, nonrigid, no guarding or rebound tenderness  EXT: Normal ROM,  no edema, good radial pulse +0.5cm ulcer to right lateral malleolus with surrounding erythema and swelling, no streaking lymphangitis, no joint swelling or pain on ROM  NEURO: A&O x3, speaking in full clear sentences, no facial asymmetry, moving all extremities

## 2022-11-03 NOTE — ASSESSMENT
[FreeTextEntry1] : right lateral ankle wound --  new open wound  -> .2x.1 -. . local wound care -> \par \par infected -> will admit for iv abx and debridement \par  [Wound Care] : wound care

## 2022-11-03 NOTE — ED PROVIDER NOTE - CLINICAL SUMMARY MEDICAL DECISION MAKING FREE TEXT BOX
87-year-old female past medical history A. fib on Eliquis hypertension MVR presents with nonhealing wound referred by Dr. Pride for admission for IV antibiotics.  Patient failed outpatient treatment multiple times no fever chills no nausea vomiting.  Well appearing, NAD, non toxic. NCAT PERRLA EOMI neck supple non tender normal wob cta bl rrr abdomen s nt nd no rebound no guarding WWPx4 neuro non focal, extremity exam shows 0.5 cm ulcer right lateral malleolus with erythema and swelling no discharge no streaking.  We will start antibiotics and admit

## 2022-11-03 NOTE — H&P ADULT - ASSESSMENT
Patient is 88 y/o Female with PMHx of HTN, HLD, paroxysmal A-fib (on Amiodarone 200mg daily and Eliquis 2.5mg bid),  Loop recorder, mitral valve regurgitation, GERD, Hypothyroidism, SLE, anxiety, dementia, recent admission for nonhealing Right ankle wound, CT showed lateral malleolar skin thickening and subcutaneous edema, with no CT evidence of acute osteomyelitis; wound cultures showed staph aur, pt completed course of Ancef IV while inpatient, and discharged home on Keflex PO; now presented with R ankle worsening pain, swelling, and redness. Pt was seen by Dr Pride in Clinic today, and sent to ED for admission for IV abx.    # R ankle nonhealing wound, cellulitis:  - CT R ankle done 8/19 and negative for osteomyelitis  - WCx from clinic 10/27/22 c/w Stenotrophomonas maltophilia susceptible to Levo and bactrim   - started on Cefazolin 2gm IV in ED, f/u with ID recommendations   - pain meds prn  - ambulate as tolerate  - Wound care: wash wound with soap and water, apply gentamycin, cover with xeroform, then wrap with kelrix and ACE wrap daily.    # Hx HTN, HLD, A-fib:  - vitals stable, cont to monitor vitals, ECG  - continue home meds: Amiodarone 200mg daily, and Eliquis 2.5mg bid  - f/u with Dr Marte as outpatient     # Asthma, mild intermittent   - no evidence of exacerbation  - monitor O2sat  - nebs prn    # GERD:  - on Famotidine 40mg daily    # Dementia, anxiety   - c/w [id number] mg BID  - Atarax prn for agitation and anxiety    # Miscellaneous:  - DVT ppx - pt on Eliquis  - PPI daily for GI ppx  - Bowel regimen  - ambulate as tolerate  - PT/OT c/s

## 2022-11-03 NOTE — ED PROVIDER NOTE - OBJECTIVE STATEMENT
88yo PMHx Afib (on amio and eliquis), HTN, HLD, MVR, anxiety, dementia, presenting for chronic non-healing wound to right ankle. Patient was seen at burn clinic earlier today, referred by Dr. Pride for admission and IV ABx, as patient has failed outpatient PO treatment multiple times. 88yo PMHx Afib (on amio and eliquis), HTN, HLD, MVR, anxiety, dementia, presenting for chronic non-healing wound to right ankle. Patient was seen at burn clinic earlier today, referred by Dr. Pride for admission and IV ABx, as patient has failed outpatient PO treatment multiple times. Patient denies any fever, chills, nausea, vomiting or problems walking

## 2022-11-03 NOTE — H&P ADULT - NSHPPHYSICALEXAM_GEN_ALL_CORE
PHYSICAL EXAM:  GENERAL: well built, well nourished, NAD, laying in bed comfortably  HEAD:  Atraumatic, Normocephalic  EYES: EOMI, PERRLA, conjunctiva and sclera clear  NECK: Supple,   CHEST/LUNG:  B/L good air entry, no tachypnea/increased WOB/retractions.  HEART: S1 and S2 intact, not in cardiopulm distress  ABDOMEN: Soft, Nontender,   EXTREMITIES:  2+ Peripheral Pulses,   NEUROLOGY: alert, oriented, forgetful, CN intact, moves all extremities   SKIN: R ankle partial thickness wound, +surrounding swelling and erythema, tenderness to palpation.

## 2022-11-03 NOTE — H&P ADULT - NSHPLABSRESULTS_GEN_ALL_CORE
LABS:  CBC Full  -  ( 03 Nov 2022 11:36 )  WBC Count : 6.69 K/uL  RBC Count : 4.25 M/uL  Hemoglobin : 13.0 g/dL  Hematocrit : 38.6 %  Platelet Count - Automated : 224 K/uL  Mean Cell Volume : 90.8 fL  Mean Cell Hemoglobin : 30.6 pg  Mean Cell Hemoglobin Concentration : 33.7 g/dL  Auto Neutrophil # : 5.01 K/uL  Auto Lymphocyte # : 1.08 K/uL  Auto Monocyte # : 0.48 K/uL  Auto Eosinophil # : 0.06 K/uL  Auto Basophil # : 0.04 K/uL  Auto Neutrophil % : 74.9 %  Auto Lymphocyte % : 16.1 %  Auto Monocyte % : 7.2 %  Auto Eosinophil % : 0.9 %  Auto Basophil % : 0.6 %    11-03    138  |  102  |  19  ----------------------------<  84  4.4   |  25  |  1.3    Ca    9.3      03 Nov 2022 11:36  Phos  3.1     11-03  Mg     1.9     11-03    TPro  6.5  /  Alb  4.1  /  TBili  0.4  /  DBili  x   /  AST  17  /  ALT  11  /  AlkPhos  72  11-03

## 2022-11-04 ENCOUNTER — TRANSCRIPTION ENCOUNTER (OUTPATIENT)
Age: 87
End: 2022-11-04

## 2022-11-04 VITALS
RESPIRATION RATE: 18 BRPM | TEMPERATURE: 98 F | SYSTOLIC BLOOD PRESSURE: 177 MMHG | DIASTOLIC BLOOD PRESSURE: 81 MMHG | OXYGEN SATURATION: 100 %

## 2022-11-04 DIAGNOSIS — Z02.9 ENCOUNTER FOR ADMINISTRATIVE EXAMINATIONS, UNSPECIFIED: ICD-10-CM

## 2022-11-04 PROCEDURE — 99238 HOSP IP/OBS DSCHRG MGMT 30/<: CPT

## 2022-11-04 RX ORDER — BACITRACIN ZINC 500 UNIT/G
1 OINTMENT IN PACKET (EA) TOPICAL
Refills: 0 | Status: DISCONTINUED | OUTPATIENT
Start: 2022-11-04 | End: 2022-11-04

## 2022-11-04 RX ORDER — MULTIVIT-MIN/FERROUS GLUCONATE 9 MG/15 ML
1 LIQUID (ML) ORAL
Qty: 0 | Refills: 0 | DISCHARGE
Start: 2022-11-04

## 2022-11-04 RX ORDER — LEVOFLOXACIN 5 MG/ML
1 INJECTION, SOLUTION INTRAVENOUS
Qty: 14 | Refills: 0
Start: 2022-11-04 | End: 2022-11-17

## 2022-11-04 RX ORDER — BACITRACIN ZINC 500 UNIT/G
1 OINTMENT IN PACKET (EA) TOPICAL
Qty: 2 | Refills: 2
Start: 2022-11-04 | End: 2023-02-01

## 2022-11-04 RX ORDER — SACCHAROMYCES BOULARDII 250 MG
1 POWDER IN PACKET (EA) ORAL
Qty: 28 | Refills: 0
Start: 2022-11-04 | End: 2022-11-17

## 2022-11-04 RX ORDER — CEPHALEXIN 500 MG
1 CAPSULE ORAL
Qty: 42 | Refills: 0
Start: 2022-11-04 | End: 2022-11-17

## 2022-11-04 RX ORDER — AMLODIPINE BESYLATE 2.5 MG/1
5 TABLET ORAL ONCE
Refills: 0 | Status: COMPLETED | OUTPATIENT
Start: 2022-11-04 | End: 2022-11-04

## 2022-11-04 RX ADMIN — MEMANTINE HYDROCHLORIDE 10 MILLIGRAM(S): 10 TABLET ORAL at 05:47

## 2022-11-04 RX ADMIN — Medication 100 MILLIGRAM(S): at 05:48

## 2022-11-04 RX ADMIN — Medication 25 MICROGRAM(S): at 05:48

## 2022-11-04 RX ADMIN — APIXABAN 2.5 MILLIGRAM(S): 2.5 TABLET, FILM COATED ORAL at 05:58

## 2022-11-04 RX ADMIN — AMIODARONE HYDROCHLORIDE 200 MILLIGRAM(S): 400 TABLET ORAL at 05:58

## 2022-11-04 NOTE — CONSULT NOTE ADULT - SUBJECTIVE AND OBJECTIVE BOX
MU NOEL  87y, Female  Allergy: sulfa drugs (Unknown)      All historical available data reviewed.    HPI:  Patient is 88 y/o Female with PMHx of HTN, HLD, paroxysmal A-fib (on Amiodarone 200mg daily and Eliquis 2.5mg bid),  Loop recorder, mitral valve regurgitation, GERD, Hypothyroidism, SLE, anxiety, dementia, recent admission for nonhealing Right ankle wound, CT showed lateral malleolar skin thickening and subcutaneous edema, with no CT evidence of acute osteomyelitis; wound cultures showed staph aur, pt completed course of Ancef IV while inpatient, and discharged home on Keflex PO; now presented with R ankle worsening pain, swelling, and redness. Pt was seen by Dr Pride in Clinic today, and sent to ED for admission for IV abx.   (03 Nov 2022 13:04)    FAMILY HISTORY:    PAST MEDICAL & SURGICAL HISTORY:  Hypothyroid      UTI (urinary tract infection)      Atrial fibrillation, unspecified type  no longer in atrial fifrillation      Anxiety      Mild intermittent asthma, unspecified whether complicated  no longer present      Cellulitis of finger, unspecified laterality  resolved      Diverticulitis  resolved      Reflux gastritis      Mitral valve insufficiency, unspecified etiology      Idiopathic pericarditis, unspecified chronicity      History of appendectomy      Status post placement of implantable loop recorder            VITALS:  T(F): 98.2, Max: 98.8 (11-03-22 @ 18:04)  HR: 88  BP: 177/81  RR: 18Vital Signs Last 24 Hrs  T(C): 36.8 (04 Nov 2022 07:26), Max: 37.1 (03 Nov 2022 18:04)  T(F): 98.2 (04 Nov 2022 07:26), Max: 98.8 (03 Nov 2022 18:04)  HR: 88 (04 Nov 2022 06:40) (63 - 88)  BP: 177/81 (04 Nov 2022 07:26) (112/63 - 200/89)  BP(mean): 134 (03 Nov 2022 23:45) (96 - 134)  RR: 18 (04 Nov 2022 07:26) (18 - 20)  SpO2: 100% (04 Nov 2022 07:26) (98% - 100%)    Parameters below as of 04 Nov 2022 07:26  Patient On (Oxygen Delivery Method): room air        TESTS & MEASUREMENTS:                        13.0   6.69  )-----------( 224      ( 03 Nov 2022 11:36 )             38.6     11-03    138  |  102  |  19  ----------------------------<  84  4.4   |  25  |  1.3    Ca    9.3      03 Nov 2022 11:36  Phos  3.1     11-03  Mg     1.9     11-03    TPro  6.5  /  Alb  4.1  /  TBili  0.4  /  DBili  x   /  AST  17  /  ALT  11  /  AlkPhos  72  11-03    LIVER FUNCTIONS - ( 03 Nov 2022 11:36 )  Alb: 4.1 g/dL / Pro: 6.5 g/dL / ALK PHOS: 72 U/L / ALT: 11 U/L / AST: 17 U/L / GGT: x                   RADIOLOGY & ADDITIONAL TESTS:  Personal review of radiological diagnostics performed  Echo and EKG results noted when applicable.     MEDICATIONS:  acetaminophen     Tablet .. 650 milliGRAM(s) Oral every 6 hours PRN  aMIOdarone    Tablet 200 milliGRAM(s) Oral daily  apixaban 2.5 milliGRAM(s) Oral every 12 hours  ascorbic acid 500 milliGRAM(s) Oral daily  calcium carbonate 1250 mG  + Vitamin D (OsCal 500 + D) 1 Tablet(s) Oral daily  ceFAZolin   IVPB 1000 milliGRAM(s) IV Intermittent every 8 hours  cyanocobalamin 1000 MICROGram(s) Oral daily  famotidine    Tablet 40 milliGRAM(s) Oral at bedtime  gentamicin 0.1% Ointment 1 Application(s) Topical two times a day  haloperidol    Injectable 2.5 milliGRAM(s) IntraMuscular once PRN  hydrOXYzine hydrochloride 25 milliGRAM(s) Oral at bedtime PRN  levothyroxine 25 MICROGram(s) Oral daily  memantine 10 milliGRAM(s) Oral two times a day  multivitamin/minerals 1 Tablet(s) Oral daily  polyethylene glycol 3350 17 Gram(s) Oral daily PRN  senna 2 Tablet(s) Oral at bedtime PRN      ANTIBIOTICS:  ceFAZolin   IVPB 1000 milliGRAM(s) IV Intermittent every 8 hours

## 2022-11-04 NOTE — DISCHARGE NOTE PROVIDER - CARE PROVIDER_API CALL
Beot Pride)  Plastic Surgery  [address] [place] [address]  Phone: [phone]  Fax: [phone]  Follow Up Time:

## 2022-11-04 NOTE — DISCHARGE NOTE PROVIDER - HOSPITAL COURSE
Patient is 86 y/o Female with PMHx of HTN, HLD, paroxysmal A-fib (on Amiodarone 200mg daily and Eliquis 2.5mg bid),  Loop recorder, mitral valve regurgitation, GERD, Hypothyroidism, SLE, anxiety, dementia, recent admission for nonhealing Right ankle wound, CT showed lateral malleolar skin thickening and subcutaneous edema, with no CT evidence of acute osteomyelitis; wound cultures showed staph aur, pt completed course of Ancef IV while inpatient, and discharged home on Keflex PO; now presented with R ankle worsening pain, swelling, and redness. Pt was seen by Dr Pride in Clinic today, and sent to ED for admission for IV abx.  Pt was admitted to BURN Unit.  WCx from clinic 10/27/22 c/w Stenotrophomonas maltophilia susceptible to Levo and bactrim. As per ID recommendations pt started on Ancef 1gm IV q8h. Wound care c/w bacitracin, adaptic, and kelrix. Cellulitis improved with IV abx. And, pt will be discharge home on PO abx.     # R ankle nonhealing wound, cellulitis:  - CT R ankle done 8/19 and negative for osteomyelitis  - WCx from clinic 10/27/22 c/w Stenotrophomonas maltophilia susceptible to Levo and bactrim   - started on Cefazolin 2gm IV in ED,   - as per ID recommendations pt started on Ancef 1 gm iv q8h;  on discharge change to po Keflex 500 mg q8h and Levofloxacin 250mg daily  x Duration 2 weeks  - pain meds prn  - ambulate as tolerate  - Wound care: wash wound with soap and water, apply bacitracin, cover with xeroform, then wrap with kelrix and ACE wrap daily.  - ambulate as tolerated    # Hx HTN, HLD, A-fib:  - vitals stable, cont to monitor vitals, ECG  - continue home meds: Amiodarone 200mg daily, and Eliquis 2.5mg bid  - f/u with Dr Marte as outpatient     # Asthma, mild intermittent   - no evidence of exacerbation  - monitor O2sat  - nebs prn    # GERD:  - on Famotidine 40mg daily    # Dementia, anxiety   - c/w [id number] mg BID  - Atarax prn for agitation and anxiety    Pt is stable to be discharge home with recommendations to continue wound care, continue abx as prescribed, and follow up in BURN Clinic in one week after discharge.

## 2022-11-04 NOTE — DISCHARGE NOTE PROVIDER - NSDCFUSCHEDAPPT_GEN_ALL_CORE_FT
[facility]  BURNTREAT [address]  Scheduled Appointment: 11/08/2022    Stanislav De Jesus  [facility]  PULMMED [address]  Scheduled Appointment: 12/06/2022    [facility]  CARDIOLOGY [address]  Scheduled Appointment: 12/06/2022    Leonel Marte  [facility]  CARDIOLOGY [address]  Scheduled Appointment: 12/13/2022    Akin Cheema  [facility]  CARDIOLOGY [address]  Scheduled Appointment: 01/24/2023

## 2022-11-04 NOTE — DISCHARGE NOTE PROVIDER - NSDCMRMEDTOKEN_GEN_ALL_CORE_FT
acetaminophen 325 mg oral tablet: 2 tab(s) orally every 6 hours, As needed, Mild Pain (1 - 3), Moderate Pain (4 - 6)  amiodarone 200 mg oral tablet: 1 tab(s) orally once a day  bacitracin 500 units/g topical ointment: 1 application topically 2 times a day  Calcium 600+D 600 mg-5 mcg (200 intl units) oral tablet: 1 tab(s) orally once a day  cephalexin 500 mg oral capsule: 1 cap(s) orally every 8 hours   diazePAM 5 mg oral tablet: 1 tab(s) orally 3 times a day, As Needed  Eliquis 2.5 mg oral tablet: 1 tab(s) orally 2 times a day  famotidine 40 mg oral tablet: 1 tab(s) orally once a day (at bedtime)  Florastor 250 mg oral capsule: 1 cap(s) orally 2 times a day   levoFLOXacin 250 mg oral tablet: 1 tab(s) orally once a day   levothyroxine 25 mcg (0.025 mg) oral tablet: 1 tab(s) orally once a day  memantine 10 mg oral tablet: 1 tab(s) orally 2 times a day  Multiple Vitamins with Minerals oral tablet: 1 tab(s) orally once a day  senna leaf extract oral tablet: 2 tab(s) orally once a day (at bedtime)  Vitamin B12 1000 mcg oral tablet: 1 tab(s) orally once a day

## 2022-11-04 NOTE — CONSULT NOTE ADULT - ASSESSMENT
86 y/o Female with PMHx of HTN, HLD, paroxysmal A-fib (on Amiodarone 200mg daily and Eliquis 2.5mg bid),  Loop recorder, mitral valve regurgitation, GERD, Hypothyroidism, SLE, anxiety, dementia, recent admission for nonhealing Right ankle wound, CT showed lateral malleolar skin thickening and subcutaneous edema, with no CT evidence of acute osteomyelitis; wound cultures showed staph aur, pt completed course of Ancef IV while inpatient, and discharged home on Keflex PO; now presented with R ankle worsening pain, swelling, and redness.    IMPRESSION;  Right foot lateral malleolus with cellulitis. No abscess/OM  818 WCx VIKRAM  10/25 WCx S maltophilia    RECOMMENDATIONS;  Ancef 1 gm iv q8h ( on discharge change to po Keflex 500 mg q8h )  Po Bactrim 1 SS tablet q12h   Duration 2 weeks  Please do not hesitate to recall ID if any questions arise either through [employer] or through [employer]

## 2022-11-04 NOTE — DISCHARGE NOTE PROVIDER - NSDCCPCAREPLAN_GEN_ALL_CORE_FT
PRINCIPAL DISCHARGE DIAGNOSIS  Diagnosis: Nonhealing ulcer of right lower leg  Assessment and Plan of Treatment: Please, continue wound care daily: wash wound with soap and water, apply bacitracin, then cover with xeroform, and wrap with kerlix. Please, continue antibiotics as prescribed: Keflex 500mg raven 8hrs, and Levofloxacin 250 mg daily for 14 days. . Call [phone] to schedule follow up appointment with Dr Frazier in B[facility] Clinic in one week after discharge.      SECONDARY DISCHARGE DIAGNOSES  Diagnosis: Atrial fibrillation  Assessment and Plan of Treatment: please continue your home medications and follow up with your PMD and cardiologist as outpatient.    Diagnosis: Hypertension  Assessment and Plan of Treatment: please continue your home medications and follow up with your PMD and cardiologist as outpatient.

## 2022-11-04 NOTE — PROGRESS NOTE ADULT - ASSESSMENT
Patient is 88 y/o Female with PMHx of HTN, HLD, paroxysmal A-fib (on Amiodarone 200mg daily and Eliquis 2.5mg bid),  Loop recorder, mitral valve regurgitation, GERD, Hypothyroidism, SLE, anxiety, dementia, recent admission for nonhealing Right ankle wound, CT showed lateral malleolar skin thickening and subcutaneous edema, with no CT evidence of acute osteomyelitis; wound cultures showed staph aur, pt completed course of Ancef IV while inpatient, and discharged home on Keflex PO; now presented with R ankle worsening pain, swelling, and redness. Pt was seen by Dr Pride in Clinic today, and sent to ED for admission for IV abx.    # R ankle nonhealing wound, cellulitis:  - CT R ankle done 8/19 and negative for osteomyelitis  - WCx from clinic 10/27/22 c/w Stenotrophomonas maltophilia susceptible to Levo and bactrim   - started on Cefazolin 2gm IV in ED,   - as per ID recomend cont  Ancef 1 gm iv q8h ( on discharge change to po Keflex 500 mg q8h ), and Po Bactrim 1 SS tablet q12h  x Duration 2 weeks  - pain meds prn  - ambulate as tolerate  - Wound care: wash wound with soap and water, apply gentamycin, cover with xeroform, then wrap with kelrix and ACE wrap daily.    # Hx HTN, HLD, A-fib:  - vitals stable, cont to monitor vitals, ECG  - continue home meds: Amiodarone 200mg daily, and Eliquis 2.5mg bid  - f/u with Dr Marte as outpatient     # Asthma, mild intermittent   - no evidence of exacerbation  - monitor O2sat  - nebs prn    # GERD:  - on Famotidine 40mg daily    # Dementia, anxiety   - c/w [id number] mg BID  - Atarax prn for agitation and anxiety    # Miscellaneous:  - DVT ppx - pt on Eliquis  - PPI daily for GI ppx  - Bowel regimen  - ambulate as tolerate  - PT/OT c/s    Patient is 88 y/o Female with PMHx of HTN, HLD, paroxysmal A-fib (on Amiodarone 200mg daily and Eliquis 2.5mg bid),  Loop recorder, mitral valve regurgitation, GERD, Hypothyroidism, SLE, anxiety, dementia, recent admission for nonhealing Right ankle wound, CT showed lateral malleolar skin thickening and subcutaneous edema, with no CT evidence of acute osteomyelitis; wound cultures showed staph aur, pt completed course of Ancef IV while inpatient, and discharged home on Keflex PO; now presented with R ankle worsening pain, swelling, and redness. Pt was seen by Dr Pride in Clinic today, and sent to ED for admission for IV abx.    # R ankle nonhealing wound, cellulitis:  - CT R ankle done 8/19 and negative for osteomyelitis  - WCx from clinic 10/27/22 c/w Stenotrophomonas maltophilia susceptible to Levo and bactrim   - started on Cefazolin 2gm IV in ED,   - as per ID recomend cont  Ancef 1 gm iv q8h; on discharge change to po Keflex 500 mg q8h and Po Bactrim 1 SS tablet q12h (pt has allergy to sulfa) -> as per phone conversations with ID will d/c on Levofloxacin 250mg daily   x 2 weeks  - pain meds prn  - ambulate as tolerate  - Wound care: wash wound with soap and water, apply gentamycin, cover with xeroform, then wrap with kelrix and ACE wrap daily.    # Hx HTN, HLD, A-fib:  - vitals stable, cont to monitor vitals, ECG  - continue home meds: Amiodarone 200mg daily, and Eliquis 2.5mg bid  - f/u with Dr Marte as outpatient     # Asthma, mild intermittent   - no evidence of exacerbation  - monitor O2sat  - nebs prn    # GERD:  - on Famotidine 40mg daily    # Dementia, anxiety   - c/w [id number] mg BID  - Atarax prn for agitation and anxiety    # Miscellaneous:  - DVT ppx - pt on Eliquis  - PPI daily for GI ppx  - Bowel regimen  - ambulate as tolerate  - PT/OT c/s

## 2022-11-04 NOTE — PROGRESS NOTE ADULT - SUBJECTIVE AND OBJECTIVE BOX
Patient is a 87y old  Female who presents with a chief complaint of     AM rounds:  afebrile, no events overnight    Vital Signs Last 24 Hrs  T(C): 36.8 (04 Nov 2022 07:26), Max: 37.1 (03 Nov 2022 18:04)  T(F): 98.2 (04 Nov 2022 07:26), Max: 98.8 (03 Nov 2022 18:04)  HR: 88 (04 Nov 2022 06:40) (63 - 88)  BP: 177/81 (04 Nov 2022 07:26) (112/63 - 200/89)  BP(mean): 134 (03 Nov 2022 23:45) (96 - 134)  RR: 18 (04 Nov 2022 07:26) (18 - 20)  SpO2: 100% (04 Nov 2022 07:26) (98% - 100%)    O2 Parameters below as of 04 Nov 2022 07:26  Patient On (Oxygen Delivery Method): room air    I&O's Summary    03 Nov 2022 07:01  -  04 Nov 2022 07:00  --------------------------------------------------------  IN: 100 mL / OUT: 0 mL / NET: 100 mL    Allergies  sulfa drugs (Unknown)    Lab Results:                        13.0   6.69  )-----------( 224      ( 03 Nov 2022 11:36 )             38.6     11-03    138  |  102  |  19  ----------------------------<  84  4.4   |  25  |  1.3    Ca    9.3      03 Nov 2022 11:36  Phos  3.1     11-03  Mg     1.9     11-03    TPro  6.5  /  Alb  4.1  /  TBili  0.4  /  DBili  x   /  AST  17  /  ALT  11  /  AlkPhos  72  11-03    LIVER FUNCTIONS - ( 03 Nov 2022 11:36 )  Alb: 4.1 g/dL / Pro: 6.5 g/dL / ALK PHOS: 72 U/L / ALT: 11 U/L / AST: 17 U/L / GGT: x             MEDICATIONS  (STANDING):  aMIOdarone    Tablet 200 milliGRAM(s) Oral daily  apixaban 2.5 milliGRAM(s) Oral every 12 hours  ascorbic acid 500 milliGRAM(s) Oral daily  BACItracin   Ointment 1 Application(s) Topical two times a day  calcium carbonate 1250 mG  + Vitamin D (OsCal 500 + D) 1 Tablet(s) Oral daily  ceFAZolin   IVPB 1000 milliGRAM(s) IV Intermittent every 8 hours  cyanocobalamin 1000 MICROGram(s) Oral daily  famotidine    Tablet 40 milliGRAM(s) Oral at bedtime  levothyroxine 25 MICROGram(s) Oral daily  memantine 10 milliGRAM(s) Oral two times a day  multivitamin/minerals 1 Tablet(s) Oral daily    MEDICATIONS  (PRN):  acetaminophen     Tablet .. 650 milliGRAM(s) Oral every 6 hours PRN Mild Pain (1 - 3)  haloperidol    Injectable 2.5 milliGRAM(s) IntraMuscular once PRN agitation  hydrOXYzine hydrochloride 25 milliGRAM(s) Oral at bedtime PRN Agitation  polyethylene glycol 3350 17 Gram(s) Oral daily PRN Constipation  senna 2 Tablet(s) Oral at bedtime PRN Constipation    PHYSICAL EXAM:  GENERAL: well built, well nourished, NAD, laying in bed comfortably  HEAD:  Atraumatic, Normocephalic  EYES: EOMI, PERRLA, conjunctiva and sclera clear  NECK: Supple,   CHEST/LUNG:  B/L good air entry, no tachypnea/increased WOB/retractions.  HEART: S1 and S2 intact, not in cardiopulm distress  ABDOMEN: Soft, Nontender,   EXTREMITIES:  2+ Peripheral Pulses,   NEUROLOGY: alert, oriented, forgetful, CN intact, moves all extremities   SKIN: R ankle partial thickness wound, improving surrounding swelling and erythema, less tenderness.   Dressing done with bacitracin and xeroform. Pt tolerate dwell.

## 2022-11-04 NOTE — DISCHARGE NOTE NURSING/CASE MANAGEMENT/SOCIAL WORK - PATIENT PORTAL LINK FT
You can access the FollowMyHealth Patient Portal offered by [facility] by registering at the following website: [email or web address]. By joining [employer]’s FollowMyHealth portal, you will also be able to view your health information using other applications (apps) compatible with our system.

## 2022-11-04 NOTE — DISCHARGE NOTE NURSING/CASE MANAGEMENT/SOCIAL WORK - NSDCVIVACCINE_GEN_ALL_CORE_FT
Tdap; 21-Jan-2019 16:42; Esperanza Kenney ([place]); [employer]; [id number] (Exp. Date: 27-Jul-2020); IntraMuscular; Deltoid Right.; 0.5 milliLiter(s); VIS (VIS Published: 09-May-2013, VIS Presented: 21-Jan-2019);

## 2022-11-05 NOTE — DISCHARGE NOTE PROVIDER - NSFOLLOWUPCLINICS_GEN_ALL_ED_FT
25.8
[facility]  Burn  [address], [address]  [place] [address]  Phone: [phone]  Fax:

## 2022-11-08 ENCOUNTER — APPOINTMENT (OUTPATIENT)
Dept: BURN CARE | Facility: CLINIC | Age: 87
End: 2022-11-08

## 2022-11-08 ENCOUNTER — OUTPATIENT (OUTPATIENT)
Dept: OUTPATIENT SERVICES | Facility: HOSPITAL | Age: 87
LOS: 1 days | Discharge: HOME | End: 2022-11-08

## 2022-11-08 DIAGNOSIS — Z90.49 ACQUIRED ABSENCE OF OTHER SPECIFIED PARTS OF DIGESTIVE TRACT: Chronic | ICD-10-CM

## 2022-11-08 DIAGNOSIS — Z95.818 PRESENCE OF OTHER CARDIAC IMPLANTS AND GRAFTS: Chronic | ICD-10-CM

## 2022-11-08 PROCEDURE — 99212 OFFICE O/P EST SF 10 MIN: CPT

## 2022-11-08 NOTE — HISTORY OF PRESENT ILLNESS
[Did you have an operation on your burn/wound injury?] : Did you have an operation on your burn/wound injury? No [Did this injury occur on the job?] : Did this injury occur on the job? No [de-identified] : wound right ankle and right lower leg [de-identified] : healing  right lateral ankle wound

## 2022-11-08 NOTE — PHYSICAL EXAM
[Healing] : healing [Size%: ______] : Size: [unfilled]% [Infected?] : Infected: Yes [3] : 3 out of 10 [Abnormal] : abnormal [Medium] : medium [] : no [de-identified] : right lateral ankle wound --  healing   -> 1x1cm -. . local wound care -> \par  \par follow up 1 week \par \par  [TWNoteComboBox1] : bandaid [TWNoteComboBox2] : False

## 2022-11-08 NOTE — ASSESSMENT
[FreeTextEntry1] : right lateral ankle wound --  healing   -> 1x1cm -. . local wound care -> \par  \par follow up 1 week  [Wound Care] : wound care

## 2022-11-08 NOTE — REASON FOR VISIT
[Revisit] : revisit [Were you seen in the Emergency Room?] : not seen in the emergency room [Were you admitted to the burn center at [facility]?] : not admitted to the burn center at [facility] [Friend] : friend
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
## 2022-11-11 DIAGNOSIS — S91.001A UNSPECIFIED OPEN WOUND, RIGHT ANKLE, INITIAL ENCOUNTER: ICD-10-CM
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
## 2022-11-11 DIAGNOSIS — S91.001D UNSPECIFIED OPEN WOUND, RIGHT ANKLE, SUBSEQUENT ENCOUNTER: ICD-10-CM

## 2022-11-11 DIAGNOSIS — B99.9 UNSPECIFIED INFECTIOUS DISEASE: ICD-10-CM

## 2022-11-14 ENCOUNTER — RX RENEWAL (OUTPATIENT)
Age: 87
End: 2022-11-14

## 2022-11-15 ENCOUNTER — OUTPATIENT (OUTPATIENT)
Dept: OUTPATIENT SERVICES | Facility: HOSPITAL | Age: 87
LOS: 1 days | Discharge: HOME | End: 2022-11-15

## 2022-11-15 ENCOUNTER — APPOINTMENT (OUTPATIENT)
Dept: BURN CARE | Facility: CLINIC | Age: 87
End: 2022-11-15

## 2022-11-15 VITALS
OXYGEN SATURATION: 100 % | HEART RATE: 66 BPM | SYSTOLIC BLOOD PRESSURE: 128 MMHG | HEIGHT: 66 IN | DIASTOLIC BLOOD PRESSURE: 61 MMHG

## 2022-11-15 DIAGNOSIS — Z95.818 PRESENCE OF OTHER CARDIAC IMPLANTS AND GRAFTS: Chronic | ICD-10-CM

## 2022-11-15 DIAGNOSIS — Z90.49 ACQUIRED ABSENCE OF OTHER SPECIFIED PARTS OF DIGESTIVE TRACT: Chronic | ICD-10-CM

## 2022-11-15 PROCEDURE — 99212 OFFICE O/P EST SF 10 MIN: CPT

## 2022-11-15 NOTE — REASON FOR VISIT
[Revisit] : revisit [Were you seen in the Emergency Room?] : not seen in the emergency room [Were you admitted to the burn center at [facility]?] : not admitted to the burn center at [facility] [Friend] : friend

## 2022-11-15 NOTE — PHYSICAL EXAM
[Healing] : healing [Size%: ______] : Size: [unfilled]% [Infected?] : Infected: No [3] : 3 out of 10 [Abnormal] : abnormal [Medium] : medium [] : no [de-identified] : right lateral ankle wound --  healing   -> 1x1cm -. . local wound care -> \par  \par follow up 1 week \par \par  [TWNoteComboBox1] : bandaid

## 2022-11-15 NOTE — HISTORY OF PRESENT ILLNESS
[Did you have an operation on your burn/wound injury?] : Did you have an operation on your burn/wound injury? No [Did this injury occur on the job?] : Did this injury occur on the job? No [de-identified] : wound right ankle and right lower leg [de-identified] : healing  right lateral ankle wound

## 2022-11-16 DIAGNOSIS — Z88.2 ALLERGY STATUS TO SULFONAMIDES: ICD-10-CM

## 2022-11-16 DIAGNOSIS — M32.9 SYSTEMIC LUPUS ERYTHEMATOSUS, UNSPECIFIED: ICD-10-CM

## 2022-11-16 DIAGNOSIS — I10 ESSENTIAL (PRIMARY) HYPERTENSION: ICD-10-CM

## 2022-11-16 DIAGNOSIS — E78.5 HYPERLIPIDEMIA, UNSPECIFIED: ICD-10-CM

## 2022-11-16 DIAGNOSIS — B95.61 METHICILLIN SUSCEPTIBLE STAPHYLOCOCCUS AUREUS INFECTION AS THE CAUSE OF DISEASES CLASSIFIED ELSEWHERE: ICD-10-CM

## 2022-11-16 DIAGNOSIS — L03.115 CELLULITIS OF RIGHT LOWER LIMB: ICD-10-CM

## 2022-11-16 DIAGNOSIS — E03.9 HYPOTHYROIDISM, UNSPECIFIED: ICD-10-CM

## 2022-11-16 DIAGNOSIS — Z20.822 CONTACT WITH AND (SUSPECTED) EXPOSURE TO COVID-19: ICD-10-CM

## 2022-11-16 DIAGNOSIS — L97.319 NON-PRESSURE CHRONIC ULCER OF RIGHT ANKLE WITH UNSPECIFIED SEVERITY: ICD-10-CM

## 2022-11-16 DIAGNOSIS — J45.20 MILD INTERMITTENT ASTHMA, UNCOMPLICATED: ICD-10-CM

## 2022-11-16 DIAGNOSIS — F03.90 UNSPECIFIED DEMENTIA WITHOUT BEHAVIORAL DISTURBANCE: ICD-10-CM

## 2022-11-16 DIAGNOSIS — F41.9 ANXIETY DISORDER, UNSPECIFIED: ICD-10-CM

## 2022-11-16 DIAGNOSIS — K21.9 GASTRO-ESOPHAGEAL REFLUX DISEASE WITHOUT ESOPHAGITIS: ICD-10-CM

## 2022-11-16 DIAGNOSIS — I73.9 PERIPHERAL VASCULAR DISEASE, UNSPECIFIED: ICD-10-CM

## 2022-11-16 DIAGNOSIS — Z79.01 LONG TERM (CURRENT) USE OF ANTICOAGULANTS: ICD-10-CM

## 2022-11-22 ENCOUNTER — OUTPATIENT (OUTPATIENT)
Dept: OUTPATIENT SERVICES | Facility: HOSPITAL | Age: 87
LOS: 1 days | Discharge: HOME | End: 2022-11-22

## 2022-11-22 ENCOUNTER — APPOINTMENT (OUTPATIENT)
Dept: BURN CARE | Facility: CLINIC | Age: 87
End: 2022-11-22

## 2022-11-22 DIAGNOSIS — Z90.49 ACQUIRED ABSENCE OF OTHER SPECIFIED PARTS OF DIGESTIVE TRACT: Chronic | ICD-10-CM

## 2022-11-22 DIAGNOSIS — Z95.818 PRESENCE OF OTHER CARDIAC IMPLANTS AND GRAFTS: Chronic | ICD-10-CM

## 2022-11-22 PROCEDURE — 99212 OFFICE O/P EST SF 10 MIN: CPT

## 2022-11-22 NOTE — ED PROVIDER NOTE - CONSTITUTIONAL, MLM
Spoke with peer reviewer.  Patient failed 6 weeks of conservative care to include physical therapy.  Also, two physical exam findings need to be positive, she has joint line tenderness or pain with forced flexion.  Patient should follow-up with a phone call the office in 3 weeks with an update on progress.  If symptoms persist, MRI can be ordered again at that time.   normal... Well appearing, awake, alert, oriented to person, place, time/situation and in no apparent distress.

## 2022-11-22 NOTE — PHYSICAL EXAM
[Closed] : closed [Size%: ______] : Size: [unfilled]% [Infected?] : Infected: No [3] : 3 out of 10 [Abnormal] : abnormal [Medium] : medium [] : no [de-identified] : right lateral ankle wound --  healed   -> 1x1cm -. . local wound care -> \par  \par follow up prn k \par \par  [TWNoteComboBox1] : bandaid

## 2022-11-22 NOTE — HISTORY OF PRESENT ILLNESS
[Did you have an operation on your burn/wound injury?] : Did you have an operation on your burn/wound injury? No [Did this injury occur on the job?] : Did this injury occur on the job? No [de-identified] : wound right ankle and right lower leg [de-identified] : healed  right lateral ankle wound

## 2022-11-22 NOTE — REASON FOR VISIT
[Revisit] : revisit [Were you seen in the Emergency Room?] : not seen in the emergency room [Were you admitted to the burn center at [facility]?] : not admitted to the burn center at [facility] [Friend] : friend

## 2022-11-22 NOTE — ASSESSMENT
[FreeTextEntry1] : right lateral ankle wound --  healed   -> 1x1cm -. . local wound care -> \par  \par follow up prn k  [Wound Care] : wound care

## 2022-11-29 ENCOUNTER — LABORATORY RESULT (OUTPATIENT)
Age: 87
End: 2022-11-29

## 2022-11-29 DIAGNOSIS — L98.8 OTHER SPECIFIED DISORDERS OF THE SKIN AND SUBCUTANEOUS TISSUE: ICD-10-CM

## 2022-12-02 ENCOUNTER — EMERGENCY (EMERGENCY)
Facility: HOSPITAL | Age: 87
LOS: 0 days | Discharge: HOME | End: 2022-12-02
Attending: EMERGENCY MEDICINE | Admitting: EMERGENCY MEDICINE

## 2022-12-02 VITALS
TEMPERATURE: 96 F | RESPIRATION RATE: 20 BRPM | WEIGHT: 130.07 LBS | DIASTOLIC BLOOD PRESSURE: 61 MMHG | HEIGHT: 66 IN | HEART RATE: 65 BPM | OXYGEN SATURATION: 100 % | SYSTOLIC BLOOD PRESSURE: 135 MMHG

## 2022-12-02 DIAGNOSIS — Z79.01 LONG TERM (CURRENT) USE OF ANTICOAGULANTS: ICD-10-CM

## 2022-12-02 DIAGNOSIS — I10 ESSENTIAL (PRIMARY) HYPERTENSION: ICD-10-CM

## 2022-12-02 DIAGNOSIS — E03.9 HYPOTHYROIDISM, UNSPECIFIED: ICD-10-CM

## 2022-12-02 DIAGNOSIS — K21.9 GASTRO-ESOPHAGEAL REFLUX DISEASE WITHOUT ESOPHAGITIS: ICD-10-CM

## 2022-12-02 DIAGNOSIS — Z86.79 PERSONAL HISTORY OF OTHER DISEASES OF THE CIRCULATORY SYSTEM: ICD-10-CM

## 2022-12-02 DIAGNOSIS — Z95.818 PRESENCE OF OTHER CARDIAC IMPLANTS AND GRAFTS: Chronic | ICD-10-CM

## 2022-12-02 DIAGNOSIS — I48.0 PAROXYSMAL ATRIAL FIBRILLATION: ICD-10-CM

## 2022-12-02 DIAGNOSIS — L03.115 CELLULITIS OF RIGHT LOWER LIMB: ICD-10-CM

## 2022-12-02 DIAGNOSIS — Z87.2 PERSONAL HISTORY OF DISEASES OF THE SKIN AND SUBCUTANEOUS TISSUE: ICD-10-CM

## 2022-12-02 DIAGNOSIS — Z88.2 ALLERGY STATUS TO SULFONAMIDES: ICD-10-CM

## 2022-12-02 DIAGNOSIS — Z02.9 ENCOUNTER FOR ADMINISTRATIVE EXAMINATIONS, UNSPECIFIED: ICD-10-CM

## 2022-12-02 DIAGNOSIS — Z90.49 ACQUIRED ABSENCE OF OTHER SPECIFIED PARTS OF DIGESTIVE TRACT: Chronic | ICD-10-CM

## 2022-12-02 DIAGNOSIS — Z87.19 PERSONAL HISTORY OF OTHER DISEASES OF THE DIGESTIVE SYSTEM: ICD-10-CM

## 2022-12-02 DIAGNOSIS — Z95.818 PRESENCE OF OTHER CARDIAC IMPLANTS AND GRAFTS: ICD-10-CM

## 2022-12-02 DIAGNOSIS — J45.20 MILD INTERMITTENT ASTHMA, UNCOMPLICATED: ICD-10-CM

## 2022-12-02 DIAGNOSIS — F03.90 UNSPECIFIED DEMENTIA WITHOUT BEHAVIORAL DISTURBANCE: ICD-10-CM

## 2022-12-02 DIAGNOSIS — E78.5 HYPERLIPIDEMIA, UNSPECIFIED: ICD-10-CM

## 2022-12-02 DIAGNOSIS — Z87.440 PERSONAL HISTORY OF URINARY (TRACT) INFECTIONS: ICD-10-CM

## 2022-12-02 DIAGNOSIS — Z90.49 ACQUIRED ABSENCE OF OTHER SPECIFIED PARTS OF DIGESTIVE TRACT: ICD-10-CM

## 2022-12-02 DIAGNOSIS — Z20.822 CONTACT WITH AND (SUSPECTED) EXPOSURE TO COVID-19: ICD-10-CM

## 2022-12-02 DIAGNOSIS — F41.9 ANXIETY DISORDER, UNSPECIFIED: ICD-10-CM

## 2022-12-02 LAB
ALBUMIN SERPL ELPH-MCNC: 4.1 G/DL — SIGNIFICANT CHANGE UP (ref 3.5–5.2)
ALP SERPL-CCNC: 73 U/L — SIGNIFICANT CHANGE UP (ref 30–115)
ALT FLD-CCNC: 11 U/L — SIGNIFICANT CHANGE UP (ref 0–41)
ANION GAP SERPL CALC-SCNC: 13 MMOL/L — SIGNIFICANT CHANGE UP (ref 7–14)
AST SERPL-CCNC: 19 U/L — SIGNIFICANT CHANGE UP (ref 0–41)
BASOPHILS # BLD AUTO: 0.03 K/UL — SIGNIFICANT CHANGE UP (ref 0–0.2)
BASOPHILS NFR BLD AUTO: 0.5 % — SIGNIFICANT CHANGE UP (ref 0–1)
BILIRUB SERPL-MCNC: 0.4 MG/DL — SIGNIFICANT CHANGE UP (ref 0.2–1.2)
BUN SERPL-MCNC: 22 MG/DL — HIGH (ref 10–20)
CALCIUM SERPL-MCNC: 9.1 MG/DL — SIGNIFICANT CHANGE UP (ref 8.4–10.5)
CHLORIDE SERPL-SCNC: 102 MMOL/L — SIGNIFICANT CHANGE UP (ref 98–110)
CO2 SERPL-SCNC: 21 MMOL/L — SIGNIFICANT CHANGE UP (ref 17–32)
CREAT SERPL-MCNC: 1.3 MG/DL — SIGNIFICANT CHANGE UP (ref 0.7–1.5)
EGFR: 40 ML/MIN/1.73M2 — LOW
EOSINOPHIL # BLD AUTO: 0.15 K/UL — SIGNIFICANT CHANGE UP (ref 0–0.7)
EOSINOPHIL NFR BLD AUTO: 2.6 % — SIGNIFICANT CHANGE UP (ref 0–8)
GLUCOSE SERPL-MCNC: 107 MG/DL — HIGH (ref 70–99)
HCT VFR BLD CALC: 39.7 % — SIGNIFICANT CHANGE UP (ref 37–47)
HGB BLD-MCNC: 12.7 G/DL — SIGNIFICANT CHANGE UP (ref 12–16)
IMM GRANULOCYTES NFR BLD AUTO: 0.4 % — HIGH (ref 0.1–0.3)
LYMPHOCYTES # BLD AUTO: 1.14 K/UL — LOW (ref 1.2–3.4)
LYMPHOCYTES # BLD AUTO: 20 % — LOW (ref 20.5–51.1)
MCHC RBC-ENTMCNC: 29.9 PG — SIGNIFICANT CHANGE UP (ref 27–31)
MCHC RBC-ENTMCNC: 32 G/DL — SIGNIFICANT CHANGE UP (ref 32–37)
MCV RBC AUTO: 93.4 FL — SIGNIFICANT CHANGE UP (ref 81–99)
MONOCYTES # BLD AUTO: 0.38 K/UL — SIGNIFICANT CHANGE UP (ref 0.1–0.6)
MONOCYTES NFR BLD AUTO: 6.7 % — SIGNIFICANT CHANGE UP (ref 1.7–9.3)
NEUTROPHILS # BLD AUTO: 3.98 K/UL — SIGNIFICANT CHANGE UP (ref 1.4–6.5)
NEUTROPHILS NFR BLD AUTO: 69.8 % — SIGNIFICANT CHANGE UP (ref 42.2–75.2)
NRBC # BLD: 0 /100 WBCS — SIGNIFICANT CHANGE UP (ref 0–0)
PLATELET # BLD AUTO: 192 K/UL — SIGNIFICANT CHANGE UP (ref 130–400)
POTASSIUM SERPL-MCNC: 4.2 MMOL/L — SIGNIFICANT CHANGE UP (ref 3.5–5)
POTASSIUM SERPL-SCNC: 4.2 MMOL/L — SIGNIFICANT CHANGE UP (ref 3.5–5)
PROT SERPL-MCNC: 6.7 G/DL — SIGNIFICANT CHANGE UP (ref 6–8)
RBC # BLD: 4.25 M/UL — SIGNIFICANT CHANGE UP (ref 4.2–5.4)
RBC # FLD: 13.6 % — SIGNIFICANT CHANGE UP (ref 11.5–14.5)
SARS-COV-2 RNA SPEC QL NAA+PROBE: SIGNIFICANT CHANGE UP
SODIUM SERPL-SCNC: 136 MMOL/L — SIGNIFICANT CHANGE UP (ref 135–146)
WBC # BLD: 5.7 K/UL — SIGNIFICANT CHANGE UP (ref 4.8–10.8)
WBC # FLD AUTO: 5.7 K/UL — SIGNIFICANT CHANGE UP (ref 4.8–10.8)

## 2022-12-02 PROCEDURE — 93010 ELECTROCARDIOGRAM REPORT: CPT

## 2022-12-02 PROCEDURE — 99214 OFFICE O/P EST MOD 30 MIN: CPT

## 2022-12-02 PROCEDURE — 99284 EMERGENCY DEPT VISIT MOD MDM: CPT | Mod: FS

## 2022-12-02 PROCEDURE — 73610 X-RAY EXAM OF ANKLE: CPT | Mod: 26,RT

## 2022-12-02 RX ORDER — LEVOFLOXACIN 5 MG/ML
1 INJECTION, SOLUTION INTRAVENOUS
Qty: 5 | Refills: 0
Start: 2022-12-02 | End: 2022-12-06

## 2022-12-02 RX ORDER — CEFAZOLIN SODIUM 1 G
2000 VIAL (EA) INJECTION ONCE
Refills: 0 | Status: COMPLETED | OUTPATIENT
Start: 2022-12-02 | End: 2022-12-02

## 2022-12-02 RX ADMIN — Medication 100 MILLIGRAM(S): at 08:36

## 2022-12-02 NOTE — ED PROVIDER NOTE - CLINICAL SUMMARY MEDICAL DECISION MAKING FREE TEXT BOX
87-year-old female past medical history of high blood pressure, hyperlipidemia, paroxysmal atrial fibrillation on Eliquis, with recorder, mitral valve regurgitation, GERD, hypothyroidism, SLE, anxiety, dementia recent admission from November 3 to November 4 for right lateral malleolus admitted to BURN Unit, WCx from clinic 10/27/22 c/w Stenotrophomonas maltophilia susceptible to Levo and bactrim. (as per ID recommendations pt started on Ancef 1gm IV q8h. Wound care c/w bacitracin, adaptic, and kelrix) discharged on Levaquin and Keflex, presents with right lateral malleolus cellulitis that returned yesterday associated with erythema, warmth to palpation, and pain.  Pain is throbbing, constant, nonradiating, moderate intensity, worse with palpation, no alleviating factors.  Patient's family member called her PMD Dr. Marte who sent patient into the emergency department for burn evaluation.  No fever, chills, n/v, cp,  pleuritic cp, sob, palpitations, diaphoresis, cough, ha/lh/dizziness, numbness/tingling, neck pain/ stiffness, abd pain, diarrhea, constipation, melena/brbpr, urinary symptoms, trauma, edema, calf pain/swelling/erythema, sick contacts, recent travel.  Burn evaluated pt, report pt can be disharged with levaquin, pt  ms, pt and fmaily at bedside aware of plan and agree, aware of signs and symptoms to return for, will follow up with Dr. Chavira. Patient with no streaking on exam, no crepitus, no induration, white count 5.70, afebrile, will follow-up as discussed.

## 2022-12-02 NOTE — ED PROVIDER NOTE - PROGRESS NOTE DETAILS
ED Attending IVAN Carrillo  Burn evaluated pt, report pt can be disharged with levaquin, pt  ms, pt and fmaily at bedside aware of plan and agree, aware of signs and symptoms to return for, will follow up with Dr. Chavira.

## 2022-12-02 NOTE — ED PROVIDER NOTE - NS ED ROS FT
Constitutional: (-) fever  Eyes/ENT: (-) visual changes   Cardiovascular: (-) chest pain, (-) syncope  Respiratory: (-) cough, (-) shortness of breath  Gastrointestinal: (-) vomiting, (-) diarrhea  Genitourinary: (-) dysuria, (-) hesitancy, (-) frequency   Musculoskeletal: (-) neck pain, (-) back pain, (-) joint pain  Integumentary: (-) rash, (+) edema/erythema   Neurological: (-) headache, (-) altered mental status  Allergic/Immunologic: (-) pruritus

## 2022-12-02 NOTE — ED PROVIDER NOTE - NSFOLLOWUPINSTRUCTIONS_ED_ALL_ED_FT
PLEASE FOLLOW UP WITH BURN IN 1 WEEK, Wound care: wash wound with soap and water, apply xeroform, then wrap with kelrix daily. ABX SENT TO PHARMACY.     Cellulitis    WHAT YOU NEED TO KNOW:    Cellulitis is a skin infection caused by bacteria. Cellulitis may go away on its own or you may need treatment. Your healthcare provider may draw a Zuni around the outside edges of your cellulitis. If your cellulitis spreads, your healthcare provider will see it outside of the Zuni. Cellulitis          DISCHARGE INSTRUCTIONS:    Call 911 if:     You have sudden trouble breathing or chest pain.        Return to the emergency department if:     Your wound gets larger and more painful.       You feel a crackling under your skin when you touch it.      You have purple dots or bumps on your skin, or you see bleeding under your skin.      You have new swelling and pain in your legs.      The red, warm, swollen area gets larger.      You see red streaks coming from the infected area.    Contact your healthcare provider if:     You have a fever.      Your fever or pain does not go away or gets worse.      The area does not get smaller after 2 days of antibiotics.      Your skin is flaking or peeling off.      You have questions or concerns about your condition or care.    Medicines:     Antibiotics help treat the bacterial infection.       NSAIDs, such as ibuprofen, help decrease swelling, pain, and fever. NSAIDs can cause stomach bleeding or kidney problems in certain people. If you take blood thinner medicine, always ask if NSAIDs are safe for you. Always read the medicine label and follow directions. Do not give these medicines to children under 6 months of age without direction from your child's healthcare provider.      Acetaminophen decreases pain and fever. It is available without a doctor's order. Ask how much to take and how often to take it. Follow directions. Read the labels of all other medicines you are using to see if they also contain acetaminophen, or ask your doctor or pharmacist. Acetaminophen can cause liver damage if not taken correctly. Do not use more than 4 grams (4,000 milligrams) total of acetaminophen in one day.       Take your medicine as directed. Contact your healthcare provider if you think your medicine is not helping or if you have side effects. Tell him or her if you are allergic to any medicine. Keep a list of the medicines, vitamins, and herbs you take. Include the amounts, and when and why you take them. Bring the list or the pill bottles to follow-up visits. Carry your medicine list with you in case of an emergency.    Self-care:     Elevate the area above the level of your heart as often as you can. This will help decrease swelling and pain. Prop the area on pillows or blankets to keep it elevated comfortably.       Clean the area daily until the wound scabs over. Gently wash the area with soap and water. Pat dry. Use dressings as directed.       Place cool or warm, wet cloths on the area as directed. Use clean cloths and clean water. Leave it on the area until the cloth is room temperature. Pat the area dry with a clean, dry cloth. The cloths may help decrease pain.     Prevent cellulitis:     Do not scratch bug bites or areas of injury. You increase your risk for cellulitis by scratching these areas.       Do not share personal items, such as towels, clothing, and razors.       Clean exercise equipment with germ-killing  before and after you use it.      Wash your hands often. Use soap and water. Wash your hands after you use the bathroom, change a child's diapers, or sneeze. Wash your hands before you prepare or eat food. Use lotion to prevent dry, cracked skin. Handwashing           Wear pressure stockings as directed. You may be told to wear the stockings if you have peripheral edema. The stockings improve blood flow and decrease swelling.      Treat athlete’s foot. This can help prevent the spread of a bacterial skin infection.    Follow up with your healthcare provider within 3 days, or as directed: Your healthcare provider will check if your cellulitis is getting better. You may need different medicine. Write down your questions so you remember to ask them during your visits.       © Copyright [employer] 2019 All illustrations and images included in CareNotes are the copyrighted property of [email or web address] or [employer]

## 2022-12-02 NOTE — ED PROVIDER NOTE - OBJECTIVE STATEMENT
86 yo female with a pmh of  HTN, HLD, paroxysmal A-fib, loop recorder, mitral valve regurgitation, GERD, Hypothyroidism, SLE, anxiety, dementia present c/o R ankle edema/erythema that started yesterday. pt states to have pain to the area that has been worsening so her daughter brought her to the hospital. pt denies any other symptoms including fevers, chill, headache, recent illness/travel, cough, abdominal pain, chest pain, or SOB.

## 2022-12-02 NOTE — ED PROVIDER NOTE - PATIENT PORTAL LINK FT
You can access the FollowMyHealth Patient Portal offered by [facility] by registering at the following website: [email or web address]. By joining [employer]’s FollowMyHealth portal, you will also be able to view your health information using other applications (apps) compatible with our system.

## 2022-12-02 NOTE — ED ADULT NURSE NOTE - NSIMPLEMENTINTERV_GEN_ALL_ED
Implemented All Fall with Harm Risk Interventions:  [place] to call system. Call bell, personal items and telephone within reach. Instruct patient to call for assistance. Room bathroom lighting operational. Non-slip footwear when patient is off stretcher. Physically safe environment: no spills, clutter or unnecessary equipment. Stretcher in lowest position, wheels locked, appropriate side rails in place. Provide visual cue, wrist band, yellow gown, etc. Monitor gait and stability. Monitor for mental status changes and reorient to person, place, and time. Review medications for side effects contributing to fall risk. Reinforce activity limits and safety measures with patient and family. Provide visual clues: red socks.

## 2022-12-02 NOTE — CONSULT NOTE ADULT - ASSESSMENT
Patient is 88 y/o Female with PMHx of HTN, HLD, paroxysmal A-fib (on Amiodarone 200mg daily and Eliquis 2.5mg bid),  Loop recorder, mitral valve regurgitation, GERD, Hypothyroidism, SLE, anxiety, dementia, recent admission for nonhealing Right ankle wound, CT showed lateral malleolar skin thickening and subcutaneous edema, with no CT evidence of acute osteomyelitis; wound cultures showed staph aur, pt completed course of Ancef IV while inpatient, and discharged home on Keflex PO; Pt readmitted on 11/4/22, with R ankle cellulitis and non healing lateral malleolus wound.  WCx from clinic 10/27/22 c/w Stenotrophomonas maltophilia susceptible to Levo and bactrim. As per ID recommendations pt started on Ancef 1gm IV q8h. Wound care c/w bacitracin, adaptic, and kelrix. Cellulitis improved with IV abx. And, pt will be discharge home on discharge change to po Keflex 500 mg q8h and Levofloxacin 250mg daily  x Duration 2 weeks. Today, pt presented to ED  with R ankle worsening pain, swelling, and redness started last night. Pt denies fever, chills, headache, chest pain, nausea, or vomiting.     # R malleolus cellulitis,   - no open wounds, no drainage, no fever, no leukocytosis  - CT R ankle done 8/19/22 and negative for osteomyelitis  - WCx from clinic 10/27/22 c/w Stenotrophomonas maltophilia susceptible to Levo and bactrim   - d/c pt on Levofloxacin PO and follow up in BURN Clinic next week  - pain meds prn  - Wound care: wash wound with soap and water, apply xeroform, then wrap with kelrix daily.  - ambulate as tolerated

## 2022-12-02 NOTE — ED PROVIDER NOTE - ATTENDING APP SHARED VISIT CONTRIBUTION OF CARE
87-year-old female past medical history of high blood pressure, hyperlipidemia, paroxysmal atrial fibrillation on Eliquis, with recorder, mitral valve regurgitation, GERD, hypothyroidism, SLE, anxiety, dementia recent admission from November 3 to November 4 for right lateral malleolus admitted to BURN Unit, WCx from clinic 10/27/22 c/w Stenotrophomonas maltophilia susceptible to Levo and bactrim. (as per ID recommendations pt started on Ancef 1gm IV q8h. Wound care c/w bacitracin, adaptic, and kelrix) discharged on Levaquin and Keflex, presents with right lateral malleolus cellulitis that returned yesterday associated with erythema, warmth to palpation, and pain.  Pain is throbbing, constant, nonradiating, moderate intensity, worse with palpation, no alleviating factors.  Patient's family member called her PMD Dr. Marte who sent patient into the emergency department for burn evaluation.  No fever, chills, n/v, cp,  pleuritic cp, sob, palpitations, diaphoresis, cough, ha/lh/dizziness, numbness/tingling, neck pain/ stiffness, abd pain, diarrhea, constipation, melena/brbpr, urinary symptoms, trauma, edema, calf pain/swelling/erythema, sick contacts, recent travel.    Vital Signs: I have reviewed the initial vital signs. Constitutional: Non toxic appearing pt sitting on stretcher speaking full sentences. Integumentary: R lateral malleolar with erythema and warmth to palpation. no streaking, no crepitus, induration, fluctuance, no discharge, no signs of trauma, (+) soft compartments.   ENT: MMM NECK: Supple, non-tender, no meningeal signs. Cardiovascular: RRR, dp and pt pulses 2/4 bl, radial pulses 2/4 b/l. No JVD. Respiratory: BS present b/l, ctabl, no wheezing or crackles, no accessory muscle use, no stridor. Gastrointestinal: BS present throughout all 4 quadrants, soft, nd, nt, no rebound tenderness or guarding, no cvat. Musculoskeletal: FROM, no edema, no calf pain/swelling/erythema. Neurologic: Awake and alert, No focal deficits.      Plan: Labs, abx, R ankle xray, abx, reassess.

## 2022-12-02 NOTE — ED PROVIDER NOTE - CARE PROVIDER_API CALL
Beto Pride)  Plastic Surgery  [address] [place] [id number]  Phone: [phone]  Fax: [phone]  Follow Up Time: 1-3 Days

## 2022-12-02 NOTE — ED PROVIDER NOTE - NSCAREINITIATED _GEN_ER
Mom noticed rash and fever today s/p getting 4mo injections yesterday.  Tylenol at 8:30am
Leela Berg(PA)

## 2022-12-02 NOTE — ED PROVIDER NOTE - PHYSICAL EXAMINATION
Gen: NAD, AOx3  Head: NCAT  HEENT: PERRL, oral mucosa moist, normal conjunctiva, oropharynx clear without exudate or erythema  Lung: CTAB, no respiratory distress, no wheezing, rales, rhonchi  CV: normal s1/s2, rrr, Normal perfusion, pulses 2+ throughout  Abd: soft, NTND, no CVA tenderness  Genitourinary: no pelvic tenderness  MSK: mild edema/erythema to R lateral malleolus w/ TTP and no streaking/discharge, no visible deformities, full range of motion in all 4 extremities  Neuro: No focal neurologic deficits  Skin: No rash   Psych: normal affect

## 2022-12-02 NOTE — CONSULT NOTE ADULT - SUBJECTIVE AND OBJECTIVE BOX
Patient is 86 y/o Female with PMHx of HTN, HLD, paroxysmal A-fib (on Amiodarone 200mg daily and Eliquis 2.5mg bid),  Loop recorder, mitral valve regurgitation, GERD, Hypothyroidism, SLE, anxiety, dementia, recent admission for nonhealing Right ankle wound, CT showed lateral malleolar skin thickening and subcutaneous edema, with no CT evidence of acute osteomyelitis; wound cultures showed staph aur, pt completed course of Ancef IV while inpatient, and discharged home on Keflex PO; Pt readmitted on 11/4/22, with R ankle cellulitis and non healing lateral malleolus wound.  WCx from clinic 10/27/22 c/w Stenotrophomonas maltophilia susceptible to Levo and bactrim. As per ID recommendations pt started on Ancef 1gm IV q8h. Wound care c/w bacitracin, adaptic, and kelrix. Cellulitis improved with IV abx. And, pt will be discharge home on discharge change to po Keflex 500 mg q8h and Levofloxacin 250mg daily  x Duration 2 weeks. Today, pt presented to ED  with R ankle worsening pain, swelling, and redness started last night. Pt denies fever, chills, headache, chest pain, nausea, or vomiting.     Allergy:   sulfa drugs (Unknown)    PAST MEDICAL & SURGICAL HISTORY:  Hypothyroid  UTI (urinary tract infection)  Atrial fibrillation, unspecified type no longer in atrial fifrillation  Anxiety  Mild intermittent asthma, unspecified whether complicated no longer present  Cellulitis of finger, unspecified laterality resolved  Diverticulitis resolved  Reflux gastritis  Mitral valve insufficiency, unspecified etiology  Idiopathic pericarditis, unspecified chronicity  History of appendectomy  Status post placement of implantable loop recorder    Vital Signs Last 24 Hrs  T(C): 35.8 (02 Dec 2022 07:32), Max: 35.8 (02 Dec 2022 07:32)  T(F): 96.4 (02 Dec 2022 07:32), Max: 96.4 (02 Dec 2022 07:32)  HR: 65 (02 Dec 2022 07:32) (65 - 65)  BP: 135/61 (02 Dec 2022 07:32) (135/61 - 135/61)  RR: 20 (02 Dec 2022 07:32) (20 - 20)  SpO2: 100% (02 Dec 2022 07:32) (100% - 100%)    O2 Parameters below as of 02 Dec 2022 07:32  Patient On (Oxygen Delivery Method): room air      PHYSICAL EXAM:  GENERAL:  laying in bed comfortably, not in acute distress, cooperative  SKIN:  Right lateral malleolus with erythema and warmth to palpation, no streaking, no edema, no crepitus, no induration, no fluctuance, no discharge, no open wounds, no drainage.       LABS:                          12.7   5.70  )-----------( 192      ( 02 Dec 2022 08:31 )             39.7                                               12-02    136  |  102  |  22<H>  ----------------------------<  107<H>  4.2   |  21  |  1.3    Ca    9.1      02 Dec 2022 08:31    TPro  6.7  /  Alb  4.1  /  TBili  0.4  /  DBili  x   /  AST  19  /  ALT  11  /  AlkPhos  73  12-02

## 2022-12-02 NOTE — ED ADULT NURSE NOTE - OBJECTIVE STATEMENT
86 y/o female presents to ED for IV ABX treatment for right ankle. pt has hx of cellulitis and reports no improvement with PO ABX.  pt to be treated under Dr. Pride

## 2022-12-03 DIAGNOSIS — Z09 ENCOUNTER FOR FOLLOW-UP EXAMINATION AFTER COMPLETED TREATMENT FOR CONDITIONS OTHER THAN MALIGNANT NEOPLASM: ICD-10-CM

## 2022-12-03 DIAGNOSIS — Z87.828 PERSONAL HISTORY OF OTHER (HEALED) PHYSICAL INJURY AND TRAUMA: ICD-10-CM

## 2022-12-04 ENCOUNTER — INPATIENT (INPATIENT)
Facility: HOSPITAL | Age: 87
LOS: 1 days | Discharge: HOME | End: 2022-12-06
Attending: PLASTIC SURGERY | Admitting: PLASTIC SURGERY

## 2022-12-04 VITALS
HEART RATE: 64 BPM | SYSTOLIC BLOOD PRESSURE: 118 MMHG | RESPIRATION RATE: 19 BRPM | HEIGHT: 66 IN | TEMPERATURE: 98 F | DIASTOLIC BLOOD PRESSURE: 56 MMHG | WEIGHT: 130.07 LBS | OXYGEN SATURATION: 99 %

## 2022-12-04 DIAGNOSIS — Z95.818 PRESENCE OF OTHER CARDIAC IMPLANTS AND GRAFTS: Chronic | ICD-10-CM

## 2022-12-04 DIAGNOSIS — Z90.49 ACQUIRED ABSENCE OF OTHER SPECIFIED PARTS OF DIGESTIVE TRACT: Chronic | ICD-10-CM

## 2022-12-04 LAB
ALBUMIN SERPL ELPH-MCNC: 4.1 G/DL — SIGNIFICANT CHANGE UP (ref 3.5–5.2)
ALP SERPL-CCNC: 65 U/L — SIGNIFICANT CHANGE UP (ref 30–115)
ALT FLD-CCNC: 8 U/L — SIGNIFICANT CHANGE UP (ref 0–41)
ANION GAP SERPL CALC-SCNC: 12 MMOL/L — SIGNIFICANT CHANGE UP (ref 7–14)
APTT BLD: 36.3 SEC — SIGNIFICANT CHANGE UP (ref 27–39.2)
AST SERPL-CCNC: 18 U/L — SIGNIFICANT CHANGE UP (ref 0–41)
BASOPHILS # BLD AUTO: 0.03 K/UL — SIGNIFICANT CHANGE UP (ref 0–0.2)
BASOPHILS NFR BLD AUTO: 0.5 % — SIGNIFICANT CHANGE UP (ref 0–1)
BILIRUB SERPL-MCNC: 0.5 MG/DL — SIGNIFICANT CHANGE UP (ref 0.2–1.2)
BLD GP AB SCN SERPL QL: SIGNIFICANT CHANGE UP
BUN SERPL-MCNC: 25 MG/DL — HIGH (ref 10–20)
CALCIUM SERPL-MCNC: 9.3 MG/DL — SIGNIFICANT CHANGE UP (ref 8.4–10.5)
CHLORIDE SERPL-SCNC: 100 MMOL/L — SIGNIFICANT CHANGE UP (ref 98–110)
CO2 SERPL-SCNC: 25 MMOL/L — SIGNIFICANT CHANGE UP (ref 17–32)
CREAT SERPL-MCNC: 1.5 MG/DL — SIGNIFICANT CHANGE UP (ref 0.7–1.5)
EGFR: 34 ML/MIN/1.73M2 — LOW
EOSINOPHIL # BLD AUTO: 0.14 K/UL — SIGNIFICANT CHANGE UP (ref 0–0.7)
EOSINOPHIL NFR BLD AUTO: 2.3 % — SIGNIFICANT CHANGE UP (ref 0–8)
GLUCOSE SERPL-MCNC: 94 MG/DL — SIGNIFICANT CHANGE UP (ref 70–99)
HCT VFR BLD CALC: 37.3 % — SIGNIFICANT CHANGE UP (ref 37–47)
HGB BLD-MCNC: 12 G/DL — SIGNIFICANT CHANGE UP (ref 12–16)
IMM GRANULOCYTES NFR BLD AUTO: 0.3 % — SIGNIFICANT CHANGE UP (ref 0.1–0.3)
INR BLD: 1.31 RATIO — HIGH (ref 0.65–1.3)
LACTATE SERPL-SCNC: 1.5 MMOL/L — SIGNIFICANT CHANGE UP (ref 0.7–2)
LYMPHOCYTES # BLD AUTO: 1.41 K/UL — SIGNIFICANT CHANGE UP (ref 1.2–3.4)
LYMPHOCYTES # BLD AUTO: 22.8 % — SIGNIFICANT CHANGE UP (ref 20.5–51.1)
MCHC RBC-ENTMCNC: 29.9 PG — SIGNIFICANT CHANGE UP (ref 27–31)
MCHC RBC-ENTMCNC: 32.2 G/DL — SIGNIFICANT CHANGE UP (ref 32–37)
MCV RBC AUTO: 93 FL — SIGNIFICANT CHANGE UP (ref 81–99)
MONOCYTES # BLD AUTO: 0.44 K/UL — SIGNIFICANT CHANGE UP (ref 0.1–0.6)
MONOCYTES NFR BLD AUTO: 7.1 % — SIGNIFICANT CHANGE UP (ref 1.7–9.3)
NEUTROPHILS # BLD AUTO: 4.15 K/UL — SIGNIFICANT CHANGE UP (ref 1.4–6.5)
NEUTROPHILS NFR BLD AUTO: 67 % — SIGNIFICANT CHANGE UP (ref 42.2–75.2)
NRBC # BLD: 0 /100 WBCS — SIGNIFICANT CHANGE UP (ref 0–0)
PLATELET # BLD AUTO: 190 K/UL — SIGNIFICANT CHANGE UP (ref 130–400)
POTASSIUM SERPL-MCNC: 4.7 MMOL/L — SIGNIFICANT CHANGE UP (ref 3.5–5)
POTASSIUM SERPL-SCNC: 4.7 MMOL/L — SIGNIFICANT CHANGE UP (ref 3.5–5)
PROT SERPL-MCNC: 6.4 G/DL — SIGNIFICANT CHANGE UP (ref 6–8)
PROTHROM AB SERPL-ACNC: 15.1 SEC — HIGH (ref 9.95–12.87)
RBC # BLD: 4.01 M/UL — LOW (ref 4.2–5.4)
RBC # FLD: 13.4 % — SIGNIFICANT CHANGE UP (ref 11.5–14.5)
SARS-COV-2 RNA SPEC QL NAA+PROBE: SIGNIFICANT CHANGE UP
SODIUM SERPL-SCNC: 137 MMOL/L — SIGNIFICANT CHANGE UP (ref 135–146)
WBC # BLD: 6.19 K/UL — SIGNIFICANT CHANGE UP (ref 4.8–10.8)
WBC # FLD AUTO: 6.19 K/UL — SIGNIFICANT CHANGE UP (ref 4.8–10.8)

## 2022-12-04 PROCEDURE — 99221 1ST HOSP IP/OBS SF/LOW 40: CPT | Mod: FS

## 2022-12-04 PROCEDURE — 99285 EMERGENCY DEPT VISIT HI MDM: CPT | Mod: GC

## 2022-12-04 PROCEDURE — 93010 ELECTROCARDIOGRAM REPORT: CPT

## 2022-12-04 RX ORDER — AMIODARONE HYDROCHLORIDE 400 MG/1
200 TABLET ORAL DAILY
Refills: 0 | Status: DISCONTINUED | OUTPATIENT
Start: 2022-12-04 | End: 2022-12-06

## 2022-12-04 RX ORDER — SACCHAROMYCES BOULARDII 250 MG
250 POWDER IN PACKET (EA) ORAL
Refills: 0 | Status: DISCONTINUED | OUTPATIENT
Start: 2022-12-04 | End: 2022-12-06

## 2022-12-04 RX ORDER — CEFAZOLIN SODIUM 1 G
1000 VIAL (EA) INJECTION EVERY 8 HOURS
Refills: 0 | Status: DISCONTINUED | OUTPATIENT
Start: 2022-12-04 | End: 2022-12-06

## 2022-12-04 RX ORDER — SODIUM CHLORIDE 9 MG/ML
1000 INJECTION, SOLUTION INTRAVENOUS
Refills: 0 | Status: DISCONTINUED | OUTPATIENT
Start: 2022-12-04 | End: 2022-12-06

## 2022-12-04 RX ORDER — CHLORHEXIDINE GLUCONATE 213 G/1000ML
1 SOLUTION TOPICAL
Refills: 0 | Status: DISCONTINUED | OUTPATIENT
Start: 2022-12-04 | End: 2022-12-06

## 2022-12-04 RX ORDER — MULTIVIT-MIN/FERROUS GLUCONATE 9 MG/15 ML
1 LIQUID (ML) ORAL DAILY
Refills: 0 | Status: DISCONTINUED | OUTPATIENT
Start: 2022-12-04 | End: 2022-12-06

## 2022-12-04 RX ORDER — LEVOTHYROXINE SODIUM 125 MCG
25 TABLET ORAL DAILY
Refills: 0 | Status: DISCONTINUED | OUTPATIENT
Start: 2022-12-04 | End: 2022-12-06

## 2022-12-04 RX ORDER — APIXABAN 2.5 MG/1
2.5 TABLET, FILM COATED ORAL EVERY 12 HOURS
Refills: 0 | Status: DISCONTINUED | OUTPATIENT
Start: 2022-12-04 | End: 2022-12-06

## 2022-12-04 RX ORDER — FAMOTIDINE 10 MG/ML
40 INJECTION INTRAVENOUS DAILY
Refills: 0 | Status: DISCONTINUED | OUTPATIENT
Start: 2022-12-04 | End: 2022-12-06

## 2022-12-04 RX ORDER — ACETAMINOPHEN 500 MG
650 TABLET ORAL EVERY 6 HOURS
Refills: 0 | Status: DISCONTINUED | OUTPATIENT
Start: 2022-12-04 | End: 2022-12-06

## 2022-12-04 RX ORDER — CEFAZOLIN SODIUM 1 G
2000 VIAL (EA) INJECTION ONCE
Refills: 0 | Status: COMPLETED | OUTPATIENT
Start: 2022-12-04 | End: 2022-12-04

## 2022-12-04 RX ORDER — BACITRACIN ZINC 500 UNIT/G
1 OINTMENT IN PACKET (EA) TOPICAL
Refills: 0 | Status: DISCONTINUED | OUTPATIENT
Start: 2022-12-04 | End: 2022-12-06

## 2022-12-04 RX ORDER — HYDROXYZINE HCL 10 MG
25 TABLET ORAL AT BEDTIME
Refills: 0 | Status: DISCONTINUED | OUTPATIENT
Start: 2022-12-04 | End: 2022-12-06

## 2022-12-04 RX ORDER — IBUPROFEN 200 MG
400 TABLET ORAL EVERY 6 HOURS
Refills: 0 | Status: DISCONTINUED | OUTPATIENT
Start: 2022-12-04 | End: 2022-12-06

## 2022-12-04 RX ORDER — SENNA PLUS 8.6 MG/1
2 TABLET ORAL AT BEDTIME
Refills: 0 | Status: DISCONTINUED | OUTPATIENT
Start: 2022-12-04 | End: 2022-12-06

## 2022-12-04 RX ORDER — LANOLIN ALCOHOL/MO/W.PET/CERES
3 CREAM (GRAM) TOPICAL AT BEDTIME
Refills: 0 | Status: DISCONTINUED | OUTPATIENT
Start: 2022-12-04 | End: 2022-12-06

## 2022-12-04 RX ORDER — MEMANTINE HYDROCHLORIDE 10 MG/1
10 TABLET ORAL
Refills: 0 | Status: DISCONTINUED | OUTPATIENT
Start: 2022-12-04 | End: 2022-12-06

## 2022-12-04 RX ORDER — PREGABALIN 225 MG/1
1000 CAPSULE ORAL DAILY
Refills: 0 | Status: DISCONTINUED | OUTPATIENT
Start: 2022-12-04 | End: 2022-12-06

## 2022-12-04 RX ADMIN — SODIUM CHLORIDE 50 MILLILITER(S): 9 INJECTION, SOLUTION INTRAVENOUS at 18:37

## 2022-12-04 RX ADMIN — Medication 1 APPLICATION(S): at 18:36

## 2022-12-04 RX ADMIN — Medication 100 MILLIGRAM(S): at 08:54

## 2022-12-04 RX ADMIN — Medication 250 MILLIGRAM(S): at 18:34

## 2022-12-04 RX ADMIN — SENNA PLUS 2 TABLET(S): 8.6 TABLET ORAL at 21:17

## 2022-12-04 RX ADMIN — APIXABAN 2.5 MILLIGRAM(S): 2.5 TABLET, FILM COATED ORAL at 18:34

## 2022-12-04 RX ADMIN — Medication 650 MILLIGRAM(S): at 18:33

## 2022-12-04 RX ADMIN — Medication 3 MILLIGRAM(S): at 21:17

## 2022-12-04 RX ADMIN — Medication 100 MILLIGRAM(S): at 21:18

## 2022-12-04 RX ADMIN — Medication 25 MILLIGRAM(S): at 18:36

## 2022-12-04 RX ADMIN — MEMANTINE HYDROCHLORIDE 10 MILLIGRAM(S): 10 TABLET ORAL at 18:34

## 2022-12-04 NOTE — H&P ADULT - ASSESSMENT
# R ankle nonhealing wound, cellulitis:  - Admit to burn   - Wound care: wash wound with soap and water, apply bacitracin, cover with allevyn pad BID  - CT R ankle done 8/19 and negative for osteomyelitis  - WCx from clinic 10/27/22 c/w Stenotrophomonas maltophilia susceptible to Levo and bactrim    - pain meds prn  - IV abx per last admission: ancef 1gm q8  - Wound culture taken 12/4, follow up  - ID c/s placed 12/4, f/u      # Hx HTN, HLD, A-fib:  - vitals stable, cont to monitor vitals, ECG  - continue home meds: Amiodarone 200mg daily, and Eliquis 2.5mg bid  - f/u with Dr Marte as outpatient     # Asthma, mild intermittent   - no evidence of exacerbation  - monitor O2sat  - nebs prn    # GERD:  - on Famotidine 40mg daily    # Dementia, anxiety   - c/w [id number] mg BID  - Atarax prn for agitation and anxiety    # Miscellaneous:  - DVT ppx - pt on Eliquis  - PPI daily for GI ppx  - Bowel regimen  - ambulate as tolerate  - PT/OT c/s        # R ankle nonhealing wound, cellulitis:  - Admit to burn   - Wound care: wash wound with soap and water, apply bacitracin, cover with allevyn pad BID  - CT R ankle done 8/19 and negative for osteomyelitis  - WCx from clinic 10/27/22 c/w Stenotrophomonas maltophilia susceptible to Levo and bactrim    - pain meds prn  - IV abx per last admission: ancef 1gm q8  - Wound culture taken 12/4, follow up  - ID c/s placed 12/4, f/u      # Hx HTN, HLD, A-fib:  - vitals stable, cont to monitor vitals, ECG  - continue home meds: Amiodarone 200mg daily, and Eliquis 2.5mg bid  - f/u with Dr Marte as outpatient     # Asthma, mild intermittent   - no evidence of exacerbation  - monitor O2sat      # GERD:  - on Famotidine 40mg daily    # Dementia, anxiety   - c/w [id number] mg BID  - Atarax prn for agitation and anxiety    # Miscellaneous:  - DVT ppx - pt on Eliquis  - PPI daily for GI ppx  - Bowel regimen  - ambulate as tolerate  - PT/OT c/s

## 2022-12-04 NOTE — ED PROVIDER NOTE - OBJECTIVE STATEMENT
Pt is an 86 y/o female with PMH of HTN, HLD, a fib on eliquis, loop recorder, MR, GERD, SLE, hypothyroidism, anxiety and dementia presenting for right ankle erythema and swelling that worsened over last 2 days. Pt reports she has had right ankle cellulitis on/off for ~2 months, with h/o 2 prior admissions to Burn Unit. Was seen on 12/2 in ED, seen by burn and started on levaquin. Pt coming back today because she says pain and swelling has not improved with oral antibiotics. No fever, chills.

## 2022-12-04 NOTE — ED PROVIDER NOTE - ATTENDING CONTRIBUTION TO CARE
Patient here with right ankle erythema and swelling is worsened over the past 2 days.  Patient has had ankle cellulitis on and off past 2 months previous to being .  Patient was seen on December 2 in ED given antibiotics however symptoms not improving.  Here patient with nonhealing right ankle cellulitis seen by burn team admitted antibiotics and further evaluation to follow.

## 2022-12-04 NOTE — ED PROVIDER NOTE - PHYSICAL EXAMINATION
CONST: well appearing for age  HEAD:  normocephalic, atraumatic  EYES:  conjunctivae without injection, drainage or discharge  ENMT:  nasal mucosa moist; mouth moist without ulcerations or lesions; throat moist without erythema, exudate, ulcerations or lesions  NECK:  supple  CARDIAC:  regular rate and rhythm, normal S1 and S2, no murmurs, rubs or gallops  RESP:  respiratory rate and effort appear normal for age; lungs are clear to auscultation bilaterally; no rales or wheezes  ABDOMEN:  soft, nontender, nondistended  MUSCULOSKELETAL/NEURO:  normal movement, normal tone  SKIN: + erythema and edema to right ankle, no drainage noted

## 2022-12-04 NOTE — H&P ADULT - NSHPPHYSICALEXAM_GEN_ALL_CORE
Physical Exam: PHYSICAL EXAM:  GENERAL: well built, well nourished, NAD, laying in bed comfortably  HEAD:  Atraumatic, Normocephalic  EYES: EOMI, PERRLA, conjunctiva and sclera clear  NECK: Supple,   CHEST/LUNG:  B/L good air entry, no tachypnea/increased WOB/retractions.  HEART: S1 and S2 intact, not in cardiopulm distress  ABDOMEN: Soft, Nontender,   EXTREMITIES:  2+ Peripheral Pulses,   NEUROLOGY: alert, oriented, forgetful, CN intact, moves all extremities   SKIN: R ankle partial thickness wound to lateral malleolus, +surrounding swelling and erythema, tenderness to palpation.

## 2022-12-04 NOTE — H&P ADULT - NSHPLABSRESULTS_GEN_ALL_CORE
12.0   6.19  )-----------( 190      ( 04 Dec 2022 08:50 )             37.3   12-04    137  |  100  |  25<H>  ----------------------------<  94  4.7   |  25  |  1.5    Ca    9.3      04 Dec 2022 08:50    TPro  6.4  /  Alb  4.1  /  TBili  0.5  /  DBili  x   /  AST  18  /  ALT  8   /  AlkPhos  65  12-04

## 2022-12-04 NOTE — H&P ADULT - HISTORY OF PRESENT ILLNESS
Patient is 86 y/o Female with PMHx of HTN, HLD, paroxysmal A-fib (on Amiodarone 200mg daily and Eliquis 2.5mg bid),  Loop recorder, mitral valve regurgitation, GERD, Hypothyroidism, SLE, anxiety, dementia presenting for right ankle erythema and swelling that worsened over last 2 days. Pt reports she has had right ankle cellulitis on/off for ~2 months, with h/o 2 prior admissions to Burn Unit. Was seen on 12/2 in ED, seen by burn and started on levaquin. Pt coming back today because she says pain and swelling has not improved with oral antibiotics. No fever, chills    Patient recently admitted on 11/3/22, with R ankle cellulitis and non healing lateral malleolus wound.  WCx from clinic 10/25/22 c/w Stenotrophomonas maltophilia susceptible to Levo and bactrim. As per ID recommendations pt started on Ancef 1gm IV q8h during inpatient stay. Wound care c/w bacitracin, adaptic, and kerlix. Cellulitis improved with IV abx and discharged on 11/4. Patient is 86 y/o Female with PMHx of HLD, paroxysmal A-fib (on Amiodarone 200mg daily and Eliquis 2.5mg bid),  Loop recorder, mitral valve regurgitation, GERD, Hypothyroidism, SLE, anxiety, dementia presenting for right ankle erythema and swelling that worsened over last 2 days. Pt reports she has had right ankle cellulitis on/off for ~2 months, with h/o 2 prior admissions to Burn Unit. Was seen on 12/2 in ED, seen by burn and started on levaquin. Pt coming back today because she says pain and swelling has not improved with oral antibiotics. No fever, chills    Patient recently admitted on 11/3/22, with R ankle cellulitis and non healing lateral malleolus wound.  WCx from clinic 10/25/22 c/w Stenotrophomonas maltophilia susceptible to Levo and bactrim. As per ID recommendations pt started on Ancef 1gm IV q8h during inpatient stay. Wound care c/w bacitracin, adaptic, and kerlix. Cellulitis improved with IV abx and discharged on 11/4.

## 2022-12-05 LAB
ANION GAP SERPL CALC-SCNC: 13 MMOL/L — SIGNIFICANT CHANGE UP (ref 7–14)
BASOPHILS # BLD AUTO: 0.05 K/UL — SIGNIFICANT CHANGE UP (ref 0–0.2)
BASOPHILS NFR BLD AUTO: 0.6 % — SIGNIFICANT CHANGE UP (ref 0–1)
BUN SERPL-MCNC: 23 MG/DL — HIGH (ref 10–20)
CALCIUM SERPL-MCNC: 9.5 MG/DL — SIGNIFICANT CHANGE UP (ref 8.4–10.5)
CHLORIDE SERPL-SCNC: 99 MMOL/L — SIGNIFICANT CHANGE UP (ref 98–110)
CO2 SERPL-SCNC: 24 MMOL/L — SIGNIFICANT CHANGE UP (ref 17–32)
CREAT SERPL-MCNC: 1.4 MG/DL — SIGNIFICANT CHANGE UP (ref 0.7–1.5)
EGFR: 36 ML/MIN/1.73M2 — LOW
EOSINOPHIL # BLD AUTO: 0.1 K/UL — SIGNIFICANT CHANGE UP (ref 0–0.7)
EOSINOPHIL NFR BLD AUTO: 1.1 % — SIGNIFICANT CHANGE UP (ref 0–8)
GLUCOSE SERPL-MCNC: 132 MG/DL — HIGH (ref 70–99)
HCT VFR BLD CALC: 39.2 % — SIGNIFICANT CHANGE UP (ref 37–47)
HGB BLD-MCNC: 12.6 G/DL — SIGNIFICANT CHANGE UP (ref 12–16)
IMM GRANULOCYTES NFR BLD AUTO: 0.3 % — SIGNIFICANT CHANGE UP (ref 0.1–0.3)
LYMPHOCYTES # BLD AUTO: 1.62 K/UL — SIGNIFICANT CHANGE UP (ref 1.2–3.4)
LYMPHOCYTES # BLD AUTO: 18 % — LOW (ref 20.5–51.1)
MAGNESIUM SERPL-MCNC: 1.8 MG/DL — SIGNIFICANT CHANGE UP (ref 1.8–2.4)
MCHC RBC-ENTMCNC: 29.4 PG — SIGNIFICANT CHANGE UP (ref 27–31)
MCHC RBC-ENTMCNC: 32.1 G/DL — SIGNIFICANT CHANGE UP (ref 32–37)
MCV RBC AUTO: 91.6 FL — SIGNIFICANT CHANGE UP (ref 81–99)
MONOCYTES # BLD AUTO: 0.59 K/UL — SIGNIFICANT CHANGE UP (ref 0.1–0.6)
MONOCYTES NFR BLD AUTO: 6.5 % — SIGNIFICANT CHANGE UP (ref 1.7–9.3)
NEUTROPHILS # BLD AUTO: 6.63 K/UL — HIGH (ref 1.4–6.5)
NEUTROPHILS NFR BLD AUTO: 73.5 % — SIGNIFICANT CHANGE UP (ref 42.2–75.2)
NRBC # BLD: 0 /100 WBCS — SIGNIFICANT CHANGE UP (ref 0–0)
PHOSPHATE SERPL-MCNC: 3.6 MG/DL — SIGNIFICANT CHANGE UP (ref 2.1–4.9)
PLATELET # BLD AUTO: 237 K/UL — SIGNIFICANT CHANGE UP (ref 130–400)
POTASSIUM SERPL-MCNC: 4.2 MMOL/L — SIGNIFICANT CHANGE UP (ref 3.5–5)
POTASSIUM SERPL-SCNC: 4.2 MMOL/L — SIGNIFICANT CHANGE UP (ref 3.5–5)
RBC # BLD: 4.28 M/UL — SIGNIFICANT CHANGE UP (ref 4.2–5.4)
RBC # FLD: 13.4 % — SIGNIFICANT CHANGE UP (ref 11.5–14.5)
SODIUM SERPL-SCNC: 136 MMOL/L — SIGNIFICANT CHANGE UP (ref 135–146)
WBC # BLD: 9.02 K/UL — SIGNIFICANT CHANGE UP (ref 4.8–10.8)
WBC # FLD AUTO: 9.02 K/UL — SIGNIFICANT CHANGE UP (ref 4.8–10.8)

## 2022-12-05 PROCEDURE — 99232 SBSQ HOSP IP/OBS MODERATE 35: CPT | Mod: FS

## 2022-12-05 RX ORDER — MAGNESIUM SULFATE 500 MG/ML
2 VIAL (ML) INJECTION ONCE
Refills: 0 | Status: COMPLETED | OUTPATIENT
Start: 2022-12-05 | End: 2022-12-05

## 2022-12-05 RX ADMIN — SENNA PLUS 2 TABLET(S): 8.6 TABLET ORAL at 21:29

## 2022-12-05 RX ADMIN — MEMANTINE HYDROCHLORIDE 10 MILLIGRAM(S): 10 TABLET ORAL at 05:34

## 2022-12-05 RX ADMIN — Medication 100 MILLIGRAM(S): at 21:29

## 2022-12-05 RX ADMIN — Medication 1 TABLET(S): at 11:57

## 2022-12-05 RX ADMIN — MEMANTINE HYDROCHLORIDE 10 MILLIGRAM(S): 10 TABLET ORAL at 17:33

## 2022-12-05 RX ADMIN — FAMOTIDINE 40 MILLIGRAM(S): 10 INJECTION INTRAVENOUS at 11:57

## 2022-12-05 RX ADMIN — Medication 1 APPLICATION(S): at 05:33

## 2022-12-05 RX ADMIN — Medication 25 MILLIGRAM(S): at 14:12

## 2022-12-05 RX ADMIN — AMIODARONE HYDROCHLORIDE 200 MILLIGRAM(S): 400 TABLET ORAL at 05:34

## 2022-12-05 RX ADMIN — Medication 3 MILLIGRAM(S): at 21:29

## 2022-12-05 RX ADMIN — APIXABAN 2.5 MILLIGRAM(S): 2.5 TABLET, FILM COATED ORAL at 05:34

## 2022-12-05 RX ADMIN — SODIUM CHLORIDE 50 MILLILITER(S): 9 INJECTION, SOLUTION INTRAVENOUS at 21:29

## 2022-12-05 RX ADMIN — Medication 100 MILLIGRAM(S): at 05:34

## 2022-12-05 RX ADMIN — Medication 100 MILLIGRAM(S): at 14:13

## 2022-12-05 RX ADMIN — Medication 250 MILLIGRAM(S): at 17:33

## 2022-12-05 RX ADMIN — Medication 25 MICROGRAM(S): at 05:34

## 2022-12-05 RX ADMIN — Medication 250 MILLIGRAM(S): at 05:34

## 2022-12-05 RX ADMIN — PREGABALIN 1000 MICROGRAM(S): 225 CAPSULE ORAL at 11:58

## 2022-12-05 RX ADMIN — Medication 25 GRAM(S): at 23:36

## 2022-12-05 RX ADMIN — APIXABAN 2.5 MILLIGRAM(S): 2.5 TABLET, FILM COATED ORAL at 17:33

## 2022-12-05 NOTE — PHYSICAL THERAPY INITIAL EVALUATION ADULT - PERTINENT HX OF CURRENT PROBLEM, REHAB EVAL
88 y/o Female with PMHx of HLD, paroxysmal A-fib (on Amiodarone 200mg daily and Eliquis 2.5mg bid),  Loop recorder, mitral valve regurgitation, GERD, Hypothyroidism, SLE, anxiety, dementia presenting for right ankle erythema and swelling that worsened over last 2 days. Pt reports she has had right ankle cellulitis on/off for ~2 months, with h/o 2 prior admissions to Burn Unit. Was seen on 12/2 in ED, seen by burn and started on levaquin. Pt coming back today because she says pain and swelling has not improved with oral antibiotics. No fever, chills    Patient recently admitted on 11/3/22, with R ankle cellulitis and non healing lateral malleolus wound.  WCx from clinic 10/25/22 c/w Stenotrophomonas maltophilia susceptible to Levo and bactrim. As per ID recommendations pt started on Ancef 1gm IV q8h during inpatient stay. Wound care c/w bacitracin, adaptic, and kerlix. Cellulitis improved with IV abx and discharged on 11/4.

## 2022-12-05 NOTE — PROGRESS NOTE ADULT - TIME BILLING
Counseled patient about diagnostic testing and treatment plan. All questions answered. Abnormal lab/radiographical/microbiological data reviewed.

## 2022-12-05 NOTE — PROGRESS NOTE ADULT - ASSESSMENT
6 y/o Female with PMHx of HLD, paroxysmal A-fib (on Amiodarone 200mg daily and Eliquis 2.5mg bid),  Loop recorder, mitral valve regurgitation, GERD, Hypothyroidism, SLE, anxiety, dementia, with nonhealing right ankle wound    # R ankle nonhealing wound, cellulitis:  - Wound care: wash wound with soap and water, apply bacitracin, cover with allevyn pad BID  - CT R ankle done 8/19 and negative for osteomyelitis  - WCx from clinic 10/27/22 c/w Stenotrophomonas maltophilia susceptible to Levo and bactrim    - pain meds prn  - IV abx per last admission: ancef 1gm q8  - Wound culture taken 12/4, follow up  - ID c/s placed 12/4, po Doxycycline 100 mg q12h for 10 days upon discharge    # Hx HTN, HLD, A-fib:  - vitals stable, cont to monitor vitals, ECG  - continue home meds: Amiodarone 200mg daily, and Eliquis 2.5mg bid  - f/u with Dr Marte as outpatient     # Asthma, mild intermittent   - no evidence of exacerbation  - monitor O2sat    # GERD:  - on Famotidine 40mg daily    # Dementia, anxiety   - c/w [id number] mg BID  - Atarax prn for agitation and anxiety    # Miscellaneous:  - DVT ppx - pt on Eliquis  - PPI daily for GI ppx  - Bowel regimen  - ambulate as tolerate  - PT/OT c/s

## 2022-12-05 NOTE — PHYSICAL THERAPY INITIAL EVALUATION ADULT - GENERAL OBSERVATIONS, REHAB EVAL
[id number] Pt received and left semifowlers in bed, NAD, pt agreeable to PT session, friend at bedside.

## 2022-12-05 NOTE — PROGRESS NOTE ADULT - ASSESSMENT
86 y/o Female with PMHx of HLD, paroxysmal A-fib (on Amiodarone 200mg daily and Eliquis 2.5mg bid),  Loop recorder, mitral valve regurgitation, GERD, Hypothyroidism, SLE, anxiety, dementia presenting for right ankle erythema and swelling that worsened over last 2 days.    IMPRESSION;  Cellulitis right lateal ankle  No OM  No abscess  WBc 6.1      RECOMMENDATIONS;  po Doxycycline 100 mg q12h for 10 days  Please do not hesitate to recall ID if any questions arise either through [employer] or through [employer]

## 2022-12-06 ENCOUNTER — APPOINTMENT (OUTPATIENT)
Dept: CARDIOLOGY | Facility: CLINIC | Age: 87
End: 2022-12-06

## 2022-12-06 ENCOUNTER — TRANSCRIPTION ENCOUNTER (OUTPATIENT)
Age: 87
End: 2022-12-06

## 2022-12-06 ENCOUNTER — NON-APPOINTMENT (OUTPATIENT)
Age: 87
End: 2022-12-06

## 2022-12-06 VITALS
RESPIRATION RATE: 18 BRPM | HEART RATE: 69 BPM | DIASTOLIC BLOOD PRESSURE: 71 MMHG | TEMPERATURE: 96 F | OXYGEN SATURATION: 95 % | SYSTOLIC BLOOD PRESSURE: 163 MMHG

## 2022-12-06 LAB
ANION GAP SERPL CALC-SCNC: 9 MMOL/L — SIGNIFICANT CHANGE UP (ref 7–14)
BACTERIA SPEC CULT: ABNORMAL
BASOPHILS # BLD AUTO: 0.03 K/UL — SIGNIFICANT CHANGE UP (ref 0–0.2)
BASOPHILS NFR BLD AUTO: 0.5 % — SIGNIFICANT CHANGE UP (ref 0–1)
BUN SERPL-MCNC: 22 MG/DL — HIGH (ref 10–20)
CALCIUM SERPL-MCNC: 9 MG/DL — SIGNIFICANT CHANGE UP (ref 8.4–10.4)
CHLORIDE SERPL-SCNC: 103 MMOL/L — SIGNIFICANT CHANGE UP (ref 98–110)
CO2 SERPL-SCNC: 27 MMOL/L — SIGNIFICANT CHANGE UP (ref 17–32)
CREAT SERPL-MCNC: 1.2 MG/DL — SIGNIFICANT CHANGE UP (ref 0.7–1.5)
EGFR: 44 ML/MIN/1.73M2 — LOW
EOSINOPHIL # BLD AUTO: 0.17 K/UL — SIGNIFICANT CHANGE UP (ref 0–0.7)
EOSINOPHIL NFR BLD AUTO: 3 % — SIGNIFICANT CHANGE UP (ref 0–8)
GLUCOSE SERPL-MCNC: 103 MG/DL — HIGH (ref 70–99)
HCT VFR BLD CALC: 33.8 % — LOW (ref 37–47)
HGB BLD-MCNC: 11.5 G/DL — LOW (ref 12–16)
IMM GRANULOCYTES NFR BLD AUTO: 0.4 % — HIGH (ref 0.1–0.3)
LYMPHOCYTES # BLD AUTO: 1.23 K/UL — SIGNIFICANT CHANGE UP (ref 1.2–3.4)
LYMPHOCYTES # BLD AUTO: 21.7 % — SIGNIFICANT CHANGE UP (ref 20.5–51.1)
MAGNESIUM SERPL-MCNC: 2.2 MG/DL — SIGNIFICANT CHANGE UP (ref 1.8–2.4)
MCHC RBC-ENTMCNC: 30.7 PG — SIGNIFICANT CHANGE UP (ref 27–31)
MCHC RBC-ENTMCNC: 34 G/DL — SIGNIFICANT CHANGE UP (ref 32–37)
MCV RBC AUTO: 90.1 FL — SIGNIFICANT CHANGE UP (ref 81–99)
MONOCYTES # BLD AUTO: 0.4 K/UL — SIGNIFICANT CHANGE UP (ref 0.1–0.6)
MONOCYTES NFR BLD AUTO: 7.1 % — SIGNIFICANT CHANGE UP (ref 1.7–9.3)
NEUTROPHILS # BLD AUTO: 3.81 K/UL — SIGNIFICANT CHANGE UP (ref 1.4–6.5)
NEUTROPHILS NFR BLD AUTO: 67.3 % — SIGNIFICANT CHANGE UP (ref 42.2–75.2)
NRBC # BLD: 0 /100 WBCS — SIGNIFICANT CHANGE UP (ref 0–0)
PHOSPHATE SERPL-MCNC: 3 MG/DL — SIGNIFICANT CHANGE UP (ref 2.1–4.9)
PLATELET # BLD AUTO: 198 K/UL — SIGNIFICANT CHANGE UP (ref 130–400)
POTASSIUM SERPL-MCNC: 4.7 MMOL/L — SIGNIFICANT CHANGE UP (ref 3.5–5)
POTASSIUM SERPL-SCNC: 4.7 MMOL/L — SIGNIFICANT CHANGE UP (ref 3.5–5)
RBC # BLD: 3.75 M/UL — LOW (ref 4.2–5.4)
RBC # FLD: 13.5 % — SIGNIFICANT CHANGE UP (ref 11.5–14.5)
SODIUM SERPL-SCNC: 139 MMOL/L — SIGNIFICANT CHANGE UP (ref 135–146)
WBC # BLD: 5.66 K/UL — SIGNIFICANT CHANGE UP (ref 4.8–10.8)
WBC # FLD AUTO: 5.66 K/UL — SIGNIFICANT CHANGE UP (ref 4.8–10.8)

## 2022-12-06 PROCEDURE — 99238 HOSP IP/OBS DSCHRG MGMT 30/<: CPT

## 2022-12-06 PROCEDURE — G2066: CPT

## 2022-12-06 PROCEDURE — 93298 REM INTERROG DEV EVAL SCRMS: CPT

## 2022-12-06 RX ORDER — BACITRACIN ZINC 500 UNIT/G
1 OINTMENT IN PACKET (EA) TOPICAL
Qty: 2 | Refills: 2
Start: 2022-12-06 | End: 2023-03-05

## 2022-12-06 RX ADMIN — Medication 100 MILLIGRAM(S): at 05:27

## 2022-12-06 RX ADMIN — Medication 1 APPLICATION(S): at 05:33

## 2022-12-06 RX ADMIN — APIXABAN 2.5 MILLIGRAM(S): 2.5 TABLET, FILM COATED ORAL at 05:34

## 2022-12-06 RX ADMIN — Medication 1 TABLET(S): at 12:06

## 2022-12-06 RX ADMIN — MEMANTINE HYDROCHLORIDE 10 MILLIGRAM(S): 10 TABLET ORAL at 05:34

## 2022-12-06 RX ADMIN — Medication 25 MICROGRAM(S): at 05:34

## 2022-12-06 RX ADMIN — PREGABALIN 1000 MICROGRAM(S): 225 CAPSULE ORAL at 12:06

## 2022-12-06 RX ADMIN — Medication 250 MILLIGRAM(S): at 05:27

## 2022-12-06 RX ADMIN — FAMOTIDINE 40 MILLIGRAM(S): 10 INJECTION INTRAVENOUS at 12:06

## 2022-12-06 RX ADMIN — AMIODARONE HYDROCHLORIDE 200 MILLIGRAM(S): 400 TABLET ORAL at 05:34

## 2022-12-06 NOTE — DISCHARGE NOTE PROVIDER - NSDCCPCAREPLAN_GEN_ALL_CORE_FT
PRINCIPAL DISCHARGE DIAGNOSIS  Diagnosis: Cellulitis  Assessment and Plan of Treatment: Please wash wound with soap and water, apply bacitracin , then cover with allevyn pad twice a day. Please complete oral antibiotic as prescribed. Please call [phone] to make an appointment for burn clinic within 1 week of discharge.      SECONDARY DISCHARGE DIAGNOSES  Diagnosis: Afib  Assessment and Plan of Treatment: - continue home meds: Amiodarone 200mg daily, and Eliquis 2.5mg bid  - f/u with Dr Marte as outpatient    Diagnosis: GERD (gastroesophageal reflux disease)  Assessment and Plan of Treatment: Continue Famotidine 40mg daily. Please follow up with your primary doctor within 1-2 weeks of discharge.    Diagnosis: Dementia  Assessment and Plan of Treatment: continue [id number] mg as prescribed. Please follow up with your primary doctor within 1-2 weeks of discharge.

## 2022-12-06 NOTE — DISCHARGE NOTE NURSING/CASE MANAGEMENT/SOCIAL WORK - NSDCPEFALRISK_GEN_ALL_CORE
For information on Fall & Injury Prevention, visit: [email or web address] OR  [email or web address] OR  https://www.cdc.gov/steadi/patient.html

## 2022-12-06 NOTE — DISCHARGE NOTE PROVIDER - CARE PROVIDERS DIRECT ADDRESSES
[email or web address]

## 2022-12-06 NOTE — DISCHARGE NOTE NURSING/CASE MANAGEMENT/SOCIAL WORK - PATIENT PORTAL LINK FT
You can access the FollowMyHealth Patient Portal offered by [facility] by registering at the following website: [email or web address]. By joining [facility]’s FollowMyHealth portal, you will also be able to view your health information using other applications (apps) compatible with our system.

## 2022-12-06 NOTE — DISCHARGE NOTE PROVIDER - NSFOLLOWUPCLINICS_GEN_ALL_ED_FT
[facility]  Burn  [address], [address]  [place] [address]  Phone: [phone]  Fax:   Follow Up Time: 1-3 days

## 2022-12-06 NOTE — DISCHARGE NOTE PROVIDER - NSDCFUSCHEDAPPT_GEN_ALL_CORE_FT
Leonel Marte  [facility]  CARDIOLOGY [address]  Scheduled Appointment: 12/13/2022    Stanislav De Jesus  [facility]  PULMMED [address]  Scheduled Appointment: 01/03/2023    Akin Cheema  [facility]  CARDIOLOGY [address]  Scheduled Appointment: 01/24/2023

## 2022-12-06 NOTE — DISCHARGE NOTE PROVIDER - HOSPITAL COURSE
HPI:  Patient is 86 y/o Female with PMHx of HLD, paroxysmal A-fib (on Amiodarone 200mg daily and Eliquis 2.5mg bid),  Loop recorder, mitral valve regurgitation, GERD, Hypothyroidism, SLE, anxiety, dementia presenting for right ankle erythema and swelling that worsened over last 2 days. Pt reports she has had right ankle cellulitis on/off for ~2 months, with h/o 2 prior admissions to Burn Unit. Was seen on 12/2 in ED, seen by burn and started on Levaquin. Pt coming back today because she says pain and swelling has not improved with oral antibiotics. No fever, chills    Patient recently admitted on 11/3/22, with R ankle cellulitis and non healing lateral malleolus wound.  WCx from clinic 10/25/22 c/w Stenotrophomonas maltophilia susceptible to Levo and bactrim. As per ID recommendations pt started on Ancef 1gm IV q8h during inpatient stay. Wound care c/w bacitracin, adaptic, and kerlix. Cellulitis improved with IV abx and discharged on 11/4. (04 Dec 2022 14:40)    Hospital course:   Patient admitted to burn service. HPI:  Patient is 88 y/o Female with PMHx of HLD, paroxysmal A-fib (on Amiodarone 200mg daily and Eliquis 2.5mg bid),  Loop recorder, mitral valve regurgitation, GERD, Hypothyroidism, SLE, anxiety, dementia presenting for right ankle erythema and swelling that worsened over last 2 days. Pt reports she has had right ankle cellulitis on/off for ~2 months, with h/o 2 prior admissions to Burn Unit. Was seen on 12/2 in ED, seen by burn and started on Levaquin. Pt coming back today because she says pain and swelling has not improved with oral antibiotics. No fever, chills    Patient recently admitted on 11/3/22, with R ankle cellulitis and non healing lateral malleolus wound.  WCx from clinic 10/25/22 c/w Stenotrophomonas maltophilia susceptible to Levo and bactrim. As per ID recommendations pt started on Ancef 1gm IV q8h during inpatient stay. Wound care c/w bacitracin, adaptic, and kerlix. Cellulitis improved with IV abx and discharged on 11/4. (04 Dec 2022 14:40)    Hospital course:   Patient admitted to burn service.  While inpatient, patient was treated with IVF, IV antibiotics , GI/dvt ppx, pain management and wound care twice a day.      # R ankle nonhealing wound, cellulitis:  - Wound care: wash wound with soap and water, apply bacitracin, cover with allevyn pad BID  - CT R ankle done 8/19 and negative for osteomyelitis  - WCx from clinic 10/27/22 c/w Stenotrophomonas maltophilia susceptible to Levo and bactrim    - pain meds prn  - IV abx per last admission: ancef 1gm q8  - Wound culture taken 12/4, results pending  - ID c/s placed 12/4, po Doxycycline 100 mg q12h for 10 days upon discharge    # Hx HTN, HLD, A-fib:  - vitals stable, cont to monitor vitals, ECG  - continue home meds: Amiodarone 200mg daily, and Eliquis 2.5mg bid  - f/u with Dr Marte as outpatient     # Asthma, mild intermittent   - no evidence of exacerbation  - monitor O2sat  -Encourage IC    # GERD:  - on Famotidine 40mg daily    # Dementia, anxiety   - c/w [id number] mg BID  - Atarax prn for agitation and anxiety    Patient is afebrile, stable and medically cleared for discharge with recommendations to continue local wound care at home. Patient to Follow up in burn clinic within one week of discharge. Patient to Take at home antibiotics as prescribed.

## 2022-12-06 NOTE — DISCHARGE NOTE NURSING/CASE MANAGEMENT/SOCIAL WORK - NSDCVIVACCINE_GEN_ALL_CORE_FT
Tdap; 21-Jan-2019 16:42; Esperanza Kenney ([place]); [employer]; [id number] (Exp. Date: 27-Jul-2020); IntraMuscular; Deltoid Right.; 0.5 milliLiter(s); VIS (VIS Published: 09-May-2013, VIS Presented: 21-Jan-2019);

## 2022-12-06 NOTE — DISCHARGE NOTE PROVIDER - NSDCMRMEDTOKEN_GEN_ALL_CORE_FT
acetaminophen 325 mg oral tablet: 2 tab(s) orally every 6 hours, As needed, Mild Pain (1 - 3), Moderate Pain (4 - 6)  amiodarone 200 mg oral tablet: 1 tab(s) orally once a day  bacitracin 500 units/g topical ointment: 1 application topically 2 times a day  Calcium 600+D 600 mg-5 mcg (200 intl units) oral tablet: 1 tab(s) orally once a day  cephalexin 500 mg oral capsule: 1 cap(s) orally every 8 hours   diazePAM 5 mg oral tablet: 1 tab(s) orally 3 times a day, As Needed  Eliquis 2.5 mg oral tablet: 1 tab(s) orally 2 times a day  famotidine 40 mg oral tablet: 1 tab(s) orally once a day (at bedtime)  Florastor 250 mg oral capsule: 1 cap(s) orally 2 times a day   levoFLOXacin 250 mg oral tablet: 1 tab(s) orally once a day   levoFLOXacin 750 mg oral tablet: 1 tab(s) orally once a day   levothyroxine 25 mcg (0.025 mg) oral tablet: 1 tab(s) orally once a day  memantine 10 mg oral tablet: 1 tab(s) orally 2 times a day  Multiple Vitamins with Minerals oral tablet: 1 tab(s) orally once a day  senna leaf extract oral tablet: 2 tab(s) orally once a day (at bedtime)  Vitamin B12 1000 mcg oral tablet: 1 tab(s) orally once a day   acetaminophen 325 mg oral tablet: 2 tab(s) orally every 6 hours, As needed, Mild Pain (1 - 3), Moderate Pain (4 - 6)  amiodarone 200 mg oral tablet: 1 tab(s) orally once a day  bacitracin 500 units/g topical ointment: 1 application topically 2 times a day  Calcium 600+D 600 mg-5 mcg (200 intl units) oral tablet: 1 tab(s) orally once a day  diazePAM 5 mg oral tablet: 1 tab(s) orally 3 times a day, As Needed  doxycycline hyclate 100 mg oral tablet: 1 tab(s) orally every 12 hours   Eliquis 2.5 mg oral tablet: 1 tab(s) orally 2 times a day  famotidine 40 mg oral tablet: 1 tab(s) orally once a day (at bedtime)  Florastor 250 mg oral capsule: 1 cap(s) orally 2 times a day   levothyroxine 25 mcg (0.025 mg) oral tablet: 1 tab(s) orally once a day  memantine 10 mg oral tablet: 1 tab(s) orally 2 times a day  Multiple Vitamins with Minerals oral tablet: 1 tab(s) orally once a day  senna leaf extract oral tablet: 2 tab(s) orally once a day (at bedtime)  Vitamin B12 1000 mcg oral tablet: 1 tab(s) orally once a day

## 2022-12-06 NOTE — PATIENT PROFILE ADULT - FALL HARM RISK - HARM RISK INTERVENTIONS
Assistance with ambulation/Assistance OOB with selected safe patient handling equipment/Communicate Risk of Fall with Harm to all staff/Monitor for mental status changes/Move patient closer to nurses' station/Reinforce activity limits and safety measures with patient and family/Reorient to person, place and time as needed/Tailored Fall Risk Interventions/Toileting schedule using arm’s reach rule for commode and bathroom/Use of alarms - bed, chair and/or voice tab/Visual Cue: Yellow wristband and red socks/Bed in lowest position, wheels locked, appropriate side rails in place/Call bell, personal items and telephone in reach/Instruct patient to call for assistance before getting out of bed or chair/Non-slip footwear when patient is out of bed/[place] to call system/Physically safe environment - no spills, clutter or unnecessary equipment/Purposeful Proactive Rounding/Room/bathroom lighting operational, light cord in reach

## 2022-12-06 NOTE — PATIENT PROFILE ADULT - HAVE YOU EXPERIENCED VIOLENCE OR A TRAUMATIC EVENT?
Mother requesting to leave at this time and has pt placed in stroller. Respirations appear easy and unlabored. Pt resting comfortably at this time. Mother reports pt has tolerated baby food and apple juice. no

## 2022-12-06 NOTE — DISCHARGE NOTE PROVIDER - NSDCFUADDINST_GEN_ALL_CORE_FT
Wound care to be performed twice daily. OK to bathe with wound care. Wash wounds with warm, soapy water and a wash cloth.  Dress open areas to right ankle with bacitracin and secure in place with Allevyn. Monitor for signs of infection including fever, chills, redness, swelling, increased pain or foul smelling drainage. Follow-up in Burn Clinic this Thursday 12/8/22. Burn Clinic is located at [address] [place] [address] in [place]. Please call [phone] to make an appointment.

## 2022-12-06 NOTE — PROGRESS NOTE ADULT - SUBJECTIVE AND OBJECTIVE BOX
Patient is a 87y old  Female who presents with a chief complaint of non healing right ankle wound.     AM Rounds  INTERVAL HISTORY:  No acute events overnight. Afebrile   Patient seen at bedside. Dressing change performed. Patient tolerated well  D/C planning today    Vital Signs Last 24 Hrs  T(C): 35.7 (06 Dec 2022 08:49), Max: 37.1 (05 Dec 2022 15:54)  T(F): 96.3 (06 Dec 2022 08:49), Max: 98.7 (05 Dec 2022 15:54)  HR: 69 (06 Dec 2022 08:49) (65 - 79)  BP: 163/71 (06 Dec 2022 08:49) (99/58 - 174/74)  RR: 18 (06 Dec 2022 08:49) (16 - 18)  SpO2: 95% (06 Dec 2022 08:49) (95% - 98%)    Parameters below as of 06 Dec 2022 08:49  Patient On (Oxygen Delivery Method): room air      I&O's Detail    05 Dec 2022 07:01  -  06 Dec 2022 07:00  --------------------------------------------------------  IN:    Lactated Ringers: 400 mL  Total IN: 400 mL    OUT:  Total OUT: 0 mL    Total NET: 400 mL      06 Dec 2022 07:01  -  06 Dec 2022 10:46  --------------------------------------------------------  IN:    Lactated Ringers: 200 mL  Total IN: 200 mL    OUT:  Total OUT: 0 mL    Total NET: 200 mL            MEDICATIONS  (STANDING):  aMIOdarone    Tablet 200 milliGRAM(s) Oral daily  apixaban 2.5 milliGRAM(s) Oral every 12 hours  bacitracin   Ointment 1 Application(s) Topical two times a day  calcium carbonate 1250 mG  + Vitamin D (OsCal 500 + D) 1 Tablet(s) Oral daily  ceFAZolin   IVPB 1000 milliGRAM(s) IV Intermittent every 8 hours  chlorhexidine 2% Cloths 1 Application(s) Topical <User Schedule>  cyanocobalamin 1000 MICROGram(s) Oral daily  famotidine    Tablet 40 milliGRAM(s) Oral daily  lactated ringers. 1000 milliLiter(s) (50 mL/Hr) IV Continuous <Continuous>  levothyroxine 25 MICROGram(s) Oral daily  melatonin 3 milliGRAM(s) Oral at bedtime  memantine 10 milliGRAM(s) Oral two times a day  multivitamin/minerals 1 Tablet(s) Oral daily  saccharomyces boulardii 250 milliGRAM(s) Oral two times a day  senna 2 Tablet(s) Oral at bedtime    MEDICATIONS  (PRN):  acetaminophen     Tablet .. 650 milliGRAM(s) Oral every 6 hours PRN Temp greater or equal to 38C (100.4F), Mild Pain (1 - 3)  hydrOXYzine hydrochloride 25 milliGRAM(s) Oral at bedtime PRN Agitation  ibuprofen  Tablet. 400 milliGRAM(s) Oral every 6 hours PRN Moderate Pain (4 - 6)    Allergies    sulfa drugs (Unknown)    Intolerances        Lab Results:                        12.6   9.02  )-----------( 237      ( 05 Dec 2022 12:58 )             39.2     12-05    136  |  99  |  23<H>  ----------------------------<  132<H>  4.2   |  24  |  1.4    Ca    9.5      05 Dec 2022 12:58  Phos  3.6     12-05  Mg     1.8     12-05      EXAM:  GENERAL: well built, well nourished, NAD, laying in bed comfortably  CHEST/LUNG: Equal b/l air entry, no signs of cardiopulmonary compromise.   NEUROLOGY: alert, oriented, forgetful,  moves all extremities   SKIN: R ankle small partial thickness wound to lateral malleolus ,healing with decreased surrounding swelling and erythema, tenderness to palpation. Mild serous drainage on dressing. No malodor, purulence or active bleeding.    Dressing change performed. Patient tolerated well.                   
Patient is a 87y old  Female who presents with a chief complaint of non healing right ankle wound.     AM rounds   Patient seen, complained of pain.   Afebrile.   No acute events overnight.     Vital Signs Last 24 Hrs  T(C): 37.1 (05 Dec 2022 15:54), Max: 37.1 (05 Dec 2022 15:54)  T(F): 98.7 (05 Dec 2022 15:54), Max: 98.7 (05 Dec 2022 15:54)  HR: 79 (05 Dec 2022 15:54) (75 - 82)  BP: 147/65 (05 Dec 2022 15:54) (147/65 - 155/66)  BP(mean): --  ABP: --  ABP(mean): --  RR: 18 (05 Dec 2022 15:54) (17 - 18)  SpO2: 98% (05 Dec 2022 15:54) (98% - 100%)    O2 Parameters below as of 05 Dec 2022 05:24  Patient On (Oxygen Delivery Method): room air      Allergies  sulfa drugs (Unknown)      Lab Results:                        12.6   9.02  )-----------( 237      ( 05 Dec 2022 12:58 )             39.2     12-05    136  |  99  |  23<H>  ----------------------------<  132<H>  4.2   |  24  |  1.4    Ca    9.5      05 Dec 2022 12:58  Phos  3.6     12-05  Mg     1.8     12-05    TPro  6.4  /  Alb  4.1  /  TBili  0.5  /  DBili  x   /  AST  18  /  ALT  8   /  AlkPhos  65  12-04    PT/INR - ( 04 Dec 2022 08:50 )   PT: 15.10 sec;   INR: 1.31 ratio         PTT - ( 04 Dec 2022 08:50 )  PTT:36.3 sec    LIVER FUNCTIONS - ( 04 Dec 2022 08:50 )  Alb: 4.1 g/dL / Pro: 6.4 g/dL / ALK PHOS: 65 U/L / ALT: 8 U/L / AST: 18 U/L / GGT: x           CAPILLARY BLOOD GLUCOSE    MEDICATIONS  (STANDING):  aMIOdarone    Tablet 200 milliGRAM(s) Oral daily  apixaban 2.5 milliGRAM(s) Oral every 12 hours  bacitracin   Ointment 1 Application(s) Topical two times a day  calcium carbonate 1250 mG  + Vitamin D (OsCal 500 + D) 1 Tablet(s) Oral daily  ceFAZolin   IVPB 1000 milliGRAM(s) IV Intermittent every 8 hours  chlorhexidine 2% Cloths 1 Application(s) Topical <User Schedule>  cyanocobalamin 1000 MICROGram(s) Oral daily  famotidine    Tablet 40 milliGRAM(s) Oral daily  lactated ringers. 1000 milliLiter(s) (50 mL/Hr) IV Continuous <Continuous>  levothyroxine 25 MICROGram(s) Oral daily  melatonin 3 milliGRAM(s) Oral at bedtime  memantine 10 milliGRAM(s) Oral two times a day  multivitamin/minerals 1 Tablet(s) Oral daily  saccharomyces boulardii 250 milliGRAM(s) Oral two times a day  senna 2 Tablet(s) Oral at bedtime    MEDICATIONS  (PRN):  acetaminophen     Tablet .. 650 milliGRAM(s) Oral every 6 hours PRN Temp greater or equal to 38C (100.4F), Mild Pain (1 - 3)  hydrOXYzine hydrochloride 25 milliGRAM(s) Oral at bedtime PRN Agitation  ibuprofen  Tablet. 400 milliGRAM(s) Oral every 6 hours PRN Moderate Pain (4 - 6)        PHYSICAL EXAM:  GENERAL: well built, well nourished, NAD, laying in bed comfortably  CHEST/LUNG: Equal b/l air entry, no signs of cardiopulmonary compromise.   NEUROLOGY: alert, oriented, forgetful, CN intact, moves all extremities   SKIN: R ankle partial thickness wound to lateral malleolus, +surrounding swelling and erythema, tenderness to palpation. Mild serous drainage on dressing. No malodor or active bleeding.  
  MU NOEL  87y, Female  Allergy: sulfa drugs (Unknown)      All historical available data reviewed.    HPI:  Patient is 88 y/o Female with PMHx of HLD, paroxysmal A-fib (on Amiodarone 200mg daily and Eliquis 2.5mg bid),  Loop recorder, mitral valve regurgitation, GERD, Hypothyroidism, SLE, anxiety, dementia presenting for right ankle erythema and swelling that worsened over last 2 days. Pt reports she has had right ankle cellulitis on/off for ~2 months, with h/o 2 prior admissions to Burn Unit. Was seen on 12/2 in ED, seen by burn and started on levaquin. Pt coming back today because she says pain and swelling has not improved with oral antibiotics. No fever, chills    Patient recently admitted on 11/3/22, with R ankle cellulitis and non healing lateral malleolus wound.  WCx from clinic 10/25/22 c/w Stenotrophomonas maltophilia susceptible to Levo and bactrim. As per ID recommendations pt started on Ancef 1gm IV q8h during inpatient stay. Wound care c/w bacitracin, adaptic, and kerlix. Cellulitis improved with IV abx and discharged on 11/4. (04 Dec 2022 14:40)    FAMILY HISTORY:    PAST MEDICAL & SURGICAL HISTORY:  Hypothyroid      UTI (urinary tract infection)      Atrial fibrillation, unspecified type  no longer in atrial fifrillation      Anxiety      Mild intermittent asthma, unspecified whether complicated  no longer present      Cellulitis of finger, unspecified laterality  resolved      Diverticulitis  resolved      Reflux gastritis      Mitral valve insufficiency, unspecified etiology      Idiopathic pericarditis, unspecified chronicity      History of appendectomy      Status post placement of implantable loop recorder            VITALS:  T(F): 97.2, Max: 98.8 (12-04-22 @ 13:38)  HR: 82  BP: 149/70  RR: 18Vital Signs Last 24 Hrs  T(C): 36.2 (05 Dec 2022 05:24), Max: 37.1 (04 Dec 2022 13:38)  T(F): 97.2 (05 Dec 2022 05:24), Max: 98.8 (04 Dec 2022 13:38)  HR: 82 (05 Dec 2022 05:24) (72 - 82)  BP: 149/70 (05 Dec 2022 05:24) (109/53 - 155/66)  BP(mean): --  RR: 18 (05 Dec 2022 05:24) (17 - 18)  SpO2: 99% (05 Dec 2022 05:24) (98% - 100%)    Parameters below as of 05 Dec 2022 05:24  Patient On (Oxygen Delivery Method): room air        TESTS & MEASUREMENTS:                        12.0   6.19  )-----------( 190      ( 04 Dec 2022 08:50 )             37.3     12-04    137  |  100  |  25<H>  ----------------------------<  94  4.7   |  25  |  1.5    Ca    9.3      04 Dec 2022 08:50    TPro  6.4  /  Alb  4.1  /  TBili  0.5  /  DBili  x   /  AST  18  /  ALT  8   /  AlkPhos  65  12-04    LIVER FUNCTIONS - ( 04 Dec 2022 08:50 )  Alb: 4.1 g/dL / Pro: 6.4 g/dL / ALK PHOS: 65 U/L / ALT: 8 U/L / AST: 18 U/L / GGT: x             Culture - Blood (collected 12-02-22 @ 08:31)  Source: .Blood Blood  Preliminary Report (12-03-22 @ 15:06):    No growth to date.    Culture - Blood (collected 12-02-22 @ 08:31)  Source: .Blood Blood  Preliminary Report (12-03-22 @ 15:06):    No growth to date.            RADIOLOGY & ADDITIONAL TESTS:  Personal review of radiological diagnostics performed  Echo and EKG results noted when applicable.     MEDICATIONS:  acetaminophen     Tablet .. 650 milliGRAM(s) Oral every 6 hours PRN  aMIOdarone    Tablet 200 milliGRAM(s) Oral daily  apixaban 2.5 milliGRAM(s) Oral every 12 hours  bacitracin   Ointment 1 Application(s) Topical two times a day  calcium carbonate 1250 mG  + Vitamin D (OsCal 500 + D) 1 Tablet(s) Oral daily  ceFAZolin   IVPB 1000 milliGRAM(s) IV Intermittent every 8 hours  chlorhexidine 2% Cloths 1 Application(s) Topical <User Schedule>  cyanocobalamin 1000 MICROGram(s) Oral daily  famotidine    Tablet 40 milliGRAM(s) Oral daily  hydrOXYzine hydrochloride 25 milliGRAM(s) Oral at bedtime PRN  ibuprofen  Tablet. 400 milliGRAM(s) Oral every 6 hours PRN  lactated ringers. 1000 milliLiter(s) IV Continuous <Continuous>  levothyroxine 25 MICROGram(s) Oral daily  melatonin 3 milliGRAM(s) Oral at bedtime  memantine 10 milliGRAM(s) Oral two times a day  multivitamin/minerals 1 Tablet(s) Oral daily  saccharomyces boulardii 250 milliGRAM(s) Oral two times a day  senna 2 Tablet(s) Oral at bedtime      ANTIBIOTICS:  ceFAZolin   IVPB 1000 milliGRAM(s) IV Intermittent every 8 hours

## 2022-12-06 NOTE — PROGRESS NOTE ADULT - ASSESSMENT
88 y/o Female with PMHx of HLD, paroxysmal A-fib (on Amiodarone 200mg daily and Eliquis 2.5mg bid),  Loop recorder, mitral valve regurgitation, GERD, Hypothyroidism, SLE, anxiety, dementia, with nonhealing right ankle wound    # R ankle nonhealing wound, cellulitis:  - Wound care: wash wound with soap and water, apply bacitracin, cover with allevyn pad BID  - CT R ankle done 8/19 and negative for osteomyelitis  - WCx from clinic 10/27/22 c/w Stenotrophomonas maltophilia susceptible to Levo and bactrim    - pain meds prn  - IV abx per last admission: ancef 1gm q8  - Wound culture taken 12/4, results pending  - ID c/s placed 12/4, po Doxycycline 100 mg q12h for 10 days upon discharge    # Hx HTN, HLD, A-fib:  - vitals stable, cont to monitor vitals, ECG  - continue home meds: Amiodarone 200mg daily, and Eliquis 2.5mg bid  - f/u with Dr Marte as outpatient     # Asthma, mild intermittent   - no evidence of exacerbation  - monitor O2sat  -Encourage IC    # GERD:  - on Famotidine 40mg daily    # Dementia, anxiety   - c/w [id number] mg BID  - Atarax prn for agitation and anxiety    # Miscellaneous:  - DVT ppx - pt on Eliquis  - PPI daily for GI ppx  - Bowel regimen  - ambulate as tolerate  - PT c/s    D/C planning today with outpatient follow up- f/u with CM/SW    Plan of care discussed with patient, Concerns addressed.

## 2022-12-06 NOTE — PATIENT PROFILE ADULT - HISTORY OF COVID-19 VACCINATION
"Outpatient Psychiatry Follow-Up Visit (MD/NP)    2/5/2019    Clinical Status of Patient:  Outpatient (Ambulatory)  IDENTIFYING DATA:  Child's Name: Ingris Cornelius   Grade: 8 th in 2018-19 academic year  School: [employer]  Child lives with: parents, sister, Evette age 11      Chief Complaint: Ingris Cornelius is a 15 y.o. male who presents today for follow-up of attention problems and dysgraphia, learning disorders. Met with patient and mother.     "Things are good. School is good. I don't really think it is stressing me out right now and I feel like I have it under control.  I feel alright right now and I don't really want to consider another anxiety medication."    Interval History and Content of Current Session:  Interim Events/Subjective Report/Content of Current Session:     Ingris arrives on time and accompanied by his mother for his appointment. He is a former patient of Dr. Peck.     "I stopped taking the Wellbutrin  mg about 2 weeks ago. I never saw a chance. I was getting bad headaches and my stomach hurts and since I stopped all of that went away and I am still taking the Vyvanse."    "I was through the roof stressed with school because I had to pass the exams."    "I like to hang out with my friends and play video games. I am more of a [place] kind of a ayah."    "I am just really anxious at school."    "They will not let me use a computer in school for my handwriting which is really bad."    "I fail because of my handwriting sometimes."    "I get along pretty well with my parents. My parents don't see me study after school and I study the notes on the ipad."    "I only have one F in reading."    "I admit it, that sometimes I have assignments and I don't have time to study."    "My parents can be pretty stubborn and they want it done their way."    "I am chill right now and doing fine."    He is taking Vyvanse 50 mg daily and is compliant with the medication.    "I want to join " "the  and do computer or be a medic."    "I am not really depressed or anxious and it is never suicidal at all. I have moments where I am upset and my friends help me out with that."    Psychotherapy:  · Target symptoms: distractability, lack of focus, insomnia, irritability and rebound hyperactivity in afternoons  · Why chosen therapy is appropriate versus another modality: relevant to diagnosis, patient responds to this modality  · Outcome monitoring methods: self-report, observation, feedback from family  · Therapeutic intervention type: behavior modifying psychotherapy, supportive psychotherapy,   · Topics discussed/themes: parenting issues, building skills sets for symptom management, symptom recognition  · The patient's response to the intervention is accepting. The patient's progress toward treatment goals is good.   · Duration of intervention: 16 minutes.     Review of Systems   · PSYCHIATRIC: Pertinant items are noted in the narrative.  · CONSTITUTIONAL: No weight gain or loss.   · MUSCULOSKELETAL: No tics or tremors No pain or stiffness of the joints.  · NEUROLOGIC: No weakness, sensory changes, seizures, confusion, memory loss, tremor or other abnormal movements.  · CARDIOVASCULAR: No tachycardia or chest pain.  · GASTROINTESTINAL: No nausea, vomiting, pain, constipation or diarrhea.     Past Medical, Family and Social History: The patient's past medical, family and social history have been reviewed and updated as appropriate within the electronic medical record - see encounter notes. He is currently doing well with school at this time except in reading."     Compliance: yes     Side effects: None     Risk Parameters:  Patient reports no suicidal ideation  Patient reports no homicidal ideation  Patient reports no self-injurious behavior  Patient reports no violent behavior    Wt Readings from Last 3 Encounters:   02/05/19 54.9 kg (121 lb 0.5 oz) (36 %, Z= -0.36)*   09/26/18 53.9 kg (118 lb 15 oz) " (39 %, Z= -0.28)*   03/05/18 51.5 kg (113 lb 8.6 oz) (40 %, Z= -0.24)*     * Growth percentiles are based on [facility] (Boys, 2-20 Years) data.     Temp Readings from Last 3 Encounters:   11/17/17 98.1 °F (36.7 °C) (Temporal)   02/13/17 99.4 °F (37.4 °C) (Temporal)   06/07/16 98.1 °F (36.7 °C) (Temporal)     BP Readings from Last 3 Encounters:   02/05/19 119/78 (80 %, Z = 0.83 /  94 %, Z = 1.52)*   09/26/18 131/71 (97 %, Z = 1.85 /  80 %, Z = 0.85)*   03/05/18 119/69     *BP percentiles are based on the August 2017 AAP Clinical Practice Guideline for boys     Pulse Readings from Last 3 Encounters:   02/05/19 (!) 114   09/26/18 73   03/05/18 83             Exam (detailed: at least 9 elements; comprehensive: all 15 elements)   Constitutional  Vitals:  Most recent vital signs, dated today were reviewed     General:  unremarkable, age appropriate, casually dressed     Musculoskeletal  Muscle Strength/Tone:  no dyskinesia, no tremor, no tic   Gait & Station:  non-ataxic      Psychiatric  Speech:  no latency; no press, spontaneous   Mood & Affect:  happy  congruent and appropriate   Thought Process:  goal-directed   Associations:  intact   Thought Content:  normal, no suicidality, no homicidality, delusions, or paranoia   Insight:  intact   Judgement: age appropriate   Orientation:  grossly intact   Memory: intact for content of interview   Language: grossly intact   Attention Span & Concentration:  able to focus   Fund of Knowledge:  intact and appropriate to age and level of education       Assessment and Diagnosis   Status/Progress: Based on the examination today, the patient's problems remain improved. New problems have not been presented today. Co-morbidities, Diagnostic uncertainty and Lack of compliance are not complicating management of the primary condition. There are no active rule-out diagnoses for this patient at this time.       General Impression: 15 yo male with inattention, dysgraphia, insomnia, anxiety when  overwhelmed      ICD-10-CM ICD-9-CM   1. ZIAN (generalized anxiety disorder) F41.1 300.02   2. ADHD (attention deficit hyperactivity disorder), combined type F90.2 314.01   3. Dysgraphia R27.8 781.3       Intervention/Counseling/Treatment Plan     · Medication Management:  Continue current medications Vyvanse 50 mg daily. Patient declined treatment for anxiety at this time. The risks and benefits of medication were discussed with the patient.  · Encouraged him to attend therapy with Ahmet Lane as previously scheduled  · Counseling provided with patient and caregiver as follows: importance of compliance with chosen treatment options was emphasized, risks and benefits of treatment options, including medications, were discussed with the patient.     Return to Clinic: 3 months   Yes

## 2022-12-06 NOTE — DISCHARGE NOTE PROVIDER - CARE PROVIDER_API CALL
Beto Pride)  Plastic Surgery  [address] [place] [id number]  Phone: [phone]  Fax: [phone]  Follow Up Time: 1-3 days

## 2022-12-07 LAB
CULTURE RESULTS: SIGNIFICANT CHANGE UP
SPECIMEN SOURCE: SIGNIFICANT CHANGE UP

## 2022-12-10 DIAGNOSIS — L97.319 NON-PRESSURE CHRONIC ULCER OF RIGHT ANKLE WITH UNSPECIFIED SEVERITY: ICD-10-CM

## 2022-12-10 DIAGNOSIS — E03.9 HYPOTHYROIDISM, UNSPECIFIED: ICD-10-CM

## 2022-12-10 DIAGNOSIS — L03.115 CELLULITIS OF RIGHT LOWER LIMB: ICD-10-CM

## 2022-12-10 DIAGNOSIS — K21.9 GASTRO-ESOPHAGEAL REFLUX DISEASE WITHOUT ESOPHAGITIS: ICD-10-CM

## 2022-12-10 DIAGNOSIS — M32.9 SYSTEMIC LUPUS ERYTHEMATOSUS, UNSPECIFIED: ICD-10-CM

## 2022-12-10 DIAGNOSIS — F03.90 UNSPECIFIED DEMENTIA WITHOUT BEHAVIORAL DISTURBANCE: ICD-10-CM

## 2022-12-10 DIAGNOSIS — I48.0 PAROXYSMAL ATRIAL FIBRILLATION: ICD-10-CM

## 2022-12-10 DIAGNOSIS — E78.5 HYPERLIPIDEMIA, UNSPECIFIED: ICD-10-CM

## 2022-12-10 DIAGNOSIS — J45.909 UNSPECIFIED ASTHMA, UNCOMPLICATED: ICD-10-CM

## 2022-12-10 DIAGNOSIS — I34.0 NONRHEUMATIC MITRAL (VALVE) INSUFFICIENCY: ICD-10-CM

## 2022-12-10 DIAGNOSIS — F41.9 ANXIETY DISORDER, UNSPECIFIED: ICD-10-CM

## 2022-12-13 ENCOUNTER — APPOINTMENT (OUTPATIENT)
Dept: CARDIOLOGY | Facility: CLINIC | Age: 87
End: 2022-12-13

## 2022-12-13 ENCOUNTER — OUTPATIENT (OUTPATIENT)
Dept: OUTPATIENT SERVICES | Facility: HOSPITAL | Age: 87
LOS: 1 days | Discharge: HOME | End: 2022-12-13

## 2022-12-13 ENCOUNTER — APPOINTMENT (OUTPATIENT)
Dept: BURN CARE | Facility: CLINIC | Age: 87
End: 2022-12-13

## 2022-12-13 VITALS
HEIGHT: 66 IN | SYSTOLIC BLOOD PRESSURE: 140 MMHG | TEMPERATURE: 96 F | DIASTOLIC BLOOD PRESSURE: 70 MMHG | HEART RATE: 61 BPM | BODY MASS INDEX: 20.57 KG/M2 | WEIGHT: 128 LBS

## 2022-12-13 VITALS — SYSTOLIC BLOOD PRESSURE: 137 MMHG | DIASTOLIC BLOOD PRESSURE: 72 MMHG | HEART RATE: 78 BPM

## 2022-12-13 DIAGNOSIS — I10 ESSENTIAL (PRIMARY) HYPERTENSION: ICD-10-CM

## 2022-12-13 DIAGNOSIS — R00.2 PALPITATIONS: ICD-10-CM

## 2022-12-13 DIAGNOSIS — L03.90 CELLULITIS, UNSPECIFIED: ICD-10-CM

## 2022-12-13 DIAGNOSIS — Z95.818 PRESENCE OF OTHER CARDIAC IMPLANTS AND GRAFTS: Chronic | ICD-10-CM

## 2022-12-13 DIAGNOSIS — Z90.49 ACQUIRED ABSENCE OF OTHER SPECIFIED PARTS OF DIGESTIVE TRACT: Chronic | ICD-10-CM

## 2022-12-13 DIAGNOSIS — I34.0 NONRHEUMATIC MITRAL (VALVE) INSUFFICIENCY: ICD-10-CM

## 2022-12-13 DIAGNOSIS — F03.90 UNSPECIFIED DEMENTIA W/OUT BEHAVIORAL DISTURBANCE: ICD-10-CM

## 2022-12-13 PROCEDURE — 99212 OFFICE O/P EST SF 10 MIN: CPT

## 2022-12-13 PROCEDURE — 93000 ELECTROCARDIOGRAM COMPLETE: CPT

## 2022-12-13 PROCEDURE — 99214 OFFICE O/P EST MOD 30 MIN: CPT

## 2022-12-13 NOTE — ASSESSMENT
[FreeTextEntry1] : The patient has been feeling well overall. No chest pain or SOB or lightheadedness . The patient has progressive dementia. Mild LVH on echo but BP is well controlled. Mild increased Kappa immunoglobin and ratio to lambda is normal . Protein electrophoresis  is normal . She has had no clinical AF . She has an ILM and is monitored by Dr. Cheema. the patient had high p ANCA with normal ESR CRP . Will discuss with Dr. Obregon , will also have Dr. ZULEYMA quintero see her for her increased creatinine .

## 2022-12-13 NOTE — ASSESSMENT
[FreeTextEntry1] : The right lateral ankle wound is healed   and measures 0.2x0.2cm in the center and 1x1cm in total.  The is mild tenderness, and no drainage.  The tissue is viable and there is no necrotic tissue.  The patient was instructed to clean  the wound with soap and water.   Continue local wound care with moisturizer. and allevyne  pad.  Follow up 1-2 months. [Wound Care] : wound care

## 2022-12-13 NOTE — HISTORY OF PRESENT ILLNESS
[Did you have an operation on your burn/wound injury?] : Did you have an operation on your burn/wound injury? No [Did this injury occur on the job?] : Did this injury occur on the job? No [de-identified] : wound right ankle and right lower leg [de-identified] : healed  right lateral ankle wound

## 2022-12-13 NOTE — REASON FOR VISIT
[Revisit] : revisit [Were you seen in the Emergency Room?] : not seen in the emergency room [Were you admitted to the burn center at [facility]?] : not admitted to the burn center at [facility] [Friend] : friend

## 2022-12-13 NOTE — PHYSICAL EXAM
[General Appearance - Well Developed] : well developed [Normal Appearance] : normal appearance [Well Groomed] : well groomed [General Appearance - Well Nourished] : well nourished [No Deformities] : no deformities [General Appearance - In No Acute Distress] : no acute distress [Normal Conjunctiva] : the conjunctiva exhibited no abnormalities [Eyelids - No Xanthelasma] : the eyelids demonstrated no xanthelasmas [Normal Oral Mucosa] : normal oral mucosa [No Oral Pallor] : no oral pallor [No Oral Cyanosis] : no oral cyanosis [Respiration, Rhythm And Depth] : normal respiratory rhythm and effort [Exaggerated Use Of Accessory Muscles For Inspiration] : no accessory muscle use [Auscultation Breath Sounds / Voice Sounds] : lungs were clear to auscultation bilaterally [Abdomen Soft] : soft [Abdomen Tenderness] : non-tender [Abdomen Mass (___ Cm)] : no abdominal mass palpated [Abnormal Walk] : normal gait [Nail Clubbing] : no clubbing of the fingernails [Cyanosis, Localized] : no localized cyanosis [Petechial Hemorrhages (___cm)] : no petechial hemorrhages [Skin Color & Pigmentation] : normal skin color and pigmentation [] : no rash [No Venous Stasis] : no venous stasis [No Skin Ulcers] : no skin ulcer [No Xanthoma] : no  xanthoma was observed [Oriented To Time, Place, And Person] : oriented to person, place, and time [Affect] : the affect was normal [Mood] : the mood was normal [No Anxiety] : not feeling anxious [Normal Rate] : normal [Rhythm Regular] : regular [No Murmur] : no murmurs heard [No Pitting Edema] : no pitting edema present [de-identified] : grssly nonfocal  [FreeTextEntry1] : multiple areas of ecchymosis. arems and legs.  [Rt] : no varicose veins of the right leg [Lt] : no varicose veins of the left leg

## 2022-12-13 NOTE — CARDIOLOGY SUMMARY
[___] : [unfilled] [de-identified] : 6-8-2021 NSR Short MI interval \par 9- NSR NS ST change \par 3- NSR Normal ECG \par 9- NSR Normal ECG . \par 12- NSR Normal ECG .  [de-identified] : 4- Normal LV systolic function mild AI mild concentric LVH . Trace MR .

## 2022-12-13 NOTE — HISTORY OF PRESENT ILLNESS
[FreeTextEntry1] : The patient was admitted with right ankle cellulitis again .  . She is still on Antibiotics . She has had progressive dementia . Mild increase in Kappa immunoglobin ,uncertain significance Kappa Lambda ratio is normal however. and Immunofixation was normal.  Seen by Rheumatology and ANCA is increased

## 2022-12-13 NOTE — PHYSICAL EXAM
[Closed] : closed [Size%: ______] : Size: [unfilled]% [Infected?] : Infected: No [3] : 3 out of 10 [Abnormal] : abnormal [Medium] : medium [] : no [de-identified] : The right lateral ankle wound is healed   and measures 0.2x0.2cm in the center and 1x1cm in total.  The is mild tenderness, and no drainage.  The tissue is viable and there is no necrotic tissue.  The patient was instructed to clean  the wound with soap and water.   Continue local wound care with moisturizer. and allevyne  pad.  Follow up 1-2 months. \par \par  [TWNoteComboBox1] : bandaid

## 2022-12-15 DIAGNOSIS — Z09 ENCOUNTER FOR FOLLOW-UP EXAMINATION AFTER COMPLETED TREATMENT FOR CONDITIONS OTHER THAN MALIGNANT NEOPLASM: ICD-10-CM

## 2022-12-15 DIAGNOSIS — Z87.2 PERSONAL HISTORY OF DISEASES OF THE SKIN AND SUBCUTANEOUS TISSUE: ICD-10-CM

## 2022-12-21 ENCOUNTER — EMERGENCY (EMERGENCY)
Facility: HOSPITAL | Age: 87
LOS: 0 days | Discharge: HOME | End: 2022-12-21
Attending: EMERGENCY MEDICINE | Admitting: EMERGENCY MEDICINE

## 2022-12-21 VITALS
OXYGEN SATURATION: 100 % | HEIGHT: 66 IN | TEMPERATURE: 98 F | HEART RATE: 70 BPM | SYSTOLIC BLOOD PRESSURE: 147 MMHG | RESPIRATION RATE: 18 BRPM | DIASTOLIC BLOOD PRESSURE: 66 MMHG

## 2022-12-21 DIAGNOSIS — F41.9 ANXIETY DISORDER, UNSPECIFIED: ICD-10-CM

## 2022-12-21 DIAGNOSIS — Z90.49 ACQUIRED ABSENCE OF OTHER SPECIFIED PARTS OF DIGESTIVE TRACT: ICD-10-CM

## 2022-12-21 DIAGNOSIS — Z95.818 PRESENCE OF OTHER CARDIAC IMPLANTS AND GRAFTS: Chronic | ICD-10-CM

## 2022-12-21 DIAGNOSIS — J45.909 UNSPECIFIED ASTHMA, UNCOMPLICATED: ICD-10-CM

## 2022-12-21 DIAGNOSIS — I34.0 NONRHEUMATIC MITRAL (VALVE) INSUFFICIENCY: ICD-10-CM

## 2022-12-21 DIAGNOSIS — L03.115 CELLULITIS OF RIGHT LOWER LIMB: ICD-10-CM

## 2022-12-21 DIAGNOSIS — Z88.2 ALLERGY STATUS TO SULFONAMIDES: ICD-10-CM

## 2022-12-21 DIAGNOSIS — E03.9 HYPOTHYROIDISM, UNSPECIFIED: ICD-10-CM

## 2022-12-21 DIAGNOSIS — I48.91 UNSPECIFIED ATRIAL FIBRILLATION: ICD-10-CM

## 2022-12-21 DIAGNOSIS — Z87.440 PERSONAL HISTORY OF URINARY (TRACT) INFECTIONS: ICD-10-CM

## 2022-12-21 DIAGNOSIS — Z95.818 PRESENCE OF OTHER CARDIAC IMPLANTS AND GRAFTS: ICD-10-CM

## 2022-12-21 DIAGNOSIS — Z90.49 ACQUIRED ABSENCE OF OTHER SPECIFIED PARTS OF DIGESTIVE TRACT: Chronic | ICD-10-CM

## 2022-12-21 DIAGNOSIS — Z87.19 PERSONAL HISTORY OF OTHER DISEASES OF THE DIGESTIVE SYSTEM: ICD-10-CM

## 2022-12-21 LAB
ALBUMIN SERPL ELPH-MCNC: 4.3 G/DL — SIGNIFICANT CHANGE UP (ref 3.5–5.2)
ALP SERPL-CCNC: 59 U/L — SIGNIFICANT CHANGE UP (ref 30–115)
ALT FLD-CCNC: 8 U/L — SIGNIFICANT CHANGE UP (ref 0–41)
ANION GAP SERPL CALC-SCNC: 12 MMOL/L — SIGNIFICANT CHANGE UP (ref 7–14)
APTT BLD: 38.1 SEC — SIGNIFICANT CHANGE UP (ref 27–39.2)
AST SERPL-CCNC: 18 U/L — SIGNIFICANT CHANGE UP (ref 0–41)
BASOPHILS # BLD AUTO: 0.03 K/UL — SIGNIFICANT CHANGE UP (ref 0–0.2)
BASOPHILS NFR BLD AUTO: 0.5 % — SIGNIFICANT CHANGE UP (ref 0–1)
BILIRUB SERPL-MCNC: 0.5 MG/DL — SIGNIFICANT CHANGE UP (ref 0.2–1.2)
BUN SERPL-MCNC: 28 MG/DL — HIGH (ref 10–20)
CALCIUM SERPL-MCNC: 9.5 MG/DL — SIGNIFICANT CHANGE UP (ref 8.4–10.5)
CHLORIDE SERPL-SCNC: 103 MMOL/L — SIGNIFICANT CHANGE UP (ref 98–110)
CO2 SERPL-SCNC: 24 MMOL/L — SIGNIFICANT CHANGE UP (ref 17–32)
CREAT SERPL-MCNC: 1.3 MG/DL — SIGNIFICANT CHANGE UP (ref 0.7–1.5)
EGFR: 40 ML/MIN/1.73M2 — LOW
EOSINOPHIL # BLD AUTO: 0.07 K/UL — SIGNIFICANT CHANGE UP (ref 0–0.7)
EOSINOPHIL NFR BLD AUTO: 1.2 % — SIGNIFICANT CHANGE UP (ref 0–8)
GLUCOSE SERPL-MCNC: 85 MG/DL — SIGNIFICANT CHANGE UP (ref 70–99)
HCT VFR BLD CALC: 36.6 % — LOW (ref 37–47)
HGB BLD-MCNC: 11.9 G/DL — LOW (ref 12–16)
IMM GRANULOCYTES NFR BLD AUTO: 0.5 % — HIGH (ref 0.1–0.3)
INR BLD: 1.26 RATIO — SIGNIFICANT CHANGE UP (ref 0.65–1.3)
LACTATE SERPL-SCNC: 1.2 MMOL/L — SIGNIFICANT CHANGE UP (ref 0.7–2)
LYMPHOCYTES # BLD AUTO: 1.32 K/UL — SIGNIFICANT CHANGE UP (ref 1.2–3.4)
LYMPHOCYTES # BLD AUTO: 22.8 % — SIGNIFICANT CHANGE UP (ref 20.5–51.1)
MCHC RBC-ENTMCNC: 30.1 PG — SIGNIFICANT CHANGE UP (ref 27–31)
MCHC RBC-ENTMCNC: 32.5 G/DL — SIGNIFICANT CHANGE UP (ref 32–37)
MCV RBC AUTO: 92.7 FL — SIGNIFICANT CHANGE UP (ref 81–99)
MONOCYTES # BLD AUTO: 0.34 K/UL — SIGNIFICANT CHANGE UP (ref 0.1–0.6)
MONOCYTES NFR BLD AUTO: 5.9 % — SIGNIFICANT CHANGE UP (ref 1.7–9.3)
NEUTROPHILS # BLD AUTO: 4 K/UL — SIGNIFICANT CHANGE UP (ref 1.4–6.5)
NEUTROPHILS NFR BLD AUTO: 69.1 % — SIGNIFICANT CHANGE UP (ref 42.2–75.2)
NRBC # BLD: 0 /100 WBCS — SIGNIFICANT CHANGE UP (ref 0–0)
PLATELET # BLD AUTO: 224 K/UL — SIGNIFICANT CHANGE UP (ref 130–400)
POTASSIUM SERPL-MCNC: 4.7 MMOL/L — SIGNIFICANT CHANGE UP (ref 3.5–5)
POTASSIUM SERPL-SCNC: 4.7 MMOL/L — SIGNIFICANT CHANGE UP (ref 3.5–5)
PROT SERPL-MCNC: 6.3 G/DL — SIGNIFICANT CHANGE UP (ref 6–8)
PROTHROM AB SERPL-ACNC: 14.5 SEC — HIGH (ref 9.95–12.87)
RBC # BLD: 3.95 M/UL — LOW (ref 4.2–5.4)
RBC # FLD: 13.3 % — SIGNIFICANT CHANGE UP (ref 11.5–14.5)
SODIUM SERPL-SCNC: 139 MMOL/L — SIGNIFICANT CHANGE UP (ref 135–146)
WBC # BLD: 5.79 K/UL — SIGNIFICANT CHANGE UP (ref 4.8–10.8)
WBC # FLD AUTO: 5.79 K/UL — SIGNIFICANT CHANGE UP (ref 4.8–10.8)

## 2022-12-21 PROCEDURE — 73610 X-RAY EXAM OF ANKLE: CPT | Mod: 26,RT

## 2022-12-21 PROCEDURE — 99213 OFFICE O/P EST LOW 20 MIN: CPT

## 2022-12-21 PROCEDURE — 99284 EMERGENCY DEPT VISIT MOD MDM: CPT | Mod: FS

## 2022-12-21 RX ORDER — CEFAZOLIN SODIUM 1 G
2000 VIAL (EA) INJECTION ONCE
Refills: 0 | Status: COMPLETED | OUTPATIENT
Start: 2022-12-21 | End: 2022-12-21

## 2022-12-21 RX ORDER — CEPHALEXIN 500 MG
1 CAPSULE ORAL
Qty: 28 | Refills: 0
Start: 2022-12-21 | End: 2022-12-27

## 2022-12-21 RX ADMIN — Medication 100 MILLIGRAM(S): at 14:28

## 2022-12-21 NOTE — ED PROVIDER NOTE - PATIENT PORTAL LINK FT
You can access the FollowMyHealth Patient Portal offered by [facility] by registering at the following website: [email or web address]. By joining [employer]’s FollowMyHealth portal, you will also be able to view your health information using other applications (apps) compatible with our system.

## 2022-12-21 NOTE — CONSULT NOTE ADULT - SUBJECTIVE AND OBJECTIVE BOX
Patient is a 87y old  Female who presents with a chief complaint of right ankle wound pain.     PAST MEDICAL & SURGICAL HISTORY:  - Hypothyroid  - UTI (urinary tract infection)  - Atrial fibrillation, unspecified type  - No longer in atrial fibrillation  - Anxiety  - Mild intermittent asthma, unspecified whether complicated - no longer present  - Cellulitis of finger, unspecified laterality resolved  - Diverticulitis, resolved  - Reflux gastritis  - Mitral valve insufficiency, unspecified etiology  - Idiopathic pericarditis, unspecified chronicity  - History of appendectomy  - Status post placement of implantable loop recorder    Allergies  - Sulfa drugs (Unknown)    Vital Signs Last 24 Hrs  T(C): 36.6 (21 Dec 2022 11:10), Max: 36.6 (21 Dec 2022 11:10)  T(F): 97.8 (21 Dec 2022 11:10), Max: 97.8 (21 Dec 2022 11:10)  HR: 70 (21 Dec 2022 11:10) (70 - 70)  BP: 147/66 (21 Dec 2022 11:10) (147/66 - 147/66)  RR: 18 (21 Dec 2022 11:10) (18 - 18)  SpO2: 100% (21 Dec 2022 11:10) (100% - 100%)    O2 Parameters below as of 21 Dec 2022 11:10  Patient On (Oxygen Delivery Method): room air    PHYSICAL EXAM:  GENERAL: Lying in stretcher in NAD  HEAD:  Atraumatic, Normocephalic  CHEST/LUNG: Breathing comfortably on RA, b/l chest rise appreciated, speaking in full sentences   HEART: In no cardiopulmonary distress  PSYCH: A&O, follows commands  SKIN:   RLE: Scab noted with erythema overlying the lateral malleolus. Tenderness to palpation. ROM intact. No drainage, purulence, edema active bleeding appreciated.

## 2022-12-21 NOTE — ED PROVIDER NOTE - OBJECTIVE STATEMENT
87 year old female with a history of 87 year old female with a history of PMHx of HLD, paroxysmal A-fib (on Amiodarone 200mg daily and Eliquis 2.5mg bid),  Loop recorder, mitral valve regurgitation, GERD, Hypothyroidism, SLE, anxiety, dementia follows with Burn Team for right ankle cellulitis presents to the ED with recurrent right ankle redness. patient reports that she completed a course of Doxycyline one week ago which initially improved her right ankle redness. In the past few days she states that redness has returned associated with pain. At this time redness is smaller than previous episodes with no drainage. Denies fever, chills, chest pain, shortness of breath, nausea, vomiting, diarrhea, numbness/tingling of extremities.

## 2022-12-21 NOTE — ED PROVIDER NOTE - PROGRESS NOTE DETAILS
JR: pt endorsed to Dr. Humphreys- pending labs and burn consult. Burn evaluated recommended 1 dose of IV abx and discharge with PO abx. Patient is to follow up in burn clinic in 1 week.

## 2022-12-21 NOTE — ED PROVIDER NOTE - PHYSICAL EXAMINATION
Physical Exam    Constitutional: No acute distress.   Eyes: Conjunctiva pink, Sclera clear, PERRLA, EOMI.  ENT: No sinus tenderness. No nasal discharge. No oropharyngeal erythema, edema, or exudates. Uvula midline.   Cardiovascular: Regular rate, regular rhythm. No noted murmurs rubs or gallops.  Respiratory: unlabored respiratory effort, clear to auscultation bilaterally no wheezing, rales or rhonchi  Gastrointestinal: Normal bowel sounds. soft, non distended, non-tender abdomen.   Musculoskeletal: supple neck, no midline tenderness. Small area of erythema overlying right lateral malleolus 1cm x 1cm. No drainage. tenderness to right lateral malleolus.   Integumentary: warm, dry, no rash  Neurologic: awake, alert, cranial nerves II-XII grossly intact, extremities’ motor and sensory functions grossly intact  Psychiatric: appropriate mood, appropriate affect

## 2022-12-21 NOTE — ED PROVIDER NOTE - ATTENDING APP SHARED VISIT CONTRIBUTION OF CARE
87F with PMH AF on Eliquis, h/o cellultiis of RLE since November, culture 11/3 sensitive to bactrim/levaquin, admitted 12/4-12/6 treated with Ancef, seen by burn team and d/c with doxycycline x10d which she finished 1 week ago. She p/w worsening pain and redness to her R lateral malleolus. She has a photo of her leg from Thursday and says it's worse. Denies falls, focal weakness or numbness, fever, vomiting.     vs noted, triage note reviewed    Gen - NAD, Head - NCAT, Pharynx - clear, MMM, Heart - RRR, no m/g/r, Lungs - CTAB, no w/c/r, Abdomen - soft, NT, ND, Skin - No rash, Extremities - FROM, 1cm area of minimal erythema, ttp to right lateral malleolus. No warmth, fluctuance, ecchymosis, brisk cap refill, Neuro - A&O x3, equal strength and sensation, non-focal exam    a/p:  RLE cellulitis s/p ancef IV > po doxy x10d  in my opinion significantly improved but pt states otherwise  labs, burn team eval  reassess

## 2022-12-21 NOTE — ED PROVIDER NOTE - CLINICAL SUMMARY MEDICAL DECISION MAKING FREE TEXT BOX
Patient presents with wound to the right lateral malleolus.  Recent cellulitis in that area with completed course of antibiotics.  Positive open wound.  No signs of cellulitis or drainage.  No fevers.  Burn team consulted who recommends oral antibiotics and discharged home with outpatient follow-up.  Family agreeable.  Discharged with burn follow-up and return precautions.

## 2022-12-21 NOTE — ED ADULT TRIAGE NOTE - CHIEF COMPLAINT QUOTE
Patient complaining of right ankle swelling and redness, was on doxycycline last week and finished course and swelling and redness came back.

## 2022-12-21 NOTE — ED PROVIDER NOTE - NS ED ROS FT
Constitutional: (-) fever  Eyes/ENT: (-) blurry vision, (-) epistaxis  Cardiovascular: (-) chest pain, (-) syncope  Respiratory: (-) cough, (-) shortness of breath  Gastrointestinal: (-) vomiting, (-) diarrhea  Musculoskeletal: (-) neck pain, (-) back pain, (+) redness over right ankle (+) right lateral ankle pain  Integumentary: (-) rash, (-) edema  Neurological: (-) headache, (-) altered mental status  Psychiatric: (-) hallucinations  Allergic/Immunologic: (-) pruritus

## 2022-12-21 NOTE — ED PROVIDER NOTE - NSFOLLOWUPINSTRUCTIONS_ED_ALL_ED_FT
Please take Keflex 500mg four times per day x 7 days. Follow up outpatient with Dr. Pride. Please return with any new or concerning symptoms.     Cellulitis    WHAT YOU NEED TO KNOW:    Cellulitis is a skin infection caused by bacteria. Cellulitis may go away on its own or you may need treatment. Your healthcare provider may draw a [place] around the outside edges of your cellulitis. If your cellulitis spreads, your healthcare provider will see it outside of the [place]. Cellulitis          DISCHARGE INSTRUCTIONS:    Call 911 if:     You have sudden trouble breathing or chest pain.        Return to the emergency department if:     Your wound gets larger and more painful.       You feel a crackling under your skin when you touch it.      You have purple dots or bumps on your skin, or you see bleeding under your skin.      You have new swelling and pain in your legs.      The red, warm, swollen area gets larger.      You see red streaks coming from the infected area.    Contact your healthcare provider if:     You have a fever.      Your fever or pain does not go away or gets worse.      The area does not get smaller after 2 days of antibiotics.      Your skin is flaking or peeling off.      You have questions or concerns about your condition or care.    Medicines:     Antibiotics help treat the bacterial infection.       NSAIDs, such as ibuprofen, help decrease swelling, pain, and fever. NSAIDs can cause stomach bleeding or kidney problems in certain people. If you take blood thinner medicine, always ask if NSAIDs are safe for you. Always read the medicine label and follow directions. Do not give these medicines to children under 6 months of age without direction from your child's healthcare provider.      Acetaminophen decreases pain and fever. It is available without a doctor's order. Ask how much to take and how often to take it. Follow directions. Read the labels of all other medicines you are using to see if they also contain acetaminophen, or ask your doctor or pharmacist. Acetaminophen can cause liver damage if not taken correctly. Do not use more than 4 grams (4,000 milligrams) total of acetaminophen in one day.       Take your medicine as directed. Contact your healthcare provider if you think your medicine is not helping or if you have side effects. Tell him or her if you are allergic to any medicine. Keep a list of the medicines, vitamins, and herbs you take. Include the amounts, and when and why you take them. Bring the list or the pill bottles to follow-up visits. Carry your medicine list with you in case of an emergency.    Self-care:     Elevate the area above the level of your heart as often as you can. This will help decrease swelling and pain. Prop the area on pillows or blankets to keep it elevated comfortably.       Clean the area daily until the wound scabs over. Gently wash the area with soap and water. Pat dry. Use dressings as directed.       Place cool or warm, wet cloths on the area as directed. Use clean cloths and clean water. Leave it on the area until the cloth is room temperature. Pat the area dry with a clean, dry cloth. The cloths may help decrease pain.     Prevent cellulitis:     Do not scratch bug bites or areas of injury. You increase your risk for cellulitis by scratching these areas.       Do not share personal items, such as towels, clothing, and razors.       Clean exercise equipment with germ-killing  before and after you use it.      Wash your hands often. Use soap and water. Wash your hands after you use the bathroom, change a child's diapers, or sneeze. Wash your hands before you prepare or eat food. Use lotion to prevent dry, cracked skin. Handwashing           Wear pressure stockings as directed. You may be told to wear the stockings if you have peripheral edema. The stockings improve blood flow and decrease swelling.      Treat athlete’s foot. This can help prevent the spread of a bacterial skin infection.    Follow up with your healthcare provider within 3 days, or as directed: Your healthcare provider will check if your cellulitis is getting better. You may need different medicine. Write down your questions so you remember to ask them during your visits.       © Copyright [employer] 2019 All illustrations and images included in CareNotes are the copyrighted property of [employer].A[employer]., Inc. or [employer]

## 2022-12-21 NOTE — ED PROVIDER NOTE - NSFOLLOWUPCLINICS_GEN_ALL_ED_FT
[facility]  Burn  [address], [address]  [place] [address]  Phone: [phone]  Fax:

## 2022-12-27 ENCOUNTER — APPOINTMENT (OUTPATIENT)
Dept: BURN CARE | Facility: CLINIC | Age: 87
End: 2022-12-27

## 2022-12-27 ENCOUNTER — OUTPATIENT (OUTPATIENT)
Dept: OUTPATIENT SERVICES | Facility: HOSPITAL | Age: 87
LOS: 1 days | Discharge: HOME | End: 2022-12-27

## 2022-12-27 VITALS — HEART RATE: 71 BPM | SYSTOLIC BLOOD PRESSURE: 163 MMHG | DIASTOLIC BLOOD PRESSURE: 72 MMHG

## 2022-12-27 DIAGNOSIS — Z95.818 PRESENCE OF OTHER CARDIAC IMPLANTS AND GRAFTS: Chronic | ICD-10-CM

## 2022-12-27 DIAGNOSIS — Z90.49 ACQUIRED ABSENCE OF OTHER SPECIFIED PARTS OF DIGESTIVE TRACT: Chronic | ICD-10-CM

## 2022-12-27 PROCEDURE — 99212 OFFICE O/P EST SF 10 MIN: CPT

## 2022-12-28 NOTE — REASON FOR VISIT
[Revisit] : revisit [Were you seen in the Emergency Room?] : not seen in the emergency room [Were you admitted to the burn center at [facility]?] : not admitted to the burn center at [facility] [Friend] : friend

## 2022-12-28 NOTE — PHYSICAL EXAM
[Closed] : closed [Size%: ______] : Size: [unfilled]% [Infected?] : Infected: No [3] : 3 out of 10 [Abnormal] : abnormal [Medium] : medium [] : no [de-identified] : allevyne pad [de-identified] : The right lateral ankle wound is healed   and measures 0.2x0.2cm in the center and 1x1cm in total.  There is mild tenderness, and no drainage.  The tissue is viable and there is no necrotic tissue.  The patient was instructed to clean  the wound with soap and water.   Continue local wound care with moisturizer. and allevyne  pad.  Follow up 1-2 months. \par \par  [TWNoteComboBox1] : False

## 2022-12-28 NOTE — HISTORY OF PRESENT ILLNESS
[Did you have an operation on your burn/wound injury?] : Did you have an operation on your burn/wound injury? No [Did this injury occur on the job?] : Did this injury occur on the job? No [de-identified] : wound right ankle and right lower leg [de-identified] : healed  right lateral ankle wound

## 2022-12-28 NOTE — ASSESSMENT
[FreeTextEntry1] : The right lateral ankle wound is healed   and measures 0.2x0.2cm in the center and 1x1cm in total.  There is mild tenderness, and no drainage.  The tissue is viable and there is no necrotic tissue.  The patient was instructed to clean  the wound with soap and water.   Continue local wound care with moisturizer. and allevyne  pad.  Follow up 1-2 months. \par \par  [Wound Care] : wound care

## 2022-12-29 ENCOUNTER — EMERGENCY (EMERGENCY)
Facility: HOSPITAL | Age: 87
LOS: 0 days | Discharge: HOME | End: 2022-12-29
Attending: EMERGENCY MEDICINE | Admitting: EMERGENCY MEDICINE
Payer: MEDICARE

## 2022-12-29 VITALS
HEIGHT: 66 IN | DIASTOLIC BLOOD PRESSURE: 73 MMHG | RESPIRATION RATE: 18 BRPM | SYSTOLIC BLOOD PRESSURE: 166 MMHG | WEIGHT: 130.07 LBS | HEART RATE: 70 BPM | TEMPERATURE: 97 F | OXYGEN SATURATION: 97 %

## 2022-12-29 DIAGNOSIS — Z87.2 PERSONAL HISTORY OF DISEASES OF THE SKIN AND SUBCUTANEOUS TISSUE: ICD-10-CM

## 2022-12-29 DIAGNOSIS — Z88.2 ALLERGY STATUS TO SULFONAMIDES: ICD-10-CM

## 2022-12-29 DIAGNOSIS — F41.9 ANXIETY DISORDER, UNSPECIFIED: ICD-10-CM

## 2022-12-29 DIAGNOSIS — M25.571 PAIN IN RIGHT ANKLE AND JOINTS OF RIGHT FOOT: ICD-10-CM

## 2022-12-29 DIAGNOSIS — I34.0 NONRHEUMATIC MITRAL (VALVE) INSUFFICIENCY: ICD-10-CM

## 2022-12-29 DIAGNOSIS — Z09 ENCOUNTER FOR FOLLOW-UP EXAMINATION AFTER COMPLETED TREATMENT FOR CONDITIONS OTHER THAN MALIGNANT NEOPLASM: ICD-10-CM

## 2022-12-29 DIAGNOSIS — E03.9 HYPOTHYROIDISM, UNSPECIFIED: ICD-10-CM

## 2022-12-29 DIAGNOSIS — K21.9 GASTRO-ESOPHAGEAL REFLUX DISEASE WITHOUT ESOPHAGITIS: ICD-10-CM

## 2022-12-29 DIAGNOSIS — Z90.49 ACQUIRED ABSENCE OF OTHER SPECIFIED PARTS OF DIGESTIVE TRACT: Chronic | ICD-10-CM

## 2022-12-29 DIAGNOSIS — Z95.818 PRESENCE OF OTHER CARDIAC IMPLANTS AND GRAFTS: Chronic | ICD-10-CM
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
## 2022-12-29 DIAGNOSIS — F03.90 UNSPECIFIED DEMENTIA WITHOUT BEHAVIORAL DISTURBANCE: ICD-10-CM
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
## 2022-12-29 DIAGNOSIS — I48.0 PAROXYSMAL ATRIAL FIBRILLATION: ICD-10-CM

## 2022-12-29 DIAGNOSIS — S91.001A UNSPECIFIED OPEN WOUND, RIGHT ANKLE, INITIAL ENCOUNTER: ICD-10-CM

## 2022-12-29 DIAGNOSIS — M32.9 SYSTEMIC LUPUS ERYTHEMATOSUS, UNSPECIFIED: ICD-10-CM

## 2022-12-29 DIAGNOSIS — Z79.01 LONG TERM (CURRENT) USE OF ANTICOAGULANTS: ICD-10-CM

## 2022-12-29 DIAGNOSIS — E78.5 HYPERLIPIDEMIA, UNSPECIFIED: ICD-10-CM

## 2022-12-29 LAB
ALBUMIN SERPL ELPH-MCNC: 4.1 G/DL — SIGNIFICANT CHANGE UP (ref 3.5–5.2)
ALP SERPL-CCNC: 60 U/L — SIGNIFICANT CHANGE UP (ref 30–115)
ALT FLD-CCNC: 9 U/L — SIGNIFICANT CHANGE UP (ref 0–41)
ANION GAP SERPL CALC-SCNC: 10 MMOL/L — SIGNIFICANT CHANGE UP (ref 7–14)
AST SERPL-CCNC: 16 U/L — SIGNIFICANT CHANGE UP (ref 0–41)
BASOPHILS # BLD AUTO: 0.03 K/UL — SIGNIFICANT CHANGE UP (ref 0–0.2)
BASOPHILS NFR BLD AUTO: 0.5 % — SIGNIFICANT CHANGE UP (ref 0–1)
BILIRUB SERPL-MCNC: 0.3 MG/DL — SIGNIFICANT CHANGE UP (ref 0.2–1.2)
BUN SERPL-MCNC: 25 MG/DL — HIGH (ref 10–20)
CALCIUM SERPL-MCNC: 9 MG/DL — SIGNIFICANT CHANGE UP (ref 8.4–10.5)
CHLORIDE SERPL-SCNC: 102 MMOL/L — SIGNIFICANT CHANGE UP (ref 98–110)
CO2 SERPL-SCNC: 26 MMOL/L — SIGNIFICANT CHANGE UP (ref 17–32)
CREAT SERPL-MCNC: 1.4 MG/DL — SIGNIFICANT CHANGE UP (ref 0.7–1.5)
EGFR: 36 ML/MIN/1.73M2 — LOW
EOSINOPHIL # BLD AUTO: 0.16 K/UL — SIGNIFICANT CHANGE UP (ref 0–0.7)
EOSINOPHIL NFR BLD AUTO: 2.8 % — SIGNIFICANT CHANGE UP (ref 0–8)
GLUCOSE SERPL-MCNC: 104 MG/DL — HIGH (ref 70–99)
HCT VFR BLD CALC: 38 % — SIGNIFICANT CHANGE UP (ref 37–47)
HGB BLD-MCNC: 12.5 G/DL — SIGNIFICANT CHANGE UP (ref 12–16)
IMM GRANULOCYTES NFR BLD AUTO: 0.5 % — HIGH (ref 0.1–0.3)
LYMPHOCYTES # BLD AUTO: 1.5 K/UL — SIGNIFICANT CHANGE UP (ref 1.2–3.4)
LYMPHOCYTES # BLD AUTO: 25.9 % — SIGNIFICANT CHANGE UP (ref 20.5–51.1)
MCHC RBC-ENTMCNC: 30.3 PG — SIGNIFICANT CHANGE UP (ref 27–31)
MCHC RBC-ENTMCNC: 32.9 G/DL — SIGNIFICANT CHANGE UP (ref 32–37)
MCV RBC AUTO: 92.2 FL — SIGNIFICANT CHANGE UP (ref 81–99)
MONOCYTES # BLD AUTO: 0.4 K/UL — SIGNIFICANT CHANGE UP (ref 0.1–0.6)
MONOCYTES NFR BLD AUTO: 6.9 % — SIGNIFICANT CHANGE UP (ref 1.7–9.3)
NEUTROPHILS # BLD AUTO: 3.67 K/UL — SIGNIFICANT CHANGE UP (ref 1.4–6.5)
NEUTROPHILS NFR BLD AUTO: 63.4 % — SIGNIFICANT CHANGE UP (ref 42.2–75.2)
NRBC # BLD: 0 /100 WBCS — SIGNIFICANT CHANGE UP (ref 0–0)
PLATELET # BLD AUTO: 189 K/UL — SIGNIFICANT CHANGE UP (ref 130–400)
POTASSIUM SERPL-MCNC: 4.4 MMOL/L — SIGNIFICANT CHANGE UP (ref 3.5–5)
POTASSIUM SERPL-SCNC: 4.4 MMOL/L — SIGNIFICANT CHANGE UP (ref 3.5–5)
PROT SERPL-MCNC: 6.1 G/DL — SIGNIFICANT CHANGE UP (ref 6–8)
RBC # BLD: 4.12 M/UL — LOW (ref 4.2–5.4)
RBC # FLD: 13.3 % — SIGNIFICANT CHANGE UP (ref 11.5–14.5)
SODIUM SERPL-SCNC: 138 MMOL/L — SIGNIFICANT CHANGE UP (ref 135–146)
WBC # BLD: 5.79 K/UL — SIGNIFICANT CHANGE UP (ref 4.8–10.8)
WBC # FLD AUTO: 5.79 K/UL — SIGNIFICANT CHANGE UP (ref 4.8–10.8)

## 2022-12-29 PROCEDURE — 99284 EMERGENCY DEPT VISIT MOD MDM: CPT | Mod: FS

## 2022-12-29 RX ORDER — CEPHALEXIN 500 MG
1 CAPSULE ORAL
Qty: 28 | Refills: 0
Start: 2022-12-29 | End: 2023-01-04

## 2022-12-29 RX ORDER — CEFAZOLIN SODIUM 1 G
1000 VIAL (EA) INJECTION ONCE
Refills: 0 | Status: COMPLETED | OUTPATIENT
Start: 2022-12-29 | End: 2022-12-29

## 2022-12-29 RX ADMIN — Medication 100 MILLIGRAM(S): at 09:36

## 2022-12-29 NOTE — ED PROVIDER NOTE - ATTENDING APP SHARED VISIT CONTRIBUTION OF CARE
87-year-old female here for right ankle wound evaluation.  Patient is recently been admitted of antibiotics and by burn team here patient with mild erythema over the lateral malleolus with no purulence or drainage.  Impression  Patient here with right ankle wound seen by burn team given single dose of IV antibiotics and ID discharge with course of Keflex.  Patient has outpatient follow-up next week.

## 2022-12-29 NOTE — ED PROVIDER NOTE - PROGRESS NOTE DETAILS
d/w Burn will consult, Burn requesting CBC/BMP Burn evaluated recommended 1 IV dose Cefazolin, then dc with Keflex. Pt has burn appt next week

## 2022-12-29 NOTE — ED PROVIDER NOTE - PHYSICAL EXAMINATION
CONST: Well appearing in NAD  EYES: PERRL, EOMI, Sclera and conjunctiva clear.   ENT: No nasal discharge.   CARD: Normal S1 S2; Normal rate and rhythm  RESP: Equal BS B/L, No wheezes, rhonchi or rales. No distress  GI: Soft, non-tender, non-distended.  MS: Normal ROM in all extremities. No midline spinal tenderness.  SKIN: Small 1.5cm area of erythema to R lateral malleolus, small punctate opening. No fluctuance. No swelling. No drainage. FROM active and passive of R ankle.  NEURO: A&Ox3, No focal deficits. Strength 5/5 with no sensory deficits. Steady gait

## 2022-12-29 NOTE — ED PROVIDER NOTE - OBJECTIVE STATEMENT
88yo female with a history of PMHx of HLD, paroxysmal A-fib (on Amiodarone 200mg daily and Eliquis 2.5mg bid),  Loop recorder, mitral valve regurgitation, GERD, Hypothyroidism, SLE, anxiety, dementia presents to ED complaining of pain to right ankle.  Patient has been followed by the burn team for poorly healing cellulitis to right ankle with previous admission several weeks ago patient finished a course of Keflex on Tuesday and was seen by Dr. Pride who had stated he would prescribe additional days of Keflex.  Family noted that when they went to the pharmacy however prescription was not there.  Patient began to complain of pain localized to right ankle this morning so family brought back to ED for evaluation by burn patient without fevers chills expanding redness open lesions or drainage.  Patient with full range of motion of right ankle. Pain intermittent, no specific aggravating or relieving factors.

## 2022-12-29 NOTE — CONSULT NOTE ADULT - SUBJECTIVE AND OBJECTIVE BOX
Patient is a 87y old  Female who presents with a chief complaint of right ankle wound.   Burn consulted for recommendations  As per patient, saw Dr. Pride in clinic who recommended routine follow up and course of oral antibiotics, now patient presenting for increased pain to wound and stating that antibiotics were never prescribed.     Vital Signs Last 24 Hrs  T(C): 36.1 (29 Dec 2022 07:09), Max: 36.1 (29 Dec 2022 07:09)  T(F): 97 (29 Dec 2022 07:09), Max: 97 (29 Dec 2022 07:09)  HR: 70 (29 Dec 2022 07:09) (70 - 70)  BP: 166/73 (29 Dec 2022 07:09) (166/73 - 166/73)  RR: 18 (29 Dec 2022 07:09) (18 - 18)  SpO2: 97% (29 Dec 2022 07:09) (97% - 97%)    O2 Parameters below as of 29 Dec 2022 07:09  Patient On (Oxygen Delivery Method): room air      PHYSICAL EXAM:  GENERAL: Lying in stretcher in NAD  HEAD:  Atraumatic, Normocephalic  CHEST/LUNG: Breathing comfortably on RA, b/l chest rise appreciated, speaking in full sentences   HEART: In no cardiopulmonary distress  PSYCH: A&O, follows commands  SKIN:   RLE: Scab noted with mild erythema overlying the lateral malleolus. Tenderness to palpation. ROM intact. No drainage, purulence, edema active bleeding appreciated.     + dressing change performed, patient tolerated well

## 2022-12-29 NOTE — ED ADULT NURSE NOTE - OBJECTIVE STATEMENT
Patient present to ED with complains of right ankle wound x 1 month, was sent in by Dr. Pride for evaluation. Denies fevers, chills, nausea, vomiting, and diarrhea.

## 2022-12-29 NOTE — ED PROVIDER NOTE - CLINICAL SUMMARY MEDICAL DECISION MAKING FREE TEXT BOX
Patient here with right ankle wound seen by burn team given single dose of IV antibiotics and ID discharge with course of Keflex.  Patient has outpatient follow-up next week.

## 2022-12-29 NOTE — CONSULT NOTE ADULT - ASSESSMENT
Patient is a 87y old  Female who presents with a chief complaint of right ankle wound pain.     Right ankle wound    - If labs unremarkable, no indication for admission at this time  - Patient can continue with LWC. Wash with soap and water. Apply Allevyn pad.   - XRay 12/21- stable from previous study  - Please give one time dose of Cefazolin IV while in ED, and Rx course of Keflex to pharmacy  - patient already has follow up appointment with Dr. Pride next week

## 2022-12-29 NOTE — ED PROVIDER NOTE - CARE PROVIDER_API CALL
Beto Pride)  Plastic Surgery  [address] [place] [address]  Phone: [phone]  Fax: [phone]  Follow Up Time:

## 2022-12-29 NOTE — ED PROVIDER NOTE - PATIENT PORTAL LINK FT
You can access the FollowMyHealth Patient Portal offered by [facility] by registering at the following website: [email or web address]. By joining [email or web address]’s FollowMyHealth portal, you will also be able to view your health information using other applications (apps) compatible with our system.

## 2022-12-29 NOTE — ED ADULT NURSE NOTE - CAS EDN DISCHARGE INTERVENTIONS
Mauc Instructions: By selecting yes to the question below the MAUC number will be added into the note.  This will be calculated automatically based on the diagnosis chosen, the size entered, the body zone selected (H,M,L) and the specific indications you chose. You will also have the option to override the Mohs AUC if you disagree with the automatically calculated number and this option is found in the Case Summary tab. IV discontinued, cath removed intact

## 2022-12-29 NOTE — ED PROVIDER NOTE - NS ED ROS FT
CONST: No fever, chills or bodyaches  EYES: No pain, redness, drainage or visual changes.  ENT: No ear pain or discharge, nasal discharge or congestion. No sore throat  CARD: No chest pain, palpitations  RESP: No SOB, cough  GI: No abdominal pain, N/V/D  MS: see HPI  SKIN: No rashes  NEURO: No paresthesias

## 2022-12-29 NOTE — ED PROVIDER NOTE - NSFOLLOWUPINSTRUCTIONS_ED_ALL_ED_FT
CELLULITIS - Discharge Care     Cellulitis    WHAT YOU NEED TO KNOW:    Cellulitis is a skin infection caused by bacteria. Cellulitis may go away on its own or you may need treatment. Your healthcare provider may draw a [place] around the outside edges of your cellulitis. If your cellulitis spreads, your healthcare provider will see it outside of the [place]. Cellulitis          DISCHARGE INSTRUCTIONS:    Call 911 if:     You have sudden trouble breathing or chest pain.        Seek care immediately if:     Your wound gets larger and more painful.       You feel a crackling under your skin when you touch it.      You have purple dots or bumps on your skin, or you see bleeding under your skin.      You have new swelling and pain in your legs.      The red, warm, swollen area gets larger.      You see red streaks coming from the infected area.    Contact your healthcare provider if:     You have a fever.      Your fever or pain does not go away or gets worse.      The area does not get smaller after 2 days of antibiotics.      Your skin is flaking or peeling off.      You have questions or concerns about your condition or care.    Medicines:     Antibiotics help treat the bacterial infection.       NSAIDs, such as ibuprofen, help decrease swelling, pain, and fever. NSAIDs can cause stomach bleeding or kidney problems in certain people. If you take blood thinner medicine, always ask if NSAIDs are safe for you. Always read the medicine label and follow directions. Do not give these medicines to children under 6 months of age without direction from your child's healthcare provider.      Acetaminophen decreases pain and fever. It is available without a doctor's order. Ask how much to take and how often to take it. Follow directions. Read the labels of all other medicines you are using to see if they also contain acetaminophen, or ask your doctor or pharmacist. Acetaminophen can cause liver damage if not taken correctly. Do not use more than 4 grams (4,000 milligrams) total of acetaminophen in one day.       Take your medicine as directed. Contact your healthcare provider if you think your medicine is not helping or if you have side effects. Tell him or her if you are allergic to any medicine. Keep a list of the medicines, vitamins, and herbs you take. Include the amounts, and when and why you take them. Bring the list or the pill bottles to follow-up visits. Carry your medicine list with you in case of an emergency.    Self-care:     Elevate the area above the level of your heart as often as you can. This will help decrease swelling and pain. Prop the area on pillows or blankets to keep it elevated comfortably.       Clean the area daily until the wound scabs over. Gently wash the area with soap and water. Pat dry. Use dressings as directed.       Place cool or warm, wet cloths on the area as directed. Use clean cloths and clean water. Leave it on the area until the cloth is room temperature. Pat the area dry with a clean, dry cloth. The cloths may help decrease pain.     Prevent cellulitis:     Do not scratch bug bites or areas of injury. You increase your risk for cellulitis by scratching these areas.       Do not share personal items, such as towels, clothing, and razors.       Clean exercise equipment with germ-killing  before and after you use it.      Wash your hands often. Use soap and water. Wash your hands after you use the bathroom, change a child's diapers, or sneeze. Wash your hands before you prepare or eat food. Use lotion to prevent dry, cracked skin. Handwashing           Wear pressure stockings as directed. You may be told to wear the stockings if you have peripheral edema. The stockings improve blood flow and decrease swelling.      Treat athlete’s foot. This can help prevent the spread of a bacterial skin infection.    Follow up with your healthcare provider within 3 days, or as directed: Your healthcare provider will check if your cellulitis is getting better. You may need different medicine. Write down your questions so you remember to ask them during your visits.

## 2022-12-29 NOTE — ED ADULT NURSE NOTE - CINV DISCH TEACH PARTICIP
I have reviewed discharge instructions with the patient. The patient verbalized understanding. Patient left ED via Discharge Method: ambulatory to Home with self. Opportunity for questions and clarification provided. Patient given 0 scripts. To continue your aftercare when you leave the hospital, you may receive an automated call from our care team to check in on how you are doing. This is a free service and part of our promise to provide the best care and service to meet your aftercare needs.  If you have questions, or wish to unsubscribe from this service please call [phone]. Thank you for Choosing our [facility] Emergency Department. Patient

## 2023-01-03 ENCOUNTER — OUTPATIENT (OUTPATIENT)
Dept: OUTPATIENT SERVICES | Facility: HOSPITAL | Age: 88
LOS: 1 days | Discharge: HOME | End: 2023-01-03

## 2023-01-03 ENCOUNTER — APPOINTMENT (OUTPATIENT)
Age: 88
End: 2023-01-03
Payer: MEDICARE

## 2023-01-03 ENCOUNTER — APPOINTMENT (OUTPATIENT)
Dept: BURN CARE | Facility: CLINIC | Age: 88
End: 2023-01-03
Payer: MEDICARE

## 2023-01-03 VITALS
DIASTOLIC BLOOD PRESSURE: 80 MMHG | HEIGHT: 66 IN | WEIGHT: 138 LBS | OXYGEN SATURATION: 90 % | BODY MASS INDEX: 22.18 KG/M2 | RESPIRATION RATE: 14 BRPM | HEART RATE: 75 BPM | SYSTOLIC BLOOD PRESSURE: 122 MMHG

## 2023-01-03 VITALS
DIASTOLIC BLOOD PRESSURE: 76 MMHG | BODY MASS INDEX: 20.57 KG/M2 | OXYGEN SATURATION: 99 % | WEIGHT: 128 LBS | RESPIRATION RATE: 16 BRPM | HEIGHT: 66 IN | HEART RATE: 71 BPM | SYSTOLIC BLOOD PRESSURE: 112 MMHG

## 2023-01-03 VITALS — HEART RATE: 69 BPM | DIASTOLIC BLOOD PRESSURE: 74 MMHG | SYSTOLIC BLOOD PRESSURE: 155 MMHG

## 2023-01-03 DIAGNOSIS — R06.02 SHORTNESS OF BREATH: ICD-10-CM

## 2023-01-03 DIAGNOSIS — R53.1 WEAKNESS: ICD-10-CM

## 2023-01-03 DIAGNOSIS — T46.2X1A POISONING BY OTHER ANTIDYSRHYTHMIC DRUGS, ACCIDENTAL (UNINTENTIONAL), INITIAL ENCOUNTER: ICD-10-CM

## 2023-01-03 DIAGNOSIS — Z95.818 PRESENCE OF OTHER CARDIAC IMPLANTS AND GRAFTS: Chronic | ICD-10-CM

## 2023-01-03 DIAGNOSIS — Z90.49 ACQUIRED ABSENCE OF OTHER SPECIFIED PARTS OF DIGESTIVE TRACT: Chronic | ICD-10-CM

## 2023-01-03 PROCEDURE — 99212 OFFICE O/P EST SF 10 MIN: CPT

## 2023-01-03 PROCEDURE — 99213 OFFICE O/P EST LOW 20 MIN: CPT

## 2023-01-03 NOTE — HISTORY OF PRESENT ILLNESS
[Did you have an operation on your burn/wound injury?] : Did you have an operation on your burn/wound injury? No [Did this injury occur on the job?] : Did this injury occur on the job? No [de-identified] : wound right ankle and right lower leg [de-identified] : healed  right lateral ankle wound

## 2023-01-03 NOTE — ASSESSMENT
[FreeTextEntry1] : The right lateral ankle wound is healed   and measures 0.2x0.2cm in the center and 1x1cm in total.  There is mild tenderness, and no drainage.  The tissue is viable and there is no necrotic tissue.  The patient was instructed to clean  the wound with soap and water.   Continue local wound care with moisturizer. and allevyne  pad.  Follow up 1-2 weeks. . \par  [Wound Care] : wound care

## 2023-01-03 NOTE — REASON FOR VISIT
[Revisit] : revisit [Were you seen in the Emergency Room?] : not seen in the emergency room [Were you admitted to the burn center at [facility]?] : not admitted to the burn center at [facility] [Friend] : friend

## 2023-01-03 NOTE — PHYSICAL EXAM
[Closed] : closed [Size%: ______] : Size: [unfilled]% [Infected?] : Infected: No [3] : 3 out of 10 [Abnormal] : abnormal [Medium] : medium [] : no [de-identified] : allevyne pad [de-identified] : The right lateral ankle wound is healed   and measures 0.2x0.2cm in the center and 1x1cm in total.  There is mild tenderness, and no drainage.  The tissue is viable and there is no necrotic tissue.  The patient was instructed to clean  the wound with soap and water.   Continue local wound care with moisturizer. and allevyne  pad.  Follow up 1-2 weeks. . \par \par

## 2023-01-03 NOTE — DISCUSSION/SUMMARY
[FreeTextEntry1] : SOB ON EXERTION\par SP PFT REVIEW AND DISCUSS RESULT\par CARDIO NOTED\par CXR NEXT VISIT\par UPDATE ON PN A VACCINE

## 2023-01-04 DIAGNOSIS — Z09 ENCOUNTER FOR FOLLOW-UP EXAMINATION AFTER COMPLETED TREATMENT FOR CONDITIONS OTHER THAN MALIGNANT NEOPLASM: ICD-10-CM

## 2023-01-04 DIAGNOSIS — Z87.2 PERSONAL HISTORY OF DISEASES OF THE SKIN AND SUBCUTANEOUS TISSUE: ICD-10-CM

## 2023-01-05 ENCOUNTER — OUTPATIENT (OUTPATIENT)
Dept: OUTPATIENT SERVICES | Facility: HOSPITAL | Age: 88
LOS: 1 days | Discharge: HOME | End: 2023-01-05

## 2023-01-05 ENCOUNTER — APPOINTMENT (OUTPATIENT)
Dept: PODIATRY | Facility: CLINIC | Age: 88
End: 2023-01-05
Payer: MEDICARE

## 2023-01-05 DIAGNOSIS — L98.499 NON-PRESSURE CHRONIC ULCER OF SKIN OF OTHER SITES WITH UNSPECIFIED SEVERITY: ICD-10-CM

## 2023-01-05 DIAGNOSIS — Z90.49 ACQUIRED ABSENCE OF OTHER SPECIFIED PARTS OF DIGESTIVE TRACT: Chronic | ICD-10-CM

## 2023-01-05 DIAGNOSIS — S81.802D UNSPECIFIED OPEN WOUND, LEFT LOWER LEG, SUBSEQUENT ENCOUNTER: ICD-10-CM

## 2023-01-05 DIAGNOSIS — Z95.818 PRESENCE OF OTHER CARDIAC IMPLANTS AND GRAFTS: Chronic | ICD-10-CM

## 2023-01-05 PROCEDURE — 99204 OFFICE O/P NEW MOD 45 MIN: CPT

## 2023-01-08 ENCOUNTER — EMERGENCY (EMERGENCY)
Facility: HOSPITAL | Age: 88
LOS: 0 days | Discharge: HOME | End: 2023-01-08
Admitting: EMERGENCY MEDICINE

## 2023-01-08 ENCOUNTER — INPATIENT (INPATIENT)
Facility: HOSPITAL | Age: 88
LOS: 0 days | Discharge: HOME | End: 2023-01-09
Attending: INTERNAL MEDICINE | Admitting: INTERNAL MEDICINE
Payer: MEDICARE

## 2023-01-08 VITALS
OXYGEN SATURATION: 100 % | HEART RATE: 70 BPM | SYSTOLIC BLOOD PRESSURE: 168 MMHG | WEIGHT: 128.09 LBS | RESPIRATION RATE: 17 BRPM | DIASTOLIC BLOOD PRESSURE: 70 MMHG | HEIGHT: 66 IN | TEMPERATURE: 98 F

## 2023-01-08 DIAGNOSIS — I25.10 ATHEROSCLEROTIC HEART DISEASE OF NATIVE CORONARY ARTERY WITHOUT ANGINA PECTORIS: ICD-10-CM

## 2023-01-08 DIAGNOSIS — I25.2 OLD MYOCARDIAL INFARCTION: ICD-10-CM

## 2023-01-08 DIAGNOSIS — F03.90 UNSPECIFIED DEMENTIA, UNSPECIFIED SEVERITY, WITHOUT BEHAVIORAL DISTURBANCE, PSYCHOTIC DISTURBANCE, MOOD DISTURBANCE, AND ANXIETY: ICD-10-CM

## 2023-01-08 DIAGNOSIS — M32.9 SYSTEMIC LUPUS ERYTHEMATOSUS, UNSPECIFIED: ICD-10-CM

## 2023-01-08 DIAGNOSIS — R07.89 OTHER CHEST PAIN: ICD-10-CM

## 2023-01-08 DIAGNOSIS — I48.0 PAROXYSMAL ATRIAL FIBRILLATION: ICD-10-CM

## 2023-01-08 DIAGNOSIS — Z95.818 PRESENCE OF OTHER CARDIAC IMPLANTS AND GRAFTS: ICD-10-CM

## 2023-01-08 DIAGNOSIS — I34.0 NONRHEUMATIC MITRAL (VALVE) INSUFFICIENCY: ICD-10-CM

## 2023-01-08 DIAGNOSIS — Z90.49 ACQUIRED ABSENCE OF OTHER SPECIFIED PARTS OF DIGESTIVE TRACT: Chronic | ICD-10-CM

## 2023-01-08 DIAGNOSIS — Z90.89 ACQUIRED ABSENCE OF OTHER ORGANS: ICD-10-CM

## 2023-01-08 DIAGNOSIS — K21.9 GASTRO-ESOPHAGEAL REFLUX DISEASE WITHOUT ESOPHAGITIS: ICD-10-CM

## 2023-01-08 DIAGNOSIS — Z95.818 PRESENCE OF OTHER CARDIAC IMPLANTS AND GRAFTS: Chronic | ICD-10-CM

## 2023-01-08 DIAGNOSIS — E03.9 HYPOTHYROIDISM, UNSPECIFIED: ICD-10-CM

## 2023-01-08 DIAGNOSIS — M54.9 DORSALGIA, UNSPECIFIED: ICD-10-CM

## 2023-01-08 DIAGNOSIS — Z87.440 PERSONAL HISTORY OF URINARY (TRACT) INFECTIONS: ICD-10-CM

## 2023-01-08 DIAGNOSIS — Z79.01 LONG TERM (CURRENT) USE OF ANTICOAGULANTS: ICD-10-CM

## 2023-01-08 DIAGNOSIS — Z88.2 ALLERGY STATUS TO SULFONAMIDES: ICD-10-CM

## 2023-01-08 DIAGNOSIS — I77.1 STRICTURE OF ARTERY: ICD-10-CM

## 2023-01-08 DIAGNOSIS — Z86.79 PERSONAL HISTORY OF OTHER DISEASES OF THE CIRCULATORY SYSTEM: ICD-10-CM

## 2023-01-08 DIAGNOSIS — E78.5 HYPERLIPIDEMIA, UNSPECIFIED: ICD-10-CM

## 2023-01-08 DIAGNOSIS — F41.9 ANXIETY DISORDER, UNSPECIFIED: ICD-10-CM

## 2023-01-08 LAB
ALBUMIN SERPL ELPH-MCNC: 4.2 G/DL — SIGNIFICANT CHANGE UP (ref 3.5–5.2)
ALP SERPL-CCNC: 54 U/L — SIGNIFICANT CHANGE UP (ref 30–115)
ALT FLD-CCNC: 10 U/L — SIGNIFICANT CHANGE UP (ref 0–41)
ANION GAP SERPL CALC-SCNC: 13 MMOL/L — SIGNIFICANT CHANGE UP (ref 7–14)
APPEARANCE UR: CLEAR — SIGNIFICANT CHANGE UP
AST SERPL-CCNC: 25 U/L — SIGNIFICANT CHANGE UP (ref 0–41)
BACTERIA # UR AUTO: NEGATIVE — SIGNIFICANT CHANGE UP
BASOPHILS # BLD AUTO: 0.04 K/UL — SIGNIFICANT CHANGE UP (ref 0–0.2)
BASOPHILS NFR BLD AUTO: 0.8 % — SIGNIFICANT CHANGE UP (ref 0–1)
BILIRUB SERPL-MCNC: 0.5 MG/DL — SIGNIFICANT CHANGE UP (ref 0.2–1.2)
BILIRUB UR-MCNC: NEGATIVE — SIGNIFICANT CHANGE UP
BUN SERPL-MCNC: 23 MG/DL — HIGH (ref 10–20)
CALCIUM SERPL-MCNC: 9.8 MG/DL — SIGNIFICANT CHANGE UP (ref 8.4–10.5)
CHLORIDE SERPL-SCNC: 105 MMOL/L — SIGNIFICANT CHANGE UP (ref 98–110)
CO2 SERPL-SCNC: 22 MMOL/L — SIGNIFICANT CHANGE UP (ref 17–32)
COLOR SPEC: YELLOW — SIGNIFICANT CHANGE UP
CREAT SERPL-MCNC: 1.4 MG/DL — SIGNIFICANT CHANGE UP (ref 0.7–1.5)
DIFF PNL FLD: NEGATIVE — SIGNIFICANT CHANGE UP
EGFR: 36 ML/MIN/1.73M2 — LOW
EOSINOPHIL # BLD AUTO: 0.09 K/UL — SIGNIFICANT CHANGE UP (ref 0–0.7)
EOSINOPHIL NFR BLD AUTO: 1.7 % — SIGNIFICANT CHANGE UP (ref 0–8)
EPI CELLS # UR: 12 /HPF — HIGH (ref 0–5)
FLUAV AG NPH QL: SIGNIFICANT CHANGE UP
FLUBV AG NPH QL: SIGNIFICANT CHANGE UP
GLUCOSE SERPL-MCNC: 102 MG/DL — HIGH (ref 70–99)
GLUCOSE UR QL: NEGATIVE — SIGNIFICANT CHANGE UP
HCT VFR BLD CALC: 36.8 % — LOW (ref 37–47)
HGB BLD-MCNC: 12.3 G/DL — SIGNIFICANT CHANGE UP (ref 12–16)
HYALINE CASTS # UR AUTO: 25 /LPF — HIGH (ref 0–7)
IMM GRANULOCYTES NFR BLD AUTO: 0.4 % — HIGH (ref 0.1–0.3)
KETONES UR-MCNC: NEGATIVE — SIGNIFICANT CHANGE UP
LEUKOCYTE ESTERASE UR-ACNC: ABNORMAL
LIDOCAIN IGE QN: 21 U/L — SIGNIFICANT CHANGE UP (ref 7–60)
LYMPHOCYTES # BLD AUTO: 1.38 K/UL — SIGNIFICANT CHANGE UP (ref 1.2–3.4)
LYMPHOCYTES # BLD AUTO: 26.1 % — SIGNIFICANT CHANGE UP (ref 20.5–51.1)
MCHC RBC-ENTMCNC: 30.5 PG — SIGNIFICANT CHANGE UP (ref 27–31)
MCHC RBC-ENTMCNC: 33.4 G/DL — SIGNIFICANT CHANGE UP (ref 32–37)
MCV RBC AUTO: 91.3 FL — SIGNIFICANT CHANGE UP (ref 81–99)
MONOCYTES # BLD AUTO: 0.34 K/UL — SIGNIFICANT CHANGE UP (ref 0.1–0.6)
MONOCYTES NFR BLD AUTO: 6.4 % — SIGNIFICANT CHANGE UP (ref 1.7–9.3)
NEUTROPHILS # BLD AUTO: 3.42 K/UL — SIGNIFICANT CHANGE UP (ref 1.4–6.5)
NEUTROPHILS NFR BLD AUTO: 64.6 % — SIGNIFICANT CHANGE UP (ref 42.2–75.2)
NITRITE UR-MCNC: NEGATIVE — SIGNIFICANT CHANGE UP
NRBC # BLD: 0 /100 WBCS — SIGNIFICANT CHANGE UP (ref 0–0)
NT-PROBNP SERPL-SCNC: 211 PG/ML — SIGNIFICANT CHANGE UP (ref 0–300)
PH UR: 7 — SIGNIFICANT CHANGE UP (ref 5–8)
PLATELET # BLD AUTO: 157 K/UL — SIGNIFICANT CHANGE UP (ref 130–400)
POTASSIUM SERPL-MCNC: 5.1 MMOL/L — HIGH (ref 3.5–5)
POTASSIUM SERPL-SCNC: 5.1 MMOL/L — HIGH (ref 3.5–5)
PROT SERPL-MCNC: 6.6 G/DL — SIGNIFICANT CHANGE UP (ref 6–8)
PROT UR-MCNC: SIGNIFICANT CHANGE UP
RBC # BLD: 4.03 M/UL — LOW (ref 4.2–5.4)
RBC # FLD: 13.2 % — SIGNIFICANT CHANGE UP (ref 11.5–14.5)
RBC CASTS # UR COMP ASSIST: 2 /HPF — SIGNIFICANT CHANGE UP (ref 0–4)
RSV RNA NPH QL NAA+NON-PROBE: SIGNIFICANT CHANGE UP
SARS-COV-2 RNA SPEC QL NAA+PROBE: SIGNIFICANT CHANGE UP
SODIUM SERPL-SCNC: 140 MMOL/L — SIGNIFICANT CHANGE UP (ref 135–146)
SP GR SPEC: 1.01 — SIGNIFICANT CHANGE UP (ref 1.01–1.03)
TROPONIN T SERPL-MCNC: 0.02 NG/ML — HIGH
TROPONIN T SERPL-MCNC: <0.01 NG/ML — SIGNIFICANT CHANGE UP
UROBILINOGEN FLD QL: SIGNIFICANT CHANGE UP
WBC # BLD: 5.29 K/UL — SIGNIFICANT CHANGE UP (ref 4.8–10.8)
WBC # FLD AUTO: 5.29 K/UL — SIGNIFICANT CHANGE UP (ref 4.8–10.8)
WBC UR QL: 2 /HPF — SIGNIFICANT CHANGE UP (ref 0–5)

## 2023-01-08 PROCEDURE — 93010 ELECTROCARDIOGRAM REPORT: CPT

## 2023-01-08 PROCEDURE — 74174 CTA ABD&PLVS W/CONTRAST: CPT | Mod: 26,MA

## 2023-01-08 PROCEDURE — 99223 1ST HOSP IP/OBS HIGH 75: CPT | Mod: FS

## 2023-01-08 PROCEDURE — 71275 CT ANGIOGRAPHY CHEST: CPT | Mod: 26,MA

## 2023-01-08 PROCEDURE — 99223 1ST HOSP IP/OBS HIGH 75: CPT

## 2023-01-08 PROCEDURE — 71045 X-RAY EXAM CHEST 1 VIEW: CPT | Mod: 26

## 2023-01-08 PROCEDURE — 93010 ELECTROCARDIOGRAM REPORT: CPT | Mod: 77

## 2023-01-08 RX ORDER — ASPIRIN/CALCIUM CARB/MAGNESIUM 324 MG
324 TABLET ORAL ONCE
Refills: 0 | Status: COMPLETED | OUTPATIENT
Start: 2023-01-08 | End: 2023-01-08

## 2023-01-08 RX ORDER — MULTIVIT-MIN/FERROUS GLUCONATE 9 MG/15 ML
1 LIQUID (ML) ORAL DAILY
Refills: 0 | Status: DISCONTINUED | OUTPATIENT
Start: 2023-01-08 | End: 2023-01-09

## 2023-01-08 RX ORDER — APIXABAN 2.5 MG/1
2.5 TABLET, FILM COATED ORAL EVERY 12 HOURS
Refills: 0 | Status: DISCONTINUED | OUTPATIENT
Start: 2023-01-08 | End: 2023-01-08

## 2023-01-08 RX ORDER — ALBUTEROL 90 UG/1
2 AEROSOL, METERED ORAL EVERY 6 HOURS
Refills: 0 | Status: DISCONTINUED | OUTPATIENT
Start: 2023-01-08 | End: 2023-01-09

## 2023-01-08 RX ORDER — AMIODARONE HYDROCHLORIDE 400 MG/1
200 TABLET ORAL DAILY
Refills: 0 | Status: DISCONTINUED | OUTPATIENT
Start: 2023-01-08 | End: 2023-01-09

## 2023-01-08 RX ORDER — MEMANTINE HYDROCHLORIDE 10 MG/1
10 TABLET ORAL
Refills: 0 | Status: DISCONTINUED | OUTPATIENT
Start: 2023-01-08 | End: 2023-01-09

## 2023-01-08 RX ORDER — PREGABALIN 225 MG/1
1000 CAPSULE ORAL DAILY
Refills: 0 | Status: DISCONTINUED | OUTPATIENT
Start: 2023-01-08 | End: 2023-01-09

## 2023-01-08 RX ORDER — ACETAMINOPHEN 500 MG
650 TABLET ORAL ONCE
Refills: 0 | Status: COMPLETED | OUTPATIENT
Start: 2023-01-08 | End: 2023-01-08

## 2023-01-08 RX ORDER — ACETAMINOPHEN 500 MG
650 TABLET ORAL EVERY 6 HOURS
Refills: 0 | Status: DISCONTINUED | OUTPATIENT
Start: 2023-01-08 | End: 2023-01-09

## 2023-01-08 RX ORDER — SENNA PLUS 8.6 MG/1
2 TABLET ORAL AT BEDTIME
Refills: 0 | Status: DISCONTINUED | OUTPATIENT
Start: 2023-01-08 | End: 2023-01-09

## 2023-01-08 RX ORDER — CHLORHEXIDINE GLUCONATE 213 G/1000ML
1 SOLUTION TOPICAL
Refills: 0 | Status: DISCONTINUED | OUTPATIENT
Start: 2023-01-08 | End: 2023-01-09

## 2023-01-08 RX ORDER — LEVOTHYROXINE SODIUM 125 MCG
25 TABLET ORAL DAILY
Refills: 0 | Status: DISCONTINUED | OUTPATIENT
Start: 2023-01-08 | End: 2023-01-09

## 2023-01-08 RX ORDER — LANOLIN ALCOHOL/MO/W.PET/CERES
5 CREAM (GRAM) TOPICAL AT BEDTIME
Refills: 0 | Status: DISCONTINUED | OUTPATIENT
Start: 2023-01-08 | End: 2023-01-09

## 2023-01-08 RX ORDER — IPRATROPIUM/ALBUTEROL SULFATE 18-103MCG
3 AEROSOL WITH ADAPTER (GRAM) INHALATION EVERY 6 HOURS
Refills: 0 | Status: DISCONTINUED | OUTPATIENT
Start: 2023-01-08 | End: 2023-01-09

## 2023-01-08 RX ORDER — FAMOTIDINE 10 MG/ML
40 INJECTION INTRAVENOUS AT BEDTIME
Refills: 0 | Status: DISCONTINUED | OUTPATIENT
Start: 2023-01-08 | End: 2023-01-09

## 2023-01-08 RX ADMIN — Medication 650 MILLIGRAM(S): at 12:25

## 2023-01-08 RX ADMIN — MEMANTINE HYDROCHLORIDE 10 MILLIGRAM(S): 10 TABLET ORAL at 17:48

## 2023-01-08 RX ADMIN — Medication 324 MILLIGRAM(S): at 17:49

## 2023-01-08 RX ADMIN — APIXABAN 2.5 MILLIGRAM(S): 2.5 TABLET, FILM COATED ORAL at 17:49

## 2023-01-08 RX ADMIN — FAMOTIDINE 40 MILLIGRAM(S): 10 INJECTION INTRAVENOUS at 21:32

## 2023-01-08 NOTE — ED ADULT NURSE NOTE - OBJECTIVE STATEMENT
Pt reports to ed for midsternal cp radiating to back x3 days. Pt reports pain worsened this am. Denies medications used for pain relief. Denies sob.

## 2023-01-08 NOTE — ED PROVIDER NOTE - PHYSICAL EXAMINATION
VITAL SIGNS: I have reviewed nursing notes and confirm.  CONSTITUTIONAL:  in no acute distress.  SKIN: Skin exam is warm and dry, no acute rash.  HEAD: Normocephalic; atraumatic.  EYES:  EOM intact; conjunctiva and sclera clear.  ENT: No nasal discharge; airway clear.   NECK: Supple; non tender.  CARD: S1, S2 normal; no murmurs, gallops, or rubs. Regular rate and rhythm.  RESP: No wheezes, rales or rhonchi. Speaking in full sentences.   ABD: Normal bowel sounds; soft; non-distended; non-tender; No rebound or guarding. No CVA tenderness.  EXT: Normal ROM. No clubbing, cyanosis or edema.

## 2023-01-08 NOTE — H&P ADULT - NSHPPHYSICALEXAM_GEN_ALL_CORE
Vital Signs Last 24 Hrs  T(C): 36.6 (08 Jan 2023 10:12), Max: 36.6 (08 Jan 2023 10:12)  T(F): 97.8 (08 Jan 2023 10:12), Max: 97.8 (08 Jan 2023 10:12)  HR: 70 (08 Jan 2023 10:12) (70 - 70)  BP: 168/70 (08 Jan 2023 10:12) (168/70 - 168/70)  RR: 17 (08 Jan 2023 10:12) (17 - 17)  SpO2: 100% (08 Jan 2023 10:12) (100% - 100%)    Parameters below as of 08 Jan 2023 10:12  Patient On (Oxygen Delivery Method): room air    PHYSICAL EXAM  GENERAL: NAD, well-groomed, well-developed  HEAD:  NCAT  EYES: EOMI, PERRL, conjunctiva clear  ENMT: No tonsillar erythema, exudates, or enlargement; Moist mucous membranes, Good dentition, No lesions  NECK: Supple, No JVD, Normal thyroid  NERVOUS SYSTEM: AAOX4, Good concentration; Motor Strength 5/5 B/L upper and lower extremities; DTRs 2+ intact and symmetric  CHEST/LUNG: CTA b/l no w/r/r  HEART: +s1s2 RRR no m/g/r  ABDOMEN: soft, NT/ND (+) bs, no HSM  EXTREMITIES:  2+ Peripheral Pulses, No c/c/e  LYMPH: No lymphadenopathy noted  SKIN: No rashes or lesions Vital Signs Last 24 Hrs  T(C): 36.6 (2023 10:12), Max: 36.6 (2023 10:12)  T(F): 97.8 (2023 10:12), Max: 97.8 (2023 10:12)  HR: 70 (2023 10:12) (70 - 70)  BP: 168/70 (2023 10:12) (168/70 - 168/70)  RR: 17 (2023 10:12) (17 - 17)  SpO2: 100% (2023 10:12) (100% - 100%)    Parameters below as of 2023 10:12  Patient On (Oxygen Delivery Method): room air    PHYSICAL EXAM  GENERAL: NAD, frail  HEAD:  NCAT, EOMI  NECK: Supple, nontender  NERVOUS SYSTEM: awake and alert - recalls name, , living situation but overall would say confused  CHEST/LUNG: CTA b/l no wheeze  HEART: +s1s2 RRR  ABDOMEN: soft, NT/ND  EXTREMITIES:  pp, no edema  SKIN: age related skin changes w discoloration/bruising

## 2023-01-08 NOTE — H&P ADULT - NSHPLABSRESULTS_GEN_ALL_CORE
Labs:                        12.3   5.29  )-----------( 157      ( 2023 11:26 )             36.8                                       140  |  105  |  23<H>  ----------------------------<  102<H>  5.1<H>   |  22  |  1.4    Ca    9.8      2023 11:26    TPro  6.6  /  Alb  4.2  /  TBili  0.5  /  DBili  x   /  AST  25  /  ALT  10  /  AlkPhos  54                           Urinalysis Basic - ( 2023 13:04 )  Color: Yellow / Appearance: Clear / S.015 / pH: x  Gluc: x / Ketone: Negative  / Bili: Negative / Urobili: <2 mg/dL   Blood: x / Protein: Trace / Nitrite: Negative   Leuk Esterase: Large / RBC: 2 /HPF / WBC 2 /HPF   Sq Epi: x / Non Sq Epi: 12 /HPF / Bacteria: Negative     Troponin T, Serum: 0.02 ng/mL (23 @ 15:51)  Troponin T, Serum: <0.01 ng/mL (23 @ 11:26)    Serum Pro-Brain Natriuretic Peptide: 211 pg/mL (23 @ 11:26)

## 2023-01-08 NOTE — H&P ADULT - HISTORY OF PRESENT ILLNESS
86yo F w h/o HTN, HLD, Paroxysmal AFib (on amio and eliquis), Cardiac Pauses s/p loop recorder, Idiopathic Pericarditis, Mitral Regurg,  Hypothyroid, SLE, GERD, Asthma, Diverticulitis, Anxiety, Dementia presents for chest pain. Patient stated the pain is mid/substernal, waxing and waxing, nonradiating over the last three days. Inital trop was .01 w no ST changes on EKG and patient initially admitted to obs. Next blood draw resulted in trop of .02 and patient was subsequently admitted to medicine for ACS workup.    Patient with no current pain at this time; vitals and labs, other than Trop .02, unremarkable; CXR and CTA neg. 86yo F w h/o HTN, HLD, Paroxysmal AFib (on amio and eliquis), Cardiac Pauses s/p loop recorder, Idiopathic Pericarditis, Mitral Regurg,  Hypothyroid, SLE, GERD, Asthma, Diverticulitis, Anxiety, Dementia presents for chest pain. Patient stated the pain is mid/substernal, waxing and waxing, nonradiating over the last three days. Inital trop was .01 w no ST changes on EKG and patient initially admitted to obs. Next blood draw resulted in trop of .02 and patient was subsequently admitted to medicine for ACS workup.    Patient with no current pain at this time, though unsure of how reliable of a historian; vitals showing hypertension (168/70) and labs, other than Trop .02, unremarkable; CXR and CTA neg for PE or dissection.    **Collateral obtained from Sister Yesenia: stated that over the last few days patient has been complaining of chest and back pain, so she was brought to hospital for further evalutaion** 86yo F w h/o HTN, HLD, Paroxysmal AFib (on amio and eliquis), Cardiac Pauses s/p loop recorder, Idiopathic Pericarditis, Mitral Regurg,  Hypothyroid, SLE, GERD, Asthma, Diverticulitis, Anxiety, Dementia presents for chest pain. Per ED documentation, pain is "non radiating intermittent substernal CP that is pressure in nature." Initial trop was .01 w no ST changes on EKG and patient initially admitted to obs. Next blood draw resulted in trop of .02 and patient was subsequently admitted to medicine for ACS workup.    Patient with no current pain at this time, though unsure of how reliable of a historian; vitals showing hypertension (168/70) and labs, other than Trop .02, unremarkable; CXR and CTA neg for PE or dissection.    **Collateral obtained from Sister Yesenia: stated that over the last few days patient has been complaining of chest and back pain, so she was brought to hospital for further evalutaion**

## 2023-01-08 NOTE — ED PROVIDER NOTE - ATTENDING CONTRIBUTION TO CARE
87-year-old female with history of A. 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 Patient is unable to characterize quality of pain, but her sister states that has been constant x3 days, and just now in the ED began also reporting back pain. Recently treated for a right ankle wound with Keflex, today is last day, with significant improvement by sister. Denies cough, fever, vomiting, abdominal pain, and all other symptoms. On exam, afebrile, hemodynamically stable, saturating well on room air, NAD, nontoxic appearing, sitting comfortably in bed, no WOB, speaking full sentences, head NCAT, EOMI grossly, anicteric, MMM, no JVD, RRR, nml S1/S2, no m/r/g, lungs CTAB, no w/r/r, abd soft, NT, ND, nml BS, no rebound or guarding, AAO, CN's 3-12 grossly intact, [place] spontaneously, no leg cyanosis or edema, 2+ symmetric radial pulses, skin warm, dry, no rashes or hives, well-healing right lateral malleolar wound, with no erythema or discharge. 87-year-old female with history of A. 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 Patient is unable to characterize quality of pain, but her sister states that has been constant x3 days, and just now in the ED began also reporting back pain. Recently treated for a right ankle wound with Keflex, today is last day, with significant improvement by sister. Denies cough, fever, vomiting, abdominal pain, and all other symptoms. On exam, afebrile, hemodynamically stable, saturating well on room air, NAD, nontoxic appearing, sitting comfortably in bed, no WOB, speaking full sentences, head NCAT, EOMI grossly, anicteric, MMM, no JVD, RRR, nml S1/S2, no m/r/g, lungs CTAB, no w/r/r, abd soft, NT, ND, nml BS, no rebound or guarding, AAO, CN's 3-12 grossly intact, [place] spontaneously, no leg cyanosis or edema, 2+ symmetric radial pulses, skin warm, dry, no rashes or hives, well-healing right lateral malleolar wound, with no erythema or discharge. Appearance lower suspicion for dissection and CTA negative. Character low suspicion for PE and no tachycardia, hypoxia, or e/o DVT. No e/o PNA, PTX, or fluid overload on exam or CXR. Pt hx/risk of some concern for ACS, ECG/trop unremarkable at this time. NAD, well appearing. D/w her cardiologist Dr. Marte. Sent to obs for further ACS w/u.

## 2023-01-08 NOTE — ED PROVIDER NOTE - CLINICAL SUMMARY MEDICAL DECISION MAKING FREE TEXT BOX
87-year-old female with history of A. 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 Patient is unable to characterize quality of pain, but her sister states that has been constant x3 days, and just now in the ED began also reporting back pain. Recently treated for a right ankle wound with Keflex, today is last day, with significant improvement by sister. Denies cough, fever, vomiting, abdominal pain, and all other symptoms. On exam, afebrile, hemodynamically stable, saturating well on room air, NAD, nontoxic appearing, sitting comfortably in bed, no WOB, speaking full sentences, head NCAT, EOMI grossly, anicteric, MMM, no JVD, RRR, nml S1/S2, no m/r/g, lungs CTAB, no w/r/r, abd soft, NT, ND, nml BS, no rebound or guarding, AAO, CN's 3-12 grossly intact, [place] spontaneously, no leg cyanosis or edema, 2+ symmetric radial pulses, skin warm, dry, no rashes or hives, well-healing right lateral malleolar wound, with no erythema or discharge. Appearance lower suspicion for dissection and CTA negative. Character low suspicion for PE and no tachycardia, hypoxia, or e/o DVT. No e/o PNA, PTX, or fluid overload on exam or CXR. Pt hx/risk of some concern for ACS, ECG/trop unremarkable at this time. NAD, well appearing. D/w her cardiologist Dr. Marte. Sent to obs for further ACS w/u.

## 2023-01-08 NOTE — ED CDU PROVIDER INITIAL DAY NOTE - ATTENDING APP SHARED VISIT CONTRIBUTION OF CARE
Sent to obs for ACS w/u, noted to have 2nd positive trop though ECG unchanged and pt well appearing. Will admit for further management.

## 2023-01-08 NOTE — H&P ADULT - ATTENDING COMMENTS
Patient seen and examined at bedside independently of the residents. I read the resident's note and agree with the plan with the additions and corrections as noted below.    REVIEW OF SYSTEMS:  CONSTITUTIONAL: No weakness, fevers or chills  EYES/ENT: No visual changes;  No vertigo or throat pain   NECK: No pain or stiffness  RESPIRATORY: No cough, wheezing, hemoptysis; No shortness of breath  CARDIOVASCULAR: No chest pain or palpitations  GASTROINTESTINAL: No abdominal or epigastric pain. No nausea, vomiting, or hematemesis; No diarrhea or constipation. No melena or hematochezia.  GENITOURINARY: No dysuria, frequency or hematuria  NEUROLOGICAL: No numbness or weakness  MSK: No pain. No weakness.   SKIN: No itching, rashes.     PMH:  HLD, Paroxysmal A.fib (on eliquis) on loop recorder, MVR, GERD, Hypothyroidism, SLD, Anxiety and Dementia    FHx: Reviewed. No fhx of asthma/copd, No fhx of liver and pulmonary disease. No fhx of hematological disorder.     Physical Exam:  GEN: No acute distress. Awake, Alert and oriented x 1-2.  Head: Atraumatic, Normocephalic.   Eye: PEERLA. No sclera icterus. EOMI.   ENT: Normal oropharynx, no thyromegaly, no mass, no lymphadenopathy.   LUNGS: Clear to auscultation bilaterally. No wheeze/rales/crackles.   HEART: Normal. S1/S2 present. RRR. No murmur/gallops.   ABD: Soft, non-tender, non-distended. Bowel sounds present.   EXT: No pitting edema. No erythema. No tenderness.  Integumentary: No rash, No sore, No petechia.   NEURO: CN III-XII intact. Strength: 5/5 b/l ULE. Sensory intact b/l ULE.     Vital Signs Last 24 Hrs  T(C): 35.9 (2023 00:35), Max: 36.6 (2023 10:12)  T(F): 96.6 (2023 00:35), Max: 97.8 (2023 10:12)  HR: 68 (2023 00:35) (68 - 70)  BP: 167/74 (2023 00:35) (167/74 - 168/70)  BP(mean): --  RR: 17 (2023 10:12) (17 - 17)  SpO2: 99% (2023 00:35) (99% - 100%)    Parameters below as of 2023 00:35  Patient On (Oxygen Delivery Method): room air      Please see the above notes for Labs and radiology.     Assessment and Plan:     77 yo F with hx of HLD, Paroxysmal A.fib (on eliquis) on loop recorder, MVR, GERD, Hypothyroidism, SLD, Anxiety and Dementia presents to ED for 3 days hx of Chest pain.     Chest pain (r/o ACS)  - EKG NSR. No significant ST/T wave changes from prior.   - c/w home med  - trend troponin. Check TTE.   - NPO after midnight.   - NST ordered as per Cardio (Dr. Marte).   - f/u Cardio  - monitor on Telemetry.     HLD. - c/w home med  Proxysmal A.fib - on amiodarone and eliquis   GERD - PPI   SLE - c/w home med  Hx of Asthma - c/w home med.   Hypothyroidism - c/w synthroid.     DVT ppx: on eliquis   GI ppx: PPI  Diet: DASH  Activity: as tolerated.     Date seen by the attendin2023.

## 2023-01-08 NOTE — ED PROVIDER NOTE - NS ED ATTENDING STATEMENT MOD
Attending with This was a shared visit with the CATE. I reviewed and verified the documentation and independently performed the documented:

## 2023-01-08 NOTE — ED PROVIDER NOTE - NS ED ROS FT
Constitutional: (-) fever, (-) chills  Eyes: (-) visual changes  ENT: (-) nasal congestions  Cardiovascular: (+) chest pain, (-) syncope  Respiratory: (-) cough, (-) shortness of breath, (-) dyspnea,   Gastrointestinal: (-) vomiting, (-) diarrhea, (-)nausea,  Musculoskeletal: (-) neck pain, (-) back pain, (-) joint pain,  Integumentary: (-) rash, (-) edema, (-) bruises  Neurological: (-) headache, (-) loc, (-) dizziness, (-) tingling, (-)numbness,  Peripheral Vascular: (-) leg swelling

## 2023-01-08 NOTE — H&P ADULT - ASSESSMENT
86yo F w h/o HTN, HLD, Paroxysmal AFib (on amio and eliquis), Cardiac Pauses s/p loop recorder, Idiopathic Pericarditis, Mitral Regurg,  Hypothyroid, SLE, GERD, Asthma, Diverticulitis, Anxiety, Dementia presents for chest pain. Patient stated the pain is mid/substernal, waxing and waxing, nonradiating over the last three days. Inital trop was .01 w no ST changes on EKG and patient initially admitted to obs. Next blood draw resulted in trop of .02 and patient was subsequently admitted to medicine for ACS workup.    #Chest pain - likely 2/2 gastritis but must r/o ACS   - Trop .01>.02; EKG w no ST changes; CTA neg for PE or dissection  - Stress test ordered in ED after speaking with cardiologist (Dr. Marte)  - cardio c/s, trend trop, monitor on tele    #HTN  #Paroxysmal AFib (on amio and eliquis)  #H/o Cardiac Pauses s/p loop recorder  #H/o Idiopathic Pericarditis  #Mitral Regurg  - cont home amio and eliquis  - f/u cardio     #Hypothyroid  - cont home levothyroxine, f/u tsh and monitor while on amio    #HLD - no on a statin  - f/u lipid panel    #GERD  - cont home pepcid and give maalox prn if chest pain occurs to see if it resolves    #H/o Asthma  - nebs and inhalers prn    #Anxiety  - on diazepam at home  - consider weaning off benzos given advanced age    #Dementia   - cont home memantine    #H/o Diverticulitis  - cont home bowel regimen    #SLE  - outpatient follow up    DVT ppx: on eliquis  GI ppx: PPI  Activity: IAT - PT eval  Diet - NPO for now    Dispo: likely d/c in AM providing repeat trop neg/stable 88yo F w h/o HTN, HLD, Paroxysmal AFib (on amio and eliquis), Cardiac Pauses s/p loop recorder, Idiopathic Pericarditis, Mitral Regurg,  Hypothyroid, SLE, GERD, Asthma, Diverticulitis, Anxiety, Dementia presents for chest pain. Patient stated the pain is mid/substernal, waxing and waxing, nonradiating over the last three days. Inital trop was .01 w no ST changes on EKG and patient initially admitted to obs. Next blood draw resulted in trop of .02 and patient was subsequently admitted to medicine for ACS workup.    #Chest pain - likely 2/2 gastritis but must r/o ACS   - Trop .01>.02; EKG w no ST changes; CTA neg for PE or dissection  - Stress test ordered in ED after speaking with cardiologist (Dr. Marte)  - cardio c/s, trend trop, monitor on tele    #HTN  #Paroxysmal AFib (on amio and eliquis)  #H/o Cardiac Pauses s/p loop recorder  #H/o Idiopathic Pericarditis  #Mitral Regurg  - cont home amio; received dose of eliquis in ED > will hold off on giving AM dose for now  - f/u cardio     #Hypothyroid  - cont home levothyroxine, f/u tsh and monitor while on amio    #HLD - no on a statin at home  - f/u lipid panel    #GERD  - cont home pepcid and give maalox prn if chest pain occurs to see if it resolves    #H/o Asthma  - nebs and inhalers prn    #Anxiety  - on diazepam at home  - consider weaning off benzos given advanced age    #Dementia   - cont home memantine    #H/o Diverticulitis  - cont home bowel regimen    #SLE  - outpatient follow up    DVT ppx: on eliquis  GI ppx: PPI  Activity: IAT - PT eval  Diet - NPO for now   86yo F w h/o HTN, HLD, Paroxysmal AFib (on amio and eliquis), Cardiac Pauses s/p loop recorder, Idiopathic Pericarditis, Mitral Regurg,  Hypothyroid, SLE, GERD, Asthma, Diverticulitis, Anxiety, Dementia presents for chest pain. Patient stated the pain is mid/substernal, waxing and waxing, nonradiating over the last three days. Inital trop was .01 w no ST changes on EKG and patient initially admitted to obs. Next blood draw resulted in trop of .02 and patient was subsequently admitted to medicine for ACS workup.    #Chest pain - must r/o ACS given rise in trop  - Trop .01>.02; EKG w no ST changes; CTA neg for PE or dissection  - loaded with aspirin and received dose of eliquis in ED  - Stress test ordered in ED after speaking with cardiologist (Dr. Marte)  - cardio c/s, trend trop, monitor on tele    #HTN  #Paroxysmal AFib (on amio and eliquis)  #H/o Cardiac Pauses s/p loop recorder  #H/o Idiopathic Pericarditis  #Mitral Regurg  - cont home amio; holding eliquis for now  - f/u cardio     #Hypothyroid  - cont home levothyroxine, f/u tsh and monitor while on amio    #HLD - no on a statin at home  - f/u lipid panel    #GERD  - cont home pepcid and give maalox prn if chest pain occurs to see if it resolves    #H/o Asthma  - nebs and inhalers prn    #Anxiety  - on diazepam prn at home > consider weaning off given advanced age    #Dementia   - cont home memantine    #H/o Diverticulitis  - cont home bowel regimen    #SLE  - outpatient follow up    DVT ppx: on eliquis  GI ppx: PPI  Activity: IAT - PT eval  Diet - NPO for now    MED REC CONFIRMED WITH EMERGENCY CONTACT   86yo F w h/o HTN, HLD, Paroxysmal AFib (on amio and eliquis), Cardiac Pauses s/p loop recorder, Idiopathic Pericarditis, Mitral Regurg,  Hypothyroid, SLE, GERD, Asthma, Diverticulitis, Anxiety, Dementia presents for chest pain. Per ED documentation, pain is "non radiating intermittent substernal CP that is pressure in nature." Initial trop was .01 w no ST changes on EKG and patient initially admitted to obs. Next blood draw resulted in trop of .02 and patient was subsequently admitted to medicine for ACS workup.    #Chest pain - r/o ACS given rise in trop  - Trop .01>.02; EKG w no ST changes; CTA neg for PE or dissection  - loaded with aspirin and received dose of eliquis in ED  - Stress test ordered in ED after speaking with cardiologist (Dr. Marte)  - cardio c/s, trend trop, monitor on tele    #HTN  #Paroxysmal AFib (on amio and eliquis)  #H/o Cardiac Pauses s/p loop recorder  #H/o Idiopathic Pericarditis  #Mitral Regurg  - cont home amio; holding eliquis for now  - f/u cardio     #Hypothyroid  - cont home levothyroxine, f/u tsh and monitor while on amio    #HLD - no on a statin at home  - f/u lipid panel    #GERD  - cont home pepcid and give maalox prn if chest pain occurs to see if it resolves    #H/o Asthma  - nebs and inhalers prn    #Anxiety  - on diazepam prn at home > consider weaning off given advanced age    #Dementia   - cont home memantine    #H/o Diverticulitis  - cont home bowel regimen    #SLE  - outpatient follow up    DVT ppx: on eliquis  GI ppx: PPI  Activity: IAT - PT eval  Diet - NPO for now    MED REC CONFIRMED WITH EMERGENCY CONTACT

## 2023-01-08 NOTE — ED PROVIDER NOTE - OBJECTIVE STATEMENT
88 yo female with a history of PMHx of HLD, paroxysmal A-fib (on Amiodarone 200mg daily and Eliquis 2.5mg bid),  Loop recorder, mitral valve regurgitation, GERD, Hypothyroidism, SLE, anxiety, dementia presenting to ED for 3 days of non radiating intermittent substernal CP that is pressure in nature. as per sister pt has been complaining of the symptoms coming and going with no precipitating factors. denies any recent flu lik syptoms. sister spoke with Dr Maciel to inform him pt was in ED. here in ED pt sts that pain has subsised

## 2023-01-08 NOTE — ED CDU PROVIDER DISPOSITION NOTE - NS ED MD TWO NIGHTS YN
Progressive renal failure ( Baseline creat ? -- Will have to call PMD Dr Ruiz when office open )  S/P R PCNT for obstructing stone  CVD - RPGN Serology negative thus far  + IFA/ + UIFA noted   ?? FH of hearing loss and renal disease ---> ? [place]'s   Normal looking kidneys on sonogram     At this point she is not uremic , hyperkalemic or fluid overloaded  However, if her renal function does not turn around, I explained to her that she will need HD       Will change IVF to ND   KCL x 1   Will check Serum IgG's , Serum free light chains and Urine Eosinophils     Urology follow up noted     If renal function does not turn around , would also consider doing renal biopsy   Therefore, have to d/c ASA for now     Will follow closely Yes

## 2023-01-08 NOTE — ED CDU PROVIDER INITIAL DAY NOTE - OBJECTIVE STATEMENT
87 y.o. female with pmx of afib on eliquis and amiodorone, hld, loop recorder, gerd anxiety, early dementia comes to ed for chest pain x 3 days. patient sees dr. bains cardiologist, as per initial ed team spoke to dr. bains, recommended nuclear stress tsting and obs transfer.

## 2023-01-08 NOTE — ED ADULT TRIAGE NOTE - CHIEF COMPLAINT QUOTE
Patient c/o chest pain since this morning Patient c/o midsternal chest pain x 3 days increasing this morning

## 2023-01-09 ENCOUNTER — TRANSCRIPTION ENCOUNTER (OUTPATIENT)
Age: 88
End: 2023-01-09

## 2023-01-09 VITALS
HEART RATE: 75 BPM | DIASTOLIC BLOOD PRESSURE: 79 MMHG | OXYGEN SATURATION: 97 % | TEMPERATURE: 97 F | RESPIRATION RATE: 18 BRPM | SYSTOLIC BLOOD PRESSURE: 151 MMHG

## 2023-01-09 DIAGNOSIS — Z02.9 ENCOUNTER FOR ADMINISTRATIVE EXAMINATIONS, UNSPECIFIED: ICD-10-CM

## 2023-01-09 LAB
A1C WITH ESTIMATED AVERAGE GLUCOSE RESULT: 5 % — SIGNIFICANT CHANGE UP (ref 4–5.6)
ALBUMIN SERPL ELPH-MCNC: 4.4 G/DL — SIGNIFICANT CHANGE UP (ref 3.5–5.2)
ALP SERPL-CCNC: 58 U/L — SIGNIFICANT CHANGE UP (ref 30–115)
ALT FLD-CCNC: 10 U/L — SIGNIFICANT CHANGE UP (ref 0–41)
ANION GAP SERPL CALC-SCNC: 12 MMOL/L — SIGNIFICANT CHANGE UP (ref 7–14)
AST SERPL-CCNC: 20 U/L — SIGNIFICANT CHANGE UP (ref 0–41)
BASOPHILS # BLD AUTO: 0.03 K/UL — SIGNIFICANT CHANGE UP (ref 0–0.2)
BASOPHILS NFR BLD AUTO: 0.4 % — SIGNIFICANT CHANGE UP (ref 0–1)
BILIRUB SERPL-MCNC: 0.5 MG/DL — SIGNIFICANT CHANGE UP (ref 0.2–1.2)
BUN SERPL-MCNC: 20 MG/DL — SIGNIFICANT CHANGE UP (ref 10–20)
CALCIUM SERPL-MCNC: 9.4 MG/DL — SIGNIFICANT CHANGE UP (ref 8.4–10.5)
CHLORIDE SERPL-SCNC: 104 MMOL/L — SIGNIFICANT CHANGE UP (ref 98–110)
CHOLEST SERPL-MCNC: 177 MG/DL — SIGNIFICANT CHANGE UP
CO2 SERPL-SCNC: 23 MMOL/L — SIGNIFICANT CHANGE UP (ref 17–32)
CREAT SERPL-MCNC: 1.2 MG/DL — SIGNIFICANT CHANGE UP (ref 0.7–1.5)
D DIMER BLD IA.RAPID-MCNC: <150 NG/ML DDU — SIGNIFICANT CHANGE UP
EGFR: 44 ML/MIN/1.73M2 — LOW
EOSINOPHIL # BLD AUTO: 0.02 K/UL — SIGNIFICANT CHANGE UP (ref 0–0.7)
EOSINOPHIL NFR BLD AUTO: 0.3 % — SIGNIFICANT CHANGE UP (ref 0–8)
ESTIMATED AVERAGE GLUCOSE: 97 MG/DL — SIGNIFICANT CHANGE UP (ref 68–114)
GLUCOSE SERPL-MCNC: 116 MG/DL — HIGH (ref 70–99)
HCT VFR BLD CALC: 36.2 % — LOW (ref 37–47)
HDLC SERPL-MCNC: 105 MG/DL — SIGNIFICANT CHANGE UP
HGB BLD-MCNC: 11.9 G/DL — LOW (ref 12–16)
IMM GRANULOCYTES NFR BLD AUTO: 0.3 % — SIGNIFICANT CHANGE UP (ref 0.1–0.3)
LIPID PNL WITH DIRECT LDL SERPL: 59 MG/DL — SIGNIFICANT CHANGE UP
LYMPHOCYTES # BLD AUTO: 0.98 K/UL — LOW (ref 1.2–3.4)
LYMPHOCYTES # BLD AUTO: 13.9 % — LOW (ref 20.5–51.1)
MAGNESIUM SERPL-MCNC: 1.8 MG/DL — SIGNIFICANT CHANGE UP (ref 1.8–2.4)
MCHC RBC-ENTMCNC: 30.2 PG — SIGNIFICANT CHANGE UP (ref 27–31)
MCHC RBC-ENTMCNC: 32.9 G/DL — SIGNIFICANT CHANGE UP (ref 32–37)
MCV RBC AUTO: 91.9 FL — SIGNIFICANT CHANGE UP (ref 81–99)
MONOCYTES # BLD AUTO: 0.41 K/UL — SIGNIFICANT CHANGE UP (ref 0.1–0.6)
MONOCYTES NFR BLD AUTO: 5.8 % — SIGNIFICANT CHANGE UP (ref 1.7–9.3)
NEUTROPHILS # BLD AUTO: 5.59 K/UL — SIGNIFICANT CHANGE UP (ref 1.4–6.5)
NEUTROPHILS NFR BLD AUTO: 79.3 % — HIGH (ref 42.2–75.2)
NON HDL CHOLESTEROL: 72 MG/DL — SIGNIFICANT CHANGE UP
NRBC # BLD: 0 /100 WBCS — SIGNIFICANT CHANGE UP (ref 0–0)
PLATELET # BLD AUTO: 209 K/UL — SIGNIFICANT CHANGE UP (ref 130–400)
POTASSIUM SERPL-MCNC: 3.9 MMOL/L — SIGNIFICANT CHANGE UP (ref 3.5–5)
POTASSIUM SERPL-SCNC: 3.9 MMOL/L — SIGNIFICANT CHANGE UP (ref 3.5–5)
PROT SERPL-MCNC: 6.3 G/DL — SIGNIFICANT CHANGE UP (ref 6–8)
RBC # BLD: 3.94 M/UL — LOW (ref 4.2–5.4)
RBC # FLD: 13 % — SIGNIFICANT CHANGE UP (ref 11.5–14.5)
SODIUM SERPL-SCNC: 139 MMOL/L — SIGNIFICANT CHANGE UP (ref 135–146)
TRIGL SERPL-MCNC: 75 MG/DL — SIGNIFICANT CHANGE UP
TROPONIN T SERPL-MCNC: <0.01 NG/ML — SIGNIFICANT CHANGE UP
WBC # BLD: 7.05 K/UL — SIGNIFICANT CHANGE UP (ref 4.8–10.8)
WBC # FLD AUTO: 7.05 K/UL — SIGNIFICANT CHANGE UP (ref 4.8–10.8)

## 2023-01-09 PROCEDURE — 99239 HOSP IP/OBS DSCHRG MGMT >30: CPT

## 2023-01-09 PROCEDURE — 93018 CV STRESS TEST I&R ONLY: CPT

## 2023-01-09 PROCEDURE — 78452 HT MUSCLE IMAGE SPECT MULT: CPT | Mod: 26

## 2023-01-09 PROCEDURE — 93016 CV STRESS TEST SUPVJ ONLY: CPT

## 2023-01-09 PROCEDURE — 99222 1ST HOSP IP/OBS MODERATE 55: CPT

## 2023-01-09 RX ORDER — HALOPERIDOL DECANOATE 100 MG/ML
5 INJECTION INTRAMUSCULAR ONCE
Refills: 0 | Status: COMPLETED | OUTPATIENT
Start: 2023-01-09 | End: 2023-01-09

## 2023-01-09 RX ORDER — ASPIRIN/CALCIUM CARB/MAGNESIUM 324 MG
1 TABLET ORAL
Qty: 0 | Refills: 0 | DISCHARGE
Start: 2023-01-09

## 2023-01-09 RX ORDER — ASPIRIN/CALCIUM CARB/MAGNESIUM 324 MG
81 TABLET ORAL DAILY
Refills: 0 | Status: DISCONTINUED | OUTPATIENT
Start: 2023-01-09 | End: 2023-01-09

## 2023-01-09 RX ORDER — DIAZEPAM 5 MG
5 TABLET ORAL THREE TIMES A DAY
Refills: 0 | Status: DISCONTINUED | OUTPATIENT
Start: 2023-01-09 | End: 2023-01-09

## 2023-01-09 RX ORDER — CLOPIDOGREL BISULFATE 75 MG/1
300 TABLET, FILM COATED ORAL ONCE
Refills: 0 | Status: DISCONTINUED | OUTPATIENT
Start: 2023-01-09 | End: 2023-01-09

## 2023-01-09 RX ORDER — REGADENOSON 0.08 MG/ML
0.4 INJECTION, SOLUTION INTRAVENOUS ONCE
Refills: 0 | Status: DISCONTINUED | OUTPATIENT
Start: 2023-01-09 | End: 2023-01-09

## 2023-01-09 RX ADMIN — Medication 25 MICROGRAM(S): at 05:35

## 2023-01-09 RX ADMIN — AMIODARONE HYDROCHLORIDE 200 MILLIGRAM(S): 400 TABLET ORAL at 05:35

## 2023-01-09 RX ADMIN — MEMANTINE HYDROCHLORIDE 10 MILLIGRAM(S): 10 TABLET ORAL at 05:35

## 2023-01-09 RX ADMIN — HALOPERIDOL DECANOATE 5 MILLIGRAM(S): 100 INJECTION INTRAMUSCULAR at 04:24

## 2023-01-09 NOTE — ED ADULT NURSE REASSESSMENT NOTE - NS ED NURSE REASSESS COMMENT FT1
Pt reassessed A/O times 4 Vs retaken stable report   no chest pain  no SOB ,denies N/V  stress test order is dine completed ,pt is seen evaluate by ED attending clear to go home with family members NAD noted ,is going home with [facility] transport .

## 2023-01-09 NOTE — DISCHARGE NOTE PROVIDER - NSDCMRMEDTOKEN_GEN_ALL_CORE_FT
acetaminophen 325 mg oral tablet: 2 tab(s) orally every 6 hours, As needed, Mild Pain (1 - 3), Moderate Pain (4 - 6)  amiodarone 200 mg oral tablet: 1 tab(s) orally once a day  aspirin 81 mg oral tablet, chewable: 1 tab(s) orally once a day  Calcium 600+D 600 mg-5 mcg (200 intl units) oral tablet: 1 tab(s) orally once a day  diazePAM 5 mg oral tablet: 1 tab(s) orally 3 times a day, As Needed  Eliquis 2.5 mg oral tablet: 1 tab(s) orally 2 times a day  famotidine 40 mg oral tablet: 1 tab(s) orally once a day (at bedtime)  levothyroxine 25 mcg (0.025 mg) oral tablet: 1 tab(s) orally once a day  memantine 10 mg oral tablet: 1 tab(s) orally 2 times a day  Multiple Vitamins with Minerals oral tablet: 1 tab(s) orally once a day  senna leaf extract oral tablet: 2 tab(s) orally once a day (at bedtime)  Vitamin B12 1000 mcg oral tablet: 1 tab(s) orally once a day

## 2023-01-09 NOTE — DISCHARGE NOTE NURSING/CASE MANAGEMENT/SOCIAL WORK - NSDCVIVACCINE_GEN_ALL_CORE_FT
Tdap; 21-Jan-2019 16:42; Esperanza Kenney ([place]); [employer]; [id number] (Exp. Date: 27-Jul-2020); IntraMuscular; Deltoid Right.; 0.5 milliLiter(s); VIS (VIS Published: 09-May-2013, VIS Presented: 21-Jan-2019);

## 2023-01-09 NOTE — DISCHARGE NOTE NURSING/CASE MANAGEMENT/SOCIAL WORK - NSDCPEFALRISK_GEN_ALL_CORE
For information on Fall & Injury Prevention, visit: [email or web address] OR  [email or web address] OR  https://www.cdc.gov/steadi/patient.html

## 2023-01-09 NOTE — DISCHARGE NOTE PROVIDER - CARE PROVIDER_API CALL
Leonel Marte)  Cardiology; Cardiovascular Disease; Internal Medicine  [address] [place] [address]  Phone: [phone]  Fax: [phone]  Follow Up Time: 2 weeks

## 2023-01-09 NOTE — DISCHARGE NOTE PROVIDER - NSDCFUSCHEDAPPT_GEN_ALL_CORE_FT
[facility]  CARDIOLOGY [address]  Scheduled Appointment: 01/10/2023    Akin Cheema  [facility]  ELECTROPH [address]  Scheduled Appointment: 01/24/2023    [facility]  BURNTREAT [address]  Scheduled Appointment: 01/26/2023    Leonel Marte  [facility]  CARDIOLOGY [address]  Scheduled Appointment: 03/07/2023     Left Breast Lumpectomy Mammo Jaz .

## 2023-01-09 NOTE — DISCHARGE NOTE NURSING/CASE MANAGEMENT/SOCIAL WORK - PATIENT PORTAL LINK FT
You can access the FollowMyHealth Patient Portal offered by [facility] by registering at the following website: [email or web address]. By joining [employer]’s FollowMyHealth portal, you will also be able to view your health information using other applications (apps) compatible with our system.

## 2023-01-09 NOTE — CONSULT NOTE ADULT - SUBJECTIVE AND OBJECTIVE BOX
Outpt cardiologist:    Reason for Consult:     HPI:  86yo F w h/o HTN, HLD, Paroxysmal AFib (on amio and eliquis), Cardiac Pauses s/p loop recorder, Idiopathic Pericarditis, Mitral Regurg,  Hypothyroid, SLE, GERD, Asthma, Diverticulitis, Anxiety, Dementia presents for chest pain. Per ED documentation, pain is "non radiating intermittent substernal CP that is pressure in nature." Initial trop was .01 w no ST changes on EKG and patient initially admitted to obs. Next blood draw resulted in trop of .02 and patient was subsequently admitted to medicine for ACS workup.    Patient with no current pain at this time, though unsure of how reliable of a historian; vitals showing hypertension (168/70) and labs, other than Trop .02, unremarkable; CXR and CTA neg for PE or dissection.    **Collateral obtained from Sister Yesenia: stated that over the last few days patient has been complaining of chest and back pain, so she was brought to hospital for further evalutaion** (2023 22:49)    Patient with cardiac history as above   Normal stress test in 2018  Echo in 2022 with normal LV function, mild AI, LVH and trace MR   Presented with non exertional chest pressure. In the ED, CT ruled out dissection and PE. It showed a lot of coronary calcifications. Stenosis of the proximal celiac artery     ECG was normal   Troponin was negative then 0.02    PREVIOUS CARDIAC WORKUP    Risk Factors:      PAST MEDICAL & SURGICAL HISTORY  Hypothyroid    UTI (urinary tract infection)    Atrial fibrillation, unspecified type  no longer in atrial fifrillation    Anxiety    Mild intermittent asthma, unspecified whether complicated  no longer present    Cellulitis of finger, unspecified laterality  resolved    Diverticulitis  resolved    Reflux gastritis    Mitral valve insufficiency, unspecified etiology    Idiopathic pericarditis, unspecified chronicity    History of appendectomy    Status post placement of implantable loop recorder        FAMILY HISTORY:  FAMILY HISTORY:  Known health problems: none (Father, Mother)        SOCIAL HISTORY:  Social History:      ALLERGIES:  sulfa drugs (Unknown)      MEDICATIONS:  albuterol    90 MICROgram(s) HFA Inhaler 2 Puff(s) Inhalation every 6 hours  albuterol/ipratropium for Nebulization 3 milliLiter(s) Nebulizer every 6 hours  aMIOdarone    Tablet 200 milliGRAM(s) Oral daily  calcium carbonate 1250 mG  + Vitamin D (OsCal 500 + D) 1 Tablet(s) Oral daily  chlorhexidine 2% Cloths 1 Application(s) Topical <User Schedule>  cyanocobalamin 1000 MICROGram(s) Oral daily  famotidine    Tablet 40 milliGRAM(s) Oral at bedtime  levothyroxine 25 MICROGram(s) Oral daily  memantine 10 milliGRAM(s) Oral two times a day  multivitamin/minerals 1 Tablet(s) Oral daily  senna 2 Tablet(s) Oral at bedtime    PRN:  acetaminophen     Tablet .. 650 milliGRAM(s) Oral every 6 hours PRN  aluminum hydroxide/magnesium hydroxide/simethicone Suspension 30 milliLiter(s) Oral every 4 hours PRN  diazepam    Tablet 5 milliGRAM(s) Oral three times a day PRN  melatonin 5 milliGRAM(s) Oral at bedtime PRN      HOME MEDICATIONS:  Home Medications:  acetaminophen 325 mg oral tablet: 2 tab(s) orally every 6 hours, As needed, Mild Pain (1 - 3), Moderate Pain (4 - 6) (2022 13:34)  amiodarone 200 mg oral tablet: 1 tab(s) orally once a day (2022 13:34)  Calcium 600+D 600 mg-5 mcg (200 intl units) oral tablet: 1 tab(s) orally once a day (2022 13:34)  diazePAM 5 mg oral tablet: 1 tab(s) orally 3 times a day, As Needed (2022 13:34)  Eliquis 2.5 mg oral tablet: 1 tab(s) orally 2 times a day (2022 13:34)  famotidine 40 mg oral tablet: 1 tab(s) orally once a day (at bedtime) (2022 13:34)  levothyroxine 25 mcg (0.025 mg) oral tablet: 1 tab(s) orally once a day (2022 13:34)  memantine 10 mg oral tablet: 1 tab(s) orally 2 times a day (2022 13:34)  Multiple Vitamins with Minerals oral tablet: 1 tab(s) orally once a day (2022 10:07)  senna leaf extract oral tablet: 2 tab(s) orally once a day (at bedtime) (2022 13:34)  Vitamin B12 1000 mcg oral tablet: 1 tab(s) orally once a day (2022 13:34)      VITALS:   T(F): 96.6 ( @ 00:35), Max: 97.8 ( @ 10:12)  HR: 68 ( @ 00:35) (68 - 70)  BP: 167/74 ( @ 00:35) (167/74 - 168/70)  BP(mean): --  RR: 17 ( @ 10:12) (17 - 17)  SpO2: 99% ( @ 00:35) (99% - 100%)    I&O's Summary      REVIEW OF SYSTEMS:  CONSTITUTIONAL: No weakness, fevers or chills  HEENT: No visual changes, neck/ear pain  RESPIRATORY: No cough, sob  CARDIOVASCULAR: See HPI  GASTROINTESTINAL: No abdominal pain. No nausea, vomiting, diarrhea   GENITOURINARY: No dysuria, frequency or hematuria  NEUROLOGICAL: No new focal deficits  SKIN: No new rashes    PHYSICAL EXAM:  General: Not in distress.  Non-toxic appearing.   HEENT: EOMI  Cardio: regular, S1, S2, no murmur  Pulm: B/L BS.  No wheezing / crackles / rales  Abdomen: Soft, non-tender, non-distended. Normoactive bowel sounds  Extremities: No edema b/l le  Neuro: A&O x1. No focal deficits    LABS:                        12.3   5.29  )-----------( 157      ( 2023 11:26 )             36.8         140  |  105  |  23<H>  ----------------------------<  102<H>  5.1<H>   |  22  |  1.4    Ca    9.8      2023 11:26    TPro  6.6  /  Alb  4.2  /  TBili  0.5  /  DBili  x   /  AST  25  /  ALT  10  /  AlkPhos  54        Troponin T, Serum: 0.02 ng/mL *H* (23 @ 15:51)  Troponin T, Serum: <0.01 ng/mL (23 @ 11:26)    CARDIAC MARKERS ( 2023 15:51 )  x     / 0.02 ng/mL / x     / x     / x      CARDIAC MARKERS ( 2023 11:26 )  x     / <0.01 ng/mL / x     / x     / x            Troponin trend:    Serum Pro-Brain Natriuretic Peptide: 211 pg/mL (23 @ 11:26)      COVID-19 PCR: NotDetec (04 Dec 2022 08:50)  COVID-19 PCR: NotDetec (02 Dec 2022 08:36)  COVID-19 PCR: NotDetec (2022 12:42)  COVID-19 PCR: NotDetec (18 Aug 2022 10:54)      EC Lead ECG:   Ventricular Rate 67 BPM    Atrial Rate 67 BPM    P-R Interval 128 ms    QRS Duration 90 ms    Q-T Interval 418 ms    QTC Calculation(Bazett) 441 ms    P Axis 75 degrees    R Axis 45 degrees    T Axis 69 degrees    Diagnosis Line Normal sinus rhythm  Normal ECG    Confirmed by Bossman Potter ([id number]) on 2023 11:26:53 AM ( @ 10:13)      TELEMETRY EVENTS:     HPI:  88yo F w h/o HTN, HLD, Paroxysmal AFib (on amio and eliquis), Cardiac Pauses s/p loop recorder, Idiopathic Pericarditis, Mitral Regurg,  Hypothyroid, SLE, GERD, Asthma, Diverticulitis, Anxiety, Dementia presents for chest pain. Per ED documentation, pain is "non radiating intermittent substernal CP that is pressure in nature." Initial trop was .01 w no ST changes on EKG and patient initially admitted to obs. Next blood draw resulted in trop of .02 and patient was subsequently admitted to medicine for ACS workup.    Patient with no current pain at this time, though unsure of how reliable of a historian; vitals showing hypertension (168/70) and labs, other than Trop .02, unremarkable; CXR and CTA neg for PE or dissection.    **Collateral obtained from Sister Yesenia: stated that over the last few days patient has been complaining of chest and back pain, so she was brought to hospital for further evalutaion** (2023 22:49)    Patient with cardiac history as above   Normal stress test in 2018  Echo in 2022 with normal LV function, mild AI, LVH and trace MR   Presented with non exertional chest pressure. In the ED, CT ruled out dissection and PE. It showed a lot of coronary calcifications. Stenosis of the proximal celiac artery     ECG was normal   Troponin was negative then 0.02      PAST MEDICAL & SURGICAL HISTORY  Hypothyroid    UTI (urinary tract infection)    Atrial fibrillation, unspecified type  no longer in atrial fifrillation    Anxiety    Mild intermittent asthma, unspecified whether complicated  no longer present    Cellulitis of finger, unspecified laterality  resolved    Diverticulitis  resolved    Reflux gastritis    Mitral valve insufficiency, unspecified etiology    Idiopathic pericarditis, unspecified chronicity    History of appendectomy    Status post placement of implantable loop recorder        FAMILY HISTORY:  FAMILY HISTORY:  Known health problems: none (Father, Mother)        SOCIAL HISTORY:  Social History: no toxic habits      ALLERGIES:  sulfa drugs (Unknown)      MEDICATIONS:  albuterol    90 MICROgram(s) HFA Inhaler 2 Puff(s) Inhalation every 6 hours  albuterol/ipratropium for Nebulization 3 milliLiter(s) Nebulizer every 6 hours  aMIOdarone    Tablet 200 milliGRAM(s) Oral daily  calcium carbonate 1250 mG  + Vitamin D (OsCal 500 + D) 1 Tablet(s) Oral daily  chlorhexidine 2% Cloths 1 Application(s) Topical <User Schedule>  cyanocobalamin 1000 MICROGram(s) Oral daily  famotidine    Tablet 40 milliGRAM(s) Oral at bedtime  levothyroxine 25 MICROGram(s) Oral daily  memantine 10 milliGRAM(s) Oral two times a day  multivitamin/minerals 1 Tablet(s) Oral daily  senna 2 Tablet(s) Oral at bedtime    PRN:  acetaminophen     Tablet .. 650 milliGRAM(s) Oral every 6 hours PRN  aluminum hydroxide/magnesium hydroxide/simethicone Suspension 30 milliLiter(s) Oral every 4 hours PRN  diazepam    Tablet 5 milliGRAM(s) Oral three times a day PRN  melatonin 5 milliGRAM(s) Oral at bedtime PRN      HOME MEDICATIONS:  Home Medications:  acetaminophen 325 mg oral tablet: 2 tab(s) orally every 6 hours, As needed, Mild Pain (1 - 3), Moderate Pain (4 - 6) (2022 13:34)  amiodarone 200 mg oral tablet: 1 tab(s) orally once a day (2022 13:34)  Calcium 600+D 600 mg-5 mcg (200 intl units) oral tablet: 1 tab(s) orally once a day (2022 13:34)  diazePAM 5 mg oral tablet: 1 tab(s) orally 3 times a day, As Needed (2022 13:34)  Eliquis 2.5 mg oral tablet: 1 tab(s) orally 2 times a day (2022 13:34)  famotidine 40 mg oral tablet: 1 tab(s) orally once a day (at bedtime) (2022 13:34)  levothyroxine 25 mcg (0.025 mg) oral tablet: 1 tab(s) orally once a day (2022 13:34)  memantine 10 mg oral tablet: 1 tab(s) orally 2 times a day (2022 13:34)  Multiple Vitamins with Minerals oral tablet: 1 tab(s) orally once a day (2022 10:07)  senna leaf extract oral tablet: 2 tab(s) orally once a day (at bedtime) (2022 13:34)  Vitamin B12 1000 mcg oral tablet: 1 tab(s) orally once a day (2022 13:34)      VITALS:   T(F): 96.6 ( @ 00:35), Max: 97.8 ( @ 10:12)  HR: 68 ( @ 00:35) (68 - 70)  BP: 167/74 ( @ 00:35) (167/74 - 168/70)  BP(mean): --  RR: 17 ( @ 10:12) (17 - 17)  SpO2: 99% ( @ 00:35) (99% - 100%)    I&O's Summary      REVIEW OF SYSTEMS:  CONSTITUTIONAL: No weakness, fevers or chills  HEENT: No visual changes, neck/ear pain  RESPIRATORY: No cough, sob  CARDIOVASCULAR: See HPI  GASTROINTESTINAL: No abdominal pain. No nausea, vomiting, diarrhea   GENITOURINARY: No dysuria, frequency or hematuria  NEUROLOGICAL: No new focal deficits  SKIN: No new rashes    PHYSICAL EXAM:  General: Not in distress.  Non-toxic appearing.   HEENT: EOMI  Cardio: regular, S1, S2, no murmur  Pulm: B/L BS.  No wheezing / crackles / rales  Abdomen: Soft, non-tender, non-distended. Normoactive bowel sounds  Extremities: No edema b/l le  Neuro: A&O x1. No focal deficits    LABS:                        12.3   5.29  )-----------( 157      ( 2023 11:26 )             36.8         140  |  105  |  23<H>  ----------------------------<  102<H>  5.1<H>   |  22  |  1.4    Ca    9.8      2023 11:26    TPro  6.6  /  Alb  4.2  /  TBili  0.5  /  DBili  x   /  AST  25  /  ALT  10  /  AlkPhos  54        Troponin T, Serum: 0.02 ng/mL *H* (23 @ 15:51)  Troponin T, Serum: <0.01 ng/mL (23 @ 11:26)    CARDIAC MARKERS ( 2023 15:51 )  x     / 0.02 ng/mL / x     / x     / x      CARDIAC MARKERS ( 2023 11:26 )  x     / <0.01 ng/mL / x     / x     / x            Troponin trend:    Serum Pro-Brain Natriuretic Peptide: 211 pg/mL (23 @ 11:26)      COVID-19 PCR: NotDetec (04 Dec 2022 08:50)  COVID-19 PCR: NotDetec (02 Dec 2022 08:36)  COVID-19 PCR: NotDetec (2022 12:42)  COVID-19 PCR: NotDetec (18 Aug 2022 10:54)      EC Lead ECG:   Ventricular Rate 67 BPM    Atrial Rate 67 BPM    P-R Interval 128 ms    QRS Duration 90 ms    Q-T Interval 418 ms    QTC Calculation(Bazett) 441 ms    P Axis 75 degrees    R Axis 45 degrees    T Axis 69 degrees    Diagnosis Line Normal sinus rhythm  Normal ECG    Confirmed by Bossman Potter ([id number]) on 2023 11:26:53 AM ( @ 10:13)      TELEMETRY EVENTS:

## 2023-01-09 NOTE — DISCHARGE NOTE PROVIDER - NSDCCPCAREPLAN_GEN_ALL_CORE_FT
PRINCIPAL DISCHARGE DIAGNOSIS  Diagnosis: Chest pain, rule out acute myocardial infarction  Assessment and Plan of Treatment: Follow up with your cardiologist within 2-4 weeks of discharge.      SECONDARY DISCHARGE DIAGNOSES  Diagnosis: Elevated troponin  Assessment and Plan of Treatment:      PRINCIPAL DISCHARGE DIAGNOSIS  Diagnosis: Chest pain, rule out acute myocardial infarction  Assessment and Plan of Treatment: Follow up with your cardiologist within 2-4 weeks of discharge.      SECONDARY DISCHARGE DIAGNOSES  Diagnosis: Elevated troponin  Assessment and Plan of Treatment:     Diagnosis: Celiac artery stenosis  Assessment and Plan of Treatment: Follow up with your PCP or cardiologist regarding this finding on CT Scan. Will need referral to a vascular cardiologist or vascular surgeon.

## 2023-01-09 NOTE — CONSULT NOTE ADULT - ATTENDING COMMENTS
Briefly, 87 year old woman came in for chest pain. Cardiology consulted for elevated troponins, which have trended down. However, due to her risk factors, would continue ACS treatment and get a pharmacological stress test.     Thank you for this consult. Please call/MS teams with questions. Briefly, 87 year old woman came in for chest pain. Cardiology consulted for elevated troponins, which have trended down. However, due to her risk factors, would get a nuclear pharmacological stress test. Can stop plavix and heparin drip in the interim.     Thank you for this consult. Please call/MS teams with questions.

## 2023-01-09 NOTE — CONSULT NOTE ADULT - ASSESSMENT
Impression   # NSTEMI  # History of afib on amio and eliquis (currently in sinus)   # Celiac artery stenosis   # History of pericarditis   # Dementia       Recommendations   - Please start aspirin 81 mg   - Give Plavix 300mg now and 75 mg daily   - Please send another set of troponins now   - Start heparin gtt in the morning   - Check TTE   - Based on newest set of troponin will discuss stress test vs cardiac cath   - Consider vascular cardiology evaluation for celiac artery stenosis     This a preliminary plan. Will need to discuss with attending.   Signature: Delio CHRISTIANSON, 1st year Cardiology Fellow   Impression   # NSTEMI  # History of afib on amio and eliquis (currently in sinus)   # Celiac artery stenosis   # History of pericarditis   # Dementia       Recommendations   - Please start aspirin 81 mg   - Give Plavix 300mg now and 75 mg daily   - Please send another set of troponins now   - Start heparin gtt in the morning   - Check TTE   - If subsequent troponin stable or trending down can proceed with pharmacological stress test   - If mental status back to baseline can consider cardiac cath but she isn't the best candidate at the moment   - Consider vascular cardiology evaluation for celiac artery stenosis     This a preliminary plan. Will need to discuss with attending.   Signature: Delio CHRISTIANSON, 1st year Cardiology Fellow   Impression   # NSTEMI  # History of afib on amio and eliquis (currently in sinus)   # Celiac artery stenosis   # History of pericarditis   # Dementia       Recommendations   - Please start aspirin 81 mg   - Give Plavix 300mg now and 75 mg daily   - Please send another set of troponins now   - Start heparin gtt in the morning   - Check TTE   - If subsequent troponin stable or trending down can proceed with pharmacological stress test   - If mental status back to baseline can consider cardiac cath but she isn't the best candidate at the moment   - Consider vascular cardiology evaluation for celiac artery stenosis     Discussed with attending.   Signature: Delio CHRISTIANSON, 1st year Cardiology Fellow

## 2023-01-09 NOTE — DISCHARGE NOTE PROVIDER - HOSPITAL COURSE
88yo F w h/o HTN, HLD, Paroxysmal AFib (on amio and eliquis), Cardiac Pauses s/p loop recorder, Idiopathic Pericarditis, Mitral Regurg,  Hypothyroid, SLE, GERD, Asthma, Diverticulitis, Anxiety, Dementia presents for chest pain. Troponins peaked at 0.02. NST done which was negative for ischemia. Pt evaluated by cardiology. Medically stable for discharge.

## 2023-01-09 NOTE — ED ADULT NURSE REASSESSMENT NOTE - NS ED NURSE REASSESS COMMENT FT1
Pt is seen evaluate by Ed attending and hospitalist at bed site  clear to go instruction is given for medication and F/UP care verbalize understanding of instruction given, pt waiting for ambulance NAD NAD noted at this time .

## 2023-01-10 ENCOUNTER — NON-APPOINTMENT (OUTPATIENT)
Age: 88
End: 2023-01-10

## 2023-01-10 ENCOUNTER — APPOINTMENT (OUTPATIENT)
Dept: CARDIOLOGY | Facility: CLINIC | Age: 88
End: 2023-01-10
Payer: MEDICARE

## 2023-01-10 LAB
FOLATE SERPL-MCNC: 14.5 NG/ML — SIGNIFICANT CHANGE UP
VIT B12 SERPL-MCNC: 620 PG/ML — SIGNIFICANT CHANGE UP (ref 232–1245)

## 2023-01-10 PROCEDURE — G2066: CPT

## 2023-01-10 PROCEDURE — 93298 REM INTERROG DEV EVAL SCRMS: CPT

## 2023-01-11 PROBLEM — L98.499 SKIN ULCER: Status: ACTIVE | Noted: 2018-03-09

## 2023-01-11 PROBLEM — S81.802D WOUND OF LEFT LOWER EXTREMITY, SUBSEQUENT ENCOUNTER: Status: ACTIVE | Noted: 2018-02-01

## 2023-01-11 NOTE — PHYSICAL EXAM
[General Appearance - Alert] : alert [General Appearance - In No Acute Distress] : in no acute distress [2+] : left foot dorsalis pedis 2+ [] : normal gait [Normal Foot/Ankle] : Both lower extremities were exposed and visualized. Standing exam demonstrates normal foot posture and alignment. Hindfoot exam shows no hindfoot valgus or varus [Ankle Swelling (On Exam)] : not present [Delayed in the Right Toes] : capillary refills normal in right toes [Delayed in the Left Toes] : capillary refills normal in the left toes [Foot Ulcer] : no foot ulcer [FreeTextEntry1] : Hyperkeratotic tissue sub met head 1 right foot, small ulcer lateral malleolus with minimal clear drainage, no malodor or other local signs of infection

## 2023-01-11 NOTE — HISTORY OF PRESENT ILLNESS
Discharge Instructions    Self Care Instructions:    Diet:  · Eat from the five basic food groups  · Fruits and proteins are good choices  · Limit fast foods and added salt/sugar  · Moderate carbonated and caffeine drinks    Hydration:  · Drink at least 8 large glasses of water a day    Kick Counts:  · After a meal, rest on your side and note the baby's movements until you have 8-10 movements in a 2 hour counting period.    · If you do not feel your baby move 8-10 times within 2 hours or you sense a change in the type or character of the baby's movement, you should come in to the hospital at once.  · Remember; your baby can sleep for 20-40 minutes at a time.      When to notify your provider:    · Vaginal bleeding like a period;  You may spot if we examined your cervix.  · If your water breaks, come to the birth center.  Note time, color and odor.  · Abdominal tenderness or pain that does not go away  · Contractions every 3 to 5 minutes for 1 to 2 hours.  True contractions move from front to back, are regular; usually get longer, stronger and closer together and do not stop if you change your position or activity.  · Any burning, urgency or frequency in relation to emptying your bladder.  · Any temperature greater than 100.4 degrees, chills, flu-like symptoms       [Sneakers] : halle [FreeTextEntry1] : Patient presents for painful callus sub met head 1 right foot. Referred by Dr. Pride who is currently managing a small ulcer on the lateral malleolus right foot. No other pedal complains today

## 2023-01-11 NOTE — ASSESSMENT
[FreeTextEntry1] : Pt seen and examined, all findings discussed\par Sharp debridement of callus using dermal curette w/o incident\par Following Dr. Pride for ulcer on lateral malleolus\par RTC w/[facility] for orthotic fitting

## 2023-01-11 NOTE — REASON FOR VISIT
[Initial Visit] : an initial visit for [Family Member] : family member [FreeTextEntry2] : Callus right foot

## 2023-01-12 DIAGNOSIS — S81.802D UNSPECIFIED OPEN WOUND, LEFT LOWER LEG, SUBSEQUENT ENCOUNTER: ICD-10-CM

## 2023-01-12 DIAGNOSIS — M77.41 METATARSALGIA, RIGHT FOOT: ICD-10-CM

## 2023-01-12 DIAGNOSIS — L98.499 NON-PRESSURE CHRONIC ULCER OF SKIN OF OTHER SITES WITH UNSPECIFIED SEVERITY: ICD-10-CM

## 2023-01-12 DIAGNOSIS — M79.671 PAIN IN RIGHT FOOT: ICD-10-CM

## 2023-01-12 DIAGNOSIS — M79.672 PAIN IN LEFT FOOT: ICD-10-CM

## 2023-01-16 ENCOUNTER — RX RENEWAL (OUTPATIENT)
Age: 88
End: 2023-01-16

## 2023-01-24 ENCOUNTER — APPOINTMENT (OUTPATIENT)
Dept: ELECTROPHYSIOLOGY | Facility: CLINIC | Age: 88
End: 2023-01-24
Payer: MEDICARE

## 2023-01-24 ENCOUNTER — APPOINTMENT (OUTPATIENT)
Dept: BURN CARE | Facility: CLINIC | Age: 88
End: 2023-01-24
Payer: MEDICARE

## 2023-01-24 ENCOUNTER — OUTPATIENT (OUTPATIENT)
Dept: OUTPATIENT SERVICES | Facility: HOSPITAL | Age: 88
LOS: 1 days | Discharge: HOME | End: 2023-01-24

## 2023-01-24 VITALS
BODY MASS INDEX: 20.73 KG/M2 | OXYGEN SATURATION: 99 % | DIASTOLIC BLOOD PRESSURE: 62 MMHG | WEIGHT: 129 LBS | HEIGHT: 66 IN | HEART RATE: 73 BPM | SYSTOLIC BLOOD PRESSURE: 108 MMHG

## 2023-01-24 VITALS — DIASTOLIC BLOOD PRESSURE: 63 MMHG | HEART RATE: 72 BPM | SYSTOLIC BLOOD PRESSURE: 116 MMHG

## 2023-01-24 DIAGNOSIS — M32.9 SYSTEMIC LUPUS ERYTHEMATOSUS, UNSPECIFIED: ICD-10-CM

## 2023-01-24 DIAGNOSIS — Z79.899 OTHER LONG TERM (CURRENT) DRUG THERAPY: ICD-10-CM

## 2023-01-24 DIAGNOSIS — I21.4 NON-ST ELEVATION (NSTEMI) MYOCARDIAL INFARCTION: ICD-10-CM

## 2023-01-24 DIAGNOSIS — I77.1 STRICTURE OF ARTERY: ICD-10-CM

## 2023-01-24 DIAGNOSIS — J45.20 MILD INTERMITTENT ASTHMA, UNCOMPLICATED: ICD-10-CM

## 2023-01-24 DIAGNOSIS — Z90.89 ACQUIRED ABSENCE OF OTHER ORGANS: ICD-10-CM

## 2023-01-24 DIAGNOSIS — Z79.01 LONG TERM (CURRENT) USE OF ANTICOAGULANTS: ICD-10-CM

## 2023-01-24 DIAGNOSIS — Z95.818 PRESENCE OF OTHER CARDIAC IMPLANTS AND GRAFTS: ICD-10-CM

## 2023-01-24 DIAGNOSIS — E78.5 HYPERLIPIDEMIA, UNSPECIFIED: ICD-10-CM

## 2023-01-24 DIAGNOSIS — I25.10 ATHEROSCLEROTIC HEART DISEASE OF NATIVE CORONARY ARTERY WITHOUT ANGINA PECTORIS: ICD-10-CM

## 2023-01-24 DIAGNOSIS — R07.9 CHEST PAIN, UNSPECIFIED: ICD-10-CM

## 2023-01-24 DIAGNOSIS — I48.0 PAROXYSMAL ATRIAL FIBRILLATION: ICD-10-CM

## 2023-01-24 DIAGNOSIS — I10 ESSENTIAL (PRIMARY) HYPERTENSION: ICD-10-CM

## 2023-01-24 DIAGNOSIS — E03.9 HYPOTHYROIDISM, UNSPECIFIED: ICD-10-CM

## 2023-01-24 DIAGNOSIS — K21.9 GASTRO-ESOPHAGEAL REFLUX DISEASE WITHOUT ESOPHAGITIS: ICD-10-CM

## 2023-01-24 DIAGNOSIS — Z79.890 HORMONE REPLACEMENT THERAPY: ICD-10-CM

## 2023-01-24 DIAGNOSIS — Z95.818 PRESENCE OF OTHER CARDIAC IMPLANTS AND GRAFTS: Chronic | ICD-10-CM

## 2023-01-24 DIAGNOSIS — Z78.1 PHYSICAL RESTRAINT STATUS: ICD-10-CM

## 2023-01-24 DIAGNOSIS — I34.0 NONRHEUMATIC MITRAL (VALVE) INSUFFICIENCY: ICD-10-CM

## 2023-01-24 DIAGNOSIS — Z45.09 ENCOUNTER FOR ADJUSTMENT AND MANAGEMENT OF OTHER CARDIAC DEVICE: ICD-10-CM

## 2023-01-24 DIAGNOSIS — F03.94 UNSPECIFIED DEMENTIA, UNSPECIFIED SEVERITY, WITH ANXIETY: ICD-10-CM

## 2023-01-24 DIAGNOSIS — Z90.49 ACQUIRED ABSENCE OF OTHER SPECIFIED PARTS OF DIGESTIVE TRACT: Chronic | ICD-10-CM

## 2023-01-24 DIAGNOSIS — I45.5 OTHER SPECIFIED HEART BLOCK: ICD-10-CM

## 2023-01-24 PROCEDURE — 93000 ELECTROCARDIOGRAM COMPLETE: CPT | Mod: 59

## 2023-01-24 PROCEDURE — 99214 OFFICE O/P EST MOD 30 MIN: CPT

## 2023-01-24 PROCEDURE — 11042 DBRDMT SUBQ TIS 1ST 20SQCM/<: CPT

## 2023-01-24 RX ORDER — MEMANTINE HYDROCHLORIDE 10 MG/1
10 TABLET, FILM COATED ORAL
Qty: 60 | Refills: 6 | Status: DISCONTINUED | COMMUNITY
Start: 2022-11-14 | End: 2023-01-24

## 2023-01-24 RX ORDER — AMIODARONE HYDROCHLORIDE 200 MG/1
200 TABLET ORAL DAILY
Qty: 30 | Refills: 6 | Status: DISCONTINUED | COMMUNITY
Start: 2021-08-24 | End: 2023-01-24

## 2023-01-24 RX ORDER — APIXABAN 2.5 MG/1
2.5 TABLET, FILM COATED ORAL
Qty: 180 | Refills: 3 | Status: ACTIVE | COMMUNITY
Start: 2021-04-26

## 2023-01-24 RX ORDER — DOXYCYCLINE HYCLATE 100 MG/1
100 TABLET ORAL
Qty: 20 | Refills: 1 | Status: DISCONTINUED | COMMUNITY
Start: 2022-12-13 | End: 2023-01-24

## 2023-01-24 RX ORDER — CEPHALEXIN 500 MG/1
500 TABLET ORAL 3 TIMES DAILY
Qty: 21 | Refills: 0 | Status: ACTIVE | COMMUNITY
Start: 2023-01-24 | End: 1900-01-01

## 2023-01-24 NOTE — REASON FOR VISIT
[Revisit] : revisit [Were you seen in the Emergency Room?] : not seen in the emergency room [Were you admitted to the burn center at [facility]?] : not admitted to the burn center at [facility] [Friend] : friend

## 2023-01-24 NOTE — END OF VISIT
[FreeTextEntry3] : I was present with the NP/PA during the history and exam of the patient. I discussed patient's management with her in details.I reviewed the NP’s note and agree with the documented findings and plan of care. I would like to take this opportunity to thank you for involving me in this patient care. Please do not hesitate to contact me if you have any further questions at [phone].\par  [Time Spent: ___ minutes] : I have spent [unfilled] minutes of time on the encounter.

## 2023-01-24 NOTE — PROCEDURE
[No] : not [NSR] : normal sinus rhythm [See Scanned Paceart Report] : See scanned paceart report [See Device Printout] : See device printout [Normal] : The battery status is normal. [Sensing Amplitude ___mv] : sensing amplitude was [unfilled] mv [de-identified] : [employer] [de-identified] : [id number] [de-identified] : [id number] [de-identified] : 3/11/21 [de-identified] : "normal" [de-identified] : no episodes

## 2023-01-24 NOTE — ASSESSMENT
[FreeTextEntry1] : ## paroxysmal atrial fibrillation s/p ILR\par ## Sinus Pauses\par \par - ILR interrogation revealed appropriate function. No events recorded.\par - On Eliquis. Compliant. No bleeding issues. Creat 1.4 from recent labs. Patient to contact us if there is any bleeding issues, interruption or any issues with OAC. Patient to go to ER/call 911 if any major bleeding. \par - On Amiodarone. Decreased from 200mg QD to 100 mg QD. LFTs normal from recent labs.\par - Pt moving to [place] will bring home monitor to continue remote monitoring\par - Return in 9 months \par \par \par I have also advised the patient to go to the nearest emergency room if he experiences any chest pain, dyspnea, syncope, or has any other compelling symptoms.\par

## 2023-01-24 NOTE — PHYSICAL EXAM
[Healing] : healing [Size%: ______] : Size: [unfilled]% [Infected?] : Infected: No [3] : 3 out of 10 [Abnormal] : abnormal [Small] : small  [] : yes [de-identified] : The right lateral ankle wound is healimg    and measures 0.2x0.2cm in the center and 1x1cm in total.  There is mild tenderness,  non viable tissue in the center, and no drainage.  Excisional debridement with scissors was performed on devitalized tissue down  to and including subcutaneous tissue. .  The patient was instructed to clean  the wound with soap and water.   Continue local wound care with moisturizer. and allevyne  pad.  Follow up 1-2 weeks. . \par \par  [TWNoteComboBox1] : bandaid

## 2023-01-24 NOTE — HISTORY OF PRESENT ILLNESS
[Did you have an operation on your burn/wound injury?] : Did you have an operation on your burn/wound injury? No [Did this injury occur on the job?] : Did this injury occur on the job? No [de-identified] : 5/12/22 [de-identified] : home  [de-identified] : wound right ankle and right lower leg [de-identified] : healing  right lateral ankle wound

## 2023-01-24 NOTE — HISTORY OF PRESENT ILLNESS
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 She underwent loop explant and reimplant on 3/10/2021.\par \par Her CHADSVASC is 4 (female, age, hypertension). She is on Eliquis 2.5 mg.\par \par \par PERICARDIAL EFFUSION DEC 2012 COMPLICATED by AF\par NERVE STIMULATOR IMPLANTED FOR LOW BACK PAIN ON 8/27/2020\par Pauses in Dec 2019 to Jan 2020\par AF episode on 9/28/2018 lasting 1 hour and 56 seconds (No AF between 9/2018 and 9/2020)\par 8/13/2021 to 8/19/2021: 14 "AF" episodes, burden 0.2%, totalling 7 hours- multiple episodes of AF and Sinus with bursts of Pulmonary veins firing;\par 8/24/2021: started on Amiodarone\par 11/23/2021: Last burst of APCs, non-sustained SVT on 9/27/2021\par 07/26/22: Feels fine. No further episodes.\par \par -------------------------------------------------\par Referring Physician: Dr. Leonel Marte\par -------------------------------------------------\par \par \par

## 2023-01-24 NOTE — CARDIOLOGY SUMMARY
[de-identified] : 01/24/2023: SR, short RI 118ms  QTc 425 ms    HR 73 bpm\par 07/25/2022: SR@83, , QRSd 88\par 11/23/2021: sinus rhythm at 77 bpm, short , no significant ST/T abnormalities\par 08/24/2021: sinus rhythm at 83 bpm, short , no significant ST/T abnormalities\par 04/27/2021: sinus rhythm at 87 bpm, short RI, no significant ST/T abnormalities\par 02/9/2021: sinus rhythm at 79 bpm, short RI, no significant ST/T abnormalities, QTc 409

## 2023-01-24 NOTE — ASSESSMENT
[FreeTextEntry1] : The right lateral ankle wound is healimg    and measures 0.2x0.2cm in the center and 1x1cm in total.  There is mild tenderness,  non viable tissue in the center, and no drainage.  Excisional debridement with scissors was performed on devitalized tissue down  to and including subcutaneous tissue. .  The patient was instructed to clean  the wound with soap and water.   Continue local wound care with moisturizer. and allevyne  pad.  Follow up 1-2 weeks. . \par \par  [Wound Care] : wound care

## 2023-01-25 ENCOUNTER — TRANSCRIPTION ENCOUNTER (OUTPATIENT)
Age: 88
End: 2023-01-25

## 2023-01-25 DIAGNOSIS — L98.8 OTHER SPECIFIED DISORDERS OF THE SKIN AND SUBCUTANEOUS TISSUE: ICD-10-CM

## 2023-01-27 ENCOUNTER — TRANSCRIPTION ENCOUNTER (OUTPATIENT)
Age: 88
End: 2023-01-27

## 2023-02-09 ENCOUNTER — APPOINTMENT (OUTPATIENT)
Dept: BURN CARE | Facility: CLINIC | Age: 88
End: 2023-02-09
Payer: MEDICARE

## 2023-02-09 ENCOUNTER — APPOINTMENT (OUTPATIENT)
Dept: BURN CARE | Facility: CLINIC | Age: 88
End: 2023-02-09

## 2023-02-09 ENCOUNTER — OUTPATIENT (OUTPATIENT)
Dept: OUTPATIENT SERVICES | Facility: HOSPITAL | Age: 88
LOS: 1 days | End: 2023-02-09
Payer: MEDICARE

## 2023-02-09 VITALS — SYSTOLIC BLOOD PRESSURE: 133 MMHG | HEART RATE: 69 BPM | DIASTOLIC BLOOD PRESSURE: 67 MMHG

## 2023-02-09 DIAGNOSIS — Z95.818 PRESENCE OF OTHER CARDIAC IMPLANTS AND GRAFTS: Chronic | ICD-10-CM

## 2023-02-09 DIAGNOSIS — Z00.8 ENCOUNTER FOR OTHER GENERAL EXAMINATION: ICD-10-CM

## 2023-02-09 DIAGNOSIS — Z90.49 ACQUIRED ABSENCE OF OTHER SPECIFIED PARTS OF DIGESTIVE TRACT: Chronic | ICD-10-CM

## 2023-02-09 PROCEDURE — 11042 DBRDMT SUBQ TIS 1ST 20SQCM/<: CPT

## 2023-02-09 RX ORDER — CEPHALEXIN 250 MG/1
250 CAPSULE ORAL
Qty: 10 | Refills: 0 | Status: ACTIVE | COMMUNITY
Start: 2023-02-09 | End: 1900-01-01

## 2023-02-09 NOTE — PHYSICAL EXAM
[Healing] : healing [Size%: ______] : Size: [unfilled]% [Infected?] : Infected: No [3] : 3 out of 10 [Abnormal] : abnormal [Small] : small  [] : no [de-identified] : The right lateral ankle wound is healimg    and measures 0.2x0.2cm in the center and 1x1cm in total.  There is mild tenderness,  non viable tissue in the center, and no drainage.  Excisional debridement with scissors was performed on devitalized tissue down  to and including subcutaneous tissue. .  The patient was instructed to clean  the wound with soap and water.   Continue local wound care with moisturizer. and allevyne  pad.  Follow up 1-2 weeks. . \par \par  [TWNoteComboBox1] : bandaid

## 2023-02-09 NOTE — HISTORY OF PRESENT ILLNESS
[Did you have an operation on your burn/wound injury?] : Did you have an operation on your burn/wound injury? No [Did this injury occur on the job?] : Did this injury occur on the job? No [de-identified] : 5/12/22 [de-identified] : home  [de-identified] : wound right ankle and right lower leg [de-identified] : healing  right lateral ankle wound

## 2023-02-09 NOTE — REASON FOR VISIT
[Revisit] : revisit [Were you seen in the Emergency Room?] : not seen in the emergency room [Were you admitted to the burn center at [facility]?] : not admitted to the burn center at [facility] [Friend] : friend

## 2023-02-14 ENCOUNTER — NON-APPOINTMENT (OUTPATIENT)
Age: 88
End: 2023-02-14

## 2023-02-14 ENCOUNTER — APPOINTMENT (OUTPATIENT)
Dept: CARDIOLOGY | Facility: CLINIC | Age: 88
End: 2023-02-14
Payer: MEDICARE

## 2023-02-14 DIAGNOSIS — L98.8 OTHER SPECIFIED DISORDERS OF THE SKIN AND SUBCUTANEOUS TISSUE: ICD-10-CM

## 2023-02-14 PROCEDURE — 93298 REM INTERROG DEV EVAL SCRMS: CPT

## 2023-02-14 PROCEDURE — G2066: CPT

## 2023-02-21 ENCOUNTER — OUTPATIENT (OUTPATIENT)
Dept: OUTPATIENT SERVICES | Facility: HOSPITAL | Age: 88
LOS: 1 days | End: 2023-02-21
Payer: MEDICARE

## 2023-02-21 ENCOUNTER — APPOINTMENT (OUTPATIENT)
Dept: BURN CARE | Facility: CLINIC | Age: 88
End: 2023-02-21
Payer: MEDICARE

## 2023-02-21 VITALS — HEART RATE: 79 BPM | DIASTOLIC BLOOD PRESSURE: 82 MMHG | SYSTOLIC BLOOD PRESSURE: 112 MMHG

## 2023-02-21 DIAGNOSIS — Z00.8 ENCOUNTER FOR OTHER GENERAL EXAMINATION: ICD-10-CM

## 2023-02-21 DIAGNOSIS — Z95.818 PRESENCE OF OTHER CARDIAC IMPLANTS AND GRAFTS: Chronic | ICD-10-CM

## 2023-02-21 DIAGNOSIS — Z90.49 ACQUIRED ABSENCE OF OTHER SPECIFIED PARTS OF DIGESTIVE TRACT: Chronic | ICD-10-CM

## 2023-02-21 PROCEDURE — 99213 OFFICE O/P EST LOW 20 MIN: CPT

## 2023-02-21 RX ORDER — CEPHALEXIN 500 MG/1
500 CAPSULE ORAL 4 TIMES DAILY
Qty: 28 | Refills: 0 | Status: ACTIVE | COMMUNITY
Start: 2023-02-21 | End: 1900-01-01

## 2023-02-21 NOTE — HISTORY OF PRESENT ILLNESS
[Did you have an operation on your burn/wound injury?] : Did you have an operation on your burn/wound injury? No [Did this injury occur on the job?] : Did this injury occur on the job? No [de-identified] : 5/12/22 [de-identified] : home  [de-identified] : wound right ankle and right lower leg [de-identified] : healing  right lateral ankle wound

## 2023-02-21 NOTE — REASON FOR VISIT
[Revisit] : revisit [Were you seen in the Emergency Room?] : not seen in the emergency room [Were you admitted to the burn center at [facility]?] : not admitted to the burn center at [facility] [Friend] : friend

## 2023-02-21 NOTE — ASSESSMENT
[FreeTextEntry1] : The right lateral ankle wound is healimg    and measures 0.2x0.2cm in the center and 1x1cm in total.  There is mild/ moderate tenderness,  non viable tissue in the center, and no drainage .  The patient was instructed to clean  the wound with soap and water.   Continue local wound care with moisturizer. and allevyne  pad.  Follow up prn.  Patient leaving area.\par  [Wound Care] : wound care 0

## 2023-02-21 NOTE — PHYSICAL EXAM
[Healing] : healing [Size%: ______] : Size: [unfilled]% [Infected?] : Infected: No [3] : 3 out of 10 [Abnormal] : abnormal [Small] : small  [] : no [de-identified] : The right lateral ankle wound is healimg    and measures 0.2x0.2cm in the center and 1x1cm in total.  There is mild/ moderate tenderness,  non viable tissue in the center, and no drainage .  The patient was instructed to clean  the wound with soap and water.   Continue local wound care with moisturizer. and allevyne  pad.  Follow up prn.  Patient leaving area.\par \par  [TWNoteComboBox1] : bandaid
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
## 2023-02-22 DIAGNOSIS — S91.001D UNSPECIFIED OPEN WOUND, RIGHT ANKLE, SUBSEQUENT ENCOUNTER: ICD-10-CM
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
## 2023-02-22 DIAGNOSIS — L98.8 OTHER SPECIFIED DISORDERS OF THE SKIN AND SUBCUTANEOUS TISSUE: ICD-10-CM

## 2023-02-23 ENCOUNTER — FOLLOW UP (OUTPATIENT)
Dept: URBAN - METROPOLITAN AREA CLINIC 21 | Facility: CLINIC | Age: 88
End: 2023-02-23

## 2023-02-23 DIAGNOSIS — H25.811: ICD-10-CM

## 2023-02-23 DIAGNOSIS — H16.142: ICD-10-CM

## 2023-02-23 DIAGNOSIS — H16.232: ICD-10-CM

## 2023-02-23 DIAGNOSIS — H04.123: ICD-10-CM

## 2023-02-23 DIAGNOSIS — H17.812: ICD-10-CM

## 2023-02-23 PROCEDURE — MISCOPTOS MISCELLANEOUS, OPTOS: Mod: NC

## 2023-02-23 PROCEDURE — 92014 COMPRE OPH EXAM EST PT 1/>: CPT

## 2023-02-23 ASSESSMENT — TONOMETRY
OS_IOP_MMHG: 9
OD_IOP_MMHG: 9

## 2023-02-23 ASSESSMENT — VISUAL ACUITY
OS_CC: J1
OS_CC: 20/40
OD_CC: 20/30-
OD_CC: J1+

## 2023-02-24 ENCOUNTER — EMERGENCY (EMERGENCY)
Facility: HOSPITAL | Age: 88
LOS: 0 days | Discharge: HOME | End: 2023-02-24
Attending: EMERGENCY MEDICINE
Payer: MEDICARE

## 2023-02-24 VITALS
HEART RATE: 72 BPM | SYSTOLIC BLOOD PRESSURE: 132 MMHG | OXYGEN SATURATION: 99 % | DIASTOLIC BLOOD PRESSURE: 64 MMHG | RESPIRATION RATE: 18 BRPM

## 2023-02-24 VITALS
TEMPERATURE: 98 F | RESPIRATION RATE: 20 BRPM | OXYGEN SATURATION: 99 % | HEART RATE: 82 BPM | DIASTOLIC BLOOD PRESSURE: 62 MMHG | SYSTOLIC BLOOD PRESSURE: 135 MMHG | HEIGHT: 66 IN

## 2023-02-24 DIAGNOSIS — L97.319 NON-PRESSURE CHRONIC ULCER OF RIGHT ANKLE WITH UNSPECIFIED SEVERITY: ICD-10-CM

## 2023-02-24 DIAGNOSIS — F41.9 ANXIETY DISORDER, UNSPECIFIED: ICD-10-CM

## 2023-02-24 DIAGNOSIS — Z95.818 PRESENCE OF OTHER CARDIAC IMPLANTS AND GRAFTS: ICD-10-CM

## 2023-02-24 DIAGNOSIS — Z79.82 LONG TERM (CURRENT) USE OF ASPIRIN: ICD-10-CM

## 2023-02-24 DIAGNOSIS — Z87.19 PERSONAL HISTORY OF OTHER DISEASES OF THE DIGESTIVE SYSTEM: ICD-10-CM

## 2023-02-24 DIAGNOSIS — E03.9 HYPOTHYROIDISM, UNSPECIFIED: ICD-10-CM

## 2023-02-24 DIAGNOSIS — Z95.818 PRESENCE OF OTHER CARDIAC IMPLANTS AND GRAFTS: Chronic | ICD-10-CM

## 2023-02-24 DIAGNOSIS — Z79.01 LONG TERM (CURRENT) USE OF ANTICOAGULANTS: ICD-10-CM

## 2023-02-24 DIAGNOSIS — Z90.49 ACQUIRED ABSENCE OF OTHER SPECIFIED PARTS OF DIGESTIVE TRACT: ICD-10-CM

## 2023-02-24 DIAGNOSIS — I10 ESSENTIAL (PRIMARY) HYPERTENSION: ICD-10-CM

## 2023-02-24 DIAGNOSIS — M32.9 SYSTEMIC LUPUS ERYTHEMATOSUS, UNSPECIFIED: ICD-10-CM

## 2023-02-24 DIAGNOSIS — Z88.2 ALLERGY STATUS TO SULFONAMIDES: ICD-10-CM

## 2023-02-24 DIAGNOSIS — F03.90 UNSPECIFIED DEMENTIA, UNSPECIFIED SEVERITY, WITHOUT BEHAVIORAL DISTURBANCE, PSYCHOTIC DISTURBANCE, MOOD DISTURBANCE, AND ANXIETY: ICD-10-CM

## 2023-02-24 DIAGNOSIS — E78.5 HYPERLIPIDEMIA, UNSPECIFIED: ICD-10-CM

## 2023-02-24 DIAGNOSIS — I48.0 PAROXYSMAL ATRIAL FIBRILLATION: ICD-10-CM

## 2023-02-24 DIAGNOSIS — J45.909 UNSPECIFIED ASTHMA, UNCOMPLICATED: ICD-10-CM

## 2023-02-24 DIAGNOSIS — Z90.49 ACQUIRED ABSENCE OF OTHER SPECIFIED PARTS OF DIGESTIVE TRACT: Chronic | ICD-10-CM

## 2023-02-24 PROCEDURE — 99284 EMERGENCY DEPT VISIT MOD MDM: CPT

## 2023-02-24 PROCEDURE — 96374 THER/PROPH/DIAG INJ IV PUSH: CPT

## 2023-02-24 PROCEDURE — 99284 EMERGENCY DEPT VISIT MOD MDM: CPT | Mod: 25

## 2023-02-24 PROCEDURE — 99213 OFFICE O/P EST LOW 20 MIN: CPT

## 2023-02-24 RX ORDER — CEFAZOLIN SODIUM 1 G
1000 VIAL (EA) INJECTION ONCE
Refills: 0 | Status: COMPLETED | OUTPATIENT
Start: 2023-02-24 | End: 2023-02-24

## 2023-02-24 RX ADMIN — Medication 100 MILLIGRAM(S): at 10:30

## 2023-02-24 NOTE — CONSULT NOTE ADULT - SUBJECTIVE AND OBJECTIVE BOX
86yo F w h/o HTN, HLD, Paroxysmal AFib (on amio and eliquis), Cardiac Pauses s/p loop recorder, Idiopathic Pericarditis, Mitral Regurg,  Hypothyroid, SLE, GERD, Asthma, Diverticulitis, Anxiety, Dementia presents for RLE wound.     PAST MEDICAL & SURGICAL HISTORY:  - Hypothyroid  - UTI (urinary tract infection)  - Atrial fibrillation, unspecified type - no longer in atrial fibrillation  - Anxiety  - Mild intermittent asthma, unspecified whether complicated - no longer present  - Cellulitis of finger, unspecified laterality, resolved  - Diverticulitis, resolved  - Reflux gastritis  - Mitral valve insufficiency, unspecified etiology  - Idiopathic pericarditis, unspecified chronicity  - History of appendectomy  - Status post placement of implantable loop recorder    Allergies  - Sulfa drugs (Unknown)    Vital Signs Last 24 Hrs  T(C): 36.6 (24 Feb 2023 07:59), Max: 36.6 (24 Feb 2023 07:59)  T(F): 97.9 (24 Feb 2023 07:59), Max: 97.9 (24 Feb 2023 07:59)  HR: 82 (24 Feb 2023 07:59) (82 - 82)  BP: 135/62 (24 Feb 2023 07:59) (135/62 - 135/62)  RR: 20 (24 Feb 2023 07:59) (20 - 20)  SpO2: 99% (24 Feb 2023 07:59) (99% - 99%)    O2 Parameters below as of 24 Feb 2023 07:59  Patient On (Oxygen Delivery Method): room air    PHYSICAL EXAM:  GENERAL: Patient lying in ED stretcher in NAD, well-developed  HEAD:  Atraumatic, Normocephalic  CHEST/LUNG: Clear to auscultation bilaterally; No wheeze  HEART: In no cardiopulmonary distress  SKIN:   RLE: Small ~0.5cm 0.5 wound with scabbing appreciated over lateral malleolus, mild erythema. Full ROM. No swelling, pain on palpation, malodor, drainage, purulence or fluctuance noted.

## 2023-02-24 NOTE — CONSULT NOTE ADULT - ASSESSMENT
88yo F w h/o HTN, HLD, Paroxysmal AFib (on amio and eliquis), Cardiac Pauses s/p loop recorder, Idiopathic Pericarditis, Mitral Regurg,  Hypothyroid, SLE, GERD, Asthma, Diverticulitis, Anxiety, Dementia presents for RLE wound.     RLE wound  - No surgical intervention  - Patient can continue with LWC. Wash with soap and water. Apply Allevyn pad.   - Please give one time dose of Cefazolin IV while in ED

## 2023-02-24 NOTE — ED PROVIDER NOTE - CARE PROVIDER_API CALL
Beto Pride)  Plastic Surgery  [address] [place] [address]  Phone: [phone]  Fax: [phone]  Follow Up Time:

## 2023-02-24 NOTE — ED PROVIDER NOTE - PHYSICAL EXAMINATION
CONSTITUTIONAL: Well-appearing; well-nourished; in no apparent distress.   HEAD: Normocephalic; atraumatic.   EYES: PERRL; EOM intact. Conjunctiva normal B/L.   ENT: Normal pharynx with no tonsillar hypertrophy. MMM.  NECK: Supple; non-tender; no cervical lymphadenopathy.   CHEST: Normal chest excursion with respiration.   CARDIOVASCULAR: Normal S1, S2; no murmurs, rubs, or gallops.   RESPIRATORY: Normal chest excursion with respiration; breath sounds clear and equal bilaterally; no wheezes, rhonchi, or rales.  GI/: Normal bowel sounds; non-distended; non-tender.  BACK: No evidence of trauma or deformity. Non-tender to palpation. No CVA tenderness.   EXT: R ankle- 1 cm ulceration over right lateral malleolus.  Mild surrounding erythema.  Positive tenderness.  No fluctuance or crepitus.  No discharge.  No foul-smelling discharge.  2+ pedal pulses bilaterally.

## 2023-02-24 NOTE — CONSULT NOTE ADULT - NS ATTEND AMEND GEN_ALL_CORE FT
Patient seen in ED. Brought in/ accompanied by friend/ who reports that pt is experiencing pain and was unable to walk this morning   Seen in outpt office yesterday for chronic wound and currently prescribed po abx    Patient currently denies any pain and is in no distress    Right lateral ankle-0.5 x 0.5 cm wound with 1 cm surrounding erythema ( reportedly unchanged from last eval) -   No drainage; no tenderness    Site washed - dressing applied   Outpt f/u   Per accompanying friend patient is scheduled to be transferred to [facility] long-term care [facility] Patient seen in ED. Brought in/ accompanied by friend/ who reports that pt is experiencing pain and was unable to walk this morning   Seen in outpt office yesterday for chronic wound and currently prescribed po abx    Patient currently denies any pain and is in no distress    Right lateral ankle-0.5 x 0.5 cm wound with 1 cm surrounding erythema ( reportedly unchanged from last eval) -   No drainage; no tenderness    Site washed - dressing applied   Outpt f/u   Per accompanying friend patient is scheduled to be transferred to [facility] long-term care facility where her medical issues will be addressed.

## 2023-02-24 NOTE — ED PROVIDER NOTE - OBJECTIVE STATEMENT
87-year-old female past medical history significant for hypertension, dyslipidemia, PAF on Eliquis, hypothyroidism, SLE, GERD, asthma–resolved, anxiety, dementia, chronic right ankle ulceration, well-known to the burn team, brought to the ED by one of her fellow nuns with worsening pain and mild redness over the right ankle ulceration.  Patient was seen by Dr. Pride 2 days ago and is currently on p.o. Keflex, however reports symptoms are worsening.  No fever, chills.  No nausea, vomiting, weakness or dizziness.  Patient with no other complaints.

## 2023-02-24 NOTE — ED PROVIDER NOTE - CLINICAL SUMMARY MEDICAL DECISION MAKING FREE TEXT BOX
87-year-old female past medical hx as documented with chronic R ankle ulcer with worsening pain.  Patient is currently on p.o. antibiotics.  Burn consulted, as she is well-known to their service, and recommended a dose of IV antibiotics and continuation of oral antibiotics and outpatient.  Patient given IV antibiotics and will follow-up with burn as an outpatient.

## 2023-02-24 NOTE — ED PROVIDER NOTE - PATIENT PORTAL LINK FT
You can access the FollowMyHealth Patient Portal offered by [facility] by registering at the following website: [email or web address]. By joining [facility]’s FollowMyHealth portal, you will also be able to view your health information using other applications (apps) compatible with our system.

## 2023-03-02 ENCOUNTER — NON-APPOINTMENT (OUTPATIENT)
Age: 88
End: 2023-03-02

## 2023-03-07 ENCOUNTER — APPOINTMENT (OUTPATIENT)
Dept: CARDIOLOGY | Facility: CLINIC | Age: 88
End: 2023-03-07

## 2023-03-14 ENCOUNTER — APPOINTMENT (OUTPATIENT)
Dept: CARDIOLOGY | Facility: CLINIC | Age: 88
End: 2023-03-14

## 2023-03-15 NOTE — ED ADULT NURSE NOTE - TEMPLATE LIST FOR HEAD TO TOE ASSESSMENT
CONST: Well appearing in NAD  EYES: PERRL, EOMI, Sclera and conjunctiva clear.   ENT: No nasal discharge. TM's clear B/L without drainage. Oropharynx normal appearing, no erythema or exudates. Uvula midline.  NECK: Non-tender, no meningeal signs  CARD: Normal S1 S2; Normal rate and rhythm  RESP: Equal BS B/L, No wheezes, rhonchi or rales. No distress  GI: Soft, non-tender, non-distended.  MS: Normal ROM in all extremities. No midline spinal tenderness.  SKIN: Warm, dry, no acute rashes. Good turgor  NEURO: A&Ox3, No focal deficits. Strength 5/5 with no sensory deficits. Steady gait Wounds

## 2023-03-17 ENCOUNTER — NON-APPOINTMENT (OUTPATIENT)
Age: 88
End: 2023-03-17

## 2023-03-17 ENCOUNTER — APPOINTMENT (OUTPATIENT)
Dept: CARDIOLOGY | Facility: CLINIC | Age: 88
End: 2023-03-17
Payer: MEDICARE

## 2023-03-17 PROCEDURE — G2066: CPT

## 2023-03-17 PROCEDURE — 93298 REM INTERROG DEV EVAL SCRMS: CPT

## 2023-03-21 ENCOUNTER — APPOINTMENT (OUTPATIENT)
Dept: CARDIOLOGY | Facility: CLINIC | Age: 88
End: 2023-03-21

## 2023-04-11 ENCOUNTER — APPOINTMENT (OUTPATIENT)
Dept: PULMONOLOGY | Facility: CLINIC | Age: 88
End: 2023-04-11

## 2023-04-20 ENCOUNTER — NON-APPOINTMENT (OUTPATIENT)
Age: 88
End: 2023-04-20

## 2023-04-20 ENCOUNTER — APPOINTMENT (OUTPATIENT)
Dept: CARDIOLOGY | Facility: CLINIC | Age: 88
End: 2023-04-20
Payer: MEDICARE

## 2023-04-20 PROCEDURE — G2066: CPT

## 2023-04-20 PROCEDURE — 93298 REM INTERROG DEV EVAL SCRMS: CPT

## 2023-05-23 ENCOUNTER — APPOINTMENT (OUTPATIENT)
Dept: NEUROLOGY | Facility: CLINIC | Age: 88
End: 2023-05-23

## 2023-05-25 ENCOUNTER — NON-APPOINTMENT (OUTPATIENT)
Age: 88
End: 2023-05-25

## 2023-05-25 ENCOUNTER — APPOINTMENT (OUTPATIENT)
Dept: CARDIOLOGY | Facility: CLINIC | Age: 88
End: 2023-05-25
Payer: MEDICARE

## 2023-05-25 PROCEDURE — 93298 REM INTERROG DEV EVAL SCRMS: CPT

## 2023-05-25 PROCEDURE — G2066: CPT

## 2023-06-29 ENCOUNTER — NON-APPOINTMENT (OUTPATIENT)
Age: 88
End: 2023-06-29

## 2023-06-29 ENCOUNTER — APPOINTMENT (OUTPATIENT)
Dept: CARDIOLOGY | Facility: CLINIC | Age: 88
End: 2023-06-29
Payer: MEDICARE

## 2023-06-29 PROCEDURE — 93298 REM INTERROG DEV EVAL SCRMS: CPT

## 2023-06-29 PROCEDURE — G2066: CPT

## 2023-08-03 ENCOUNTER — APPOINTMENT (OUTPATIENT)
Dept: CARDIOLOGY | Facility: CLINIC | Age: 88
End: 2023-08-03
Payer: MEDICARE

## 2023-08-03 ENCOUNTER — NON-APPOINTMENT (OUTPATIENT)
Age: 88
End: 2023-08-03

## 2023-08-03 PROCEDURE — 93298 REM INTERROG DEV EVAL SCRMS: CPT

## 2023-08-03 PROCEDURE — G2066: CPT

## 2023-09-05 ENCOUNTER — NON-APPOINTMENT (OUTPATIENT)
Age: 88
End: 2023-09-05

## 2023-09-05 ENCOUNTER — APPOINTMENT (OUTPATIENT)
Dept: CARDIOLOGY | Facility: CLINIC | Age: 88
End: 2023-09-05
Payer: MEDICARE

## 2023-09-05 PROCEDURE — G2066: CPT

## 2023-09-05 PROCEDURE — 93298 REM INTERROG DEV EVAL SCRMS: CPT

## 2023-09-20 NOTE — ED ADULT NURSE NOTE - PAIN RATING/NUMBER SCALE (0-10): ACTIVITY
Value Date/Time    COVID19 Not Detected 04/02/2021 12:24 PM           Anesthesia Evaluation    Airway: Mallampati: II  TM distance: >3 FB   Neck ROM: full  Mouth opening: > = 3 FB   Dental: normal exam         Pulmonary: breath sounds clear to auscultation  (+) shortness of breath:  sleep apnea:                             Cardiovascular:    (+) hypertension:, CHF:,         Rhythm: regular  Rate: normal                    Neuro/Psych:   (+) neuromuscular disease:,             GI/Hepatic/Renal: Neg GI/Hepatic/Renal ROS            Endo/Other:    (+) : arthritis:., .                 Abdominal:             Vascular: Other Findings:           Anesthesia Plan      MAC     ASA 3             Anesthetic plan and risks discussed with patient.       Plan discussed with CRNA and surgical team.    Attending anesthesiologist reviewed and agrees with Preprocedure content                Suzanne Miranda MD   9/20/2023 4

## 2023-10-09 ENCOUNTER — NON-APPOINTMENT (OUTPATIENT)
Age: 88
End: 2023-10-09

## 2023-10-09 ENCOUNTER — APPOINTMENT (OUTPATIENT)
Dept: CARDIOLOGY | Facility: CLINIC | Age: 88
End: 2023-10-09
Payer: MEDICARE

## 2023-10-10 PROCEDURE — 93298 REM INTERROG DEV EVAL SCRMS: CPT

## 2023-10-10 PROCEDURE — G2066: CPT

## 2023-11-06 NOTE — H&P ADULT - NSHPPOADEEPVENOUSTHROMB_GEN_A_CORE
Pulmonary and Critical Care Medicine Progress Note    ADMISSION DATE:  11/3/2023  DATE:  11/6/2023  CURRENT HOSPITAL DAY:  Hospital Day: 4  ATTENDING PHYSICIAN:  Fabrice Mejia MD  CODE STATUS:  Full Resuscitation  Tracy Anand  [id number]  1946        SUBJECTIVE:  Patient is laying in bed, no acute distress.   is aiding in history.  Saturating well on 4L.    MEDICATIONS:  SCHEDULED MEDS:  Current Facility-Administered Medications   Medication Dose Route Frequency Provider Last Rate Last Admin    sodium chloride 0.9 % injection 2 mL  2 mL Intracatheter 2 times per day Melvin Celaya MD   2 mL at 11/06/23 0843    PARoxetine (PAXIL) tablet 20 mg  20 mg Oral Daily Melvin Celaya MD   20 mg at 11/06/23 0842    donepezil (ARICEPT) tablet 5 mg  5 mg Oral Nightly Melvin Celaya MD   5 mg at 11/05/23 2018    rosuvastatin (CRESTOR) tablet 20 mg  20 mg Oral Nightly Melvin Celaya MD   20 mg at 11/05/23 2018    Potassium Standard Replacement Protocol (Levels 3.5 and lower)   Does not apply See Admin Instructions Melvin Celaya MD        Magnesium Standard Replacement Protocol   Does not apply See Admin Instructions Melvin Celaya MD        Phosphorus Standard Replacement Protocol   Does not apply See Admin Instructions Melvin Celaya MD        enoxaparin (LOVENOX) injection 40 mg  40 mg Subcutaneous Daily Melvin Celaya MD   40 mg at 11/06/23 0843       CONTINUOUS INFUSIONS:  Current Facility-Administered Medications   Medication Dose Route Frequency Provider Last Rate Last Admin       PRN MEDS:    Current Facility-Administered Medications   Medication Dose Route Frequency Provider Last Rate Last Admin    sodium chloride 0.9 % flush bag 25 mL  25 mL Intravenous PRN Melvin Celaya MD        sodium chloride (NORMAL SALINE) 0.9 % bolus 500 mL  500 mL Intravenous PRN Melvin Celaya MD        nitroGLYCERIN (NITROSTAT) sublingual tablet 0.4 mg  0.4 mg Sublingual Q5 Min PRN Belia  Melvin HAWKINS MD        melatonin tablet 3 mg  3 mg Oral Nightly PRN Melvin Celaya MD   3 mg at 11/05/23 2217    ondansetron (ZOFRAN) injection 4 mg  4 mg Intravenous Q6H PRN Melvin Celaya MD        acetaminophen (TYLENOL) tablet 650 mg  650 mg Oral Q4H PRN Melvin Celaya MD   650 mg at 11/05/23 2217    polyethylene glycol (MIRALAX) packet 17 g  17 g Oral Daily PRN Melvin Celaya MD        docusate sodium-sennosides (SENOKOT S) 50-8.6 MG 2 tablet  2 tablet Oral Daily PRN Melvin Celaya MD        aluminum-magnesium hydroxide-simethicone (MAALOX) 200-200-20 MG/5ML suspension 30 mL  30 mL Oral Q4H PRN Melvin Celaya MD        sodium chloride 0.9 % flush bag 25 mL  25 mL Intravenous PRN Melvin Celaya MD        morphine injection 2 mg  2 mg Intravenous Q2H PRN Melvin Celaya MD             REVIEW OF SYSTEMS            Review of Systems   Unable to perform ROS: Dementia          OBJECTIVE:  VITAL SIGNS:  Vital Last Value 24 Hour Range   Temperature 98.6 °F (37 °C) (11/06/23 0512) Temp  Min: 98.1 °F (36.7 °C)  Max: 99.3 °F (37.4 °C)   Pulse 83 (11/06/23 0512) Pulse  Min: 72  Max: 85   Respiratory 16 (11/06/23 0512) Resp  Min: 16  Max: 18   Non-Invasive  Blood Pressure 115/75 (11/06/23 0512) BP  Min: 115/75  Max: 121/68   Pulse Oximetry 97 % (11/06/23 0512) SpO2  Min: 95 %  Max: 98 %     Vital Today Admitted   Weight 72 kg (158 lb 12.8 oz) (11/06/23 0600) Weight: 72.6 kg (160 lb) (11/03/23 1346)   Height N/A Height: 5' 6\" (167.6 cm) (11/03/23 1346)   BMI N/A BMI (Calculated): 25.82 (11/03/23 1346)         VENT SETTINGS LAST 24 HOURS:       HEMODYNAMIC SETTINGS LAST 24 HOURS:       PHYSICAL EXAMINATION:    General: Awake and alert.  No distress  Eyes:  PERRL, conjunctivae/corneas clear.  HEENT: No oral lesions.  Neck: Supple, symmetrical.  Trachea midline. No adenopathy.  Lungs: Diffuse velcro crackles, no tachypnea or retractions  Heart:: Regular rate and rhythm. No murmur, rub or  gallop.  Abdomen:  Soft, nontender.  Bowel sounds present. No masses or hepatomegaly or splenomegaly.  Extremities:  No clubbing, cyanosis, edema    LABORATORY DATA:    Labs   Recent Labs   Lab 11/06/23  0610 11/05/23  0559 11/04/23  0559   WBC 14.9* 9.8 10.3   HCT 40.3 40.4 39.1   HGB 13.1 13.3 13.0    291 304   SODIUM 138 139 141   POTASSIUM 3.6 3.7 3.9   CHLORIDE 100 101 104   CO2 30 30 29   GLUCOSE 144* 140* 134*   BUN 16 16 18   CREATININE 0.68 0.73 0.77   RESR  --   --  56*        IMAGING STUDIES:    XR CHEST PA AND LATERAL 2 VIEWS    Result Date: 11/6/2023  EXAMINATION: XR CHEST PA AND LATERAL 2 VIEWS. CLINICAL INDICATION: leukocytosis, short of breath. COMPARISON: November 3. PA lateral views of the chest were obtained. Bilateral infiltrates are seen. These are more prominent mostly in the right upper lobe and left lower lobe. These are denser compared to previous study. Small left effusion is appreciated. Heart and mediastinum are stable. There is no pneumothorax.     Worsening bilateral pneumonia. Small left effusion. Electronically Signed by: DIONE NJ MD Signed on: 11/6/2023 11:01 AM Workstation ID: [id number]        IMPRESSION:   1.  Hypoxic respiratory failure secondary to:  2.  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  Elevated troponins likely due to hypoxemia, supply demand mismatch  4.  Alzheimer's dementia  5.  Dyslipidemia  6.  Depression  7.  Urinary retention       PLAN:   Patient was following up with outpatient pulmonary prior to admission, Dr. Barclay  Patient was instructed to complete outpatient PFT, HRCT  RA factor negative, ESR elevated at 56, FADY pending  Negative procal  Negative for flu, covid and RSV    Will require oxygen at DC    Pending above work up may require anti fibrotic agent    May require placement due to high oxygen needs    Lasix per cardiology      On 11/6/2023, Aspen RUBIO APNP scribed the services  personally performed by INDIO Pfeiffer  The documentation recorded by the scribe accurately and completely reflects the service(s) I, Dr. Luther Donahue MD, personally performed and the decisions made by me.      no

## 2023-11-13 ENCOUNTER — APPOINTMENT (OUTPATIENT)
Dept: CARDIOLOGY | Facility: CLINIC | Age: 88
End: 2023-11-13

## 2023-12-01 NOTE — H&P ADULT - NS ATTEND BILL GEN_ALL_CORE
Attending to bill Patient requests all Lab, Cardiology, and Radiology Results on their Discharge Instructions

## 2024-01-04 ENCOUNTER — APPOINTMENT (OUTPATIENT)
Dept: PULMONOLOGY | Facility: CLINIC | Age: 89
End: 2024-01-04

## 2024-11-01 NOTE — ED PROVIDER NOTE - NS ED MD EM SELECTION
Pt has liver and CKD. He cannot take colchcine. Her FBG this mornin. His BG now: 159. His highest BG in the past 24 hours: <250 per pt hx. Hypoglycemia: none. He will increase his tresiba to 95 units per day vs 90 units daily x 5 days before returning to the 90 units per day dosing.     Please add him to my schedule for a VV at 1:40pm on Monday         Dr. Blanca Alvarado  [facility] [address], [address]  [place] [address]  Phone: [phone]  Fax: [phone]         [id number] [facility]

## 2025-03-04 NOTE — ED ADULT NURSE NOTE - CAS DISCH TRANSFER METHOD
She can't keep anything down, not even water.   Mom wants to know what she can give her so she won't dehydrate    Private car

## 2025-05-06 NOTE — CONSULT NOTE ADULT - ASSESSMENT
Patient is a 87y old  Female who presents with a chief complaint of right ankle wound pain.     Right ankle wound  - Patient can continue with LWC. Wash with soap and water. Apply Allevyn pad.   - Ordered Xray of RLE - f/u  - Stat dose of ABx  - If Xray and labs are unremarkable can dc with PO abx and have patient f/u in Burn clinic in 1 week  --> Burn Clinic  - Phone: [phone]   - Address: [address] [place] [address] on Tuesdays (2-4pm) or Thursdays (9am-1pm). 
No

## (undated) RX ORDER — ERYTHROMYCIN 5 MG/G
1/4 OINTMENT OPHTHALMIC
Start: 2022-03-21